# Patient Record
Sex: FEMALE | Race: WHITE | NOT HISPANIC OR LATINO | URBAN - METROPOLITAN AREA
[De-identification: names, ages, dates, MRNs, and addresses within clinical notes are randomized per-mention and may not be internally consistent; named-entity substitution may affect disease eponyms.]

---

## 2019-12-19 ENCOUNTER — TRANSCRIBE ORDERS (OUTPATIENT)
Dept: ADMINISTRATIVE | Facility: HOSPITAL | Age: 49
End: 2019-12-19

## 2019-12-19 ENCOUNTER — TELEPHONE (OUTPATIENT)
Dept: SURGICAL ONCOLOGY | Facility: CLINIC | Age: 49
End: 2019-12-19

## 2019-12-19 ENCOUNTER — APPOINTMENT (OUTPATIENT)
Dept: LAB | Facility: HOSPITAL | Age: 49
End: 2019-12-19
Payer: COMMERCIAL

## 2019-12-19 ENCOUNTER — HOSPITAL ENCOUNTER (OUTPATIENT)
Dept: CT IMAGING | Facility: HOSPITAL | Age: 49
Discharge: HOME | End: 2019-12-19
Payer: COMMERCIAL

## 2019-12-19 DIAGNOSIS — R87.9 ABNORMAL TISSUE IN THE UTERUS: ICD-10-CM

## 2019-12-19 DIAGNOSIS — N93.9 VAGINAL BLEEDING: ICD-10-CM

## 2019-12-19 DIAGNOSIS — E34.9 ELEVATED SERUM HCG: ICD-10-CM

## 2019-12-19 DIAGNOSIS — E34.9 ELEVATED SERUM HCG: Primary | ICD-10-CM

## 2019-12-19 LAB
BUN SERPL-MCNC: 15 MG/DL (ref 5–25)
CREAT SERPL-MCNC: 0.77 MG/DL (ref 0.6–1.3)
GFR SERPL CREATININE-BSD FRML MDRD: 91 ML/MIN/1.73SQ M

## 2019-12-19 PROCEDURE — 36415 COLL VENOUS BLD VENIPUNCTURE: CPT

## 2019-12-19 PROCEDURE — 74177 CT ABD & PELVIS W/CONTRAST: CPT

## 2019-12-19 PROCEDURE — 82565 ASSAY OF CREATININE: CPT

## 2019-12-19 PROCEDURE — 71260 CT THORAX DX C+: CPT

## 2019-12-19 PROCEDURE — 84520 ASSAY OF UREA NITROGEN: CPT

## 2019-12-19 RX ADMIN — IOHEXOL 100 ML: 350 INJECTION, SOLUTION INTRAVENOUS at 17:00

## 2019-12-19 RX ADMIN — IOHEXOL 50 ML: 240 INJECTION, SOLUTION INTRATHECAL; INTRAVASCULAR; INTRAVENOUS; ORAL at 17:00

## 2019-12-19 NOTE — TELEPHONE ENCOUNTER
New Patient Encounter    New Patient Intake Form   Patient Details:  Anali Phillips  1970  [id number]    Background Information:  [address] starts by opening a telephone encounter and gathering the following information   Who is calling to schedule? If not self, relationship to patient? self   Referring Provider Tiburcio Saleem   What is the diagnosis? Elevated hcg levels/tissue mass   Is there any prior history of Cancer? No   If yes, please explain    When was the diagnosis? 12/2019   Is patient aware of diagnosis? Yes   Reason for visit? NP DX   Have you had any testing done? If so: when, where? Yes   Are records in EPIC? No  emailed   Was the patient told to bring a disk? no   Scheduling Information:   Preferred [place]:  Any     Requesting Specific Provider? no   Are there any dates/time the patient cannot be seen? no      Miscellaneous: na   After completing the above information, please route to Financial Counselor and the appropriate Nurse Navigator for review

## 2019-12-19 NOTE — PROGRESS NOTES
Patient to be seen as consult urgently on Friday 12/20/19, for abnormal vaginal bleeding, abnormal uterus on ultrasound imaging (with approximately 6 cm mass in the endometrium), as well as elevated HCG  Plan for STAT CT chest, abdomen and pelvis to rule out metastatic disease and assist with treatment planning

## 2019-12-20 ENCOUNTER — HOSPITAL ENCOUNTER (INPATIENT)
Facility: HOSPITAL | Age: 49
LOS: 3 days | Discharge: HOME | DRG: 754 | End: 2019-12-23
Attending: OBSTETRICS & GYNECOLOGY | Admitting: OBSTETRICS & GYNECOLOGY
Payer: COMMERCIAL

## 2019-12-20 ENCOUNTER — APPOINTMENT (INPATIENT)
Dept: RADIOLOGY | Facility: HOSPITAL | Age: 49
DRG: 754 | End: 2019-12-20
Payer: COMMERCIAL

## 2019-12-20 DIAGNOSIS — O01.9 GTD (GESTATIONAL TROPHOBLASTIC DISEASE): Primary | ICD-10-CM

## 2019-12-20 DIAGNOSIS — R91.8 PULMONARY NODULES/LESIONS, MULTIPLE: ICD-10-CM

## 2019-12-20 DIAGNOSIS — F41.9 ANXIETY: ICD-10-CM

## 2019-12-20 PROBLEM — E66.9 OBESITY: Status: ACTIVE | Noted: 2019-12-20

## 2019-12-20 LAB
ABO GROUP BLD: NORMAL
ALBUMIN SERPL BCP-MCNC: 3.5 G/DL (ref 3.5–5)
ALP SERPL-CCNC: 70 U/L (ref 46–116)
ALT SERPL W P-5'-P-CCNC: 39 U/L (ref 12–78)
ANION GAP SERPL CALCULATED.3IONS-SCNC: 6 MMOL/L (ref 4–13)
APTT PPP: 27 SECONDS (ref 23–37)
AST SERPL W P-5'-P-CCNC: 22 U/L (ref 5–45)
B-HCG SERPL-ACNC: ABNORMAL MIU/ML
BASOPHILS # BLD AUTO: 0.03 THOUSANDS/ΜL (ref 0–0.1)
BASOPHILS NFR BLD AUTO: 0 % (ref 0–1)
BILIRUB SERPL-MCNC: 0.67 MG/DL (ref 0.2–1)
BLD GP AB SCN SERPL QL: NEGATIVE
BUN SERPL-MCNC: 15 MG/DL (ref 5–25)
CALCIUM SERPL-MCNC: 9.4 MG/DL (ref 8.3–10.1)
CHLORIDE SERPL-SCNC: 108 MMOL/L (ref 100–108)
CO2 SERPL-SCNC: 24 MMOL/L (ref 21–32)
CREAT SERPL-MCNC: 0.89 MG/DL (ref 0.6–1.3)
EOSINOPHIL # BLD AUTO: 0.12 THOUSAND/ΜL (ref 0–0.61)
EOSINOPHIL NFR BLD AUTO: 1 % (ref 0–6)
ERYTHROCYTE [DISTWIDTH] IN BLOOD BY AUTOMATED COUNT: 14 % (ref 11.6–15.1)
GFR SERPL CREATININE-BSD FRML MDRD: 76 ML/MIN/1.73SQ M
GLUCOSE SERPL-MCNC: 91 MG/DL (ref 65–140)
HCT VFR BLD AUTO: 39 % (ref 34.8–46.1)
HGB BLD-MCNC: 13 G/DL (ref 11.5–15.4)
IMM GRANULOCYTES # BLD AUTO: 0.03 THOUSAND/UL (ref 0–0.2)
IMM GRANULOCYTES NFR BLD AUTO: 0 % (ref 0–2)
INR PPP: 0.99 (ref 0.84–1.19)
LYMPHOCYTES # BLD AUTO: 1.6 THOUSANDS/ΜL (ref 0.6–4.47)
LYMPHOCYTES NFR BLD AUTO: 18 % (ref 14–44)
MAGNESIUM SERPL-MCNC: 2.1 MG/DL (ref 1.6–2.6)
MCH RBC QN AUTO: 29.1 PG (ref 26.8–34.3)
MCHC RBC AUTO-ENTMCNC: 33.3 G/DL (ref 31.4–37.4)
MCV RBC AUTO: 87 FL (ref 82–98)
MONOCYTES # BLD AUTO: 0.41 THOUSAND/ΜL (ref 0.17–1.22)
MONOCYTES NFR BLD AUTO: 5 % (ref 4–12)
NEUTROPHILS # BLD AUTO: 6.79 THOUSANDS/ΜL (ref 1.85–7.62)
NEUTS SEG NFR BLD AUTO: 76 % (ref 43–75)
NRBC BLD AUTO-RTO: 0 /100 WBCS
PHOSPHATE SERPL-MCNC: 3.8 MG/DL (ref 2.7–4.5)
PLATELET # BLD AUTO: 263 THOUSANDS/UL (ref 149–390)
PMV BLD AUTO: 9.6 FL (ref 8.9–12.7)
POTASSIUM SERPL-SCNC: 4.1 MMOL/L (ref 3.5–5.3)
PROT SERPL-MCNC: 6.9 G/DL (ref 6.4–8.2)
PROTHROMBIN TIME: 12.7 SECONDS (ref 11.6–14.5)
RBC # BLD AUTO: 4.47 MILLION/UL (ref 3.81–5.12)
RH BLD: POSITIVE
SODIUM SERPL-SCNC: 138 MMOL/L (ref 136–145)
SPECIMEN EXPIRATION DATE: NORMAL
WBC # BLD AUTO: 8.98 THOUSAND/UL (ref 4.31–10.16)

## 2019-12-20 PROCEDURE — 86900 BLOOD TYPING SEROLOGIC ABO: CPT | Performed by: STUDENT IN AN ORGANIZED HEALTH CARE EDUCATION/TRAINING PROGRAM

## 2019-12-20 PROCEDURE — 86901 BLOOD TYPING SEROLOGIC RH(D): CPT | Performed by: STUDENT IN AN ORGANIZED HEALTH CARE EDUCATION/TRAINING PROGRAM

## 2019-12-20 PROCEDURE — 85610 PROTHROMBIN TIME: CPT | Performed by: STUDENT IN AN ORGANIZED HEALTH CARE EDUCATION/TRAINING PROGRAM

## 2019-12-20 PROCEDURE — 80053 COMPREHEN METABOLIC PANEL: CPT | Performed by: STUDENT IN AN ORGANIZED HEALTH CARE EDUCATION/TRAINING PROGRAM

## 2019-12-20 PROCEDURE — 86850 RBC ANTIBODY SCREEN: CPT | Performed by: STUDENT IN AN ORGANIZED HEALTH CARE EDUCATION/TRAINING PROGRAM

## 2019-12-20 PROCEDURE — 85025 COMPLETE CBC W/AUTO DIFF WBC: CPT | Performed by: STUDENT IN AN ORGANIZED HEALTH CARE EDUCATION/TRAINING PROGRAM

## 2019-12-20 PROCEDURE — 85730 THROMBOPLASTIN TIME PARTIAL: CPT | Performed by: STUDENT IN AN ORGANIZED HEALTH CARE EDUCATION/TRAINING PROGRAM

## 2019-12-20 PROCEDURE — A9585 GADOBUTROL INJECTION: HCPCS | Performed by: OBSTETRICS & GYNECOLOGY

## 2019-12-20 PROCEDURE — 70553 MRI BRAIN STEM W/O & W/DYE: CPT

## 2019-12-20 PROCEDURE — 84702 CHORIONIC GONADOTROPIN TEST: CPT | Performed by: STUDENT IN AN ORGANIZED HEALTH CARE EDUCATION/TRAINING PROGRAM

## 2019-12-20 PROCEDURE — 84100 ASSAY OF PHOSPHORUS: CPT | Performed by: STUDENT IN AN ORGANIZED HEALTH CARE EDUCATION/TRAINING PROGRAM

## 2019-12-20 PROCEDURE — 99221 1ST HOSP IP/OBS SF/LOW 40: CPT | Performed by: OBSTETRICS & GYNECOLOGY

## 2019-12-20 PROCEDURE — 83735 ASSAY OF MAGNESIUM: CPT | Performed by: STUDENT IN AN ORGANIZED HEALTH CARE EDUCATION/TRAINING PROGRAM

## 2019-12-20 RX ORDER — ONDANSETRON 2 MG/ML
4 INJECTION INTRAMUSCULAR; INTRAVENOUS EVERY 6 HOURS PRN
Status: DISCONTINUED | OUTPATIENT
Start: 2019-12-20 | End: 2019-12-23 | Stop reason: HOSPADM

## 2019-12-20 RX ORDER — SENNOSIDES 8.6 MG
1 TABLET ORAL
Status: DISCONTINUED | OUTPATIENT
Start: 2019-12-20 | End: 2019-12-23 | Stop reason: HOSPADM

## 2019-12-20 RX ORDER — DOCUSATE SODIUM 100 MG/1
100 CAPSULE, LIQUID FILLED ORAL 2 TIMES DAILY
Status: DISCONTINUED | OUTPATIENT
Start: 2019-12-21 | End: 2019-12-23 | Stop reason: HOSPADM

## 2019-12-20 RX ORDER — IBUPROFEN 600 MG/1
600 TABLET ORAL EVERY 6 HOURS PRN
Status: DISCONTINUED | OUTPATIENT
Start: 2019-12-20 | End: 2019-12-20

## 2019-12-20 RX ORDER — ACETAMINOPHEN 325 MG/1
650 TABLET ORAL EVERY 6 HOURS PRN
Status: DISCONTINUED | OUTPATIENT
Start: 2019-12-20 | End: 2019-12-20

## 2019-12-20 RX ORDER — POLYETHYLENE GLYCOL 3350 17 G/17G
17 POWDER, FOR SOLUTION ORAL DAILY
Status: DISCONTINUED | OUTPATIENT
Start: 2019-12-21 | End: 2019-12-23 | Stop reason: HOSPADM

## 2019-12-20 RX ORDER — DEXTROSE AND SODIUM CHLORIDE 5; .45 G/100ML; G/100ML
125 INJECTION, SOLUTION INTRAVENOUS CONTINUOUS
Status: DISCONTINUED | OUTPATIENT
Start: 2019-12-20 | End: 2019-12-20

## 2019-12-20 RX ADMIN — GADOBUTROL 14 ML: 604.72 INJECTION INTRAVENOUS at 21:06

## 2019-12-20 NOTE — QUICK NOTE
At this point, Madisyn Kendall does not require inpatient port placement, especially if this placement will delay discharge  At this time, PICC line may be placed to start emergent chemotherapy if necessary  Butch et  al  (CVIR 2019) showed in a multivariate retrospective analysis of nearly 6000 ports that inpatient status was the sole factor responsible for increased infection when accounting for many other variables, with close to a 5x increased risk of infection  Aliya Parkinson  (JVIR 2013) in a cohort of approximately 2000 port placements, found that there was nearly a 12x increased risk of port infections placed in the inpatient setting compared to the outpatient setting for all indications  Tomy Costa  al  (JVIR 2014) in a cohort of approximately 4000 port placements, also showed an increased prevalence of port infections within the first thirty days of placement specifically within the inpatient population  For these reasons, we will make every attempt to expedite an outpatient port placement  I will email IR scheduling who will reach out to patient for outpatient port placement time and place  Thank you for allowing me to participate in the care of Madisyn Kendall  Please don't hesitate to call, text, email, or TigerText with any questions

## 2019-12-20 NOTE — PLAN OF CARE
Problem: PAIN - ADULT  Goal: Verbalizes/displays adequate comfort level or baseline comfort level  Description  Interventions:  - Encourage patient to monitor pain and request assistance  - Assess pain using appropriate pain scale  - Administer analgesics based on type and severity of pain and evaluate response  - Implement non-pharmacological measures as appropriate and evaluate response  - Consider cultural and social influences on pain and pain management  - Notify physician/advanced practitioner if interventions unsuccessful or patient reports new pain  Outcome: Progressing     Problem: DISCHARGE PLANNING  Goal: Discharge to home or other facility with appropriate resources  Description  INTERVENTIONS:  - Identify barriers to discharge w/patient and caregiver  - Arrange for needed discharge resources and transportation as appropriate  - Identify discharge learning needs (meds, wound care, etc )  - Arrange for interpretive services to assist at discharge as needed  - Refer to Case Management Department for coordinating discharge planning if the patient needs post-hospital services based on physician/advanced practitioner order or complex needs related to functional status, cognitive ability, or social support system  Outcome: Progressing     Problem: Knowledge Deficit  Goal: Patient/family/caregiver demonstrates understanding of disease process, treatment plan, medications, and discharge instructions  Description  Complete learning assessment and assess knowledge base    Interventions:  - Provide teaching at level of understanding  - Provide teaching via preferred learning methods  Outcome: Progressing

## 2019-12-20 NOTE — H&P
History & Physical - GYN Oncology  Melita Devine 52 y o  female MRN: [id number]  Unit/Bed#: [facility] 910-01 Encounter: [id number]      Chief complaint:  "I have a mass in my abdomen"    HPI:  Melita Devine is a 52 y o  [id number] who presents following abdominal mass with pulmonary nodules and elevated bHCG > 506137 consistent with GTD  Felice Rivera reports she has had very regular menses since her ablation 10 years ago and had an episode of heavy vaginal bleeding which prompted her to take a urine pregnancy test that was negative and present for evaluation at her OBGYN office  She was subsequently sent for a transvaginal ultrasound which noted an intrauterine mass and was sent as a direct admission given her concern for advanced GTD  She reports she continues to have vaginal bleeding that is heavier on some days needing to change her pad 3 times  She reports breast tenderness and abdominal cramping she rates a 5/10 that is constant  She denies any light headedness, dizziness, visual changes, parasthesias, weakness, chest pain, shortness of breath or hemoptosis  She has no difficulty voiding or having BM  Felice Rivera has an obstetrical history that is significant for 1  section in  with 2 SAB that were managed surgically by [facility]  and a more recent history in 2016 of a spontaneous  that passed  She was noted to have a bHCG of 584 in 2016 at her ED visit and her uterus showed a complex endometrial lining measuring 7mm with no IUP identified  Per patient she had a documented IUP that size was not equalling to dates prior  She did not follow up per recommendations to trend her bHCG to 0  Felice Rivera denies any significant medical history  She reports a surgical history significant for "partial thyroidectomy" when she was 16, a  section endometrial ablation and 2 D&C surgeries  She reports remote tobacco use "many years ago" socially, but denies any recent smoking   She denies any illegal drug use and reports social alcohol use  Active Problems:  Patient Active Problem List   Diagnosis    GTD (gestational trophoblastic disease)    Obesity     PMH:  Past Medical History:   Diagnosis Date    Obesity      PSH:  Past Surgical History:   Procedure Laterality Date     SECTION  2003    DILATION AND CURETTAGE OF UTERUS      X2    ENDOMETRIAL ABLATION  2009    THYROIDECTOMY, PARTIAL       Social Hx:  Social and rare alcohol use  No current tobacco use  No illicit drug use    OB Hx:  OB History    Para Term  AB Living   4 1 1 0 3 1   SAB TAB Ectopic Multiple Live Births   0 0 0 0 1      # Outcome Date GA Lbr French/2nd Weight Sex Delivery Anes PTL Lv   4 AB            3 AB            2 AB            1 Term                Meds:  Current Facility-Administered Medications on File Prior to Encounter   Medication Dose Route Frequency Provider Last Rate Last Dose    [COMPLETED] iohexol (OMNIPAQUE) 240 MG/ML solution 50 mL  50 mL Oral Once in imaging Trinidad Wynne PA-C   50 mL at 19 1700    [COMPLETED] iohexol (OMNIPAQUE) 350 MG/ML injection (SINGLE-DOSE) 100 mL  100 mL Intravenous Once in imaging Trinidad Wynne PA-C   100 mL at 19 1700     No current outpatient medications on file prior to encounter  Allergies: Allergies   Allergen Reactions    Penicillins Fever       Physical Exam:  /83   Pulse 82   Temp 98 8 °F (37 1 °C)   Resp 18   SpO2 100%     Physical Exam   Constitutional: She is oriented to person, place, and time  She appears well-developed and well-nourished  No distress  Obese  female   HENT:   Head: Normocephalic and atraumatic  Neck: Normal range of motion  Neck supple  Cardiovascular: Normal rate and regular rhythm  Exam reveals no gallop and no friction rub  No murmur heard  Pulmonary/Chest: Effort normal  No respiratory distress  She has no wheezes  She has no rales  Abdominal: Soft  She exhibits mass  There is tenderness   There is no rebound and no guarding  Obese, diffuse mild lower abdominal tenderness   Lymphadenopathy:     She has no cervical adenopathy  Neurological: She is alert and oriented to person, place, and time  Skin: Skin is warm and dry  No rash noted  She is not diaphoretic  No erythema  No pallor  Psychiatric: She has a normal mood and affect  Her behavior is normal  Judgment and thought content normal    Vitals reviewed  Labs:  Recent Results (from the past 6 hour(s))   CBC and differential    Collection Time: 12/20/19  3:27 PM   Result Value Ref Range    WBC 8 98 4 31 - 10 16 Thousand/uL    RBC 4 47 3 81 - 5 12 Million/uL    Hemoglobin 13 0 11 5 - 15 4 g/dL    Hematocrit 39 0 34 8 - 46 1 %    MCV 87 82 - 98 fL    MCH 29 1 26 8 - 34 3 pg    MCHC 33 3 31 4 - 37 4 g/dL    RDW 14 0 11 6 - 15 1 %    MPV 9 6 8 9 - 12 7 fL    Platelets 888 669 - 858 Thousands/uL    nRBC 0 /100 WBCs    Neutrophils Relative 76 (H) 43 - 75 %    Immat GRANS % 0 0 - 2 %    Lymphocytes Relative 18 14 - 44 %    Monocytes Relative 5 4 - 12 %    Eosinophils Relative 1 0 - 6 %    Basophils Relative 0 0 - 1 %    Neutrophils Absolute 6 79 1 85 - 7 62 Thousands/µL    Immature Grans Absolute 0 03 0 00 - 0 20 Thousand/uL    Lymphocytes Absolute 1 60 0 60 - 4 47 Thousands/µL    Monocytes Absolute 0 41 0 17 - 1 22 Thousand/µL    Eosinophils Absolute 0 12 0 00 - 0 61 Thousand/µL    Basophils Absolute 0 03 0 00 - 0 10 Thousands/µL       Assessment/ Plan:   52 y o  [id number] with at least stage III 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  Patient has an 11 1 x 7 6 x 12 1 cm necrotic lesion in uterus  According to the [facility] Prognostic Scoring System of GTD she qualifies as high risk GTD with a score of >7 (currently calculating 14 without imaging of head to assess for brain metastases)  Patient is to be admitted for further staging and to begin treatment  Plan:   1   Gestational trophoblastic disease   - CT C/A/P 12/19/19: 11 1 x 7 5 x 12 1 cm necrotic intrauterine mass, multiple scattered pulmonary nodules bilaterally concerning for metastatic disease, no liver lesions noted  - bHCG 12/17/19 > 400,000   - Elevated TSH at 7 4, T4 1 0   - Will obtain, CBC, CMP to assess liver and kidney function, magnesium, Phos, type and screen and coags   - Will obtain MRI w/ and w/o contrast of brain to r/o brain metastases   - PICC line placement to be completed in AM   - Plan for EMA/CO chemotherapy treatment given the high risk nature of patient's GTD and therefore elevated risk of occurrence- Plan for Etoposide, Methotrexate and Dactinomycin with Leucovorin while inpatient for inpatient  Ultimately she will receive this treatment every 2 weeks until her bHCG returns to normal level with an additional 2 weeks following  Risks, benefits and alternatives discussed with patient this evening by Dr Mena Horton regarding treatment  2  FEN: Regular diet    3   DVT Ppx: Lovenox 40mg SubQ daily, SCDs    Discussed with Dr Sailaja Cedeno MD  12/20/19

## 2019-12-21 PROCEDURE — C1751 CATH, INF, PER/CENT/MIDLINE: HCPCS

## 2019-12-21 PROCEDURE — 36569 INSJ PICC 5 YR+ W/O IMAGING: CPT

## 2019-12-21 PROCEDURE — 99232 SBSQ HOSP IP/OBS MODERATE 35: CPT | Performed by: OBSTETRICS & GYNECOLOGY

## 2019-12-21 PROCEDURE — 02HV33Z INSERTION OF INFUSION DEVICE INTO SUPERIOR VENA CAVA, PERCUTANEOUS APPROACH: ICD-10-PCS | Performed by: OBSTETRICS & GYNECOLOGY

## 2019-12-21 PROCEDURE — 3E04305 INTRODUCTION OF OTHER ANTINEOPLASTIC INTO CENTRAL VEIN, PERCUTANEOUS APPROACH: ICD-10-PCS | Performed by: OBSTETRICS & GYNECOLOGY

## 2019-12-21 RX ORDER — ACETAMINOPHEN 325 MG/1
650 TABLET ORAL EVERY 6 HOURS PRN
Status: DISCONTINUED | OUTPATIENT
Start: 2019-12-21 | End: 2019-12-23 | Stop reason: HOSPADM

## 2019-12-21 RX ORDER — SODIUM CHLORIDE 9 MG/ML
125 INJECTION, SOLUTION INTRAVENOUS CONTINUOUS
Status: DISCONTINUED | OUTPATIENT
Start: 2019-12-21 | End: 2019-12-22

## 2019-12-21 RX ORDER — IBUPROFEN 600 MG/1
600 TABLET ORAL EVERY 6 HOURS PRN
Status: DISCONTINUED | OUTPATIENT
Start: 2019-12-21 | End: 2019-12-23 | Stop reason: HOSPADM

## 2019-12-21 RX ORDER — SODIUM CHLORIDE 9 MG/ML
20 INJECTION, SOLUTION INTRAVENOUS ONCE AS NEEDED
Status: DISCONTINUED | OUTPATIENT
Start: 2019-12-21 | End: 2019-12-23 | Stop reason: HOSPADM

## 2019-12-21 RX ORDER — OXYCODONE HYDROCHLORIDE 5 MG/1
5 TABLET ORAL EVERY 4 HOURS PRN
Status: DISCONTINUED | OUTPATIENT
Start: 2019-12-21 | End: 2019-12-23 | Stop reason: HOSPADM

## 2019-12-21 RX ORDER — PALONOSETRON 0.05 MG/ML
0.25 INJECTION, SOLUTION INTRAVENOUS ONCE
Status: COMPLETED | OUTPATIENT
Start: 2019-12-21 | End: 2019-12-21

## 2019-12-21 RX ORDER — METHOTREXATE 25 MG/ML
100 INJECTION, SOLUTION INTRA-ARTERIAL; INTRAMUSCULAR; INTRAVENOUS ONCE
Status: COMPLETED | OUTPATIENT
Start: 2019-12-21 | End: 2019-12-21

## 2019-12-21 RX ORDER — BISACODYL 10 MG
10 SUPPOSITORY, RECTAL RECTAL DAILY PRN
Status: DISCONTINUED | OUTPATIENT
Start: 2019-12-21 | End: 2019-12-23 | Stop reason: HOSPADM

## 2019-12-21 RX ADMIN — SENNOSIDES 8.6 MG: 8.6 TABLET, FILM COATED ORAL at 21:54

## 2019-12-21 RX ADMIN — POLYETHYLENE GLYCOL 3350 17 G: 17 POWDER, FOR SOLUTION ORAL at 09:43

## 2019-12-21 RX ADMIN — ETOPOSIDE 243 MG: 20 INJECTION, SOLUTION, CONCENTRATE INTRAVENOUS at 14:54

## 2019-12-21 RX ADMIN — ENOXAPARIN SODIUM 40 MG: 40 INJECTION SUBCUTANEOUS at 09:43

## 2019-12-21 RX ADMIN — SODIUM CHLORIDE 125 ML/HR: 0.9 INJECTION, SOLUTION INTRAVENOUS at 11:51

## 2019-12-21 RX ADMIN — IBUPROFEN 600 MG: 600 TABLET ORAL at 09:42

## 2019-12-21 RX ADMIN — DOCUSATE SODIUM 100 MG: 100 CAPSULE, LIQUID FILLED ORAL at 17:15

## 2019-12-21 RX ADMIN — DACTINOMYCIN 500 MCG: 0.5 INJECTION, POWDER, LYOPHILIZED, FOR SOLUTION INTRAVENOUS at 16:00

## 2019-12-21 RX ADMIN — METHOTREXATE 485 MG: 25 INJECTION INTRA-ARTERIAL; INTRAMUSCULAR; INTRATHECAL; INTRAVENOUS at 16:42

## 2019-12-21 RX ADMIN — IBUPROFEN 600 MG: 600 TABLET ORAL at 21:54

## 2019-12-21 RX ADMIN — DOCUSATE SODIUM 100 MG: 100 CAPSULE, LIQUID FILLED ORAL at 09:42

## 2019-12-21 RX ADMIN — METHOTREXATE 242.5 MG: 25 INJECTION INTRA-ARTERIAL; INTRAMUSCULAR; INTRATHECAL; INTRAVENOUS at 16:41

## 2019-12-21 RX ADMIN — SODIUM CHLORIDE 125 ML/HR: 0.9 INJECTION, SOLUTION INTRAVENOUS at 21:02

## 2019-12-21 RX ADMIN — DEXAMETHASONE SODIUM PHOSPHATE 10 MG: 10 INJECTION, SOLUTION INTRAMUSCULAR; INTRAVENOUS at 14:20

## 2019-12-21 RX ADMIN — PALONOSETRON HYDROCHLORIDE 0.25 MG: 0.25 INJECTION, SOLUTION INTRAVENOUS at 14:21

## 2019-12-21 NOTE — PROGRESS NOTES
Gyn Progress Note   Juanita Armando 52 y o  female MRN: [id number]  Unit/Bed#: [facility] 910-01 Encounter: [id number]    Assessment/Plan:  70-year-old female with 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 with 12 cm uterine mass and scattered pulmonary metastatic disease bilaterally  No brain metastases noted on MRI overnight  1  Gestational trophoblastic neoplasia              - CT C/A/P 12/19/19: 11 1 x 7 5 x 12 1 cm necrotic intrauterine mass, multiple scattered pulmonary nodules bilaterally concerning for metastatic disease, no liver lesions noted  - bHCG 12/17/19 > 400,000, bHCG [facility] yesterday > 200,000              - Elevated TSH at 7 4, T4 1 0              - CBC, CMP, Mag, phos and coags within normal limits              - MRI brain 12/21/2019: No evidence of metastatic disease to brain              - PICC line placement to be completed this morning, IR does not recommend port-a- cath placement in hospital as it increases risk of infection               - Plan for EMA/CO chemotherapy treatment given the high risk nature of patient's GTD and therefore elevated risk of occurrence-      Etoposide to be dosed at 100 mg/m^2 IV given on day 1 and 2 of tx     Methotrexate is given at 100 mg/m^2 IV followed by 200 mg/m^2 IV over 12 hours on day 1     Leucovorin 15 mg every 12 hours for 4 doses to be ordered starting 24 hours after methotrexate starts     Actinomycin D 0 5 mg IV on day 1 and 2      2  Elevated blood pressures: Continue to monitor, if persistently elevated consideration to start PO anti-hypertensive  Pain: Motrin/ Tylenol/ Roxicodone 5 mg PO PRN  FEN: Regular diet  DVT Ppx: Lovenox 40mg SubQ daily, SCDs    Disposition:  Continue inpatient management at this time with plans for discharge following treatment    Subjective:   Patient seen at bedside resting comfortably  She reports she slept well overnight denies any complaints this morning    She reports her abdominal discomfort is minimal and her bleeding has tapered down  She denies any chest pain, shortness of breath, nausea, vomiting or calf pain  She reports that it has been awhile since she has had a bowel movement and would like something to help her go to the bathroom  We discussed the findings of her MRI and she expressed relief  We reviewed today's plans of getting a PICC line and starting treatment to which she agrees  /79   Pulse 83   Temp 98 9 °F (37 2 °C)   Resp 21   Ht 5' 6" (1 676 m)   Wt (!) 144 kg (318 lb 3 2 oz)   SpO2 97%   BMI 51 36 kg/m²     Lab Results   Component Value Date    WBC 8 98 12/20/2019    HGB 13 0 12/20/2019    HCT 39 0 12/20/2019    MCV 87 12/20/2019     12/20/2019       Lab Results   Component Value Date    CALCIUM 9 4 12/20/2019    K 4 1 12/20/2019    CO2 24 12/20/2019     12/20/2019    BUN 15 12/20/2019    CREATININE 0 89 12/20/2019       No results found for: POCGLU    I/O last 3 completed shifts: In: 120 [P O :120]  Out: -   No intake/output data recorded  Physical Exam  Physical Exam   Constitutional: She is oriented to person, place, and time  She appears well-developed and well-nourished  No distress  Obese  female   HENT:   Head: Normocephalic and atraumatic  Neck: Normal range of motion  Neck supple  Cardiovascular: Normal rate, regular rhythm and normal heart sounds  Exam reveals no gallop and no friction rub  No murmur heard  Pulmonary/Chest: Effort normal and breath sounds normal  No respiratory distress  She has no wheezes  She has no rales  Abdominal: Soft  She exhibits mass  There is no tenderness  There is no rebound and no guarding  Obese, large palpable lower abdominal mass   Neurological: She is alert and oriented to person, place, and time  Skin: Skin is warm and dry  She is not diaphoretic  Psychiatric: She has a normal mood and affect   Her behavior is normal  Judgment and thought content normal    Vitals reviewed        Kenrick Cole MD   Gyn PGY-3  12/21/2019 3:54 AM

## 2019-12-21 NOTE — UTILIZATION REVIEW
Initial Clinical Review    Admission: Date/Time/Statement: Inpatient Admission Orders (From admission, onward)     Ordered        12/20/19 1428  Inpatient Admission  Once                   Orders Placed This Encounter   Procedures    Inpatient Admission     Standing Status:   Standing     Number of Occurrences:   1     Order Specific Question:   Admitting Physician     Answer:   Shaan Jacinto [[id number]]     Order Specific Question:   Level of Care     Answer:   Med Surg [[id number]]     Order Specific Question:   Estimated length of stay     Answer:   More than 2 Midnights     Order Specific Question:   Certification     Answer:   I certify that inpatient services are medically necessary for this patient for a duration of greater than two midnights  See H&P and MD Progress Notes for additional information about the patient's course of treatment  No chief complaint on file  Assessment/Plan: 51 yo female direct admit  from home w/ abd mass with pulmonary nodules and elevated bHCG > 574002 consistent with GTD, gestational trophoblastic disease )   Cont w/ heavy vaginal bleeding   Admitted IP status monitor labs , MRI brain to eval for mets   PICC for preparation for chemo   PE : abd mass and tenderness     12/20 IR consult   PICC line placed      Temperature Pulse Respirations Blood Pressure SpO2   12/20/19 1441 12/20/19 1441 12/20/19 1441 12/20/19 1441 12/20/19 1441   98 8 °F (37 1 °C) 82 18 154/83 100 %      Temp src Heart Rate Source Patient Position - Orthostatic VS BP Location FiO2 (%)   -- -- -- -- --             Pain Score       12/20/19 1423       5        Wt Readings from Last 1 Encounters:   12/20/19 (!) 144 kg (318 lb 3 2 oz)     Additional Vital Signs:   12/21/19 08:27:45  99 6 °F (37 6 °C)  97  20  146/70  95  99 %   12/20/19 22:11:05  98 9 °F (37 2 °C)  83  21  147/79  102  97 %       Pertinent Labs/Diagnostic Test Results:   12/19 CT chest Large heterogeneous mass within the uterus, as described above  Findings are highly suspicious for neoplasm including endometrial carcinoma or possibly a uterine leiomyosarcoma    Multiple scattered pulmonary nodules throughout the lungs bilaterally suspicious for metastatic disease    Skin thickening along the left greater than right breast   Correlate with mammography    12/20 MRI brain - No evidence of metastatic disease  No acute intracranial process    Results from last 7 days   Lab Units 12/20/19  1527   WBC Thousand/uL 8 98   HEMOGLOBIN g/dL 13 0   HEMATOCRIT % 39 0   PLATELETS Thousands/uL 263   NEUTROS ABS Thousands/µL 6 79     Results from last 7 days   Lab Units 12/20/19  1527 12/19/19  1533   SODIUM mmol/L 138  --    POTASSIUM mmol/L 4 1  --    CHLORIDE mmol/L 108  --    CO2 mmol/L 24  --    ANION GAP mmol/L 6  --    BUN mg/dL 15 15   CREATININE mg/dL 0 89 0 77   EGFR ml/min/1 73sq m 76 91   CALCIUM mg/dL 9 4  --    MAGNESIUM mg/dL 2 1  --    PHOSPHORUS mg/dL 3 8  --      Results from last 7 days   Lab Units 12/20/19  1527   AST U/L 22   ALT U/L 39   ALK PHOS U/L 70   TOTAL PROTEIN g/dL 6 9   ALBUMIN g/dL 3 5   TOTAL BILIRUBIN mg/dL 0 67     Results from last 7 days   Lab Units 12/20/19  1527   GLUCOSE RANDOM mg/dL 91     Results from last 7 days   Lab Units 12/20/19  1527   PROTIME seconds 12 7   INR  0 99   PTT seconds 27     ED Treatment:   Medication Administration - No Administrations Displayed (No Start Event Found)     None        Past Medical History:   Diagnosis Date    Obesity      Present on Admission:   GTD (gestational trophoblastic disease)      Admitting Diagnosis: GTD (gestational trophoblastic disease) [O01 9]  Age/Sex: 52 y o  female  Admission Orders:  Scheduled Medications:    Medications:  DACTINomycin (COSMEGEN) IVPB 500 mcg Intravenous Once   dexamethasone (DECADRON) IVPB (ONC use only) 10 mg Intravenous Once   docusate sodium 100 mg Oral BID   enoxaparin 40 mg Subcutaneous Q24H [address]   etoposide (TOPOSAR) chemo infusion 100 mg/m2 (Treatment Plan Recorded) Intravenous Once   methotrexate (PF) continuous infusion 200 mg/m2 (Treatment Plan Recorded) Intravenous Once   methotrexate 100 mg/m2 (Treatment Plan Recorded) Intravenous Once   palonosetron 0 25 mg Intravenous Once   polyethylene glycol 17 g Oral Daily     Continuous IV Infusions:    sodium chloride 125 mL/hr Intravenous Continuous     PRN Meds:    acetaminophen 650 mg Oral Q6H PRN   bisacodyl 10 mg Rectal Daily PRN   ibuprofen 600 mg Oral Q6H PRN   ondansetron 4 mg Intravenous Q6H PRN   oxyCODONE 5 mg Oral Q4H PRN   senna 1 tablet Oral HS PRN   sodium chloride 20 mL/hr Intravenous Once PRN     PICC   Reg diet   IP CONSULT TO PICC TEAM    Network Utilization Review Department  [email or web address]  ATTENTION: Please call with any questions or concerns to [phone] and carefully listen to the prompts so that you are directed to the right person  All voicemails are confidential   Sully Neri all requests for admission clinical reviews, approved or denied determinations and any other requests to dedicated fax number below belonging to the campus where the patient is receiving treatment   List of dedicated fax numbers for the Facilities:  [address] [place] [address] DENIALS (Administrative/Medical Necessity) [phone]   [address] (Maternity/NICU/Pediatrics) [phone]   Sondra Simental [phone]   Pratibha Cotton [phone]   Laura Levine [phone]   Siddharth Cerna [phone]   [address] [phone]   [facility]  [phone]   [address] [facility] [address]  [address] [facility] [address] [facility] [phone]

## 2019-12-21 NOTE — PROCEDURES
Insert PICC line  Date/Time: 12/21/2019 8:57 AM  Performed by: Deuce Jones RN  Authorized by: Mathis Blizzard, MD     Patient location:  Bedside  Consent:     Consent obtained:  Written (obtained by physician)  Universal protocol:     Procedure explained and questions answered to patient or proxy's satisfaction: yes      Relevant documents present and verified: yes      Test results available and properly labeled: yes      Radiology Images displayed and confirmed  If images not available, report reviewed: yes      Required blood products, implants, devices, and special equipment available: yes      Site/side marked: yes      Immediately prior to procedure, a time out was called: yes      Patient identity confirmed:  Verbally with patient and arm band  Pre-procedure details:     Hand hygiene: Hand hygiene performed prior to insertion      Sterile barrier technique: All elements of maximal sterile technique followed      Skin preparation:  ChloraPrep    Skin preparation agent: Skin preparation agent completely dried prior to procedure    Indications:     PICC line indications: chemotherapy    Anesthesia (see MAR for exact dosages):      Anesthesia method:  Local infiltration    Local anesthetic:  Lidocaine 1% w/o epi (3ml)  Procedure details:     Location:  Basilic    Vessel type: vein      Laterality:  Right    Approach: percutaneous technique used      Patient position:  Flat    Catheter size:  5 Fr    Landmarks identified: yes      Ultrasound guidance: yes      Sterile ultrasound techniques: Sterile gel and sterile probe covers were used      Number of attempts:  1    Successful placement: yes      Vessel of catheter tip end:  Sherlock 3CG confirmed (SVC)    Total catheter length (cm):  49    Catheter out on skin (cm):  1    Max flow rate:  999ml/hr    Arm circumference:  39  Post-procedure details:     Post-procedure:  Dressing applied and securement device placed    Assessment:  Blood return through all ports and free fluid flow    Patient tolerance of procedure:   Tolerated well, no immediate complications    Observer: Yes      Observer name:  DARWIN Richards, inf Tech

## 2019-12-22 LAB
ANION GAP SERPL CALCULATED.3IONS-SCNC: 5 MMOL/L (ref 4–13)
BUN SERPL-MCNC: 12 MG/DL (ref 5–25)
CALCIUM SERPL-MCNC: 8.6 MG/DL (ref 8.3–10.1)
CHLORIDE SERPL-SCNC: 113 MMOL/L (ref 100–108)
CO2 SERPL-SCNC: 22 MMOL/L (ref 21–32)
CREAT SERPL-MCNC: 0.82 MG/DL (ref 0.6–1.3)
ERYTHROCYTE [DISTWIDTH] IN BLOOD BY AUTOMATED COUNT: 13.6 % (ref 11.6–15.1)
GFR SERPL CREATININE-BSD FRML MDRD: 84 ML/MIN/1.73SQ M
GLUCOSE SERPL-MCNC: 100 MG/DL (ref 65–140)
HCT VFR BLD AUTO: 35.6 % (ref 34.8–46.1)
HGB BLD-MCNC: 11.7 G/DL (ref 11.5–15.4)
MCH RBC QN AUTO: 28.8 PG (ref 26.8–34.3)
MCHC RBC AUTO-ENTMCNC: 32.9 G/DL (ref 31.4–37.4)
MCV RBC AUTO: 88 FL (ref 82–98)
PLATELET # BLD AUTO: 232 THOUSANDS/UL (ref 149–390)
PMV BLD AUTO: 10.1 FL (ref 8.9–12.7)
POTASSIUM SERPL-SCNC: 4.1 MMOL/L (ref 3.5–5.3)
RBC # BLD AUTO: 4.06 MILLION/UL (ref 3.81–5.12)
SODIUM SERPL-SCNC: 140 MMOL/L (ref 136–145)
WBC # BLD AUTO: 10.37 THOUSAND/UL (ref 4.31–10.16)

## 2019-12-22 PROCEDURE — NC001 PR NO CHARGE: Performed by: OBSTETRICS & GYNECOLOGY

## 2019-12-22 PROCEDURE — 99232 SBSQ HOSP IP/OBS MODERATE 35: CPT | Performed by: OBSTETRICS & GYNECOLOGY

## 2019-12-22 PROCEDURE — 80048 BASIC METABOLIC PNL TOTAL CA: CPT | Performed by: STUDENT IN AN ORGANIZED HEALTH CARE EDUCATION/TRAINING PROGRAM

## 2019-12-22 PROCEDURE — 85027 COMPLETE CBC AUTOMATED: CPT | Performed by: STUDENT IN AN ORGANIZED HEALTH CARE EDUCATION/TRAINING PROGRAM

## 2019-12-22 RX ORDER — SODIUM CHLORIDE 450 MG/100ML
125 INJECTION, SOLUTION INTRAVENOUS CONTINUOUS
Status: DISCONTINUED | OUTPATIENT
Start: 2019-12-22 | End: 2019-12-23 | Stop reason: HOSPADM

## 2019-12-22 RX ORDER — LORAZEPAM 0.5 MG/1
0.5 TABLET ORAL EVERY 8 HOURS PRN
Status: DISCONTINUED | OUTPATIENT
Start: 2019-12-22 | End: 2019-12-23 | Stop reason: HOSPADM

## 2019-12-22 RX ORDER — PALONOSETRON 0.05 MG/ML
0.25 INJECTION, SOLUTION INTRAVENOUS ONCE
Status: COMPLETED | OUTPATIENT
Start: 2019-12-22 | End: 2019-12-22

## 2019-12-22 RX ORDER — SIMETHICONE 80 MG
80 TABLET,CHEWABLE ORAL EVERY 6 HOURS PRN
Status: DISCONTINUED | OUTPATIENT
Start: 2019-12-22 | End: 2019-12-23 | Stop reason: HOSPADM

## 2019-12-22 RX ORDER — LEUCOVORIN CALCIUM 5 MG/1
15 TABLET ORAL EVERY 12 HOURS
Status: DISCONTINUED | OUTPATIENT
Start: 2019-12-22 | End: 2019-12-23 | Stop reason: HOSPADM

## 2019-12-22 RX ORDER — LEUCOVORIN CALCIUM 5 MG/1
15 TABLET ORAL EVERY 12 HOURS
Status: DISCONTINUED | OUTPATIENT
Start: 2019-12-22 | End: 2019-12-22

## 2019-12-22 RX ORDER — SODIUM CHLORIDE 9 MG/ML
20 INJECTION, SOLUTION INTRAVENOUS ONCE AS NEEDED
Status: DISCONTINUED | OUTPATIENT
Start: 2019-12-22 | End: 2019-12-23 | Stop reason: HOSPADM

## 2019-12-22 RX ADMIN — OXYCODONE HYDROCHLORIDE 5 MG: 5 TABLET ORAL at 23:08

## 2019-12-22 RX ADMIN — LORAZEPAM 0.5 MG: 0.5 TABLET ORAL at 22:25

## 2019-12-22 RX ADMIN — LEUCOVORIN CALCIUM 15 MG: 5 TABLET ORAL at 16:27

## 2019-12-22 RX ADMIN — PALONOSETRON HYDROCHLORIDE 0.25 MG: 0.25 INJECTION, SOLUTION INTRAVENOUS at 10:52

## 2019-12-22 RX ADMIN — SODIUM CHLORIDE 125 ML/HR: 0.9 INJECTION, SOLUTION INTRAVENOUS at 04:41

## 2019-12-22 RX ADMIN — ETOPOSIDE 243 MG: 20 INJECTION, SOLUTION, CONCENTRATE INTRAVENOUS at 11:39

## 2019-12-22 RX ADMIN — DEXAMETHASONE SODIUM PHOSPHATE 10 MG: 10 INJECTION, SOLUTION INTRAMUSCULAR; INTRAVENOUS at 10:52

## 2019-12-22 RX ADMIN — DACTINOMYCIN 500 MCG: 0.5 INJECTION, POWDER, LYOPHILIZED, FOR SOLUTION INTRAVENOUS at 12:50

## 2019-12-22 RX ADMIN — MAGNESIUM HYDROXIDE 30 ML: 400 SUSPENSION ORAL at 23:20

## 2019-12-22 RX ADMIN — OXYCODONE HYDROCHLORIDE 5 MG: 5 TABLET ORAL at 04:41

## 2019-12-22 RX ADMIN — ACETAMINOPHEN 650 MG: 325 TABLET ORAL at 07:35

## 2019-12-22 RX ADMIN — SODIUM CHLORIDE 125 ML/HR: 0.45 INJECTION, SOLUTION INTRAVENOUS at 18:36

## 2019-12-22 RX ADMIN — POLYETHYLENE GLYCOL 3350 17 G: 17 POWDER, FOR SOLUTION ORAL at 10:16

## 2019-12-22 RX ADMIN — DOCUSATE SODIUM 100 MG: 100 CAPSULE, LIQUID FILLED ORAL at 10:17

## 2019-12-22 RX ADMIN — DOCUSATE SODIUM 100 MG: 100 CAPSULE, LIQUID FILLED ORAL at 16:30

## 2019-12-22 RX ADMIN — ENOXAPARIN SODIUM 40 MG: 40 INJECTION SUBCUTANEOUS at 10:16

## 2019-12-22 RX ADMIN — SODIUM CHLORIDE 125 ML/HR: 0.45 INJECTION, SOLUTION INTRAVENOUS at 10:16

## 2019-12-22 RX ADMIN — SIMETHICONE CHEW TAB 80 MG 80 MG: 80 TABLET ORAL at 23:20

## 2019-12-22 RX ADMIN — LORAZEPAM 0.5 MG: 0.5 TABLET ORAL at 11:52

## 2019-12-22 NOTE — DISCHARGE SUMMARY
Discharge Summary - Gynecologic Oncology  David Hills 52 y o  female MRN: [id number]  Unit/Bed#: [facility] Encounter: [id number]    Admission Date: 12/20/2019   Discharge Date: 12/23/2019    Admitting Physician: Dr Oscar Gillespie  Discharge Attending: Dr Oscar Gillespie    Admitting Diagnosis:   GTD (gestational trophoblastic disease) [O01 9]    Discharge Diagnosis:   GTD (gestational trophoblastic disease) [O01 9]    Hospital Course: David Hills is a 51 yo [id number] who was admitted to the hospital on 12/20/2019 for treatment of gestational trophoblastic disease  Patient had an episode of abnormal uterine bleeding with an antecedent miscarriage approximately 4 years ago and on imaging was found to have a 12 cm uterine mass, pulmonary metastatic disease and a beta HCG greater than 400,000 consistent with gestational trophoblastic disease  An MRI of had confirmed no metastatic lesions to the brain  Gestational trophoblastic disease, stage III with risk factor calculated at 14 put her at high risk category and patient was started on EMA/CO Chemotherapy  She began EMA on 12/21-12/22/19 with leucovorin given 24 hours following methotrexate  She overall tolerated her chemotherapy well during her initial cycle  Patient was started on Lexapro 10mg PO daily for anxiety during her hospital stay  Ms Lucille Persaud was discharged on Hospital day #4 with plans for outpatient chemotherapy to complete cycle #1 of EMA/CO on 12/31/2019  She will be sent home w/ Rx for Leucovorin with instruction on how to complete course at this time  We will plan for readmission in 2 weeks for repeat lab work including bHCG, CBC, CMP and Mag levels and to begin her second cycle of EMA/CO  Patient received Depo provera prior to discharge as she desired to begin contraception       Lab Results:   Lab Results   Component Value Date    WBC 10 37 (H) 12/22/2019    HGB 11 7 12/22/2019    HCT 35 6 12/22/2019    MCV 88 12/22/2019     12/22/2019     Lab Results   Component Value Date    CALCIUM 8 6 12/22/2019    K 4 1 12/22/2019    CO2 22 12/22/2019     (H) 12/22/2019    BUN 12 12/22/2019    CREATININE 0 82 12/22/2019     No results found for: POCGLU  Lab Results   Component Value Date    PTT 27 12/20/2019     Lab Results   Component Value Date    INR 0 99 12/20/2019    PROTIME 12 7 12/20/2019     Imaging:  CT C/A/P 12/19/19: Large heterogenous mass in uterus measuring 11 1x 7 6 x 12 1 cm with multiple scattered pulmonary nodules through lungs bilaterally  MRI Brain 12/20/19: No evidence of metastatic disease to brain  Condition at Discharge: good     Discharge Medications: See after visit summary for reconciled discharge medications provided to patient and family  Discharge instructions/Information to patient and family: See after visit summary for information provided to patient and family  Provisions for Follow-Up Care: See after visit summary for information related to follow-up care and any pertinent home health orders  Disposition: Home    Planned Readmission: Yes, patient will need inpatient chemotherapy scheduled     Code Status: Full- code    Discharge Statement   I spent 30 minutes discharging the patient  This time was spent on the day of discharge  I had direct contact with the patient on the day of discharge  Additional documentation is required if more than 30 minutes were spent on discharge       Ronnie Blair MD  12/23/19

## 2019-12-22 NOTE — PROGRESS NOTES
Gyn Progress Note   Connor Davis 52 y o  female MRN: [id number]  Unit/Bed#: [facility] 910-01 Encounter: [id number]    Assessment/Plan:  15-year-old female with 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 with 12 cm uterine mass and scattered pulmonary metastatic disease bilaterally  No brain metastases noted on MRI overnight  Hospital Day #3  1  Gestational trophoblastic neoplasia              - CT C/A/P 12/19/19: 11 1 x 7 5 x 12 1 cm necrotic intrauterine mass, multiple scattered pulmonary nodules bilaterally concerning for metastatic disease, no liver lesions noted  - bHCG 12/17/19 > 400,000, bHCG [facility] yesterday > 200,000              - Elevated TSH at 7 4, T4 1 0              - CBC, CMP, Mag, phos and coags within normal limits              - MRI brain 12/21/2019: No evidence of metastatic disease to brain              - S/p PICC line placement, for port placement in an outpatient setting              - Day #2 of EMA/CO therapy     Etoposide to be dosed at 100 mg/m^2 IV given on day 1 and 2 of tx     Methotrexate is given at 100 mg/m^2 IV followed by 200 mg/m^2 IV over 12 hours on day 1     Leucovorin 15 mg every 12 hours for 4 doses to be started today 24 hours after methotrexate start time     Actinomycin D 0 5 mg IV on day 1 and 2      2  Elevated blood pressures:  Improved, continue to monitor     Pain: Motrin/ Tylenol/ Roxicodone 5 mg PO PRN  FEN: Regular diet  DVT Ppx: Lovenox 40mg SubQ daily, SCDs    Disposition:  Continue inpatient management at this time with plans for discharge following treatment    Subjective:   Patient seen at bedside resting comfortably  She states she did not sleep all overnight because she was up every hour voiding secondary to IV fluids  She additionally reports some feelings of anxiety overnight and is agreeable to try an anti antianxiolytic  She denies any chest pain or shortness of breath, nausea, vomiting    She had a small bowel movement yesterday  /72   Pulse 90   Temp 98 4 °F (36 9 °C)   Resp 18   Ht 5' 6" (1 676 m)   Wt (!) 144 kg (317 lb 14 5 oz)   SpO2 97%   BMI 51 31 kg/m²     Lab Results   Component Value Date    WBC 10 37 (H) 12/22/2019    HGB 11 7 12/22/2019    HCT 35 6 12/22/2019    MCV 88 12/22/2019     12/22/2019       Lab Results   Component Value Date    CALCIUM 8 6 12/22/2019    K 4 1 12/22/2019    CO2 22 12/22/2019     (H) 12/22/2019    BUN 12 12/22/2019    CREATININE 0 82 12/22/2019       No results found for: POCGLU    I/O last 3 completed shifts: In: 3476 8 [P O :1440; I V :1326 8; IV RHTBIZDMX:998]  Out: 6707 [Urine:2950]  I/O this shift:  In: 300 [P O :300]  Out: 500 [Urine:500]      Physical Exam  Physical Exam   Constitutional: She is oriented to person, place, and time  She appears well-developed and well-nourished  No distress  Obese  female  HENT:   Head: Normocephalic and atraumatic  Neck: Normal range of motion  Neck supple  Cardiovascular: Normal rate, regular rhythm and normal heart sounds  Exam reveals no gallop and no friction rub  No murmur heard  Pulmonary/Chest: Effort normal and breath sounds normal  No respiratory distress  She has no wheezes  She has no rales  Abdominal: Soft  She exhibits mass  There is no tenderness  There is no rebound and no guarding  Mild tenderness to palpation   Genitourinary: Vagina normal    Musculoskeletal:   PICC line in place in her right upper extremity, no erythema or warmth noted   Neurological: She is alert and oriented to person, place, and time  Skin: Skin is warm and dry  She is not diaphoretic  Psychiatric: She has a normal mood and affect  Her behavior is normal  Judgment and thought content normal    Vitals reviewed        Kenrick Cole MD   Gyn PGY-3  12/22/2019 8:47 AM

## 2019-12-22 NOTE — SOCIAL WORK
Met with pt and discussed role of CM  Pt lives with her 17 yo son in a 2nd floor apt with flight of stairs at entrance  Pt is independent in ADLs  No DME or prior C  Preference for pharmacy is [facility] in [place], [place]  Pt has hx depression--no inpt psych admissions  No D&A tx hx  PCP- Kalyani Sher MD ([phone])  No POA  Main contact: DeboraherAlcarlos Ayers ([phone])  CM reviewed d/c planning process including the following: identifying help at home, patient preference for d/c planning needs, Discharge Lounge, [facility] Meds to Bed program, availability of treatment team to discuss questions or concerns patient and/or family may have regarding understanding medications and recognizing signs and symptoms once discharged  CM also encouraged patient to follow up with all recommended appointments after discharge  Patient advised of importance for patient and family to participate in managing patients medical well being

## 2019-12-23 VITALS
SYSTOLIC BLOOD PRESSURE: 150 MMHG | OXYGEN SATURATION: 95 % | DIASTOLIC BLOOD PRESSURE: 86 MMHG | BODY MASS INDEX: 47.09 KG/M2 | TEMPERATURE: 98.4 F | HEIGHT: 66 IN | RESPIRATION RATE: 20 BRPM | HEART RATE: 89 BPM | WEIGHT: 293 LBS

## 2019-12-23 DIAGNOSIS — O01.9 GTD (GESTATIONAL TROPHOBLASTIC DISEASE): Primary | ICD-10-CM

## 2019-12-23 DIAGNOSIS — C58 MOLAR PREGNANCY WITH CHORIOCARCINOMA (HCC): ICD-10-CM

## 2019-12-23 DIAGNOSIS — O01.9 GTD (GESTATIONAL TROPHOBLASTIC DISEASE): ICD-10-CM

## 2019-12-23 DIAGNOSIS — O02.89 MOLAR PREGNANCY WITH CHORIOCARCINOMA (HCC): ICD-10-CM

## 2019-12-23 PROBLEM — F41.9 ANXIETY: Status: ACTIVE | Noted: 2019-12-23

## 2019-12-23 LAB
ANION GAP SERPL CALCULATED.3IONS-SCNC: 5 MMOL/L (ref 4–13)
BUN SERPL-MCNC: 16 MG/DL (ref 5–25)
CALCIUM SERPL-MCNC: 8.5 MG/DL (ref 8.3–10.1)
CHLORIDE SERPL-SCNC: 115 MMOL/L (ref 100–108)
CO2 SERPL-SCNC: 21 MMOL/L (ref 21–32)
CREAT SERPL-MCNC: 1.03 MG/DL (ref 0.6–1.3)
ERYTHROCYTE [DISTWIDTH] IN BLOOD BY AUTOMATED COUNT: 14.2 % (ref 11.6–15.1)
GFR SERPL CREATININE-BSD FRML MDRD: 64 ML/MIN/1.73SQ M
GLUCOSE SERPL-MCNC: 84 MG/DL (ref 65–140)
HCT VFR BLD AUTO: 34.7 % (ref 34.8–46.1)
HGB BLD-MCNC: 11.5 G/DL (ref 11.5–15.4)
MCH RBC QN AUTO: 29.3 PG (ref 26.8–34.3)
MCHC RBC AUTO-ENTMCNC: 33.1 G/DL (ref 31.4–37.4)
MCV RBC AUTO: 89 FL (ref 82–98)
PLATELET # BLD AUTO: 217 THOUSANDS/UL (ref 149–390)
PMV BLD AUTO: 9.8 FL (ref 8.9–12.7)
POTASSIUM SERPL-SCNC: 3.9 MMOL/L (ref 3.5–5.3)
RBC # BLD AUTO: 3.92 MILLION/UL (ref 3.81–5.12)
SODIUM SERPL-SCNC: 141 MMOL/L (ref 136–145)
WBC # BLD AUTO: 10.34 THOUSAND/UL (ref 4.31–10.16)

## 2019-12-23 PROCEDURE — 99238 HOSP IP/OBS DSCHRG MGMT 30/<: CPT | Performed by: OBSTETRICS & GYNECOLOGY

## 2019-12-23 PROCEDURE — 85027 COMPLETE CBC AUTOMATED: CPT | Performed by: STUDENT IN AN ORGANIZED HEALTH CARE EDUCATION/TRAINING PROGRAM

## 2019-12-23 PROCEDURE — 80048 BASIC METABOLIC PNL TOTAL CA: CPT | Performed by: STUDENT IN AN ORGANIZED HEALTH CARE EDUCATION/TRAINING PROGRAM

## 2019-12-23 RX ORDER — LORAZEPAM 1 MG/1
1 TABLET ORAL EVERY 8 HOURS PRN
Qty: 30 TABLET | Refills: 0 | Status: SHIPPED | OUTPATIENT
Start: 2019-12-23 | End: 2020-04-02 | Stop reason: SDUPTHER

## 2019-12-23 RX ORDER — OXYCODONE HYDROCHLORIDE 5 MG/1
5 TABLET ORAL EVERY 6 HOURS PRN
Qty: 30 TABLET | Refills: 0 | Status: SHIPPED | OUTPATIENT
Start: 2019-12-23 | End: 2020-01-02

## 2019-12-23 RX ORDER — SODIUM CHLORIDE 9 MG/ML
5 INJECTION INTRAVENOUS DAILY
Qty: 30 SYRINGE | Refills: 0 | Status: SHIPPED | OUTPATIENT
Start: 2019-12-23 | End: 2019-12-23 | Stop reason: HOSPADM

## 2019-12-23 RX ORDER — MEDROXYPROGESTERONE ACETATE 150 MG/ML
150 INJECTION, SUSPENSION INTRAMUSCULAR ONCE
Status: COMPLETED | OUTPATIENT
Start: 2019-12-23 | End: 2019-12-23

## 2019-12-23 RX ORDER — ACETAMINOPHEN 325 MG/1
650 TABLET ORAL EVERY 6 HOURS PRN
Qty: 30 TABLET | Refills: 0
Start: 2019-12-23 | End: 2020-05-06

## 2019-12-23 RX ORDER — MAGNESIUM CARB/ALUMINUM HYDROX 105-160MG
296 TABLET,CHEWABLE ORAL ONCE
Status: COMPLETED | OUTPATIENT
Start: 2019-12-23 | End: 2019-12-23

## 2019-12-23 RX ORDER — DOCUSATE SODIUM 100 MG/1
100 CAPSULE, LIQUID FILLED ORAL 2 TIMES DAILY
Qty: 30 CAPSULE | Refills: 2 | Status: SHIPPED | OUTPATIENT
Start: 2019-12-23 | End: 2020-04-02 | Stop reason: SDUPTHER

## 2019-12-23 RX ORDER — SIMETHICONE 80 MG
80 TABLET,CHEWABLE ORAL EVERY 6 HOURS PRN
Qty: 30 TABLET | Refills: 0 | Status: SHIPPED | OUTPATIENT
Start: 2019-12-23 | End: 2020-05-06

## 2019-12-23 RX ORDER — LEUCOVORIN CALCIUM 10 MG/1
15 TABLET ORAL EVERY 12 HOURS
Qty: 3 TABLET | Refills: 0 | Status: SHIPPED | OUTPATIENT
Start: 2019-12-23 | End: 2020-01-08 | Stop reason: HOSPADM

## 2019-12-23 RX ORDER — IBUPROFEN 600 MG/1
600 TABLET ORAL EVERY 6 HOURS PRN
Qty: 30 TABLET | Refills: 3 | Status: SHIPPED | OUTPATIENT
Start: 2019-12-23 | End: 2020-05-06

## 2019-12-23 RX ORDER — ESCITALOPRAM OXALATE 10 MG/1
10 TABLET ORAL DAILY
Qty: 30 TABLET | Refills: 3 | Status: ON HOLD | OUTPATIENT
Start: 2019-12-23 | End: 2020-02-18

## 2019-12-23 RX ORDER — LEUCOVORIN CALCIUM 15 MG/1
15 TABLET ORAL EVERY 12 HOURS
Qty: 16 TABLET | Refills: 4 | Status: SHIPPED | OUTPATIENT
Start: 2019-12-23 | End: 2020-05-06

## 2019-12-23 RX ORDER — POLYETHYLENE GLYCOL 3350 17 G/17G
17 POWDER, FOR SOLUTION ORAL DAILY
Qty: 14 EACH | Refills: 0 | Status: SHIPPED | OUTPATIENT
Start: 2019-12-23 | End: 2020-05-06

## 2019-12-23 RX ORDER — ONDANSETRON 4 MG/1
4 TABLET, FILM COATED ORAL EVERY 8 HOURS PRN
Qty: 20 TABLET | Refills: 0 | Status: SHIPPED | OUTPATIENT
Start: 2019-12-23 | End: 2020-05-06

## 2019-12-23 RX ORDER — ESCITALOPRAM OXALATE 10 MG/1
10 TABLET ORAL DAILY
Status: DISCONTINUED | OUTPATIENT
Start: 2019-12-23 | End: 2019-12-23 | Stop reason: HOSPADM

## 2019-12-23 RX ADMIN — MAGNESIUM CITRATE 296 ML: 1.75 LIQUID ORAL at 09:21

## 2019-12-23 RX ADMIN — POLYETHYLENE GLYCOL 3350 17 G: 17 POWDER, FOR SOLUTION ORAL at 09:08

## 2019-12-23 RX ADMIN — IBUPROFEN 600 MG: 600 TABLET ORAL at 03:12

## 2019-12-23 RX ADMIN — DOCUSATE SODIUM 100 MG: 100 CAPSULE, LIQUID FILLED ORAL at 09:07

## 2019-12-23 RX ADMIN — MEDROXYPROGESTERONE ACETATE 150 MG: 150 INJECTION, SUSPENSION INTRAMUSCULAR at 09:21

## 2019-12-23 RX ADMIN — SODIUM CHLORIDE 125 ML/HR: 0.45 INJECTION, SOLUTION INTRAVENOUS at 03:07

## 2019-12-23 RX ADMIN — ENOXAPARIN SODIUM 40 MG: 40 INJECTION SUBCUTANEOUS at 09:07

## 2019-12-23 RX ADMIN — IBUPROFEN 600 MG: 600 TABLET ORAL at 09:21

## 2019-12-23 RX ADMIN — ESCITALOPRAM OXALATE 10 MG: 10 TABLET ORAL at 09:07

## 2019-12-23 RX ADMIN — LEUCOVORIN CALCIUM 15 MG: 5 TABLET ORAL at 06:00

## 2019-12-23 NOTE — PLAN OF CARE
Problem: PAIN - ADULT  Goal: Verbalizes/displays adequate comfort level or baseline comfort level  Description  Interventions:  - Encourage patient to monitor pain and request assistance  - Assess pain using appropriate pain scale  - Administer analgesics based on type and severity of pain and evaluate response  - Implement non-pharmacological measures as appropriate and evaluate response  - Consider cultural and social influences on pain and pain management  - Notify physician/advanced practitioner if interventions unsuccessful or patient reports new pain  Outcome: Progressing     Problem: DISCHARGE PLANNING  Goal: Discharge to home or other facility with appropriate resources  Description  INTERVENTIONS:  - Identify barriers to discharge w/patient and caregiver  - Arrange for needed discharge resources and transportation as appropriate  - Identify discharge learning needs (meds, wound care, etc )  - Arrange for interpretive services to assist at discharge as needed  - Refer to Case Management Department for coordinating discharge planning if the patient needs post-hospital services based on physician/advanced practitioner order or complex needs related to functional status, cognitive ability, or social support system  Outcome: Progressing     Problem: Knowledge Deficit  Goal: Patient/family/caregiver demonstrates understanding of disease process, treatment plan, medications, and discharge instructions  Description  Complete learning assessment and assess knowledge base    Interventions:  - Provide teaching at level of understanding  - Provide teaching via preferred learning methods  Outcome: Progressing     Problem: HEMATOLOGIC - ADULT  Goal: Maintains hematologic stability  Description  INTERVENTIONS  - Assess for signs and symptoms of bleeding or hemorrhage  - Monitor labs  - Administer supportive blood products/factors as ordered and appropriate  Outcome: Progressing

## 2019-12-23 NOTE — DISCHARGE INSTRUCTIONS
Please complete your course of Leucovorin- you will need 2 more doses for this cycle, One at 6 PM tonight and one more tomorrow morning at 6AM   Please follow up at your scheduled chemotherapy appointment on 12/31/2019  You will receive a call from the GYN- Oncology office to discuss the rest of your treatment plan and chemo port placement  It has been a pleasure being part of your care  Intravenous Chemotherapy   WHAT YOU NEED TO KNOW:   IV chemo is medicine used to shrink a tumor or kill cancer cells  IV chemo is injected into your blood through an IV  Chemo can help cure cancer, prevent cancer from spreading, and relieve symptoms caused by cancer  You may be given 1 or more types of chemo  You may get chemo at home, in your healthcare provider's office, in a clinic, or in a hospital    DISCHARGE INSTRUCTIONS:   Call 911 for any of the following:   · You have chest pain, shortness of breath, or trouble breathing  · Your throat feels swollen and you have trouble swallowing or breathing  · You cough up blood  Seek care immediately if:   · You feel confused or have a severe headache that does not go away  · You have arm or leg weakness, trouble walking, or trouble seeing  · You have pain where your IV was or at your catheter site  · Your arm or leg feels warm, painful, or looks bigger than usual      · You have blood in your urine or bowel movement  · You vomit blood  · You urinate a lot less than usual or stop urinating  · You feel weak, dizzy, or faint  · Your heart is beating faster than usual   Contact your oncologist if:   · You have a fever of 100 5° F or higher or chills  · You have bleeding from your gums  · You have white spots or sores in your mouth  · You have bruises on your body that are not caused by an injury or fall  · You feel depressed  · You have a cough that lasts more than a few days       · You have diarrhea, nausea, or vomiting that lasts more than 2 days  · You have frequent, painful urination  · You have questions or concerns about your condition or care  Medicines  may be given to help manage side effects of chemotherapy  This may include medicines to decrease nausea and vomiting, or to manage pain  Take your medicines as directed  Call your healthcare provider if you think your medicine is not helping or if you have side effects  Tell him if you are allergic to any medicine  Keep a list of the medicines, vitamins, and herbs you take  Include the amounts, and when and why you take them  Bring the list or the pill bottles to follow-up visits  Carry your medicine list with you in case of an emergency  Self-care:   · Rest as needed  You may feel tired for a few days after getting chemo  Return to activities slowly, and do more as you feel stronger  · Eat healthy foods  Healthy foods include fruits, vegetables, whole-grain breads, low-fat dairy products, beans, lean meats, and fish  Several small meals a day may be easier to eat than a few large meals  · Drink plenty of fluids  This will help prevent dehydration from vomiting or diarrhea  Ask how much liquid to drink each day and which liquids are best for you  · Prevent infection  Stay away from people who are sick  Wash your hands frequently and ask visitors to wash their hands  Ask family and friends not to visit if they are sick  Do not spend time in crowded places such as movie theaters, malls, or elevators  Ask your healthcare provider if you need vaccines  · Manage hair loss  Use mild shampoos if your hair begins to thin or fall out  Use a soft bristled brush to comb your hair  If you lose your hair, wash your scalp with moisturizing shampoos or conditioners  Apply lotion and massage your scalp after a shower  Use sunscreen, a hat, a scarf, or a wig to protect your scalp from the sun  Ask your healthcare provider where you can purchase a wig or hair piece  · Work with your healthcare provider to manage side effects  Always tell your healthcare provider if you have side effects  Take medicines as directed  Ask your healthcare provider for more information on how to manage certain side effects  For more information and support: It may be difficult for you and your family to go through cancer and cancer treatments  Join a support group or talk with others who have gone through treatment  · [address]  [place] , [place] [address]  Phone: [phone]  Web Address: http://[employer]/  [employer]  · [address] [place] [address], [address] [place] [address]  Phone: 2- [phone] - [phone]  Web Address: http://[employer]/  gov  Follow up with your oncologist as directed: You will need to see your oncologist for ongoing tests and treatment  Write down your questions so you remember to ask them during your visits  © 2017 [address] [place] [address] Information is for End User's use only and may not be sold, redistributed or otherwise used for commercial purposes  All illustrations and images included in CareNotes® are the copyrighted property of A D A M , Inc  or Javier Florentino  The above information is an  only  It is not intended as medical advice for individual conditions or treatments  Talk to your doctor, nurse or pharmacist before following any medical regimen to see if it is safe and effective for you

## 2019-12-23 NOTE — QUICK NOTE
New plan:  Discussed with Trish Braun from Gyn/Onc Office who relayed that port placement scheduled for 12/30/2019 with subsequent chemotherapy scheduled on 12/31/2019  PICC may be pulled prior to discharge  She will not need VNA services or flashes      Deangelo Arzola MD  12/23/19

## 2019-12-23 NOTE — SOCIAL WORK
A post acute care recommendation was made by your care team for [facility]  Discussed [place] [facility] with patient  List of agenices given to patient via hard copy  Patient aware the list is custom filtered for them by zip code and that [facility] post acute providers are designated  PT would like referral to [facility], same sent via [facility]  Agency has accepted    Agreeable to [address] for PICC flushes and [facility] for PICC line supplies     Referral in [facility]    [facility], they can provide PICC line supplies and flushes  They will deliver to her room today by 2PM   Notified [facility] not fill flushes    PT aware

## 2019-12-23 NOTE — PROGRESS NOTES
Gyn Progress Note   Peggy Meraz 52 y o  female MRN: [id number]  Unit/Bed#: [facility] 910-01 Encounter: [id number]    Assessment/Plan:  49-year-old female with 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 with 12 cm uterine mass and scattered pulmonary metastatic disease bilaterally  No brain metastases noted on MRI overnight  Hospital Day #4  1  Gestational trophoblastic neoplasia              - CT C/A/P 12/19/19: 11 1 x 7 5 x 12 1 cm necrotic intrauterine mass, multiple scattered pulmonary nodules bilaterally concerning for metastatic disease, no liver lesions noted  - [facility] 12/17/19 > 400,000, [facility] yesterday > 200,000              - MRI brain 12/21/2019: No evidence of metastatic disease to brain              - S/p PICC line placement, for port placement in an outpatient setting              - Day #3 of EMA/CO therapy     S/p Etoposide to be dosed at 100 mg/m^2 IV given on day 1 and 2 of tx     S/p Methotrexate is given at 100 mg/m^2 IV followed by 200 mg/m^2 IV over 12 hours on day 1     On Leucovorin 15 mg every 12 hours for 4 doses to be started today 24 hours after methotrexate start time     S/p Actinomycin D 0 5 mg IV on day 1 and 2      Plan for cyclophosphamide and Vincristine outpatient on Day #8     Planned readmission for next cycle in 2 weeks for EMA      2  Elevated blood pressures:  Elevated overnight- patient reports she was in pain overnight secondary to severe gas pain    - We discussed that she should follow up with her PCP about elevated BP noted in hospital     3  Constipation:   - Colace, Miralax and Sennekot ordered   - Milk of mag and simethicone added overnight   - Dulcolax suppository PRN    4  Anxiety:    - Will start Lexapro 10mg PO daily- explained this is a medication which takes time to work, patient reports understanding    - Ativan PRN    5   Contraception: Patient concerned about pregnancy explained the low likelihood during chemotherapy for pregnancy, desires contraception  Discussed progesterone only options including risks of changes in her bleeding and she would like Depo provera  - Depo Provera prior to discharge    Pain: Motrin/ Tylenol/ Roxicodone 5 mg PO PRN  FEN: Regular diet  DVT Ppx: Lovenox 40mg SubQ daily, SCDs    Disposition:  Anticipate discharge home today    Subjective:   Patient seen at bedside this Am- she is resting comfortably  She reports she feels well this AM  She states she had an episode of abdominal bloating and pain overnight that is improved this morning after "passing a lot of gas"  She has only had a small bowel movement since she has been here  She denies any N/V and is passing flatus  She is tolerating regular diet and has been voiding spontaneously  Reports some vaginal spotting overnight that has resolved this AM  We reviewed her plans for chemotherapy and all questions and concerns addressed  /86   Pulse 89   Temp 98 4 °F (36 9 °C)   Resp 20   Ht 5' 6" (1 676 m)   Wt (!) 144 kg (317 lb 14 5 oz)   SpO2 95%   BMI 51 31 kg/m²     Lab Results   Component Value Date    WBC 10 37 (H) 12/22/2019    HGB 11 7 12/22/2019    HCT 35 6 12/22/2019    MCV 88 12/22/2019     12/22/2019       Lab Results   Component Value Date    CALCIUM 8 6 12/22/2019    K 4 1 12/22/2019    CO2 22 12/22/2019     (H) 12/22/2019    BUN 12 12/22/2019    CREATININE 0 82 12/22/2019       No results found for: POCGLU    I/O last 3 completed shifts: In: 2800 [P O :2540; I V :2358; IV Piggyback:1370]  Out: 3110 [LROZV:5403]  I/O this shift: In: 480 [P O :480]  Out: 1650 [Urine:1650]      Physical Exam  Physical Exam   Constitutional: She is oriented to person, place, and time  She appears well-developed and well-nourished  No distress  HENT:   Head: Normocephalic and atraumatic  Neck: Normal range of motion  Neck supple  Cardiovascular: Normal rate, regular rhythm and normal heart sounds   Exam reveals no gallop and no friction rub  No murmur heard  Pulmonary/Chest: Effort normal and breath sounds normal  No respiratory distress  She has no wheezes  She has no rales  Abdominal: Soft  Bowel sounds are normal  She exhibits distension  There is no tenderness  There is no rebound and no guarding  Obese abdomen   Neurological: She is alert and oriented to person, place, and time  Skin: Skin is warm and dry  She is not diaphoretic  Psychiatric: She has a normal mood and affect  Her behavior is normal    Vitals reviewed        Piedad Mercado MD   Gyn PGY-3  12/23/2019 6:42 AM

## 2019-12-24 ENCOUNTER — TELEPHONE (OUTPATIENT)
Dept: INTERVENTIONAL RADIOLOGY/VASCULAR | Facility: HOSPITAL | Age: 49
End: 2019-12-24

## 2019-12-24 DIAGNOSIS — O01.9 GTD (GESTATIONAL TROPHOBLASTIC DISEASE): Primary | ICD-10-CM

## 2019-12-24 RX ORDER — SODIUM CHLORIDE 9 MG/ML
75 INJECTION, SOLUTION INTRAVENOUS CONTINUOUS
Status: CANCELLED | OUTPATIENT
Start: 2019-12-24

## 2019-12-24 NOTE — UTILIZATION REVIEW
Notification of Discharge  This is a Notification of Discharge from our facility [address]  Please be advised that this patient has been discharge from our facility  Below you will find the admission and discharge date and time including the patients disposition  PRESENTATION DATE: 12/20/2019  2:23 PM  OBS ADMISSION DATE:   IP ADMISSION DATE: 12/20/19 1423   DISCHARGE DATE: 12/23/2019  3:25 PM  DISPOSITION: Home/Self Care Home/Self Care   Admission Orders listed below:  Admission Orders (From admission, onward)     Ordered        12/20/19 1428  Inpatient Admission  Once                   Please contact the UR Department if additional information is required to close this patient's authorization/case  [address] Utilization Review Department  Main: [phone] x carefully listen to the prompts  All voicemails are confidential   [email or web address]  Send all requests for admission clinical reviews, approved or denied determinations and any other requests to dedicated fax number below belonging to the campus where the patient is receiving treatment   List of dedicated fax numbers:  [address] DENIALS (Administrative/Medical Necessity) [phone]   [address] (Maternity/NICU/Pediatrics) [phone] [address] [facility] [phone]   [facility] [phone]   [facility] [phone]   [address] [phone]   [facility]  [phone]   [address] [facility] [address]  [address] [place] [address] [facility] [phone]

## 2019-12-25 ENCOUNTER — APPOINTMENT (EMERGENCY)
Dept: RADIOLOGY | Facility: HOSPITAL | Age: 49
End: 2019-12-25
Payer: COMMERCIAL

## 2019-12-25 ENCOUNTER — APPOINTMENT (EMERGENCY)
Dept: CT IMAGING | Facility: HOSPITAL | Age: 49
End: 2019-12-25
Payer: COMMERCIAL

## 2019-12-25 ENCOUNTER — HOSPITAL ENCOUNTER (EMERGENCY)
Facility: HOSPITAL | Age: 49
End: 2019-12-25
Attending: EMERGENCY MEDICINE | Admitting: EMERGENCY MEDICINE
Payer: COMMERCIAL

## 2019-12-25 ENCOUNTER — HOSPITAL ENCOUNTER (INPATIENT)
Facility: HOSPITAL | Age: 49
LOS: 3 days | Discharge: HOME | DRG: 754 | End: 2019-12-28
Attending: OBSTETRICS & GYNECOLOGY | Admitting: OBSTETRICS & GYNECOLOGY
Payer: COMMERCIAL

## 2019-12-25 VITALS
HEART RATE: 99 BPM | DIASTOLIC BLOOD PRESSURE: 85 MMHG | RESPIRATION RATE: 16 BRPM | TEMPERATURE: 100 F | OXYGEN SATURATION: 98 % | WEIGHT: 293 LBS | HEIGHT: 66 IN | BODY MASS INDEX: 47.09 KG/M2 | SYSTOLIC BLOOD PRESSURE: 177 MMHG

## 2019-12-25 DIAGNOSIS — R04.89 PULMONARY HEMORRHAGE: ICD-10-CM

## 2019-12-25 DIAGNOSIS — E66.01 CLASS 3 SEVERE OBESITY DUE TO EXCESS CALORIES WITHOUT SERIOUS COMORBIDITY WITH BODY MASS INDEX (BMI) OF 50.0 TO 59.9 IN ADULT (HCC): ICD-10-CM

## 2019-12-25 DIAGNOSIS — O01.9 GESTATIONAL TROPHOBLASTIC DISEASE: ICD-10-CM

## 2019-12-25 DIAGNOSIS — I10 HYPERTENSION, UNSPECIFIED TYPE: ICD-10-CM

## 2019-12-25 DIAGNOSIS — O01.9 GTD (GESTATIONAL TROPHOBLASTIC DISEASE): ICD-10-CM

## 2019-12-25 DIAGNOSIS — C78.00 PULMONARY METASTASES (HCC): Primary | ICD-10-CM

## 2019-12-25 DIAGNOSIS — J18.9 PNEUMONIA: ICD-10-CM

## 2019-12-25 DIAGNOSIS — F41.9 ANXIETY: ICD-10-CM

## 2019-12-25 DIAGNOSIS — J18.9 COMMUNITY ACQUIRED PNEUMONIA: Primary | ICD-10-CM

## 2019-12-25 LAB
ALBUMIN SERPL BCP-MCNC: 3.3 G/DL (ref 3.5–5)
ALP SERPL-CCNC: 69 U/L (ref 46–116)
ALT SERPL W P-5'-P-CCNC: 59 U/L (ref 12–78)
AMORPH URATE CRY URNS QL MICRO: ABNORMAL /HPF
ANION GAP SERPL CALCULATED.3IONS-SCNC: 10 MMOL/L (ref 4–13)
APTT PPP: 29 SECONDS (ref 23–37)
AST SERPL W P-5'-P-CCNC: 42 U/L (ref 5–45)
BACTERIA UR QL AUTO: ABNORMAL /HPF
BASOPHILS # BLD AUTO: 0.03 THOUSANDS/ΜL (ref 0–0.1)
BASOPHILS NFR BLD AUTO: 0 % (ref 0–1)
BILIRUB SERPL-MCNC: 0.8 MG/DL (ref 0.2–1)
BILIRUB UR QL STRIP: NEGATIVE
BUN SERPL-MCNC: 17 MG/DL (ref 5–25)
CALCIUM SERPL-MCNC: 8.8 MG/DL (ref 8.3–10.1)
CHLORIDE SERPL-SCNC: 105 MMOL/L (ref 100–108)
CLARITY UR: ABNORMAL
CO2 SERPL-SCNC: 23 MMOL/L (ref 21–32)
COLOR UR: YELLOW
CREAT SERPL-MCNC: 0.99 MG/DL (ref 0.6–1.3)
EOSINOPHIL # BLD AUTO: 0.12 THOUSAND/ΜL (ref 0–0.61)
EOSINOPHIL NFR BLD AUTO: 1 % (ref 0–6)
ERYTHROCYTE [DISTWIDTH] IN BLOOD BY AUTOMATED COUNT: 13.6 % (ref 11.6–15.1)
FLUAV RNA NPH QL NAA+PROBE: NORMAL
FLUBV RNA NPH QL NAA+PROBE: NORMAL
GFR SERPL CREATININE-BSD FRML MDRD: 67 ML/MIN/1.73SQ M
GLUCOSE SERPL-MCNC: 124 MG/DL (ref 65–140)
GLUCOSE UR STRIP-MCNC: NEGATIVE MG/DL
HCT VFR BLD AUTO: 38 % (ref 34.8–46.1)
HGB BLD-MCNC: 12.5 G/DL (ref 11.5–15.4)
HGB UR QL STRIP.AUTO: ABNORMAL
IMM GRANULOCYTES # BLD AUTO: 0.08 THOUSAND/UL (ref 0–0.2)
IMM GRANULOCYTES NFR BLD AUTO: 1 % (ref 0–2)
INR PPP: 0.97 (ref 0.84–1.19)
KETONES UR STRIP-MCNC: NEGATIVE MG/DL
LACTATE SERPL-SCNC: 1.3 MMOL/L (ref 0.5–2)
LEUKOCYTE ESTERASE UR QL STRIP: NEGATIVE
LYMPHOCYTES # BLD AUTO: 0.53 THOUSANDS/ΜL (ref 0.6–4.47)
LYMPHOCYTES NFR BLD AUTO: 4 % (ref 14–44)
MCH RBC QN AUTO: 28.9 PG (ref 26.8–34.3)
MCHC RBC AUTO-ENTMCNC: 32.9 G/DL (ref 31.4–37.4)
MCV RBC AUTO: 88 FL (ref 82–98)
MONOCYTES # BLD AUTO: 0.02 THOUSAND/ΜL (ref 0.17–1.22)
MONOCYTES NFR BLD AUTO: 0 % (ref 4–12)
NEUTROPHILS # BLD AUTO: 12.5 THOUSANDS/ΜL (ref 1.85–7.62)
NEUTS SEG NFR BLD AUTO: 94 % (ref 43–75)
NITRITE UR QL STRIP: NEGATIVE
NON-SQ EPI CELLS URNS QL MICRO: ABNORMAL /HPF
NRBC BLD AUTO-RTO: 0 /100 WBCS
PH UR STRIP.AUTO: 5.5 [PH]
PLATELET # BLD AUTO: 250 THOUSANDS/UL (ref 149–390)
PMV BLD AUTO: 9.9 FL (ref 8.9–12.7)
POTASSIUM SERPL-SCNC: 3.9 MMOL/L (ref 3.5–5.3)
PROCALCITONIN SERPL-MCNC: 0.09 NG/ML
PROT SERPL-MCNC: 6.6 G/DL (ref 6.4–8.2)
PROT UR STRIP-MCNC: ABNORMAL MG/DL
PROTHROMBIN TIME: 12.6 SECONDS (ref 11.6–14.5)
RBC # BLD AUTO: 4.33 MILLION/UL (ref 3.81–5.12)
RBC #/AREA URNS AUTO: ABNORMAL /HPF
RSV RNA NPH QL NAA+PROBE: NORMAL
SODIUM SERPL-SCNC: 138 MMOL/L (ref 136–145)
SP GR UR STRIP.AUTO: 1.02 (ref 1–1.03)
TROPONIN I SERPL-MCNC: 0.03 NG/ML
UROBILINOGEN UR QL STRIP.AUTO: 0.2 E.U./DL
WBC # BLD AUTO: 13.28 THOUSAND/UL (ref 4.31–10.16)
WBC #/AREA URNS AUTO: ABNORMAL /HPF

## 2019-12-25 PROCEDURE — 85025 COMPLETE CBC W/AUTO DIFF WBC: CPT

## 2019-12-25 PROCEDURE — G0379 DIRECT REFER HOSPITAL OBSERV: HCPCS

## 2019-12-25 PROCEDURE — 81001 URINALYSIS AUTO W/SCOPE: CPT

## 2019-12-25 PROCEDURE — 80053 COMPREHEN METABOLIC PANEL: CPT

## 2019-12-25 PROCEDURE — 84484 ASSAY OF TROPONIN QUANT: CPT

## 2019-12-25 PROCEDURE — 87631 RESP VIRUS 3-5 TARGETS: CPT | Performed by: INTERNAL MEDICINE

## 2019-12-25 PROCEDURE — 83605 ASSAY OF LACTIC ACID: CPT

## 2019-12-25 PROCEDURE — 85610 PROTHROMBIN TIME: CPT

## 2019-12-25 PROCEDURE — 99285 EMERGENCY DEPT VISIT HI MDM: CPT

## 2019-12-25 PROCEDURE — 36415 COLL VENOUS BLD VENIPUNCTURE: CPT

## 2019-12-25 PROCEDURE — 93005 ELECTROCARDIOGRAM TRACING: CPT

## 2019-12-25 PROCEDURE — 94640 AIRWAY INHALATION TREATMENT: CPT

## 2019-12-25 PROCEDURE — 99220 PR INITIAL OBSERVATION CARE/DAY 70 MINUTES: CPT | Performed by: OBSTETRICS & GYNECOLOGY

## 2019-12-25 PROCEDURE — 99223 1ST HOSP IP/OBS HIGH 75: CPT | Performed by: INTERNAL MEDICINE

## 2019-12-25 PROCEDURE — 87040 BLOOD CULTURE FOR BACTERIA: CPT

## 2019-12-25 PROCEDURE — 96367 TX/PROPH/DG ADDL SEQ IV INF: CPT

## 2019-12-25 PROCEDURE — 99285 EMERGENCY DEPT VISIT HI MDM: CPT | Performed by: EMERGENCY MEDICINE

## 2019-12-25 PROCEDURE — 85730 THROMBOPLASTIN TIME PARTIAL: CPT

## 2019-12-25 PROCEDURE — 96365 THER/PROPH/DIAG IV INF INIT: CPT

## 2019-12-25 PROCEDURE — 71275 CT ANGIOGRAPHY CHEST: CPT

## 2019-12-25 PROCEDURE — 71046 X-RAY EXAM CHEST 2 VIEWS: CPT

## 2019-12-25 PROCEDURE — 84145 PROCALCITONIN (PCT): CPT

## 2019-12-25 RX ORDER — LORAZEPAM 1 MG/1
1 TABLET ORAL EVERY 8 HOURS PRN
Status: DISCONTINUED | OUTPATIENT
Start: 2019-12-25 | End: 2019-12-28 | Stop reason: HOSPADM

## 2019-12-25 RX ORDER — IBUPROFEN 600 MG/1
600 TABLET ORAL EVERY 6 HOURS PRN
Status: DISCONTINUED | OUTPATIENT
Start: 2019-12-25 | End: 2019-12-25

## 2019-12-25 RX ORDER — CEFTRIAXONE 1 G/50ML
1000 INJECTION, SOLUTION INTRAVENOUS ONCE
Status: COMPLETED | OUTPATIENT
Start: 2019-12-25 | End: 2019-12-25

## 2019-12-25 RX ORDER — IBUPROFEN 600 MG/1
600 TABLET ORAL EVERY 6 HOURS PRN
Status: DISCONTINUED | OUTPATIENT
Start: 2019-12-25 | End: 2019-12-28 | Stop reason: HOSPADM

## 2019-12-25 RX ORDER — ALBUTEROL SULFATE 90 UG/1
2 AEROSOL, METERED RESPIRATORY (INHALATION) EVERY 4 HOURS PRN
Status: DISCONTINUED | OUTPATIENT
Start: 2019-12-25 | End: 2019-12-28 | Stop reason: HOSPADM

## 2019-12-25 RX ORDER — SENNOSIDES 8.6 MG
1 TABLET ORAL
Status: DISCONTINUED | OUTPATIENT
Start: 2019-12-25 | End: 2019-12-28 | Stop reason: HOSPADM

## 2019-12-25 RX ORDER — SIMETHICONE 80 MG
80 TABLET,CHEWABLE ORAL ONCE
Status: COMPLETED | OUTPATIENT
Start: 2019-12-25 | End: 2019-12-25

## 2019-12-25 RX ORDER — ACETAMINOPHEN 325 MG/1
650 TABLET ORAL ONCE
Status: COMPLETED | OUTPATIENT
Start: 2019-12-25 | End: 2019-12-25

## 2019-12-25 RX ORDER — ESCITALOPRAM OXALATE 10 MG/1
10 TABLET ORAL DAILY
Status: DISCONTINUED | OUTPATIENT
Start: 2019-12-26 | End: 2019-12-28 | Stop reason: HOSPADM

## 2019-12-25 RX ORDER — IPRATROPIUM BROMIDE AND ALBUTEROL SULFATE 2.5; .5 MG/3ML; MG/3ML
3 SOLUTION RESPIRATORY (INHALATION)
Status: DISCONTINUED | OUTPATIENT
Start: 2019-12-25 | End: 2019-12-25

## 2019-12-25 RX ORDER — FUROSEMIDE 10 MG/ML
20 INJECTION INTRAMUSCULAR; INTRAVENOUS ONCE
Status: COMPLETED | OUTPATIENT
Start: 2019-12-25 | End: 2019-12-25

## 2019-12-25 RX ORDER — BENZONATATE 100 MG/1
100 CAPSULE ORAL 3 TIMES DAILY PRN
Status: DISCONTINUED | OUTPATIENT
Start: 2019-12-25 | End: 2019-12-28 | Stop reason: HOSPADM

## 2019-12-25 RX ORDER — SIMETHICONE 80 MG
80 TABLET,CHEWABLE ORAL EVERY 6 HOURS PRN
Status: DISCONTINUED | OUTPATIENT
Start: 2019-12-25 | End: 2019-12-28 | Stop reason: HOSPADM

## 2019-12-25 RX ORDER — AZITHROMYCIN 250 MG/1
500 TABLET, FILM COATED ORAL EVERY 24 HOURS
Status: DISCONTINUED | OUTPATIENT
Start: 2019-12-26 | End: 2019-12-28 | Stop reason: HOSPADM

## 2019-12-25 RX ORDER — DOCUSATE SODIUM 100 MG/1
100 CAPSULE, LIQUID FILLED ORAL 2 TIMES DAILY
Status: DISCONTINUED | OUTPATIENT
Start: 2019-12-25 | End: 2019-12-28 | Stop reason: HOSPADM

## 2019-12-25 RX ORDER — ACETAMINOPHEN 325 MG/1
650 TABLET ORAL EVERY 6 HOURS PRN
Status: DISCONTINUED | OUTPATIENT
Start: 2019-12-25 | End: 2019-12-28 | Stop reason: HOSPADM

## 2019-12-25 RX ORDER — POLYETHYLENE GLYCOL 3350 17 G/17G
17 POWDER, FOR SOLUTION ORAL DAILY
Status: DISCONTINUED | OUTPATIENT
Start: 2019-12-26 | End: 2019-12-28 | Stop reason: HOSPADM

## 2019-12-25 RX ADMIN — CEFTRIAXONE 1000 MG: 1 INJECTION, SOLUTION INTRAVENOUS at 15:00

## 2019-12-25 RX ADMIN — ACETAMINOPHEN 650 MG: 325 TABLET ORAL at 18:52

## 2019-12-25 RX ADMIN — DOCUSATE SODIUM 100 MG: 100 CAPSULE, LIQUID FILLED ORAL at 18:52

## 2019-12-25 RX ADMIN — AZITHROMYCIN MONOHYDRATE 500 MG: 500 INJECTION, POWDER, LYOPHILIZED, FOR SOLUTION INTRAVENOUS at 15:38

## 2019-12-25 RX ADMIN — IOHEXOL 100 ML: 350 INJECTION, SOLUTION INTRAVENOUS at 12:14

## 2019-12-25 RX ADMIN — IPRATROPIUM BROMIDE AND ALBUTEROL SULFATE 3 ML: 2.5; .5 SOLUTION RESPIRATORY (INHALATION) at 10:38

## 2019-12-25 RX ADMIN — IBUPROFEN 600 MG: 600 TABLET ORAL at 20:46

## 2019-12-25 RX ADMIN — ACETAMINOPHEN 650 MG: 325 TABLET, FILM COATED ORAL at 12:55

## 2019-12-25 RX ADMIN — SIMETHICONE CHEW TAB 80 MG 80 MG: 80 TABLET ORAL at 15:37

## 2019-12-25 RX ADMIN — FUROSEMIDE 20 MG: 10 INJECTION, SOLUTION INTRAMUSCULAR; INTRAVENOUS at 18:52

## 2019-12-25 NOTE — ED PROVIDER NOTES
History  Chief Complaint   Patient presents with    Shortness of Breath     Pt presents to ER stating she just got DC'd from [facility] recently for chemo, started with congestion and wheezing yesterday, progressively got worse this morning  patient having difficulty talking during triage  This 51-year-old female with history of obesity and gestational trophoblastic disease stage III complains of shortness of breath and wheezing since yesterday  She notes rhinorrhea and cough productive of clear sputum since yesterday  Does not feel she has had fever  No chills, rash GI distress, hemoptysis or syncope  She states she has had some swelling in the legs and face and was told that was due to steroid that was given to her prior to chemotherapy  She was discharged from [facility] on 12/20 of this year after the diagnosis of GTD  She was begun on 1st cycle of leucovorin and methotrexate  She also the Depo-Provera injection and was started on Lexapro for anxiety  She is taking leucovorin at home and is scheduled to return for inpatient chemotherapy in 6 days  Prior to Admission Medications   Prescriptions Last Dose Informant Patient Reported? Taking?    LORazepam (ATIVAN) 1 mg tablet   No No   Sig: Take 1 tablet (1 mg total) by mouth every 8 (eight) hours as needed for anxiety (and nausea) for up to 10 days   acetaminophen (TYLENOL) 325 mg tablet   No No   Sig: Take 2 tablets (650 mg total) by mouth every 6 (six) hours as needed for mild pain   docusate sodium (COLACE) 100 mg capsule   No No   Sig: Take 1 capsule (100 mg total) by mouth 2 (two) times a day   escitalopram (LEXAPRO) 10 mg tablet   No No   Sig: Take 1 tablet (10 mg total) by mouth daily   ibuprofen (MOTRIN) 600 mg tablet   No No   Sig: Take 1 tablet (600 mg total) by mouth every 6 (six) hours as needed for mild pain   leucovorin (WELLCOVORIN) 10 MG tablet   No No   Sig: Take 1 5 tablets (15 mg total) by mouth every 12 (twelve) hours for 2 doses   leucovorin (WELLCOVORIN) 15 MG tablet   No No   Sig: Take 1 tablet (15 mg total) by mouth every 12 (twelve) hours for 3 doses   ondansetron (ZOFRAN) 4 mg tablet   No No   Sig: Take 1 tablet (4 mg total) by mouth every 8 (eight) hours as needed for nausea or vomiting   oxyCODONE (ROXICODONE) 5 mg immediate release tablet   No No   Sig: Take 1 tablet (5 mg total) by mouth every 6 (six) hours as needed for severe pain for up to 10 daysMax Daily Amount: 20 mg   polyethylene glycol (MIRALAX) 17 g packet   No No   Sig: Take 17 g by mouth daily   simethicone (MYLICON) 80 mg chewable tablet   No No   Sig: Chew 1 tablet (80 mg total) every 6 (six) hours as needed for flatulence      Facility-Administered Medications: None       Past Medical History:   Diagnosis Date    Cancer ([facility] [address] )     GTD (gestational trophoblastic disease)     History of chemotherapy     Obesity     Shortness of breath     Wheezing        Past Surgical History:   Procedure Laterality Date     SECTION  2003    DILATION AND CURETTAGE OF UTERUS      X2    ENDOMETRIAL ABLATION  2009    THYROIDECTOMY, PARTIAL         History reviewed  No pertinent family history  I have reviewed and agree with the history as documented  Social History     Tobacco Use    Smoking status: Former Smoker     Types: Cigarettes     Last attempt to quit:      Years since quittin 0    Smokeless tobacco: Former User   Substance Use Topics    Alcohol use: Not Currently    Drug use: Never        Review of Systems   Constitutional: Positive for fatigue  HENT: Positive for rhinorrhea  Negative for sinus pain, sore throat and trouble swallowing  Eyes: Negative  Respiratory: Positive for cough, shortness of breath and wheezing  Cardiovascular: Positive for leg swelling  Negative for chest pain and palpitations  Gastrointestinal: Negative  Endocrine: Negative  Genitourinary: Negative  Musculoskeletal: Negative      Skin: Negative  Allergic/Immunologic: Negative  Neurological: Negative  Hematological: Negative  Psychiatric/Behavioral: Negative for self-injury  The patient is nervous/anxious  All other systems reviewed and are negative  Physical Exam  Physical Exam   Constitutional: She is oriented to person, place, and time  She appears well-developed and well-nourished  Non-toxic appearance  She appears ill  She appears distressed  HENT:   Head: Normocephalic and atraumatic  Mouth/Throat: Oropharynx is clear and moist  No oropharyngeal exudate  Eyes: Pupils are equal, round, and reactive to light  Conjunctivae and EOM are normal    Neck: Normal range of motion  Neck supple  No JVD present  No thyromegaly present  Cardiovascular: Normal rate, regular rhythm and intact distal pulses  No murmur heard  Pulmonary/Chest: Effort normal  No accessory muscle usage or stridor  No respiratory distress  She has decreased breath sounds in the right lower field and the left lower field  She has no wheezes  She has no rhonchi  She has no rales  Chest wall is not dull to percussion  She exhibits no bony tenderness  Diminished air entry bilateral bases   Abdominal: Soft  Bowel sounds are normal  She exhibits no mass  There is no tenderness  There is no guarding  Musculoskeletal: Normal range of motion  Right lower leg: She exhibits edema  She exhibits no tenderness  Left lower leg: She exhibits edema  She exhibits no tenderness  Neurological: She is alert and oriented to person, place, and time  She has normal reflexes  No cranial nerve deficit  Coordination normal    Skin: Skin is warm and dry  Capillary refill takes less than 2 seconds  No rash noted  She is not diaphoretic  No cyanosis  No pallor  Psychiatric: Her mood appears anxious  She is not agitated  Nursing note and vitals reviewed        Vital Signs  ED Triage Vitals   Temperature Pulse Respirations Blood Pressure SpO2   12/25/19 0916 12/25/19 0915 12/25/19 0916 12/25/19 0921 12/25/19 0915   99 5 °F (37 5 °C) (!) 138 19 (!) 204/97 95 %      Temp Source Heart Rate Source Patient Position - Orthostatic VS BP Location FiO2 (%)   12/25/19 1226 12/25/19 0916 12/25/19 0930 12/25/19 0930 --   Temporal Monitor Lying Right arm       Pain Score       12/25/19 0916       No Pain           Vitals:    12/25/19 1530 12/25/19 1545 12/25/19 1645 12/25/19 1700   BP:       Pulse: (!) 108 105 96 99   Patient Position - Orthostatic VS: Lying  Lying Lying         Visual Acuity  Visual Acuity      Most Recent Value   L Pupil Size (mm)  3   R Pupil Size (mm)  3          ED Medications  Medications   iohexol (OMNIPAQUE) 350 MG/ML injection (MULTI-DOSE) 100 mL (100 mL Intravenous Given 12/25/19 1214)   acetaminophen (TYLENOL) tablet 650 mg (650 mg Oral Given 12/25/19 1255)   cefTRIAXone (ROCEPHIN) IVPB (premix) 1,000 mg (0 mg Intravenous Stopped 12/25/19 1537)   azithromycin (ZITHROMAX) 500 mg in sodium chloride 0 9% 250mL IVPB 500 mg (0 mg Intravenous Stopped 12/25/19 1638)   simethicone (MYLICON) chewable tablet 80 mg (80 mg Oral Given 12/25/19 1537)       Diagnostic Studies  Results Reviewed     Procedure Component Value Units Date/Time    Blood culture #1 [[id number]] Collected:  12/25/19 0930    Lab Status:  Preliminary result Specimen:  Blood from Arm, Left Updated:  12/27/19 1702     Blood Culture No Growth at 48 hrs  Blood culture #2 [[id number]] Collected:  12/25/19 0930    Lab Status:  Preliminary result Specimen:  Blood from Arm, Right Updated:  12/27/19 1702     Blood Culture No Growth at 48 hrs      Procalcitonin [[id number]]  (Normal) Collected:  12/25/19 0930    Lab Status:  Final result Specimen:  Blood from Arm, Right Updated:  12/25/19 1703     Procalcitonin 0 09 ng/ml     Urine Microscopic [[id number]]  (Abnormal) Collected:  12/25/19 1117    Lab Status:  Final result Specimen:  Urine, Clean Catch Updated:  12/25/19 1138     RBC, UA 30-50 /hpf      WBC, UA 1-2 /hpf      Epithelial Cells Innumerable /hpf      Bacteria, UA Moderate /hpf      AMORPH URATES Moderate /hpf     UA w Reflex to Microscopic w Reflex to Culture [[id number]]  (Abnormal) Collected:  12/25/19 1117    Lab Status:  Final result Specimen:  Urine, Clean Catch Updated:  12/25/19 1129     Color, UA Yellow     Clarity, UA Slightly Cloudy     Specific [place], UA 1 020     pH, UA 5 5     Leukocytes, UA Negative     Nitrite, UA Negative     Protein, UA Trace mg/dl      Glucose, UA Negative mg/dl      Ketones, UA Negative mg/dl      Urobilinogen, UA 0 2 E U /dl      Bilirubin, UA Negative     Blood, UA Large    CBC and differential [[id number]]  (Abnormal) Collected:  12/25/19 0930    Lab Status:  Final result Specimen:  Blood from Arm, Right Updated:  12/25/19 1030     WBC 13 28 Thousand/uL      RBC 4 33 Million/uL      Hemoglobin 12 5 g/dL      Hematocrit 38 0 %      MCV 88 fL      MCH 28 9 pg      MCHC 32 9 g/dL      RDW 13 6 %      MPV 9 9 fL      Platelets 281 Thousands/uL      nRBC 0 /100 WBCs      Neutrophils Relative 94 %      Immat GRANS % 1 %      Lymphocytes Relative 4 %      Monocytes Relative 0 %      Eosinophils Relative 1 %      Basophils Relative 0 %      Neutrophils Absolute 12 50 Thousands/µL      Immature Grans Absolute 0 08 Thousand/uL      Lymphocytes Absolute 0 53 Thousands/µL      Monocytes Absolute 0 02 Thousand/µL      Eosinophils Absolute 0 12 Thousand/µL      Basophils Absolute 0 03 Thousands/µL     Narrative: This is an appended report  These results have been appended to a previously verified report  Lactic acid x2 [[id number]]  (Normal) Resulted:  12/25/19 1018    Lab Status:  Final result Specimen:  Blood Updated:  12/25/19 1018     LACTIC ACID 1 3 mmol/L     Narrative:       Result may be elevated if tourniquet was used during collection      Troponin I [[id number]]  (Normal) Collected:  12/25/19 0930    Lab Status:  Final result Specimen:  Blood from Arm, Right Updated:  12/25/19 1001     Troponin I 0 03 ng/mL     Comprehensive metabolic panel [[id number]]  (Abnormal) Collected:  12/25/19 0930    Lab Status:  Final result Specimen:  Blood from Arm, Right Updated:  12/25/19 0958     Sodium 138 mmol/L      Potassium 3 9 mmol/L      Chloride 105 mmol/L      CO2 23 mmol/L      ANION GAP 10 mmol/L      BUN 17 mg/dL      Creatinine 0 99 mg/dL      Glucose 124 mg/dL      Calcium 8 8 mg/dL      AST 42 U/L      ALT 59 U/L      Alkaline Phosphatase 69 U/L      Total Protein 6 6 g/dL      Albumin 3 3 g/dL      Total Bilirubin 0 80 mg/dL      eGFR 67 ml/min/1 73sq m     Narrative:       [place] guidelines for Chronic Kidney Disease (CKD):     Stage 1 with normal or high GFR (GFR > 90 mL/min/1 73 square meters)    Stage 2 Mild CKD (GFR = 60-89 mL/min/1 73 square meters)    Stage 3A Moderate CKD (GFR = 45-59 mL/min/1 73 square meters)    Stage 3B Moderate CKD (GFR = 30-44 mL/min/1 73 square meters)    Stage 4 Severe CKD (GFR = 15-29 mL/min/1 73 square meters)    Stage 5 End Stage CKD (GFR <15 mL/min/1 73 square meters)  Note: GFR calculation is accurate only with a steady state creatinine    Protime-INR [[id number]]  (Normal) Collected:  12/25/19 0930    Lab Status:  Final result Specimen:  Blood from Arm, Left Updated:  12/25/19 0954     Protime 12 6 seconds      INR 0 97    APTT [[id number]]  (Normal) Collected:  12/25/19 0930    Lab Status:  Final result Specimen:  Blood from Arm, Left Updated:  12/25/19 0954     PTT 29 seconds                  CTA ED chest PE study   Final Result by Kit Nieves MD (12/25 1301)      1  No evidence of pulmonary embolus  2   Dilated central pulmonary arteries, consistent with pulmonary arterial hypertension  3   New areas of consolidation in both lungs, particularly in the left upper lobe, most likely pneumonia    Differential diagnosis includes pulmonary hemorrhage and, less likely, drug reaction or atypical pulmonary edema  4   Multiple irregular pulmonary nodules, most likely hemorrhagic metastases, increased in size since 12/19/2019    5   Moderate-sized bilateral pleural effusions  Workstation performed: [id number]         XR chest 2 views   ED Interpretation by Philly Marquez DO (12/25 1341)   Cardiomegaly  Possible pleural effusion bilaterally  Bilateral infiltrates      Final Result by Daniel Hines MD (12/25 0865)      Multilobar alveolar opacities most prominent in the left upper lobe  This may represent pneumonia in the appropriate clinical context  Small bilateral pleural effusions  Findings suggestive of mild central pulmonary vascular congestion  9 mm nodule right upper lobe  See subsequent CTA chest report              Workstation performed: [id number]                    Procedures  Procedures         ED Course  ED Course as of Dec 28 0726   Wed Dec 25, 2019   1351 Texted Georgina Vasques Criteria for PE      Most Recent Value   Wells' Criteria for PE   Clinical signs and symptoms of DVT  0 Filed at: 12/25/2019 1109   PE is primary diagnosis or equally likely  0 Filed at: 12/25/2019 1109   HR >100  0 Filed at: 12/25/2019 1109   Immobilization at least 3 days or Surgery in the previous 4 weeks  0 Filed at: 12/25/2019 1109   Previous, objectively diagnosed PE or DVT  0 Filed at: 12/25/2019 1109   Hemoptysis  1 Filed at: 12/25/2019 1109   Malignancy with treatment within 6 months or palliative  1 Filed at: 12/25/2019 1109   Wells' Criteria Total  2 Filed at: 12/25/2019 1109            MDM  Number of Diagnoses or Management Options  Community acquired pneumonia: new and requires workup  Gestational trophoblastic disease: new and requires workup  Pulmonary hemorrhage: new and requires workup  Diagnosis management comments: Patient with acute dyspnea and hemoptysis status post several days of chemotherapy for gestational trophoblastic disease with pulmonary Mets  CT pulmonary arteries shows no PE but there are multiple nodules with indistinct borders consistent with intra tumor hemorrhage  Pneumonia and drug reaction cannot be ruled out  After discussion with gyn oncologist it was decided patient be transferred to [facility] where she can be on the service of GYN Oncology [facility] Pulmonary and other consultations as needed  Amount and/or Complexity of Data Reviewed  Clinical lab tests: ordered and reviewed  Tests in the radiology section of CPT®: ordered and reviewed  Obtain history from someone other than the patient: yes  Review and summarize past medical records: yes  Discuss the patient with other providers: yes  Independent visualization of images, tracings, or specimens: yes          Disposition  Final diagnoses:   Community acquired pneumonia   Gestational trophoblastic disease   Pulmonary hemorrhage     Time reflects when diagnosis was documented in both MDM as applicable and the Disposition within this note     Time User Action Codes Description Comment    12/25/2019  2:44 PM Callie Marino [J18 [id number]] Community acquired pneumonia     12/25/2019  2:45 PM Callie Marino [O01 [id number]] Gestational trophoblastic disease     12/25/2019  2:45 PM Callie Marino [[id number]] Pulmonary hemorrhage       ED Disposition     ED Disposition Condition Date/Time Comment    Transfer to Another Facility-In Network  Wed Dec 25, 2019  2:44 PM Jose M Dougherty should be transferred out to B          MD Documentation      Most Recent Value   Patient Condition  The patient has been stabilized such that within reasonable medical probability, no material deterioration of the patient condition or the condition of the unborn child(richi) is likely to result from the transfer   Reason for Transfer  Level of Care needed not available at this facility   Benefits of Transfer  Continuity of care, Specialized equipment and/or services available at the receiving facility (Include comment)________________________   Risks of Transfer  Potential for delay in receiving treatment, Potential deterioration of medical condition, Loss of IV, Increased discomfort during transfer   Accepting Physician  [address], [address]   [facility]    (Name & Tel number)  PACS   Transported by (Company and Unit #)  Darien Holt MD  [facility]   Provider Certification  General risk, such as traffic hazards, adverse weather conditions, rough terrain or turbulence, possible failure of equipment (including vehicle or aircraft), or consequences of actions of persons outside the control of the transport personnel, Risk of worsening condition      RN Documentation      [facility] Name, [address]    (Name & Tel number)  PACS   Transported by (Company and Unit #)  [employer]      Follow-up Information    None         Discharge Medication List as of 12/25/2019  5:39 PM      CONTINUE these medications which have NOT CHANGED    Details   acetaminophen (TYLENOL) 325 mg tablet Take 2 tablets (650 mg total) by mouth every 6 (six) hours as needed for mild pain, Starting Mon 12/23/2019, No Print      docusate sodium (COLACE) 100 mg capsule Take 1 capsule (100 mg total) by mouth 2 (two) times a day, Starting Mon 12/23/2019, Normal      escitalopram (LEXAPRO) 10 mg tablet Take 1 tablet (10 mg total) by mouth daily, Starting Mon 12/23/2019, Normal      ibuprofen (MOTRIN) 600 mg tablet Take 1 tablet (600 mg total) by mouth every 6 (six) hours as needed for mild pain, Starting Mon 12/23/2019, Normal      leucovorin (WELLCOVORIN) 15 MG tablet Take 1 tablet (15 mg total) by mouth every 12 (twelve) hours for 3 doses, Starting Mon 12/23/2019, Until Wed 12/25/2019, Normal      LORazepam (ATIVAN) 1 mg tablet Take 1 tablet (1 mg total) by mouth every 8 (eight) hours as needed for anxiety (and nausea) for up to 10 days, Starting Mon 12/23/2019, Until u 1/2/2020, Normal      ondansetron (ZOFRAN) 4 mg tablet Take 1 tablet (4 mg total) by mouth every 8 (eight) hours as needed for nausea or vomiting, Starting Mon 12/23/2019, Normal      oxyCODONE (ROXICODONE) 5 mg immediate release tablet Take 1 tablet (5 mg total) by mouth every 6 (six) hours as needed for severe pain for up to 10 daysMax Daily Amount: 20 mg, Starting Mon 12/23/2019, Until Thu 1/2/2020, Normal      polyethylene glycol (MIRALAX) 17 g packet Take 17 g by mouth daily, Starting Mon 12/23/2019, Normal      simethicone (MYLICON) 80 mg chewable tablet Chew 1 tablet (80 mg total) every 6 (six) hours as needed for flatulence, Starting Mon 12/23/2019, Normal           No discharge procedures on file      ED Provider  Electronically Signed by           Chad Mercado DO  12/28/19 0838

## 2019-12-25 NOTE — ED NOTES
CARLOS  at [address] to Room 909   Report can be called at [address], RN  12/25/19 4569

## 2019-12-25 NOTE — ED NOTES
Patient family removing blood pressure cuff from patient arm to "test their own"  I told family they are not allowed to touch medical equipment and that their vitals can be misread as the patients  I asked family not to touch any other equipment in the room        Kadie Thrasher RN  12/25/19 8405

## 2019-12-25 NOTE — PLAN OF CARE
Problem: PAIN - ADULT  Goal: Verbalizes/displays adequate comfort level or baseline comfort level  Description  Interventions:  - Encourage patient to monitor pain and request assistance  - Assess pain using appropriate pain scale  - Administer analgesics based on type and severity of pain and evaluate response  - Implement non-pharmacological measures as appropriate and evaluate response  - Consider cultural and social influences on pain and pain management  - Notify physician/advanced practitioner if interventions unsuccessful or patient reports new pain  Outcome: Progressing     Problem: INFECTION - ADULT  Goal: Absence or prevention of progression during hospitalization  Description  INTERVENTIONS:  - Assess and monitor for signs and symptoms of infection  - Monitor lab/diagnostic results  - Monitor all insertion sites, i e  indwelling lines, tubes, and drains  - Monitor endotracheal if appropriate and nasal secretions for changes in amount and color  - [place] appropriate cooling/warming therapies per order  - Administer medications as ordered  - Instruct and encourage patient and family to use good hand hygiene technique  - Identify and instruct in appropriate isolation precautions for identified infection/condition  Outcome: Progressing     Problem: RESPIRATORY - ADULT  Goal: Achieves optimal ventilation and oxygenation  Description  INTERVENTIONS:  - Assess for changes in respiratory status  - Assess for changes in mentation and behavior  - Position to facilitate oxygenation and minimize respiratory effort  - Oxygen administered by appropriate delivery if ordered  - Initiate smoking cessation education as indicated  - Encourage broncho-pulmonary hygiene including cough, deep breathe, Incentive Spirometry  - Assess the need for suctioning and aspirate as needed  - Assess and instruct to report SOB or any respiratory difficulty  - Respiratory Therapy support as indicated  Outcome: Progressing

## 2019-12-25 NOTE — ED NOTES
Attempted to call [facility] P9 x2 for nurse to nurse report  Will attempt again        Arcadio Simpson RN  12/25/19 6093

## 2019-12-25 NOTE — EMTALA/ACUTE CARE TRANSFER
[facility] EMERGENCY DEPARTMENT  [address]  [place] [address]  Dept: [phone]      [facility] TRANSFER CONSENT    NAME David Hills                                         1970                              MRN [id number]    I have been informed of my rights regarding examination, treatment, and transfer   by Dr Stephanie Suarez DO    Benefits: Continuity of care, Specialized equipment and/or services available at the receiving facility (Include comment)________________________    Risks: Potential for delay in receiving treatment, Potential deterioration of medical condition, Loss of IV, Increased discomfort during transfer      Consent for Transfer:  I acknowledge that my medical condition has been evaluated and explained to me by the emergency department physician or other qualified medical person and/or my attending physician, who has recommended that I be transferred to the service of  Accepting Physician: Chi Scruggs at [address] Name, [address] : [facility]  The above potential benefits of such transfer, the potential risks associated with such transfer, and the probable risks of not being transferred have been explained to me, and I fully understand them  The doctor has explained that, in my case, the benefits of transfer outweigh the risks  I agree to be transferred  I authorize the performance of emergency medical procedures and treatments upon me in both transit and upon arrival at the receiving facility  Additionally, I authorize the release of any and all medical records to the receiving facility and request they be transported with me, if possible  I understand that the safest mode of transportation during a medical emergency is an ambulance and that the Hospital advocates the use of this mode of transport   Risks of traveling to the receiving facility by car, including absence of medical control, life sustaining equipment, such as oxygen, and medical personnel has been explained to me and I fully understand them  (TERESO CORRECT BOX BELOW)  [X]  I consent to the stated transfer and to be transported by ambulance/helicopter  [  ]  I consent to the stated transfer, but refuse transportation by ambulance and accept full responsibility for my transportation by car  I understand the risks of non-ambulance transfers and I exonerate the Hospital and its staff from any deterioration in my condition that results from this refusal     X___________________________________________    DATE  19  TIME________  Signature of patient or legally responsible individual signing on patient behalf           RELATIONSHIP TO PATIENT_________________________          Provider Certification    NAME [facility]                                         1970                              MRN [id number]    A medical screening exam was performed on the above named patient  Based on the examination:    Condition Necessitating Transfer The primary encounter diagnosis was Community acquired pneumonia  Diagnoses of Gestational trophoblastic disease and Pulmonary hemorrhage were also pertinent to this visit      Patient Condition: The patient has been stabilized such that within reasonable medical probability, no material deterioration of the patient condition or the condition of the unborn child(richi) is likely to result from the transfer    Reason for Transfer: Level of Care needed not available at this facility    Transfer Requirements: Facility B   · Space available and qualified personnel available for treatment as acknowledged by PACS  · Agreed to accept transfer and to provide appropriate medical treatment as acknowledged by       Krystle Solano  · Appropriate medical records of the examination and treatment of the patient are provided at the time of transfer   [address] _______  · Transfer will be performed by qualified personnel from [facility]  and appropriate transfer equipment as required, including the use of necessary and appropriate life support measures  Provider Certification: I have examined the patient and explained the following risks and benefits of being transferred/refusing transfer to the patient/family:  General risk, such as traffic hazards, adverse weather conditions, rough terrain or turbulence, possible failure of equipment (including vehicle or aircraft), or consequences of actions of persons outside the control of the transport personnel, Risk of worsening condition      Based on these reasonable risks and benefits to the patient and/or the unborn child(richi), and based upon the information available at the time of the patients examination, I certify that the medical benefits reasonably to be expected from the provision of appropriate medical treatments at another medical facility outweigh the increasing risks, if any, to the individuals medical condition, and in the case of labor to the unborn child, from effecting the transfer      X____________________________________________ DATE 12/25/19        TIME_______      ORIGINAL - SEND TO MEDICAL RECORDS   COPY - SEND WITH PATIENT DURING TRANSFER

## 2019-12-25 NOTE — H&P
H&P EXAM - Gynecology/Onccology  José Tovar 52 y o  female MRN: [id number]  Unit/Bed#: [facility] 909-01 Encounter: [id number]    Assessment/Plan:  1  Gestational trophoblastic disease with pulmonary mets   - CT  vs  demonstrates "new areas of consolidation in both lungs, particularly in the left upper lobe, most likely pneumonia  Differential diagnosis includes pulmonary hemorrhage and less likely drug reaction or atypical pulmonary edema  Multiple irregular pulmonary nodules, most likely hemorrhagic metastases increased in size, moderate-sized bilateral pleural effusions, dilated central pulmonary arteries consistent with pulmonary arterial hypertension, no evidence of pulmonary embolism"   - S/p initial EMA-CO treatment, negative brain mets on recent MRI  2  Superimposed pneumonia   - S/p 1g IV ceftriaxone and 500mg IV azithromycin at [facility] ED   - Afebrile, lactic acid 1 3, procalcitonin 0 09, coags wnl, leuks 13 28k, blood cultures pending x2, UA w reflex pending, troponins normal, CMP wnl   - ID consult placed, case d/w Dr Edward Gaytan, appreciate recommendations for inpatient antibiotic therapy, suspect will need coverage for both CAP and HAP due to recent inpatient hospitalization, pulmonology consulted Dr Wilfrido Coats aware, appreciate recs   - F/u AM labs  3  Hypertension   - Suspect 2/2 pulmonary arterial hypertension, f/u pulm consult   - 20mg IV Lasix provided, strict I/Os, vitals q4h  4  Anxiety   - Continue home ativan PRN   - Lexapro 10mg PO daily  5  Contraception   - S/p Depoprovera at time of last admission  6  Pain   - Motrin/tylenol/roxicodone 5mg pO PRN  7  FEN    - Regular diet  8  DVT ppx   - SCDs, start Lovenox 40mg q24h      CHIEF COMPLAINT:   Wheezing, shortness of breath, tiredness, new onset cough    Subjective:   José Tovar is a 53 yo  with 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   She was admitted on 19 after a workup for abnormal vaginal bleeding and an endometrial mass with an elevated HCG >400,000 was discovered  She received a PICC line and had her initial EMACO chemotherapy while inpatient, MRI of the brain was negative and CT abd/chest/pelvis demonstrated pulmonary metastases  She has a 1cm uterine mass  She began EMA on 12/21-12/22/19 with leucovorin 24h after MTX  She was also started on Lexprao 10mg PO daily for anxiety  She was discharged on hospital day 4, plan was for outpatient chemotherapy for cycle #1 completion of [facility] 12/31/19 after port placement 12/30/19  Yesterday, on 12/24/19 she noticed a new onset cough and some hemoptysis that she said was just "specks of blood" when she coughed without hemorrhage  She also reported wheezing overnight and dyspnea which prompted her to go to the ED  She also reported chills and possible fever at home  She had a CTA PE study in the ED which ruled out PE but demonstrated pulmonary metastses with consolidation bilateral and worse in the left upper lobe, concerning for superimposed pneumonia  Problem:  Cancer Staging  No matching staging information was found for the patient      Previous therapy:     GTD (gestational trophoblastic disease)    12/20/2019 Initial Diagnosis     GTD (gestational trophoblastic disease)      12/21/2019 -  Chemotherapy     leucovorin (WELLCOVORIN) tablet 15 mg, 15 mg, Oral, Every 12 hours, 1 of 6 cycles  Administration: 15 mg (12/22/2019), 15 mg (12/23/2019)  palonosetron (ALOXI) injection 0 25 mg, 0 25 mg, Intravenous, Once, 1 of 6 cycles  Administration: 0 25 mg (12/21/2019), 0 25 mg (12/22/2019)  methotrexate injection 242 5 mg, 100 mg/m2 = 242 5 mg, Intravenous, Once, 1 of 6 cycles  Administration: 242 5 mg (12/21/2019)  DACTINomycin (COSMEGEN) 500 mcg in sodium chloride 0 9 % 50 mL IVPB, 500 mcg, Intravenous, Once, 1 of 6 cycles  Administration: 500 mcg (12/21/2019), 500 mcg (12/22/2019)  vinCRIStine (ONCOVIN) 2 mg in sodium chloride 0 9 % 50 mL chemo infusion, 2 mg (original dose 1 mg/m2), Intravenous, Once, 1 of 6 cycles  Dose modification: 2 mg (original dose 1 mg/m2, Cycle 1, Reason: Max Dose Reached)  cyclophosphamide (CYTOXAN) 1,458 mg in sodium chloride 0 9 % 250 mL IVPB, 600 mg/m2, Intravenous, Once, 1 of 6 cycles  methotrexate (PF) 485 mg in sodium chloride 0 9 % 1,000 mL continuous infusion, 200 mg/m2 = 485 mg, Intravenous, Once, 1 of 6 cycles  Administration: 485 mg (12/21/2019)  etoposide (TOPOSAR) 243 mg in sodium chloride 0 9 % 600 mL chemo infusion, 100 mg/m2 = 243 mg, Intravenous, Once, 1 of 6 cycles  Administration: 243 mg (12/21/2019), 243 mg (12/22/2019)         Patient ID: Anali Phillips is a 52 y o  female  Review of Systems   Constitutional: Positive for chills, fatigue, fever and unexpected weight change  Respiratory: Positive for cough and wheezing  Negative for choking, chest tightness and shortness of breath  Cardiovascular: Negative for chest pain and palpitations  Gastrointestinal: Positive for abdominal distention  Negative for abdominal pain, constipation, diarrhea, nausea and vomiting  Genitourinary: Negative for dysuria, flank pain, pelvic pain, vaginal bleeding and vaginal pain  Musculoskeletal: Negative for back pain  Skin: Negative for pallor and rash  Neurological: Negative for dizziness, speech difficulty, weakness, light-headedness, numbness and headaches  Psychiatric/Behavioral: Negative for agitation, behavioral problems and confusion  The patient is not nervous/anxious          Current Facility-Administered Medications   Medication Dose Route Frequency Provider Last Rate Last Dose    acetaminophen (TYLENOL) tablet 650 mg  650 mg Oral Q6H PRN Belle Grumet, DO        albuterol (PROVENTIL HFA,VENTOLIN HFA) inhaler 2 puff  2 puff Inhalation Q4H PRN Belle Grumet, DO        docusate sodium (COLACE) capsule 100 mg  100 mg Oral BID Belle Grumet, DO        [START ON 12/26/2019] escitalopram (LEXAPRO) tablet 10 mg  10 mg Oral Daily Sadie Brunner, DO        furosemide (LASIX) injection 20 mg  20 mg Intravenous Once Sadie Brunner, DO        ibuprofen (MOTRIN) tablet 600 mg  600 mg Oral Q6H PRN Sadie Brunner, DO        LORazepam (ATIVAN) tablet 1 mg  1 mg Oral Q8H PRN Sadie Brunner, DO        [START ON 2019] polyethylene glycol (MIRALAX) packet 17 g  17 g Oral Daily North Swenson DO        [facility]) chewable tablet 80 mg  80 mg Oral Q6H PRN Sadie Brunner, DO           Allergies   Allergen Reactions    Penicillins Fever       Past Medical History:   Diagnosis Date    Cancer ([facility] [address] )     GTD (gestational trophoblastic disease)     Obesity        Past Surgical History:   Procedure Laterality Date     SECTION      DILATION AND CURETTAGE OF UTERUS      X2    ENDOMETRIAL ABLATION  2009    THYROIDECTOMY, PARTIAL         OB History        4    Para   1    Term   1            AB   3    Living   1       SAB        TAB        Ectopic        Multiple        Live Births   1                 History reviewed  No pertinent family history  The following portions of the patient's history were reviewed and updated as appropriate: allergies, current medications, past family history, past medical history, past social history, past surgical history and problem list     Objective:    Blood pressure (!) 154/103, pulse 105, temperature 99 7 °F (37 6 °C), resp  rate 20, height 5' 6" (1 676 m), weight (!) 151 kg (332 lb), SpO2 98 %  Body mass index is 53 59 kg/m²  Physical Exam   Constitutional: She is oriented to person, place, and time  She appears well-developed and well-nourished  No distress  HENT:   Head: Normocephalic and atraumatic  Right Ear: External ear normal    Left Ear: External ear normal    Cardiovascular: Normal rate, regular rhythm and normal heart sounds  Exam reveals no gallop and no friction rub     No murmur heard   Pulmonary/Chest: Effort normal and breath sounds normal  No stridor  No respiratory distress  She has no wheezes  She exhibits no tenderness  Abdominal: Soft  She exhibits distension  There is no tenderness  There is no guarding  Neurological: She is alert and oriented to person, place, and time  She displays normal reflexes  Skin: Skin is warm and dry  Capillary refill takes less than 2 seconds  She is not diaphoretic  No pallor  Psychiatric: She has a normal mood and affect  Her behavior is normal  Judgment and thought content normal    Vitals reviewed        No results found for:   Lab Results   Component Value Date    WBC 13 28 (H) 12/25/2019    HGB 12 5 12/25/2019    HCT 38 0 12/25/2019    MCV 88 12/25/2019     12/25/2019     Lab Results   Component Value Date    K 3 9 12/25/2019     12/25/2019    CO2 23 12/25/2019    BUN 17 12/25/2019    CREATININE 0 99 12/25/2019    CALCIUM 8 8 12/25/2019    AST 42 12/25/2019    ALT 59 12/25/2019    ALKPHOS 69 12/25/2019    EGFR 67 12/25/2019       Elbert Davison, DO  PGY-3 GYN/ONC  12/25/2019 6:48 PM

## 2019-12-26 ENCOUNTER — ANESTHESIA EVENT (OUTPATIENT)
Dept: GASTROENTEROLOGY | Facility: HOSPITAL | Age: 49
DRG: 754 | End: 2019-12-26
Payer: COMMERCIAL

## 2019-12-26 ENCOUNTER — APPOINTMENT (OUTPATIENT)
Dept: GASTROENTEROLOGY | Facility: HOSPITAL | Age: 49
DRG: 754 | End: 2019-12-26
Attending: INTERNAL MEDICINE
Payer: COMMERCIAL

## 2019-12-26 ENCOUNTER — ANESTHESIA (OUTPATIENT)
Dept: GASTROENTEROLOGY | Facility: HOSPITAL | Age: 49
DRG: 754 | End: 2019-12-26
Payer: COMMERCIAL

## 2019-12-26 LAB
ANION GAP SERPL CALCULATED.3IONS-SCNC: 7 MMOL/L (ref 4–13)
BASOPHILS # BLD MANUAL: 0 THOUSAND/UL (ref 0–0.1)
BASOPHILS NFR MAR MANUAL: 0 % (ref 0–1)
BUN SERPL-MCNC: 15 MG/DL (ref 5–25)
CALCIUM SERPL-MCNC: 8.6 MG/DL (ref 8.3–10.1)
CHLORIDE SERPL-SCNC: 110 MMOL/L (ref 100–108)
CO2 SERPL-SCNC: 22 MMOL/L (ref 21–32)
CREAT SERPL-MCNC: 0.94 MG/DL (ref 0.6–1.3)
EOSINOPHIL # BLD MANUAL: 0.08 THOUSAND/UL (ref 0–0.4)
EOSINOPHIL NFR BLD MANUAL: 1 % (ref 0–6)
ERYTHROCYTE [DISTWIDTH] IN BLOOD BY AUTOMATED COUNT: 13.6 % (ref 11.6–15.1)
GFR SERPL CREATININE-BSD FRML MDRD: 71 ML/MIN/1.73SQ M
GLUCOSE P FAST SERPL-MCNC: 97 MG/DL (ref 65–99)
GLUCOSE SERPL-MCNC: 97 MG/DL (ref 65–140)
HCT VFR BLD AUTO: 34 % (ref 34.8–46.1)
HGB BLD-MCNC: 11.3 G/DL (ref 11.5–15.4)
L PNEUMO1 AG UR QL IA.RAPID: NEGATIVE
LYMPHOCYTES # BLD AUTO: 0.67 THOUSAND/UL (ref 0.6–4.47)
LYMPHOCYTES # BLD AUTO: 8 % (ref 14–44)
MCH RBC QN AUTO: 28.5 PG (ref 26.8–34.3)
MCHC RBC AUTO-ENTMCNC: 33.2 G/DL (ref 31.4–37.4)
MCV RBC AUTO: 86 FL (ref 82–98)
MONOCYTES # BLD AUTO: 0 THOUSAND/UL (ref 0–1.22)
MONOCYTES NFR BLD: 0 % (ref 4–12)
NEUTROPHILS # BLD MANUAL: 7.6 THOUSAND/UL (ref 1.85–7.62)
NEUTS SEG NFR BLD AUTO: 91 % (ref 43–75)
NRBC BLD AUTO-RTO: 0 /100 WBCS
PLATELET # BLD AUTO: 206 THOUSANDS/UL (ref 149–390)
PLATELET BLD QL SMEAR: ADEQUATE
PMV BLD AUTO: 9.7 FL (ref 8.9–12.7)
POTASSIUM SERPL-SCNC: 3.9 MMOL/L (ref 3.5–5.3)
RBC # BLD AUTO: 3.97 MILLION/UL (ref 3.81–5.12)
RBC MORPH BLD: NORMAL
S PNEUM AG UR QL: NEGATIVE
SODIUM SERPL-SCNC: 139 MMOL/L (ref 136–145)
WBC # BLD AUTO: 8.35 THOUSAND/UL (ref 4.31–10.16)

## 2019-12-26 PROCEDURE — 88312 SPECIAL STAINS GROUP 1: CPT | Performed by: PATHOLOGY

## 2019-12-26 PROCEDURE — 99233 SBSQ HOSP IP/OBS HIGH 50: CPT | Performed by: OBSTETRICS & GYNECOLOGY

## 2019-12-26 PROCEDURE — 88112 CYTOPATH CELL ENHANCE TECH: CPT | Performed by: PATHOLOGY

## 2019-12-26 PROCEDURE — 31624 DX BRONCHOSCOPE/LAVAGE: CPT | Performed by: INTERNAL MEDICINE

## 2019-12-26 PROCEDURE — 88305 TISSUE EXAM BY PATHOLOGIST: CPT | Performed by: PATHOLOGY

## 2019-12-26 PROCEDURE — 87116 MYCOBACTERIA CULTURE: CPT | Performed by: INTERNAL MEDICINE

## 2019-12-26 PROCEDURE — 85007 BL SMEAR W/DIFF WBC COUNT: CPT | Performed by: STUDENT IN AN ORGANIZED HEALTH CARE EDUCATION/TRAINING PROGRAM

## 2019-12-26 PROCEDURE — 87281 PNEUMOCYSTIS CARINII AG IF: CPT | Performed by: INTERNAL MEDICINE

## 2019-12-26 PROCEDURE — 87070 CULTURE OTHR SPECIMN AEROBIC: CPT | Performed by: INTERNAL MEDICINE

## 2019-12-26 PROCEDURE — 99232 SBSQ HOSP IP/OBS MODERATE 35: CPT | Performed by: INTERNAL MEDICINE

## 2019-12-26 PROCEDURE — 87206 SMEAR FLUORESCENT/ACID STAI: CPT | Performed by: INTERNAL MEDICINE

## 2019-12-26 PROCEDURE — 99205 OFFICE O/P NEW HI 60 MIN: CPT | Performed by: INTERNAL MEDICINE

## 2019-12-26 PROCEDURE — 87015 SPECIMEN INFECT AGNT CONCNTJ: CPT | Performed by: INTERNAL MEDICINE

## 2019-12-26 PROCEDURE — 87102 FUNGUS ISOLATION CULTURE: CPT | Performed by: INTERNAL MEDICINE

## 2019-12-26 PROCEDURE — 0B998ZX DRAINAGE OF LINGULA BRONCHUS, VIA NATURAL OR ARTIFICIAL OPENING ENDOSCOPIC, DIAGNOSTIC: ICD-10-PCS | Performed by: INTERNAL MEDICINE

## 2019-12-26 PROCEDURE — 87252 VIRUS INOCULATION TISSUE: CPT | Performed by: INTERNAL MEDICINE

## 2019-12-26 PROCEDURE — 80048 BASIC METABOLIC PNL TOTAL CA: CPT | Performed by: STUDENT IN AN ORGANIZED HEALTH CARE EDUCATION/TRAINING PROGRAM

## 2019-12-26 PROCEDURE — 87449 NOS EACH ORGANISM AG IA: CPT | Performed by: INTERNAL MEDICINE

## 2019-12-26 PROCEDURE — 85027 COMPLETE CBC AUTOMATED: CPT | Performed by: STUDENT IN AN ORGANIZED HEALTH CARE EDUCATION/TRAINING PROGRAM

## 2019-12-26 RX ORDER — PROPOFOL 10 MG/ML
INJECTION, EMULSION INTRAVENOUS CONTINUOUS PRN
Status: DISCONTINUED | OUTPATIENT
Start: 2019-12-26 | End: 2019-12-26 | Stop reason: SURG

## 2019-12-26 RX ORDER — PROPOFOL 10 MG/ML
INJECTION, EMULSION INTRAVENOUS AS NEEDED
Status: DISCONTINUED | OUTPATIENT
Start: 2019-12-26 | End: 2019-12-26 | Stop reason: SURG

## 2019-12-26 RX ORDER — LABETALOL 20 MG/4 ML (5 MG/ML) INTRAVENOUS SYRINGE
10 EVERY 6 HOURS PRN
Status: DISCONTINUED | OUTPATIENT
Start: 2019-12-26 | End: 2019-12-28 | Stop reason: HOSPADM

## 2019-12-26 RX ORDER — REMIFENTANIL HYDROCHLORIDE 1 MG/ML
INJECTION, POWDER, LYOPHILIZED, FOR SOLUTION INTRAVENOUS AS NEEDED
Status: DISCONTINUED | OUTPATIENT
Start: 2019-12-26 | End: 2019-12-26 | Stop reason: SURG

## 2019-12-26 RX ORDER — ALBUTEROL SULFATE 2.5 MG/3ML
2.5 SOLUTION RESPIRATORY (INHALATION) ONCE
Status: COMPLETED | OUTPATIENT
Start: 2019-12-26 | End: 2019-12-26

## 2019-12-26 RX ORDER — SODIUM CHLORIDE 9 MG/ML
INJECTION, SOLUTION INTRAVENOUS CONTINUOUS PRN
Status: DISCONTINUED | OUTPATIENT
Start: 2019-12-26 | End: 2019-12-26 | Stop reason: SURG

## 2019-12-26 RX ADMIN — PHENYLEPHRINE HYDROCHLORIDE 100 MCG: 10 INJECTION INTRAVENOUS at 12:35

## 2019-12-26 RX ADMIN — POLYETHYLENE GLYCOL 3350 17 G: 17 POWDER, FOR SOLUTION ORAL at 13:59

## 2019-12-26 RX ADMIN — DOCUSATE SODIUM 100 MG: 100 CAPSULE, LIQUID FILLED ORAL at 13:58

## 2019-12-26 RX ADMIN — ESCITALOPRAM OXALATE 10 MG: 10 TABLET ORAL at 13:58

## 2019-12-26 RX ADMIN — CEFEPIME HYDROCHLORIDE 2000 MG: 2 INJECTION, POWDER, FOR SOLUTION INTRAVENOUS at 03:36

## 2019-12-26 RX ADMIN — IBUPROFEN 600 MG: 600 TABLET ORAL at 06:43

## 2019-12-26 RX ADMIN — ALBUTEROL SULFATE 2 PUFF: 90 AEROSOL, METERED RESPIRATORY (INHALATION) at 22:18

## 2019-12-26 RX ADMIN — BENZONATATE 100 MG: 100 CAPSULE ORAL at 22:18

## 2019-12-26 RX ADMIN — BENZONATATE 100 MG: 100 CAPSULE ORAL at 03:35

## 2019-12-26 RX ADMIN — ALBUTEROL SULFATE 2.5 MG: 2.5 SOLUTION RESPIRATORY (INHALATION) at 13:14

## 2019-12-26 RX ADMIN — ALBUTEROL SULFATE 2 PUFF: 90 AEROSOL, METERED RESPIRATORY (INHALATION) at 06:44

## 2019-12-26 RX ADMIN — PROPOFOL 200 MG: 10 INJECTION, EMULSION INTRAVENOUS at 12:17

## 2019-12-26 RX ADMIN — SODIUM CHLORIDE: 0.9 INJECTION, SOLUTION INTRAVENOUS at 12:13

## 2019-12-26 RX ADMIN — REMIFENTANIL HYDROCHLORIDE 0.2 MCG/KG/MIN: 1 INJECTION, POWDER, LYOPHILIZED, FOR SOLUTION INTRAVENOUS at 12:17

## 2019-12-26 RX ADMIN — IBUPROFEN 600 MG: 600 TABLET ORAL at 14:59

## 2019-12-26 RX ADMIN — ENOXAPARIN SODIUM 40 MG: 40 INJECTION SUBCUTANEOUS at 22:18

## 2019-12-26 RX ADMIN — REMIFENTANIL HYDROCHLORIDE 50 MCG: 1 INJECTION, POWDER, LYOPHILIZED, FOR SOLUTION INTRAVENOUS at 12:18

## 2019-12-26 RX ADMIN — PROPOFOL 130 MCG/KG/MIN: 10 INJECTION, EMULSION INTRAVENOUS at 12:17

## 2019-12-26 RX ADMIN — CEFEPIME HYDROCHLORIDE 2000 MG: 2 INJECTION, POWDER, FOR SOLUTION INTRAVENOUS at 15:04

## 2019-12-26 RX ADMIN — AZITHROMYCIN 500 MG: 250 TABLET, FILM COATED ORAL at 17:45

## 2019-12-26 RX ADMIN — LABETALOL HYDROCHLORIDE 10 MG: 5 INJECTION INTRAVENOUS at 08:22

## 2019-12-26 RX ADMIN — SENNOSIDES 8.6 MG: 8.6 TABLET, FILM COATED ORAL at 22:18

## 2019-12-26 RX ADMIN — DOCUSATE SODIUM 100 MG: 100 CAPSULE, LIQUID FILLED ORAL at 17:45

## 2019-12-26 RX ADMIN — PHENYLEPHRINE HYDROCHLORIDE 100 MCG: 10 INJECTION INTRAVENOUS at 12:40

## 2019-12-26 RX ADMIN — REMIFENTANIL HYDROCHLORIDE 50 MCG: 1 INJECTION, POWDER, LYOPHILIZED, FOR SOLUTION INTRAVENOUS at 12:23

## 2019-12-26 NOTE — PROGRESS NOTES
Gyn Progress Note   Melita Devine 52 y o  female MRN: [id number]  Unit/Bed#: [facility] 909-01 Encounter: [id number]    Assessment/Plan:  78-year-old female with gestational trophoblastic disease undergoing EMA/CO therapy admitted for possible hospital versus community acquired pneumonia  Hospital day 2      1  Possible hospital vs community acquired pneumonia vs viral etiology vs pneumonitis from recent chemotherapy              - S/p 1g IV ceftriaxone and 500mg IV azithromycin --> Appreciate ID recs transitioned to Cefepime 2g Q12h              - Afebrile, lactic acid 1 3, procalcitonin 0 09, mild leukocytosis 13 28k   - Negative Flu/ RSV   - Blood cultures pending x2, Urine Legionella and S  penumo antigens pending   - F/u Pulmonology consultation              - F/u AM labs    2  Gestational trophoblastic disease with pulmonary metastatic disease              - CT 12/19 vs 12/25 demonstrates "new areas of consolidation in both lungs, particularly in the left upper lobe, most likely pneumonia  Differential diagnosis includes pulmonary hemorrhage and less likely drug reaction or atypical pulmonary edema  Multiple irregular pulmonary nodules, most likely hemorrhagic metastases increased in size, moderate-sized bilateral pleural effusions, dilated central pulmonary arteries consistent with pulmonary arterial hypertension, no evidence of pulmonary embolism"              - S/p initial EMA-CO treatment, negative brain mets on recent MRI    3  Hypertension: Improved overnight - Bps 120/50s, however notable hypertensive urgency on admission              - Consideration for chronic HTN given elevated BP last admission   - Possible pulmonary HTN from mets and current disease process              - 20mg IV Lasix provided, strict I/Os, vitals q4h    4  Anxiety              - Continue home ativan PRN              - Lexapro 10mg PO daily    5  Contraception              - S/p Depoprovera at time of last admission    6   Pain - Motrin/tylenol/roxicodone 5mg pO PRN    7  FEN               - Regular diet    8  DVT ppx              - SCDs, will discuss Lovenox following pulmonary consultation    Subjective:   Patient seen at bedside resting comfortably  She reports continued cough with production bloody sputum  Reports her chest tightness is somewhat improved following nebulizer treatment this morning and denies any shortness of breath  She states she has some chest pain when coughing but denies any current chest pain at rest   She denies any fever, chills, nausea, vomiting, abdominal pain or calf pain and reports she is voiding spontaneously  Patient reports a mild headache that is improved with Motrin  /58   Pulse 77   Temp 99 °F (37 2 °C)   Resp 18   Ht 5' 6" (1 676 m)   Wt (!) 151 kg (332 lb)   SpO2 97%   BMI 53 59 kg/m²     Lab Results   Component Value Date    WBC 13 28 (H) 12/25/2019    HGB 12 5 12/25/2019    HCT 38 0 12/25/2019    MCV 88 12/25/2019     12/25/2019       Lab Results   Component Value Date    CALCIUM 8 8 12/25/2019    K 3 9 12/25/2019    CO2 23 12/25/2019     12/25/2019    BUN 17 12/25/2019    CREATININE 0 99 12/25/2019       No results found for: POCGLU    No intake/output data recorded  I/O this shift: In: 530 [P O :480; IV Piggyback:50]  Out: 1700 [Urine:1700]      Physical Exam  Physical Exam   Constitutional: She is oriented to person, place, and time  She appears well-developed and well-nourished  No distress  HENT:   Head: Normocephalic and atraumatic  Neck: Normal range of motion  Neck supple  Cardiovascular: Normal rate, regular rhythm and normal heart sounds  Exam reveals no gallop and no friction rub  No murmur heard  Pulmonary/Chest: Effort normal  No respiratory distress  She has no wheezes  She has no rales  Congested upper resp    Abdominal: Soft  She exhibits mass  There is no tenderness  There is no rebound and no guarding     Obese abdomen Neurological: She is alert and oriented to person, place, and time  Skin: Skin is warm and dry  She is not diaphoretic  Psychiatric: She has a normal mood and affect  Her behavior is normal    Vitals reviewed        Charles Bright MD   Gyn PGY-3  12/26/2019 6:32 AM

## 2019-12-26 NOTE — CONSULTS
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    Recommendations:    Discuss with the primary service  With a normal procalcitonin and symptoms that resemble a viral URI her pneumonia may be viral in origin  However, with her immunosuppressive state it is safest to treat her as a healthcare associated pneumonia that may be bacterial at this time  1  Will check results of her blood cultures and obtain influenza/RSV PCR, urine for pneumococcal and Legionella antigens  2  Will change her antibiotics to cefepime 2 g q 12 hours IV and azithromycin 500 mg Q 24 hours p o  pending above      HISTORY OF PRESENT ILLNESS:    Reason for Consult:  Pneumonia  HPI: Tricia López is a 52y o  year old female who was transferred from the [address] [place] [address] ER today with fever and probable pneumonia  The patient was last here from 12/20/19-12/23/19 for treatment of gestational  trophoblastic disease with pulmonary metastases  She was treated with  EMA/CO chemotherapy as well as leucovorin following methotrexate     On 12/24 the patient developed nasal congestion with a clear sputum productive cough  She also had some wheezing  She called her physician and was told to take Mucinex  This morning she awoke and was weaker with dyspnea on exertion and a continued increased cough and general congestion  The patient lives with her 59-year-old son who was well but neither of them have taken the influenza vaccine this year    At the [address] [place] [address] ER the patient had low-grade temperature elevation of 100 6 degrees     Currently she has urinalysis that has moderate bacteria and large amount of blood  A CBC shows a WBC count of 13,280 with 94% segs, CMP is essentially WNL with the exception of a mildly reduced albumin, procalcitonin is normal at 0 09  Chest x-ray showed multilobar alveolar opacities that were most prominent in the MONIKA  A CTA revealed no evidence of PE with new areas of consolidation in both lungs particularly in the MONIKA that were most likely compatible with pneumonia  She received a single dose of ceftriaxone 1 g IV and azithromycin 500 mg IV  Review of Systems   Constitutional: Positive for appetite change and fatigue  HENT: Positive for congestion, rhinorrhea and sore throat  Respiratory: Positive for cough (White sputum), chest tightness, shortness of breath and wheezing  Gastrointestinal: Positive for diarrhea ( yesterday), nausea and vomiting ( 1 episode with coughing)  Genitourinary: Positive for hematuria  Neurological: Positive for weakness  A [id number] point system-based review of systems is otherwise negative  PAST MEDICAL HISTORY:  Past Medical History:   Diagnosis Date    Cancer ([address] )     GTD (gestational trophoblastic disease)     Obesity      Past Surgical History:   Procedure Laterality Date     SECTION  2003    DILATION AND CURETTAGE OF UTERUS      X2    ENDOMETRIAL ABLATION  2009    THYROIDECTOMY, PARTIAL         FAMILY HISTORY:  Non-contributory    SOCIAL HISTORY:  Social History   Lives with her son who was 16years old    Social History     Substance and Sexual Activity   Alcohol Use Not Currently     Social History     Substance and Sexual Activity   Drug Use Never     Social History     Tobacco Use   Smoking Status Former Smoker    Types: Cigarettes    Last attempt to quit:     Years since quittin 0   Smokeless Tobacco Former User       ALLERGIES:  Allergies   Allergen Reactions    Penicillins Fever       MEDICATIONS:  All current active medications have been reviewed  PHYSICAL EXAM:  Temp:  [99 1 °F (37 3 °C)-100 6 °F (38 1 °C)] 99 7 °F (37 6 °C)  HR:  [] 105  Resp:  [16-48] 20  BP: (154-204)/() 154/103  SpO2:  [93 %-100 %] 98 %  Temp (24hrs), Av 9 °F (37 7 °C), Min:99 1 °F (37 3 °C), Max:100 6 °F (38 1 °C)  Current: Temperature: 99 7 °F (37 6 °C)  No intake or output data in the 24 hours ending 19    General Appearance:  Appearing obese, nontoxic, and in no distress, appears stated age   Head:  Normocephalic, without obvious abnormality, atraumatic   Eyes:  PERRL, conjunctiva pink and sclera anicteric, both eyes   Nose: Nares normal, mucosa normal, no drainage   Throat: Oropharynx moist without lesions; lips, mucosa, and tongue normal; teeth and gums normal   Neck: Supple, symmetrical, trachea midline, no adenopathy, no tenderness/mass/nodules   Back:   Symmetric, no curvature, ROM normal, no CVA tenderness   Lungs:   Clear to auscultation bilaterally, no audible wheezes, rhonchi and rales, respirations unlabored  Respiratory expansion is decreased   Chest Wall:  No tenderness or deformity   Heart:  Regular rate and rhythm, S1, S2 normal, no murmur, rub or gallop   Abdomen:   Soft, non-tender, protuberant, non-distended, positive bowel sounds, no masses, no organomegaly    No CVA tenderness   Extremities: Extremities normal, atraumatic, no cyanosis, clubbing or edema   Skin: Skin color, texture, turgor normal, no rashes or lesions  No draining wounds noted  Lymph nodes: Cervical, supraclavicular, and axillary nodes normal   Neurologic: Alert and oriented times 3, extremity strength 5/5 and symmetric           Invasive Devices:   Peripheral IV 19 Right Antecubital (Active)   Site Assessment Clean;Dry; Intact 2019  8:00 PM   Dressing Type Transparent 2019  8:00 PM   Line Status Saline locked 2019  8:00 PM   Dressing Status Clean;Dry; Intact 2019  8:00 PM   Dressing Change Due 12/29/19 12/25/2019  6:00 PM       LABS, IMAGING, & OTHER STUDIES:  Lab Results:      I have personally reviewed pertinent labs  Results from last 7 days   Lab Units 12/25/19  0930 12/23/19  0731 12/22/19  0556   WBC Thousand/uL 13 28* 10 34* 10 37*   HEMOGLOBIN g/dL 12 5 11 5 11 7   PLATELETS Thousands/uL 250 217 232     Results from last 7 days   Lab Units 12/25/19  0930 12/23/19  0731 12/22/19  0556 12/20/19  1527   SODIUM mmol/L 138 141 140 138   POTASSIUM mmol/L 3 9 3 9 4 1 4 1   CHLORIDE mmol/L 105 115* 113* 108   CO2 mmol/L 23 21 22 24   BUN mg/dL 17 16 12 15   CREATININE mg/dL 0 99 1 03 0 82 0 89   EGFR ml/min/1 73sq m 67 64 84 76   CALCIUM mg/dL 8 8 8 5 8 6 9 4   AST U/L 42  --   --  22   ALT U/L 59  --   --  39   ALK PHOS U/L 69  --   --  70     Results from last 7 days   Lab Units 12/25/19  0930   BLOOD CULTURE  Received in Microbiology Lab  Culture in Progress  Received in Microbiology Lab  Culture in Progress  Imaging Studies:   I have personally reviewed pertinent imaging study reports and images in PACS  EKG, Pathology, and Other Studies:   I have personally reviewed pertinent reports

## 2019-12-26 NOTE — CONSULTS
Pulmonary Consultation   Gala Greenfield 52 y o  female MRN: [id number]  Unit/Bed#: [facility] 909-01 Encounter: [id number]      Reason for consultation: pulmonary mets; pneumonia    Requesting physician: El Bernstein DO    Impressions/Recommendations:   Pt is a 52yo F w/ pmhx sig for gestational trophoblastic with metastases on active chemotherapy who presents with cough/congestion with abnormal CT scan  Abnormal CT scan:  Concern for pneumonia given abnormal CT but procalcitonin is negative again this is in the setting of active chemotherapy  -  Plan for bronchoscopy today given multiple possible etiologies:  Pneumonia vs viral infection vs less likely hemorrhagic mets  -  Influenza/RSV panel negative  -  Discussed with ID who is in agreement   -  abx per ID  -  May continue albuterol HFA on an as needed basis; no current wheezing    Bilateral pleural effusions  -  Likely related to metastatic disease vs infection  -  Too small for thoracentesis at this time; will continue to monitor     Gestational trophoblastic disease with pulmonary mets  -  On active chemotherapy with EMA/CO; methotrexate  -  Planned for next round of chemotherapy in 1 week    Hypertension  -  Currently well controlled    Super morbid obesity   -  Pt understands need for weight loss  Discussed with ob/gyn and ID  History of Present Illness   HPI:  Gala Greenfield is a 52 y o  female with past medical history significant for super morbid obesity, gestational trial plastic cancer on chemotherapy who presents with cough and congestion  The patient reports she underwent her 1st cycle of chemotherapy on 12/23  Approximately 24 hours later she developed a cough and chest congestion  She was told to take Mucinex D  She denies sick contacts but has helped for with children who recently sick  She has a cough congestion but denies fevers and chills  The patient does report hemoptysis which was mixed with mucus    She has been eating and drinking but denies diarrhea  She does have a sore throat  On the  she developed wheezing and worsening shortness of breath  She denies personal or family history of asthma or previous inhaler use  The patient was given breathing treatments in the emergency department felt the these were helpful  She denies smoking, alcohol or illicit drug use  She denies any occupational exposures  Review of systems:  12 point review of systems was completed and was otherwise negative except as listed in HPI        Historical Information   Past Medical History:   Diagnosis Date    Cancer ([address] )     GTD (gestational trophoblastic disease)     Obesity      Past Surgical History:   Procedure Laterality Date     SECTION  2003    DILATION AND CURETTAGE OF UTERUS      X2    ENDOMETRIAL ABLATION  2009    THYROIDECTOMY, PARTIAL       Family History: lung cancer in grandfather    Social History     Socioeconomic History    Marital status: Unknown     Spouse name: None    Number of children: None    Years of education: None    Highest education level: None   Occupational History    None   Social Needs    Financial resource strain: None    Food insecurity:     Worry: None     Inability: None    Transportation needs:     Medical: None     Non-medical: None   Tobacco Use    Smoking status: Former Smoker     Types: Cigarettes     Last attempt to quit:      Years since quittin 0    Smokeless tobacco: Former User   Substance and Sexual Activity    Alcohol use: Not Currently    Drug use: Never    Sexual activity: Not Currently   Lifestyle    Physical activity:     Days per week: None     Minutes per session: None    Stress: None   Relationships    Social connections:     Talks on phone: None     Gets together: None     Attends [employer] service: None     Active member of club or organization: None     Attends meetings of clubs or organizations: None     Relationship status: None    Intimate partner violence:     Fear of current or ex partner: None     Emotionally abused: None     Physically abused: None     Forced sexual activity: None   Other Topics Concern    None   Social History Narrative    None     Meds/Allergies   Current Facility-Administered Medications   Medication Dose Route Frequency    acetaminophen (TYLENOL) tablet 650 mg  650 mg Oral Q6H PRN    albuterol (PROVENTIL HFA,VENTOLIN HFA) inhaler 2 puff  2 puff Inhalation Q4H PRN    azithromycin (ZITHROMAX) tablet 500 mg  500 mg Oral Q24H    benzonatate (TESSALON PERLES) capsule 100 mg  100 mg Oral TID PRN    cefepime (MAXIPIME) 2,000 mg in dextrose 5 % 50 mL IVPB  2,000 mg Intravenous Q12H    docusate sodium (COLACE) capsule 100 mg  100 mg Oral BID    escitalopram (LEXAPRO) tablet 10 mg  10 mg Oral Daily    ibuprofen (MOTRIN) tablet 600 mg  600 mg Oral Q6H PRN    Labetalol HCl (NORMODYNE) injection 10 mg  10 mg Intravenous Q6H PRN    LORazepam (ATIVAN) tablet 1 mg  1 mg Oral Q8H PRN    polyethylene glycol (MIRALAX) packet 17 g  17 g Oral Daily    senna (SENOKOT) tablet 8 6 mg  1 tablet Oral HS PRN    simethicone (MYLICON) chewable tablet 80 mg  80 mg Oral Q6H PRN     Medications Prior to Admission   Medication    acetaminophen (TYLENOL) 325 mg tablet    docusate sodium (COLACE) 100 mg capsule    escitalopram (LEXAPRO) 10 mg tablet    ibuprofen (MOTRIN) 600 mg tablet    leucovorin (WELLCOVORIN) 10 MG tablet    leucovorin (WELLCOVORIN) 15 MG tablet    LORazepam (ATIVAN) 1 mg tablet    ondansetron (ZOFRAN) 4 mg tablet    oxyCODONE (ROXICODONE) 5 mg immediate release tablet    polyethylene glycol (MIRALAX) 17 g packet    simethicone (MYLICON) 80 mg chewable tablet     Allergies   Allergen Reactions    Penicillins Fever       Vitals: Blood pressure (!) 172/98, pulse 78, temperature 98 5 °F (36 9 °C), temperature source Oral, resp   rate 18, height 5' 6" (1 676 m), weight (!) 151 kg (332 lb), SpO2 97 % , RA, Body mass index is 53 59 kg/m²  Intake/Output Summary (Last 24 hours) at 12/26/2019 0849  Last data filed at 12/26/2019 0601  Gross per 24 hour   Intake 530 ml   Output 1925 ml   Net -1395 ml       Physical Exam  General: Pleasant, Awake alert and oriented x 3, conversant without conversational dyspnea, NAD, normal affect  HEENT:  PERRL, Sclera noninjected, nonicteric OU, Nares patent, no nasal flaring, no nasal drainage, Mucous membranes, moist, no oral lesions, normal dentition  NECK: Trachea midline, no accessory muscle use, no stridor, no cervical or supraclavicular adenopathy, JVP not elevated  CARDIAC: Reg, single s1/S2, no m/r/g  PULM: crackles at bilateral bases; good air movement  CHEST: No gross deformities, equal chest expansion on inspiration bilaterally  ABD: Normoactive bowel sounds, soft slight diffuse tenderness, nondistended, no rebound, no rigidity, no guarding; obese  EXT: No cyanosis, no clubbing, no edema, normal capillary refill  SKIN:  No rashes, no lesions  NEURO: no focal neurologic deficits, AAOx3, moving all extremities appropriately    Labs: I have personally reviewed pertinent lab results  Results from last 7 days   Lab Units 12/26/19 0637 12/25/19 0930 12/23/19  0731  12/20/19  1527   WBC Thousand/uL 8 35 13 28* 10 34*   < > 8 98   HEMOGLOBIN g/dL 11 3* 12 5 11 5   < > 13 0   HEMATOCRIT % 34 0* 38 0 34 7*   < > 39 0   PLATELETS Thousands/uL 206 250 217   < > 263   NEUTROS PCT %  --  94*  --   --  76*   MONOS PCT %  --  0*  --   --  5   MONO PCT % 0*  --   --   --   --     < > = values in this interval not displayed        Results from last 7 days   Lab Units 12/26/19 0637 12/25/19  0930 12/23/19  0731  12/20/19  1527   POTASSIUM mmol/L 3 9 3 9 3 9   < > 4 1   CHLORIDE mmol/L 110* 105 115*   < > 108   CO2 mmol/L 22 23 21   < > 24   BUN mg/dL 15 17 16   < > 15   CREATININE mg/dL 0 94 0 99 1 03   < > 0 89   CALCIUM mg/dL 8 6 8 8 8 5   < > 9 4   ALK PHOS U/L  --  69  --   --  70   ALT U/L  -- 59  --   --  39   AST U/L  --  42  --   --  22    < > = values in this interval not displayed  Results from last 7 days   Lab Units 12/20/19  1527   MAGNESIUM mg/dL 2 1     Results from last 7 days   Lab Units 12/20/19  1527   PHOSPHORUS mg/dL 3 8      Results from last 7 days   Lab Units 12/25/19  0930 12/20/19  1527   INR  0 97 0 99   PTT seconds 29 27     Results from last 7 days   Lab Units 12/25/19  1018   LACTIC ACID mmol/L 1 3     0   Lab Value Date/Time    TROPONINI 0 03 12/25/2019 0930       Imaging and other studies: I have personally reviewed pertinent reports  and I have personally reviewed pertinent films in PACS  CTA 12/25:  Bibasilar effusions; bilateral consolidations  PULMONARY ARTERIAL TREE:  No pulmonary embolus is seen  Central pulmonary arteries dilated with a main pulmonary artery diameter of 4 cm      LUNGS:  Dense consolidation in the left upper lobe with small areas of consolidation in both lower lobes, most likely pneumonia or, possibly, pulmonary hemorrhage  Differential diagnosis includes drug reaction and asymmetric pulmonary edema  Several pulmonary nodules, increased in size since the last CT, now with poorly defined margins, consistent with intratumoral hemorrhage, a relatively common finding in pulmonary metastases from choriocarcinoma  Largest nodules include the following: Left upper  lobe, 1 0 cm (series 3, image 32); 1 1 cm, right upper lobe (image 34); 0 8 cm, right upper lobe (image 40); 1 2 cm, right middle lobe (image 53); 1 5 cm, left lower lobe (image 56)  PLEURA:  Moderate sized bilateral pleural effusions, developing since the last CT  HEART/GREAT VESSELS:  Unremarkable for patient's age  MEDIASTINUM AND DHAVAL: No lymphadenopathy or mass  Small hiatal hernia  Esophagus unremarkable  Trachea and main stem bronchi normal   CHEST WALL AND LOWER NECK:   Unremarkable  Pulmonary function testing:  No pulmonary function testing available for review       EKG, Pathology, and Other Studies:   No echocardiogram available for review  Code Status: Level 1 - Full Code    Urbano Ravi

## 2019-12-26 NOTE — MALNUTRITION/BMI
This medical record reflects one or more clinical indicators suggestive of morbid obesity  BMI Findings:  BMI Classifications: Morbid Obesity 50-59 9     Body mass index is 53 59 kg/m²  See Nutrition note dated 12/26/19 for additional details  Completed nutrition assessment is viewable in the nutrition documentation

## 2019-12-26 NOTE — UTILIZATION REVIEW
Initial Clinical Review    Admission: Date/Time/Statement:  OBSERVATION 12/25/19 @ 1845 - CHANGED TO INPATIENT ON 12/26/19 @ 1225  Orders Placed This Encounter   Procedures     12/26/19 1226  Inpatient Admission Once     Transfer Service: GYN Oncology       Question Answer Comment   Admitting Physician Reggie Prime    Level of Care Med Surg    Estimated length of stay More than 2 Midnights    Certification I certify that inpatient services are medically necessary for this patient for a duration of greater than two midnights  See H&P and MD Progress Notes for additional information about the patient's course of treatment  12/26/19 1225       Assessment/Plan:   Jerod Quintero is a 53 yo female who presents as a transfer from [facility] to [facility] for higher level of care  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  Recently started on Chemo EMACO  12/24 developed new onset cough with hemoptysis "specks of blood" not hemorrhage, wheezing, dyspnea, chills and possible fever  CTA PE study showed no PE, pulmonary mets, bilat consolidation worse on MONIKA, concerning for superimposed pneumonia  She is admitted to OBSERVATION status with Gestational trophoblastic disease with pulmonary mets - post initial chemo  Pneumonia - IV antibiotics, ID consult  HTN - IV lasix  Anxiety - Ativan PRN, Lexapro  Pain control  PMH:  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  12/26 - + cough with bloody sputum, chest tightness improved post treatment, CP with coughing  Pt will have bronch today - multiple possible etiologies- pneumonia, viral infection vs  Less likely hemorhhagic mets  Flue and RSV are negative  + bilat pleural effusions - too small for thoracentesis currently         Wt Readings from Last 1 Encounters:   12/25/19 (!) 151 kg (332 lb)     Additional Vital Signs:   12/25/19 22:56:34    77  18  124/58  80  97 %    12/25/19 22:28:55  99 °F (37 2 °C)  90  20  171/86Abnormal    114  97 %     12/25/19 18:44:29  99 7 °F (37 6 °C)  105  20  154/103Abnormal   120  98 %     12/25/19 18:43:43  99 7 °F (37 6 °C)  88  20  154/103Abnormal   120  99 %     12/25/19 17:53:51  99 1 °F (37 3 °C)  89  20  192/107Abnormal    135  98 %  None (Room air)     Pertinent Labs/Diagnostic Test Results:     12/25 CXR - Multilobar alveolar opacities most prominent in the left upper lobe  This may represent pneumonia in the appropriate clinical context  Small bilateral pleural effusions       Findings suggestive of mild central pulmonary vascular congestion  9 mm nodule right upper lobe  12/25 CTA ED chest PE study - 1  No evidence of pulmonary embolus  2   Dilated central pulmonary arteries, consistent with pulmonary arterial hypertension  3   New areas of consolidation in both lungs, particularly in the left upper lobe, most likely pneumonia  Differential diagnosis includes pulmonary hemorrhage and, less likely, drug reaction or atypical pulmonary edema  4   Multiple irregular pulmonary nodules, most likely hemorrhagic metastases, increased in size since 12/19/2019      5   Moderate-sized bilateral pleural effusions        Results from last 7 days   Lab Units 12/26/19  0637 12/25/19  0930 12/23/19  0731 12/22/19  0556 12/20/19  1527   WBC Thousand/uL 8 35 13 28* 10 34* 10 37* 8 98   HEMOGLOBIN g/dL 11 3* 12 5 11 5 11 7 13 0   HEMATOCRIT % 34 0* 38 0 34 7* 35 6 39 0   PLATELETS Thousands/uL 206 250 217 232 263   NEUTROS ABS Thousands/µL  --  12 50*  --   --  6 79         Results from last 7 days   Lab Units 12/25/19  0930 12/23/19  0731 12/22/19  0556 12/20/19  1527 12/19/19  1533   SODIUM mmol/L 138 141 140 138  --    POTASSIUM mmol/L 3 9 3 9 4 1 4 1  --    CHLORIDE mmol/L 105 115* 113* 108  --    CO2 mmol/L 23 21 22 24  --    ANION GAP mmol/L 10 5 5 6  --    BUN mg/dL 17 16 12 15 15   CREATININE mg/dL 0 99 1 03 0 82 0 89 0 77 EGFR ml/min/1 73sq m 67 64 84 76 91   CALCIUM mg/dL 8 8 8 5 8 6 9 4  --    MAGNESIUM mg/dL  --   --   --  2 1  --    PHOSPHORUS mg/dL  --   --   --  3 8  --      Results from last 7 days   Lab Units 12/25/19  0930 12/20/19  1527   AST U/L 42 22   ALT U/L 59 39   ALK PHOS U/L 69 70   TOTAL PROTEIN g/dL 6 6 6 9   ALBUMIN g/dL 3 3* 3 5   TOTAL BILIRUBIN mg/dL 0 80 0 67     Results from last 7 days   Lab Units 12/25/19  0930 12/23/19  0731 12/22/19  0556 12/20/19  1527   GLUCOSE RANDOM mg/dL 124 84 100 91     Results from last 7 days   Lab Units 12/25/19  0930   TROPONIN I ng/mL 0 03     Results from last 7 days   Lab Units 12/25/19  0930 12/20/19  1527   PROTIME seconds 12 6 12 7   INR  0 97 0 99   PTT seconds 29 27     Results from last 7 days   Lab Units 12/25/19  0930   PROCALCITONIN ng/ml 0 09     Results from last 7 days   Lab Units 12/25/19  1018   LACTIC ACID mmol/L 1 3     Results from last 7 days   Lab Units 12/25/19  1117   CLARITY UA  Slightly Cloudy   COLOR UA  Yellow   SPEC GRAV UA  1 020   PH UA  5 5   GLUCOSE UA mg/dl Negative   KETONES UA mg/dl Negative   BLOOD UA  Large*   PROTEIN UA mg/dl Trace*   NITRITE UA  Negative   BILIRUBIN UA  Negative   UROBILINOGEN UA E U /dl 0 2   LEUKOCYTES UA  Negative   WBC UA /hpf 1-2*   RBC UA /hpf 30-50*   BACTERIA UA /hpf Moderate*   EPITHELIAL CELLS WET PREP /hpf Innumerable*     Results from last 7 days   Lab Units 12/25/19  2228   INFLUENZA A PCR  None Detected   RSV PCR  None Detected     Results from last 7 days   Lab Units 12/25/19  0930   BLOOD CULTURE  Received in Microbiology Lab  Culture in Progress  Received in Microbiology Lab  Culture in Progress       Past Medical History:   Diagnosis Date    Cancer ([facility] [address] )     GTD (gestational trophoblastic disease)     Obesity      Admitting Diagnosis: Pneumonia [J18 9]     Age/Sex: 52 y o  female     Admission Orders:  Scheduled Medications:    Medications:  azithromycin 500 mg Oral Q24H   cefepime 2,000 mg Intravenous Q12H   docusate sodium 100 mg Oral BID   escitalopram 10 mg Oral Daily   polyethylene glycol 17 g Oral Daily     Continuous IV Infusions:     PRN Meds:    acetaminophen 650 mg Oral Q6H PRN x1 12/25   albuterol 2 puff Inhalation Q4H PRN x1 12/26   benzonatate 100 mg Oral TID PRN x1 12/26   ibuprofen 600 mg Oral Q6H PRN x1 12/25, 12/26   LORazepam 1 mg Oral Q8H PRN    senna 1 tablet Oral HS PRN    simethicone 80 mg Oral Q6H PRN      SCDs  Vitals q 4 hr   Legionella and strep - urine  Diet NPO   IP CONSULT TO PULMONOLOGY  IP CONSULT TO INFECTIOUS DISEASES    Network Utilization Review Department  [email or web address]  ATTENTION: Please call with any questions or concerns to [phone] and carefully listen to the prompts so that you are directed to the right person  All voicemails are confidential   Radha Tejada all requests for admission clinical reviews, approved or denied determinations and any other requests to dedicated fax number below belonging to the campus where the patient is receiving treatment   List of dedicated fax numbers for the Facilities:  [address] [place] [address] DENIALS (Administrative/Medical Necessity) [phone]   [address] (Maternity/NICU/Pediatrics) [phone]   [facility] [phone]   Chung Hernandez [phone]   [facility] [phone]   Patrick Ridley [phone]   [id number] [facility] [phone]   [facility]  [phone]   [address] [place] [address], [address]  [address] [facility] [address] [facility] [phone]

## 2019-12-26 NOTE — PROGRESS NOTES
Progress Note - Infectious Disease   [facility] 52 y o  female MRN: [id number]  Unit/Bed#: [facility] 909-01 Encounter: [id number]      Impression:  1  Healthcare associated pneumonia versus hemorrhage R/0 viral versus back  2  Gestational trophoblastic disease with pulmonary metastases on chemotherapy    Recommendations:  Results of bronchoscopy noted and discussed with Pulmonary  T-max is 99 7°  WBC count is within normal limits the  Alert blood cultures are negative so far  Urine Legionella, strep pneumoniae antigen as well as influenza and RSV PCRs are negative  1  Check bronchoscopy cultures  2  Pending above would continue cefepime 2 g q 12 hours IV and azithromycin 500 mg Q 24 hours p o  Antibiotics:  1  Cefepime 2 g q 12 hours IV, day 2 Rx  2  Azithromycin 500 mg Q 24 hours p o , day 2 Rx     Subjective: Although she still has a cough is less than previous  She denies shortness of breath  Denies fevers, chills, or sweats  Denies nausea, vomiting, or diarrhea  Objective:  Vitals:  Temp:  [98 3 °F (36 8 °C)-99 7 °F (37 6 °C)] 99 2 °F (37 3 °C)  HR:  [] 105  Resp:  [16-20] 18  BP: (120-192)/() 139/87  SpO2:  [97 %-100 %] 99 %  Temp (24hrs), Av °F (37 2 °C), Min:98 3 °F (36 8 °C), Max:99 7 °F (37 6 °C)  Current: Temperature: 99 2 °F (37 3 °C)    Physical Exam:     General Appearance:  Alert, obese female who is nontoxic, no acute distress  Throat: Oropharynx moist without lesions  Lips, mucosa, and tongue normal   Neck: Supple, symmetrical, trachea midline, no adenopathy,  no tenderness/mass/nodules   Lungs:   Sparse bibasilar rales   Heart:  Regular rate and rhythm, S1, S2 normal, no murmur, rub or gallop   Abdomen:   Soft, non-tender, protuberant, non-distended, positive bowel sounds  No masses, no organomegaly    No CVA tenderness   Extremities: Extremities normal, atraumatic, no clubbing, cyanosis or edema   Skin: Skin color, texture, turgor normal, no rashes or lesions   No draining wounds noted  , tattoos         Invasive Devices     Peripheral Intravenous Line            Peripheral IV 12/25/19 Right Antecubital 1 day                Labs, Imaging, & Other studies:   All pertinent labs were personally reviewed  Results from last 7 days   Lab Units 12/26/19  0637 12/25/19  0930 12/23/19  0731   WBC Thousand/uL 8 35 13 28* 10 34*   HEMOGLOBIN g/dL 11 3* 12 5 11 5   PLATELETS Thousands/uL 206 250 217     Results from last 7 days   Lab Units 12/26/19  0637 12/25/19  0930 12/23/19  0731  12/20/19  1527   SODIUM mmol/L 139 138 141   < > 138   POTASSIUM mmol/L 3 9 3 9 3 9   < > 4 1   CHLORIDE mmol/L 110* 105 115*   < > 108   CO2 mmol/L 22 23 21   < > 24   BUN mg/dL 15 17 16   < > 15   CREATININE mg/dL 0 94 0 99 1 03   < > 0 89   EGFR ml/min/1 73sq m 71 67 64   < > 76   CALCIUM mg/dL 8 6 8 8 8 5   < > 9 4   AST U/L  --  42  --   --  22   ALT U/L  --  59  --   --  39   ALK PHOS U/L  --  69  --   --  70    < > = values in this interval not displayed  Results from last 7 days   Lab Units 12/26/19  0343 12/25/19  0930   BLOOD CULTURE   --  Received in Microbiology Lab  Culture in Progress  Received in Microbiology Lab  Culture in Progress     LEGIONELLA URINARY ANTIGEN  Negative  --

## 2019-12-26 NOTE — ANESTHESIA PREPROCEDURE EVALUATION
Review of Systems/Medical History          Cardiovascular   Pulmonary  Pneumonia, Shortness of breath, Pleural effusion : bilateral,   Comment: Lung ca with mets  Hemoptysis     GI/Hepatic            Endo/Other    Obesity  super morbid obesity   GYN       Hematology   Musculoskeletal       Neurology   Psychology   Anxiety,              Physical Exam    Airway    Mallampati score: II         Dental       Cardiovascular  Rhythm: regular, Rate: normal,     Pulmonary      Other Findings        Anesthesia Plan  ASA Score- 3 Emergent    Anesthesia Type- general with ASA Monitors  Additional Monitors:   Airway Plan: ETT  Plan Factors-    Induction- intravenous  Postoperative Plan- Plan for postoperative opioid use  Planned trial extubation    Informed Consent- Anesthetic plan and risks discussed with patient  I personally reviewed this patient with the CRNA  Discussed and agreed on the Anesthesia Plan with the CRNA  Marcelo Aguilar

## 2019-12-26 NOTE — ANESTHESIA POSTPROCEDURE EVALUATION
Post-Op Assessment Note    CV Status:  Stable  Pain Score: 0    Pain management: adequate     Mental Status:  Alert and awake   Hydration Status:  Euvolemic   PONV Controlled:  Controlled   Airway Patency:  Patent   Post Op Vitals Reviewed: Yes      Staff: CRNA           BP   128/68   Temp  98 3   Pulse 99   Resp 16   SpO2 98 on simple mask @ 8l/m

## 2019-12-26 NOTE — PLAN OF CARE
Problem: PAIN - ADULT  Goal: Verbalizes/displays adequate comfort level or baseline comfort level  Description  Interventions:  - Encourage patient to monitor pain and request assistance  - Assess pain using appropriate pain scale  - Administer analgesics based on type and severity of pain and evaluate response  - Implement non-pharmacological measures as appropriate and evaluate response  - Consider cultural and social influences on pain and pain management  - Notify physician/advanced practitioner if interventions unsuccessful or patient reports new pain  Outcome: Progressing     Problem: INFECTION - ADULT  Goal: Absence or prevention of progression during hospitalization  Description  INTERVENTIONS:  - Assess and monitor for signs and symptoms of infection  - Monitor lab/diagnostic results  - Monitor all insertion sites, i e  indwelling lines, tubes, and drains  - Monitor endotracheal if appropriate and nasal secretions for changes in amount and color  - [place] appropriate cooling/warming therapies per order  - Administer medications as ordered  - Instruct and encourage patient and family to use good hand hygiene technique  - Identify and instruct in appropriate isolation precautions for identified infection/condition  Outcome: Progressing     Problem: RESPIRATORY - ADULT  Goal: Achieves optimal ventilation and oxygenation  Description  INTERVENTIONS:  - Assess for changes in respiratory status  - Assess for changes in mentation and behavior  - Position to facilitate oxygenation and minimize respiratory effort  - Oxygen administered by appropriate delivery if ordered  - Initiate smoking cessation education as indicated  - Encourage broncho-pulmonary hygiene including cough, deep breathe, Incentive Spirometry  - Assess the need for suctioning and aspirate as needed  - Assess and instruct to report SOB or any respiratory difficulty  - Respiratory Therapy support as indicated  Outcome: Progressing

## 2019-12-26 NOTE — SOCIAL WORK
Readmission - fever, cough, SOB, ? Pneumonia viral vs hospital acquired    Met with pt and explained Cm program/CM Role  Resides with son in an apartment, 1 slight stairs to reach, main floor set up  Son is with father  Independent prior to admission for ADL's/ambulation  She drives  PCP TAMARA Timmons  Has prescription plan, uses [facility]- interested in meds to bed  Denies utilization DME/HHC/IP Rehab  Denies mental health illness, IP or OP psyche care, drug and/or alcohol abuse  No POA or living will  Primary contact is brother Carolyn Fox, [phone]  Family will provide transport home    Advised she will receive call from PCP office within 3 business days of discharge to schedule appt    CM reviewed d/c planning process including the following: identifying help at home, patient preference for d/c planning needs, Discharge Lounge, [facility] Meds to Bed program, availability of treatment team to discuss questions or concerns patient and/or family may have regarding understanding medications and recognizing signs and symptoms once discharged  CM also encouraged patient to follow up with all recommended appointments after discharge  Patient advised of importance for patient and family to participate in managing patients medical well being  Patient/caregiver received discharge checklist  Content reviewed  Patient/caregiver encouraged to participate in discharge plan of care prior to discharge home

## 2019-12-27 PROBLEM — I10 HTN (HYPERTENSION): Status: ACTIVE | Noted: 2019-12-27

## 2019-12-27 LAB
ANION GAP SERPL CALCULATED.3IONS-SCNC: 7 MMOL/L (ref 4–13)
ATRIAL RATE: 95 BPM
BASOPHILS # BLD AUTO: 0.03 THOUSANDS/ΜL (ref 0–0.1)
BASOPHILS NFR BLD AUTO: 1 % (ref 0–1)
BUN SERPL-MCNC: 20 MG/DL (ref 5–25)
CALCIUM SERPL-MCNC: 8.4 MG/DL (ref 8.3–10.1)
CHLORIDE SERPL-SCNC: 112 MMOL/L (ref 100–108)
CO2 SERPL-SCNC: 21 MMOL/L (ref 21–32)
CREAT SERPL-MCNC: 0.92 MG/DL (ref 0.6–1.3)
EOSINOPHIL # BLD AUTO: 0.12 THOUSAND/ΜL (ref 0–0.61)
EOSINOPHIL NFR BLD AUTO: 3 % (ref 0–6)
ERYTHROCYTE [DISTWIDTH] IN BLOOD BY AUTOMATED COUNT: 13.8 % (ref 11.6–15.1)
GFR SERPL CREATININE-BSD FRML MDRD: 73 ML/MIN/1.73SQ M
GLUCOSE SERPL-MCNC: 100 MG/DL (ref 65–140)
HCT VFR BLD AUTO: 31.2 % (ref 34.8–46.1)
HGB BLD-MCNC: 10.3 G/DL (ref 11.5–15.4)
IMM GRANULOCYTES # BLD AUTO: 0.06 THOUSAND/UL (ref 0–0.2)
IMM GRANULOCYTES NFR BLD AUTO: 1 % (ref 0–2)
LYMPHOCYTES # BLD AUTO: 0.72 THOUSANDS/ΜL (ref 0.6–4.47)
LYMPHOCYTES NFR BLD AUTO: 15 % (ref 14–44)
MCH RBC QN AUTO: 28.8 PG (ref 26.8–34.3)
MCHC RBC AUTO-ENTMCNC: 33 G/DL (ref 31.4–37.4)
MCV RBC AUTO: 87 FL (ref 82–98)
MONOCYTES # BLD AUTO: 0.05 THOUSAND/ΜL (ref 0.17–1.22)
MONOCYTES NFR BLD AUTO: 1 % (ref 4–12)
NEUTROPHILS # BLD AUTO: 3.88 THOUSANDS/ΜL (ref 1.85–7.62)
NEUTS SEG NFR BLD AUTO: 79 % (ref 43–75)
NRBC BLD AUTO-RTO: 0 /100 WBCS
P AXIS: 66 DEGREES
P JIROVECII AG SPEC QL IF: NORMAL
PLATELET # BLD AUTO: 191 THOUSANDS/UL (ref 149–390)
PMV BLD AUTO: 10 FL (ref 8.9–12.7)
POTASSIUM SERPL-SCNC: 3.9 MMOL/L (ref 3.5–5.3)
PR INTERVAL: 146 MS
QRS AXIS: 20 DEGREES
QRSD INTERVAL: 78 MS
QT INTERVAL: 370 MS
QTC INTERVAL: 464 MS
RBC # BLD AUTO: 3.58 MILLION/UL (ref 3.81–5.12)
SODIUM SERPL-SCNC: 140 MMOL/L (ref 136–145)
T WAVE AXIS: 58 DEGREES
VENTRICULAR RATE: 95 BPM
WBC # BLD AUTO: 4.86 THOUSAND/UL (ref 4.31–10.16)

## 2019-12-27 PROCEDURE — 80048 BASIC METABOLIC PNL TOTAL CA: CPT | Performed by: STUDENT IN AN ORGANIZED HEALTH CARE EDUCATION/TRAINING PROGRAM

## 2019-12-27 PROCEDURE — 93010 ELECTROCARDIOGRAM REPORT: CPT | Performed by: INTERNAL MEDICINE

## 2019-12-27 PROCEDURE — 85025 COMPLETE CBC W/AUTO DIFF WBC: CPT | Performed by: STUDENT IN AN ORGANIZED HEALTH CARE EDUCATION/TRAINING PROGRAM

## 2019-12-27 PROCEDURE — 99254 IP/OBS CNSLTJ NEW/EST MOD 60: CPT | Performed by: INTERNAL MEDICINE

## 2019-12-27 PROCEDURE — 99232 SBSQ HOSP IP/OBS MODERATE 35: CPT | Performed by: INTERNAL MEDICINE

## 2019-12-27 PROCEDURE — 99233 SBSQ HOSP IP/OBS HIGH 50: CPT | Performed by: OBSTETRICS & GYNECOLOGY

## 2019-12-27 PROCEDURE — 99233 SBSQ HOSP IP/OBS HIGH 50: CPT | Performed by: INTERNAL MEDICINE

## 2019-12-27 RX ORDER — GUAIFENESIN/DEXTROMETHORPHAN 100-10MG/5
10 SYRUP ORAL EVERY 4 HOURS PRN
Status: DISCONTINUED | OUTPATIENT
Start: 2019-12-27 | End: 2019-12-28

## 2019-12-27 RX ORDER — CHLORTHALIDONE 25 MG/1
12.5 TABLET ORAL DAILY
Status: DISCONTINUED | OUTPATIENT
Start: 2019-12-28 | End: 2019-12-28

## 2019-12-27 RX ORDER — CALCIUM CARBONATE 200(500)MG
500 TABLET,CHEWABLE ORAL DAILY PRN
Status: DISCONTINUED | OUTPATIENT
Start: 2019-12-27 | End: 2019-12-28 | Stop reason: HOSPADM

## 2019-12-27 RX ADMIN — POLYETHYLENE GLYCOL 3350 17 G: 17 POWDER, FOR SOLUTION ORAL at 09:50

## 2019-12-27 RX ADMIN — ESCITALOPRAM OXALATE 10 MG: 10 TABLET ORAL at 09:50

## 2019-12-27 RX ADMIN — CEFEPIME HYDROCHLORIDE 2000 MG: 2 INJECTION, POWDER, FOR SOLUTION INTRAVENOUS at 03:18

## 2019-12-27 RX ADMIN — ENOXAPARIN SODIUM 40 MG: 40 INJECTION SUBCUTANEOUS at 20:33

## 2019-12-27 RX ADMIN — SIMETHICONE CHEW TAB 80 MG 80 MG: 80 TABLET ORAL at 04:27

## 2019-12-27 RX ADMIN — LORAZEPAM 1 MG: 1 TABLET ORAL at 04:23

## 2019-12-27 RX ADMIN — DOCUSATE SODIUM 100 MG: 100 CAPSULE, LIQUID FILLED ORAL at 09:50

## 2019-12-27 RX ADMIN — DOCUSATE SODIUM 100 MG: 100 CAPSULE, LIQUID FILLED ORAL at 16:49

## 2019-12-27 RX ADMIN — CEFEPIME HYDROCHLORIDE 2000 MG: 2 INJECTION, POWDER, FOR SOLUTION INTRAVENOUS at 16:48

## 2019-12-27 RX ADMIN — IBUPROFEN 600 MG: 600 TABLET ORAL at 03:16

## 2019-12-27 RX ADMIN — HYDROCODONE BITARTRATE AND HOMATROPINE METHYLBROMIDE ORAL SOLUTION 5 ML: 5; 1.5 LIQUID ORAL at 18:11

## 2019-12-27 RX ADMIN — CALCIUM CARBONATE (ANTACID) CHEW TAB 500 MG 500 MG: 500 CHEW TAB at 22:21

## 2019-12-27 RX ADMIN — LABETALOL HYDROCHLORIDE 10 MG: 5 INJECTION INTRAVENOUS at 03:17

## 2019-12-27 RX ADMIN — SENNOSIDES 8.6 MG: 8.6 TABLET, FILM COATED ORAL at 20:32

## 2019-12-27 RX ADMIN — AZITHROMYCIN 500 MG: 250 TABLET, FILM COATED ORAL at 16:49

## 2019-12-27 RX ADMIN — GUAIFENESIN AND DEXTROMETHORPHAN 10 ML: 100; 10 SYRUP ORAL at 09:50

## 2019-12-27 RX ADMIN — HYDROCODONE BITARTRATE AND HOMATROPINE METHYLBROMIDE ORAL SOLUTION 5 ML: 5; 1.5 LIQUID ORAL at 22:13

## 2019-12-27 NOTE — H&P (VIEW-ONLY)
Progress Note - Infectious Disease   Anais Herman 52 y o  female MRN: [id number]  Unit/Bed#: [facility] 909-01 Encounter: [id number]      Impression:  1  Probable hemorrhagic non infectious pneumonitis   2  Gestational trophoblastic disease with pulmonary metastases on chemotherapy    Recommendations:   T-max is 99 4°  WBC count is within normal limits the    Blood cultures are negative so far  Urine Legionella, strep pneumoniae antigen as well as influenza and RSV PCRs are negative  1  Check final bronchoscopy cultures which are negative so far  2  Pending above would continue cefepime 2 g q 12 hours IV and azithromycin 500 mg Q 24 hours p o  If bronchoscopy cultures are negative tomorrow would consider discontinuing antibiotics  3  Discussed with Pulmonary and Internal Medicine    Antibiotics:  1  Cefepime 2 g q 12 hours IV, day 3 Rx  2  Azithromycin 500 mg Q 24 hours p o , day 3 Rx     Subjective:  Cough with mild hemoptysis is still present but is improving daily Denies fevers, chills, or sweats  Denies nausea, vomiting, or diarrhea  Objective:  Vitals:  Temp:  [98 2 °F (36 8 °C)-99 4 °F (37 4 °C)] 99 3 °F (37 4 °C)  HR:  [79-97] 79  Resp:  [18-20] 18  BP: (136-175)/(78-97) 167/91  SpO2:  [96 %-99 %] 98 %  Temp (24hrs), Av 8 °F (37 1 °C), Min:98 2 °F (36 8 °C), Max:99 4 °F (37 4 °C)  Current: Temperature: 99 3 °F (37 4 °C)    Physical Exam:     General Appearance:  Alert, obese female who is nontoxic, no acute distress  Throat: Oropharynx moist without lesions  Lips, mucosa, and tongue normal   Neck: Supple, symmetrical, trachea midline, no adenopathy,  no tenderness/mass/nodules   Lungs:   Lungs are clear   Heart:  Regular rate and rhythm, S1, S2 normal, no murmur, rub or gallop   Abdomen:   Soft, non-tender, protuberant, non-distended, positive bowel sounds    No masses, no organomegaly    No CVA tenderness   Extremities: Extremities normal, atraumatic, no clubbing, cyanosis or edema   Skin: Skin color, texture, turgor normal, no rashes or lesions  No draining wounds noted  , tattoos         Invasive Devices     Peripheral Intravenous Line            Peripheral IV 12/27/19 Right Arm less than 1 day                Labs, Imaging, & Other studies:   All pertinent labs were personally reviewed  Results from last 7 days   Lab Units 12/27/19  0512 12/26/19  0637 12/25/19  0930   WBC Thousand/uL 4 86 8 35 13 28*   HEMOGLOBIN g/dL 10 3* 11 3* 12 5   PLATELETS Thousands/uL 191 206 250     Results from last 7 days   Lab Units 12/27/19  0513 12/26/19  0637 12/25/19  0930   SODIUM mmol/L 140 139 138   POTASSIUM mmol/L 3 9 3 9 3 9   CHLORIDE mmol/L 112* 110* 105   CO2 mmol/L 21 22 23   BUN mg/dL 20 15 17   CREATININE mg/dL 0 92 0 94 0 99   EGFR ml/min/1 73sq m 73 71 67   CALCIUM mg/dL 8 4 8 6 8 8   AST U/L  --   --  42   ALT U/L  --   --  59   ALK PHOS U/L  --   --  69     Results from last 7 days   Lab Units 12/26/19  1250 12/26/19  0343 12/25/19  0930   BLOOD CULTURE   --   --  No Growth at 24 hrs  No Growth at 24 hrs     GRAM STAIN RESULT  3+ Polys  No bacteria seen  --   --    LEGIONELLA URINARY ANTIGEN   --  Negative  --

## 2019-12-27 NOTE — CONSULTS
Consult- Will Mays 1970, 52 y o  female MRN: [id number]    Unit/Bed#: [facility] 909-01 Encounter: [id number]    Primary Care Provider: Que Wilson MD   Date and time admitted to hospital: 12/25/2019  5:47 PM      Inpatient consult to Internal Medicine  Consult performed by: Zenon Bahena MD  Consult ordered by: Lucio Clements MD          HTN (hypertension)  Assessment & Plan  No history of hypertension in the past but does know systolic pressures in the 130s to 140s at prior physician appointments  Suspect a component of uncontrolled pain due to frequent cough  Please consider addition of Hycodan for cough suppression and pain relief  This time the patient is very averse to initiating antihypertensive, given her lower extremity edema will start chlorthalidone 12 5 mg and encourage leg elevation  Will continue to follow and titrate antihypertensives  Check BNP, if abnormal, the possibility of cardiac toxicity from vincristine should be considered  Continue with labetalol IV p r n  Hypertensive urgencies  * Pneumonia  Assessment & Plan  Currently being evaluated by pulmonology and Infectious Disease  Cultures remain negative  Receiving empiric azithromycin and cefepime  Status post bronchoscopy with blood-tinged secretions, awaiting BAL cytology  Inpatient Medical Consultation - [facility]    Patient Information: Will Mays 52 y o  female MRN: [id number]  Unit/Bed#: [facility] 909-01 Encounter: [id number]  PCP: Que Wilson MD  Date of Admission:  12/25/2019  Date of Consultation: 12/27/19  Requesting Physician: Garrett Dietrich MD    Reason For Consultation:   Hypertension    Assessment/Plan:    · Please see above    VTE Prophylaxis: Sequential compression device (Venodyne)   / sequential compression device     Recommendations for Discharge:  · To be determined    Counseling / Coordination of Care Time: 30 minutes    Greater than 50% of total time spent on patient counseling and coordination of care  Collaboration of Care: Were Recommendations Directly Discussed with Primary Treatment Team? 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 is originally admitted to the Gynecology/Oncology service on 2019 due to pneumonia  We are consulted for hypertension  The patient denies any history of hypertension, she does note occasional readings with systolics of 739-267  She reports occasional headaches, denies any visual changes  She does have cough and sputum with associated chest pain  Reports lower extremity edema since starting her chemotherapy  Review of Systems:    Review of Systems   Eyes: Negative for visual disturbance  Respiratory: Positive for cough and shortness of breath  Cardiovascular: Positive for chest pain  Gastrointestinal: Positive for abdominal pain  Neurological: Positive for headaches  Past Medical and Surgical History:     Past Medical History:   Diagnosis Date    Cancer ([address] )     GTD (gestational trophoblastic disease)     History of chemotherapy     Obesity     Shortness of breath     Wheezing        Past Surgical History:   Procedure Laterality Date     SECTION      DILATION AND CURETTAGE OF UTERUS      X2    ENDOMETRIAL ABLATION  2009    THYROIDECTOMY, PARTIAL         Meds/Allergies:    all medications and allergies reviewed    Allergies:    Allergies   Allergen Reactions    Penicillins Fever       Social History:     Marital Status: Unknown    Substance Use History:   Social History     Substance and Sexual Activity   Alcohol Use Not Currently     Social History     Tobacco Use   Smoking Status Former Smoker    Types: Cigarettes    Last attempt to quit: Jeana Cruz Years since quittin 0   Smokeless Tobacco Former User     Social History     Substance and Sexual Activity   Drug Use Never       Family History:    non-contributory    Physical Exam:     Vitals:   Blood Pressure: 167/91 (12/27/19 1607)  Pulse: 79 (12/27/19 1607)  Temperature: 99 3 °F (37 4 °C) (12/27/19 1607)  Temp Source: Oral (12/27/19 0747)  Respirations: 18 (12/27/19 1607)  Height: 5' 6" (167 6 cm) (12/25/19 1753)  Weight - Scale: (!) 151 kg (332 lb) (12/25/19 1753)  SpO2: 98 % (12/27/19 1607)    Physical Exam   Constitutional: She is oriented to person, place, and time  Morbidly obese middle-aged female, lying in bed, coughing frequently   HENT:   Head: Normocephalic and atraumatic  Mouth/Throat: No oropharyngeal exudate  Eyes: Pupils are equal, round, and reactive to light  EOM are normal  No scleral icterus  Neck: Neck supple  Cardiovascular: Normal rate and regular rhythm  Pulmonary/Chest: Effort normal  She has no wheezes  She has no rales  She exhibits tenderness  Abdominal: Soft  She exhibits no distension  There is no tenderness  Musculoskeletal: She exhibits edema  Neurological: She is alert and oriented to person, place, and time  Skin: Skin is warm and dry  Psychiatric: She has a normal mood and affect  Her behavior is normal              Additional Data:     Lab Results: I have personally reviewed pertinent reports  Results from last 7 days   Lab Units 12/27/19  0512   WBC Thousand/uL 4 86   HEMOGLOBIN g/dL 10 3*   HEMATOCRIT % 31 2*   PLATELETS Thousands/uL 191   NEUTROS PCT % 79*   LYMPHS PCT % 15   MONOS PCT % 1*   EOS PCT % 3     Results from last 7 days   Lab Units 12/27/19  0513  12/25/19  0930   POTASSIUM mmol/L 3 9   < > 3 9   CHLORIDE mmol/L 112*   < > 105   CO2 mmol/L 21   < > 23   BUN mg/dL 20   < > 17   CREATININE mg/dL 0 92   < > 0 99   CALCIUM mg/dL 8 4   < > 8 8   ALK PHOS U/L  --   --  69   ALT U/L  --   --  59   AST U/L  --   --  42    < > = values in this interval not displayed  Results from last 7 days   Lab Units 12/25/19  0930   INR  0 97       Imaging: I have personally reviewed pertinent reports        Xr Chest 2 Views    Result Date: 12/25/2019  Narrative: CHEST INDICATION:   shortness of breath  COMPARISON:  None EXAM PERFORMED/VIEWS:  XR CHEST PA & LATERAL  The frontal view was performed utilizing dual energy radiographic technique  FINDINGS: Prominent partially obscured cardiac silhouette  Moderate left upper lobe and small patchy bilateral lower lobe alveolar opacities  Small bilateral pleural effusions  9 mm nodule right upper lobe  Mild prominence of the central pulmonary vasculature  No pneumothorax  Mild degenerative changes of the mid thoracic spine  Impression: Multilobar alveolar opacities most prominent in the left upper lobe  This may represent pneumonia in the appropriate clinical context  Small bilateral pleural effusions  Findings suggestive of mild central pulmonary vascular congestion  9 mm nodule right upper lobe  See subsequent CTA chest report  Workstation performed: [id number]     Mri Brain W Wo Contrast    Result Date: 12/20/2019  Narrative: MRI BRAIN WITH AND WITHOUT CONTRAST INDICATION: Screening for CNS metastasis  COMPARISON:  12/19/2019  TECHNIQUE: Sagittal T1, axial T2, axial FLAIR, axial T1, axial [place], axial diffusion  Sagittal, axial T1 postcontrast   Axial bravo postcontrast with coronal reconstructions  IV Contrast:  14 mL of gadobutrol injection (MULTI-DOSE)  IMAGE QUALITY:   Diagnostic  FINDINGS: BRAIN PARENCHYMA:  There is no discrete mass, mass effect or midline shift  There is no intracranial hemorrhage  Normal posterior fossa  Diffusion imaging is unremarkable  There are no white matter changes in the cerebral hemispheres  Postcontrast imaging of the brain demonstrates no abnormal enhancement  VENTRICLES:  Normal for the patient's age  SELLA AND PITUITARY GLAND:  Normal  ORBITS:  Normal  PARANASAL SINUSES:  Normal  VASCULATURE:  Evaluation of the major intracranial vasculature demonstrates appropriate flow voids   CALVARIUM AND SKULL BASE:  Normal  EXTRACRANIAL SOFT TISSUES:  Normal      Impression: No evidence of metastatic disease  No acute intracranial process  Workstation performed: [id number]     Ct Chest Abdomen Pelvis W Contrast    Result Date: 12/19/2019  Narrative: CT CHEST, ABDOMEN AND PELVIS WITH IV CONTRAST INDICATION:   E34 9: Endocrine disorder, unspecified R87 9: Unspecified abnormal finding in specimens from female genital organs N93 9: Abnormal uterine and vaginal bleeding, unspecified  Reported prior ultrasound demonstrating a fibroid  COMPARISON:  None  TECHNIQUE: CT examination of the chest, abdomen and pelvis was performed  Axial, sagittal, and coronal 2D reformatted images were created from the source data and submitted for interpretation  Radiation dose length product (DLP) for this visit:  9725 mGy-cm   This examination, like all CT scans performed in the [facility], was performed utilizing techniques to minimize radiation dose exposure, including the use of iterative reconstruction and automated exposure control  IV Contrast:  50 mL of iohexol (OMNIPAQUE) 100 mL of iohexol (OMNIPAQUE) Enteric Contrast: Enteric contrast was administered  FINDINGS: CHEST LUNGS:  There are multiple scattered pulmonary nodules bilaterally  These are described below: - There is a 7 x 7 mm nodule in the left upper lobe series 3, image 36 - 6 mm right upper lobe pulmonary nodule on image 45 - 6 mm right upper lobe nodule on image 56 -7 mm nodule along the fissure in the left lower lobe on image 68 - 9 mm nodule in the left lower lobe on image 80 The central airways are patent  PLEURA:  There is no pleural nodularity  There are no pleural effusions  There is no pneumothorax  HEART/GREAT VESSELS:  Unremarkable for patient's age  MEDIASTINUM AND DHAVAL:  Unremarkable  CHEST WALL AND LOWER NECK:   There is left greater than right skin thickening overlying the breasts without discrete soft tissue mass  There is calcific is within the thyroid  ABDOMEN LIVER/BILIARY TREE:  Unremarkable  GALLBLADDER:  No calcified gallstones  No pericholecystic inflammatory change  SPLEEN:  Unremarkable  PANCREAS:  Unremarkable  ADRENAL GLANDS:  Unremarkable  KIDNEYS/URETERS:  Unremarkable  No hydronephrosis  STOMACH AND BOWEL:  There is colonic diverticulosis without evidence of acute diverticulitis  APPENDIX:  No findings to suggest appendicitis  ABDOMINOPELVIC CAVITY:  No ascites or free intraperitoneal air  No lymphadenopathy  VESSELS:  Unremarkable for patient's age  PELVIS REPRODUCTIVE ORGANS:  There is a large heterogeneously enhancing and necrotic lesion within the uterus measuring approximately 11 1 x 7 6 x 12 1 cm  There are otherwise no suspicious adnexal lesions identified  URINARY BLADDER:  Unremarkable  ABDOMINAL WALL/INGUINAL REGIONS:  Unremarkable  OSSEOUS STRUCTURES:  There are age appropriate degenerative changes  No acute fracture or destructive osseous lesion  No discrete lytic or blastic osseous lesions are seen  Impression: Large heterogeneous mass within the uterus, as described above  Findings are highly suspicious for neoplasm including endometrial carcinoma or possibly a uterine leiomyosarcoma  Multiple scattered pulmonary nodules throughout the lungs bilaterally suspicious for metastatic disease  Skin thickening along the left greater than right breast   Correlate with mammography  I personally discussed this study with Dr Yo Leon on 12/19/2019 at 6:24 PM  Workstation performed: [id number]     Cta Ed Chest Pe Study    Result Date: 12/25/2019  Narrative: CTA - CHEST WITH IV CONTRAST - PULMONARY ANGIOGRAM INDICATION:   PE suspected, high pretest prob  12-year-old female found unresponsive  Recent diagnosis of gestational trophoblastic disease and pulmonary metastases, begun 1st cycle of chemotherapy  COMPARISON: CT of the chest, abdomen and pelvis from December 19, 2019   TECHNIQUE: CTA examination of the chest was performed using angiographic technique according to a protocol specifically tailored to evaluate for pulmonary embolism  Axial, sagittal, and coronal 2D reformatted images were created from the source data and  submitted for interpretation  In addition, coronal 3D MIP postprocessing was performed on the acquisition scanner  Radiation dose length product (DLP) for this visit:  537 37 mGy-cm   This examination, like all CT scans performed in the [facility], was performed utilizing techniques to minimize radiation dose exposure, including the use of iterative  reconstruction and automated exposure control  IV Contrast:  100 mL of iohexol (OMNIPAQUE)  FINDINGS: PULMONARY ARTERIAL TREE:  No pulmonary embolus is seen  Central pulmonary arteries dilated with a main pulmonary artery diameter of 4 cm  LUNGS:  Dense consolidation in the left upper lobe with small areas of consolidation in both lower lobes, most likely pneumonia or, possibly, pulmonary hemorrhage  Differential diagnosis includes drug reaction and asymmetric pulmonary edema  Several pulmonary nodules, increased in size since the last CT, now with poorly defined margins, consistent with intratumoral hemorrhage, a relatively common finding in pulmonary metastases from choriocarcinoma  Largest nodules include the following: Left upper  lobe, 1 0 cm (series 3, image 32); 1 1 cm, right upper lobe (image 34); 0 8 cm, right upper lobe (image 40); 1 2 cm, right middle lobe (image 53); 1 5 cm, left lower lobe (image 56)  PLEURA:  Moderate sized bilateral pleural effusions, developing since the last CT  HEART/GREAT VESSELS:  Unremarkable for patient's age  MEDIASTINUM AND DHAVAL: No lymphadenopathy or mass  Small hiatal hernia  Esophagus unremarkable  Trachea and main stem bronchi normal  CHEST WALL AND LOWER NECK:   Unremarkable  VISUALIZED STRUCTURES IN THE UPPER ABDOMEN:  Unremarkable  OSSEOUS STRUCTURES:  No acute fracture or destructive osseous lesion  Impression: 1    No evidence of pulmonary embolus  2   Dilated central pulmonary arteries, consistent with pulmonary arterial hypertension  3   New areas of consolidation in both lungs, particularly in the left upper lobe, most likely pneumonia  Differential diagnosis includes pulmonary hemorrhage and, less likely, drug reaction or atypical pulmonary edema  4   Multiple irregular pulmonary nodules, most likely hemorrhagic metastases, increased in size since 12/19/2019  5   Moderate-sized bilateral pleural effusions  Workstation performed: [id number]       EKG, Pathology, and Other Studies Reviewed on Admission:   · EKG:  Sinus rhythm    ** Please Note: This note has been constructed using a voice recognition system   **

## 2019-12-27 NOTE — PROGRESS NOTES
Pastoral Care Progress Note    2019  Patient: Blayne Felder : 1970  Admission Date & Time: 2019 1747  MRN: [id number] CSN: [id number]                     Chaplaincy Interventions Utilized:   Empowerment: Encouraged focus on present and Encouraged self-care            Relationship Building: Listened empathically                Chaplaincy Outcomes Achieved:  Distress reduced          Spiritual Coping Strategies Utilized:          [facility] Affiliation unknown   Current Pentecostal Involvement Patient not active with Islam   Spiritual Beliefs/Perceptions   Concept of God Uninvolved   Relationship with God Distant   Stress Factors   Patient Stress Factors Other (Comment)  (Pt worried about son home alone who is 16 y o )   Coping Responses   Patient Coping Sadness

## 2019-12-27 NOTE — ASSESSMENT & PLAN NOTE
No history of hypertension in the past but does know systolic pressures in the 130s to 140s at prior physician appointments  Suspect a component of uncontrolled pain due to frequent cough  Please consider addition of Hycodan for cough suppression and pain relief  This time the patient is very averse to initiating antihypertensive, given her lower extremity edema will start chlorthalidone 12 5 mg and encourage leg elevation  Will continue to follow and titrate antihypertensives  Check BNP, if abnormal, the possibility of cardiac toxicity from vincristine should be considered  Continue with labetalol IV p r n  Hypertensive urgencies

## 2019-12-27 NOTE — PROGRESS NOTES
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    Recommendations:   T-max is 99 4°  WBC count is within normal limits the    Blood cultures are negative so far  Urine Legionella, strep pneumoniae antigen as well as influenza and RSV PCRs are negative  1  Check final bronchoscopy cultures which are negative so far  2  Pending above would continue cefepime 2 g q 12 hours IV and azithromycin 500 mg Q 24 hours p o  If bronchoscopy cultures are negative tomorrow would consider discontinuing antibiotics  3  Discussed with Pulmonary and Internal Medicine    Antibiotics:  1  Cefepime 2 g q 12 hours IV, day 3 Rx  2  Azithromycin 500 mg Q 24 hours p o , day 3 Rx     Subjective:  Cough with mild hemoptysis is still present but is improving daily Denies fevers, chills, or sweats  Denies nausea, vomiting, or diarrhea  Objective:  Vitals:  Temp:  [98 2 °F (36 8 °C)-99 4 °F (37 4 °C)] 99 3 °F (37 4 °C)  HR:  [79-97] 79  Resp:  [18-20] 18  BP: (136-175)/(78-97) 167/91  SpO2:  [96 %-99 %] 98 %  Temp (24hrs), Av 8 °F (37 1 °C), Min:98 2 °F (36 8 °C), Max:99 4 °F (37 4 °C)  Current: Temperature: 99 3 °F (37 4 °C)    Physical Exam:     General Appearance:  Alert, obese female who is nontoxic, no acute distress  Throat: Oropharynx moist without lesions  Lips, mucosa, and tongue normal   Neck: Supple, symmetrical, trachea midline, no adenopathy,  no tenderness/mass/nodules   Lungs:   Lungs are clear   Heart:  Regular rate and rhythm, S1, S2 normal, no murmur, rub or gallop   Abdomen:   Soft, non-tender, protuberant, non-distended, positive bowel sounds    No masses, no organomegaly    No CVA tenderness   Extremities: Extremities normal, atraumatic, no clubbing, cyanosis or edema   Skin: Skin color, texture, turgor normal, no rashes or lesions  No draining wounds noted  , tattoos         Invasive Devices     Peripheral Intravenous Line            Peripheral IV 12/27/19 Right Arm less than 1 day                Labs, Imaging, & Other studies:   All pertinent labs were personally reviewed  Results from last 7 days   Lab Units 12/27/19  0512 12/26/19  0637 12/25/19  0930   WBC Thousand/uL 4 86 8 35 13 28*   HEMOGLOBIN g/dL 10 3* 11 3* 12 5   PLATELETS Thousands/uL 191 206 250     Results from last 7 days   Lab Units 12/27/19  0513 12/26/19  0637 12/25/19  0930   SODIUM mmol/L 140 139 138   POTASSIUM mmol/L 3 9 3 9 3 9   CHLORIDE mmol/L 112* 110* 105   CO2 mmol/L 21 22 23   BUN mg/dL 20 15 17   CREATININE mg/dL 0 92 0 94 0 99   EGFR ml/min/1 73sq m 73 71 67   CALCIUM mg/dL 8 4 8 6 8 8   AST U/L  --   --  42   ALT U/L  --   --  59   ALK PHOS U/L  --   --  69     Results from last 7 days   Lab Units 12/26/19  1250 12/26/19  0343 12/25/19  0930   BLOOD CULTURE   --   --  No Growth at 24 hrs  No Growth at 24 hrs     GRAM STAIN RESULT  3+ Polys  No bacteria seen  --   --    LEGIONELLA URINARY ANTIGEN   --  Negative  --

## 2019-12-27 NOTE — PROGRESS NOTES
Gyn Progress Note   Carolina Lam 52 y o  female MRN: [id number]  Unit/Bed#: [facility] 909-01 Encounter: [id number]    Assessment/Plan:  71-year-old female with gestational trophoblastic disease undergoing EMA/CO therapy admitted for possible hospital vs community acquired pneumonia vs pneumonitis secondary to chemotherapy, with current testing infectious etiology seems less likely  Hospital day 3      1  Possible hospital vs community acquired pneumonia vs viral etiology vs pneumonitis from recent chemotherapy              - S/p 1g IV ceftriaxone and 500mg IV azithromycin   - Currently on Day 2 of Cefepime 2g Q12h IV and Day 3 of Azithromycin 500 mg PO daily              - Remains afebrile; Leukocytosis trended down 13 28 -> 8 25 -> f/u AM labs   - Negative Flu/ RSV/ Legionella and S  pneumo   - Blood cultures negative for 24 hours   - S/p bronchoscopy yesterday- f/u cultures and cytology   - Tessalon perles and Dextromethorphan added for cough     2  Gestational trophoblastic disease with pulmonary metastatic disease              - CT 12/19 vs 12/25 demonstrates "new areas of consolidation in both lungs, particularly in the left upper lobe, most likely pneumonia  Differential diagnosis includes pulmonary hemorrhage and less likely drug reaction or atypical pulmonary edema  Multiple irregular pulmonary nodules, most likely hemorrhagic metastases increased in size, moderate-sized bilateral pleural effusions, dilated central pulmonary arteries consistent with pulmonary arterial hypertension, no evidence of pulmonary embolism"              - S/p initial EMA-CO treatment, negative brain mets on recent MRI   - Plan was for Cyclophosphamide and Vincristine on 12/31/19 following oprt placement on 12/30/19  3  Hypertension: Bps remain elevated, at times labile so PRN was added   Has needed Labetalol 10mg IV 2x for BP > 170/100              - If Bps remain persistently elevated plan to start PO antihypertensive    - Labetalol 10mg IV Q 6 hrs PRN for > 170/100   - Possible pulmonary HTN from mets and current disease process              - S/p 20mg IV Lasix, I/Os, vitals q4h    4  Anxiety              - Continue home ativan PRN              - Lexapro 10mg PO daily    5  Pain              - Motrin/tylenol/roxicodone 5mg pO PRN    6  FEN               - Regular diet    7  DVT ppx              - SCDs, Lovenox    Subjective:   Patient reports continued cough with chest pain with coughing  She denies shortness of breath at rest but reports feeling winded when ambulating  She denies fever, chills, N/V/D, abdominal pain or calf pain  She reports she adrian having vaginal spotting again this AM  She reports feeling bloated, she is passing flatus and had BM yesterday  Discussed plan today to which she agrees  BP (!) 171/93   Pulse 83   Temp 98 2 °F (36 8 °C)   Resp 18   Ht 5' 6" (1 676 m)   Wt (!) 151 kg (332 lb)   SpO2 96%   BMI 53 59 kg/m²     Lab Results   Component Value Date    WBC 4 86 12/27/2019    HGB 10 3 (L) 12/27/2019    HCT 31 2 (L) 12/27/2019    MCV 87 12/27/2019     12/27/2019       Lab Results   Component Value Date    CALCIUM 8 4 12/27/2019    K 3 9 12/27/2019    CO2 21 12/27/2019     (H) 12/27/2019    BUN 20 12/27/2019    CREATININE 0 92 12/27/2019       No results found for: POCGLU    I/O last 3 completed shifts: In: 1120 [P O :1020; IV Piggyback:100]  Out: 3675 [Urine:3675]  No intake/output data recorded  Physical Exam  Physical Exam   Constitutional: She is oriented to person, place, and time  She appears well-developed and well-nourished  No distress  HENT:   Head: Normocephalic and atraumatic  Neck: Normal range of motion  Neck supple  Cardiovascular: Normal rate, regular rhythm and normal heart sounds  Exam reveals no gallop and no friction rub  No murmur heard  Pulmonary/Chest: Effort normal  No respiratory distress  She has no wheezes  She has no rales     Coarse bronchial sounds Abdominal: Soft  There is no tenderness  There is no rebound and no guarding  Obese abdomen   Neurological: She is alert and oriented to person, place, and time  Skin: Skin is warm and dry  She is not diaphoretic  Psychiatric: She has a normal mood and affect  Her behavior is normal    Vitals reviewed        Lucie Jaeger MD   Gyn PGY-3

## 2019-12-27 NOTE — ASSESSMENT & PLAN NOTE
Currently being evaluated by pulmonology and Infectious Disease  Cultures remain negative  Receiving empiric azithromycin and cefepime  Status post bronchoscopy with blood-tinged secretions, awaiting BAL cytology

## 2019-12-28 VITALS
HEART RATE: 84 BPM | DIASTOLIC BLOOD PRESSURE: 93 MMHG | BODY MASS INDEX: 47.09 KG/M2 | TEMPERATURE: 98.5 F | RESPIRATION RATE: 20 BRPM | OXYGEN SATURATION: 98 % | WEIGHT: 293 LBS | SYSTOLIC BLOOD PRESSURE: 161 MMHG | HEIGHT: 66 IN

## 2019-12-28 LAB
ANION GAP SERPL CALCULATED.3IONS-SCNC: 6 MMOL/L (ref 4–13)
B-HCG SERPL-ACNC: ABNORMAL MIU/ML
BACTERIA BRONCH AEROBE CULT: NO GROWTH
BASOPHILS # BLD AUTO: 0.03 THOUSANDS/ΜL (ref 0–0.1)
BASOPHILS NFR BLD AUTO: 1 % (ref 0–1)
BUN SERPL-MCNC: 15 MG/DL (ref 5–25)
CALCIUM SERPL-MCNC: 8.7 MG/DL (ref 8.3–10.1)
CHLORIDE SERPL-SCNC: 112 MMOL/L (ref 100–108)
CO2 SERPL-SCNC: 21 MMOL/L (ref 21–32)
CREAT SERPL-MCNC: 0.77 MG/DL (ref 0.6–1.3)
EOSINOPHIL # BLD AUTO: 0.22 THOUSAND/ΜL (ref 0–0.61)
EOSINOPHIL NFR BLD AUTO: 5 % (ref 0–6)
ERYTHROCYTE [DISTWIDTH] IN BLOOD BY AUTOMATED COUNT: 13.6 % (ref 11.6–15.1)
GFR SERPL CREATININE-BSD FRML MDRD: 91 ML/MIN/1.73SQ M
GLUCOSE SERPL-MCNC: 89 MG/DL (ref 65–140)
GRAM STN SPEC: NORMAL
GRAM STN SPEC: NORMAL
HCT VFR BLD AUTO: 30.3 % (ref 34.8–46.1)
HGB BLD-MCNC: 10.2 G/DL (ref 11.5–15.4)
IMM GRANULOCYTES # BLD AUTO: 0.03 THOUSAND/UL (ref 0–0.2)
IMM GRANULOCYTES NFR BLD AUTO: 1 % (ref 0–2)
LYMPHOCYTES # BLD AUTO: 1 THOUSANDS/ΜL (ref 0.6–4.47)
LYMPHOCYTES NFR BLD AUTO: 21 % (ref 14–44)
MCH RBC QN AUTO: 29.3 PG (ref 26.8–34.3)
MCHC RBC AUTO-ENTMCNC: 33.7 G/DL (ref 31.4–37.4)
MCV RBC AUTO: 87 FL (ref 82–98)
MONOCYTES # BLD AUTO: 0.07 THOUSAND/ΜL (ref 0.17–1.22)
MONOCYTES NFR BLD AUTO: 2 % (ref 4–12)
NEUTROPHILS # BLD AUTO: 3.4 THOUSANDS/ΜL (ref 1.85–7.62)
NEUTS SEG NFR BLD AUTO: 70 % (ref 43–75)
NRBC BLD AUTO-RTO: 0 /100 WBCS
NT-PROBNP SERPL-MCNC: 1522 PG/ML
PLATELET # BLD AUTO: 172 THOUSANDS/UL (ref 149–390)
PMV BLD AUTO: 9.9 FL (ref 8.9–12.7)
POTASSIUM SERPL-SCNC: 3.9 MMOL/L (ref 3.5–5.3)
RBC # BLD AUTO: 3.48 MILLION/UL (ref 3.81–5.12)
SODIUM SERPL-SCNC: 139 MMOL/L (ref 136–145)
WBC # BLD AUTO: 4.75 THOUSAND/UL (ref 4.31–10.16)

## 2019-12-28 PROCEDURE — 80048 BASIC METABOLIC PNL TOTAL CA: CPT | Performed by: STUDENT IN AN ORGANIZED HEALTH CARE EDUCATION/TRAINING PROGRAM

## 2019-12-28 PROCEDURE — 99238 HOSP IP/OBS DSCHRG MGMT 30/<: CPT | Performed by: OBSTETRICS & GYNECOLOGY

## 2019-12-28 PROCEDURE — 99222 1ST HOSP IP/OBS MODERATE 55: CPT | Performed by: INTERNAL MEDICINE

## 2019-12-28 PROCEDURE — NC001 PR NO CHARGE: Performed by: OBSTETRICS & GYNECOLOGY

## 2019-12-28 PROCEDURE — 85025 COMPLETE CBC W/AUTO DIFF WBC: CPT | Performed by: STUDENT IN AN ORGANIZED HEALTH CARE EDUCATION/TRAINING PROGRAM

## 2019-12-28 PROCEDURE — 99232 SBSQ HOSP IP/OBS MODERATE 35: CPT | Performed by: INTERNAL MEDICINE

## 2019-12-28 PROCEDURE — 84702 CHORIONIC GONADOTROPIN TEST: CPT | Performed by: OBSTETRICS & GYNECOLOGY

## 2019-12-28 PROCEDURE — 83880 ASSAY OF NATRIURETIC PEPTIDE: CPT | Performed by: INTERNAL MEDICINE

## 2019-12-28 RX ORDER — LISINOPRIL 10 MG/1
10 TABLET ORAL DAILY
Status: DISCONTINUED | OUTPATIENT
Start: 2019-12-28 | End: 2019-12-28 | Stop reason: HOSPADM

## 2019-12-28 RX ORDER — CHLORTHALIDONE 25 MG/1
25 TABLET ORAL DAILY
Status: DISCONTINUED | OUTPATIENT
Start: 2019-12-29 | End: 2019-12-28

## 2019-12-28 RX ORDER — FUROSEMIDE 20 MG/1
20 TABLET ORAL DAILY
Qty: 30 TABLET | Refills: 1 | Status: ON HOLD | OUTPATIENT
Start: 2019-12-28 | End: 2020-01-22 | Stop reason: SDUPTHER

## 2019-12-28 RX ORDER — LISINOPRIL 10 MG/1
10 TABLET ORAL DAILY
Qty: 30 TABLET | Refills: 1 | Status: SHIPPED | OUTPATIENT
Start: 2019-12-28 | End: 2020-03-09 | Stop reason: SDUPTHER

## 2019-12-28 RX ORDER — GUAIFENESIN/DEXTROMETHORPHAN 100-10MG/5
10 SYRUP ORAL
Status: DISCONTINUED | OUTPATIENT
Start: 2019-12-28 | End: 2019-12-28 | Stop reason: HOSPADM

## 2019-12-28 RX ORDER — FUROSEMIDE 20 MG/1
20 TABLET ORAL DAILY
Status: DISCONTINUED | OUTPATIENT
Start: 2019-12-28 | End: 2019-12-28 | Stop reason: HOSPADM

## 2019-12-28 RX ORDER — ALBUTEROL SULFATE 90 UG/1
2 AEROSOL, METERED RESPIRATORY (INHALATION) EVERY 4 HOURS PRN
Qty: 1 INHALER | Refills: 1 | Status: SHIPPED | OUTPATIENT
Start: 2019-12-28 | End: 2020-05-06

## 2019-12-28 RX ORDER — BENZONATATE 100 MG/1
100 CAPSULE ORAL 3 TIMES DAILY PRN
Qty: 20 CAPSULE | Refills: 0 | Status: SHIPPED | OUTPATIENT
Start: 2019-12-28 | End: 2020-05-06

## 2019-12-28 RX ORDER — GUAIFENESIN/DEXTROMETHORPHAN 100-10MG/5
10 SYRUP ORAL
Qty: 118 ML | Refills: 0 | Status: SHIPPED | OUTPATIENT
Start: 2019-12-28 | End: 2020-05-06

## 2019-12-28 RX ADMIN — CHLORTHALIDONE 12.5 MG: 25 TABLET ORAL at 09:24

## 2019-12-28 RX ADMIN — LISINOPRIL 10 MG: 10 TABLET ORAL at 15:08

## 2019-12-28 RX ADMIN — CEFEPIME HYDROCHLORIDE 2000 MG: 2 INJECTION, POWDER, FOR SOLUTION INTRAVENOUS at 02:51

## 2019-12-28 RX ADMIN — FUROSEMIDE 20 MG: 20 TABLET ORAL at 15:08

## 2019-12-28 RX ADMIN — HYDROCODONE BITARTRATE AND HOMATROPINE METHYLBROMIDE ORAL SOLUTION 5 ML: 5; 1.5 LIQUID ORAL at 13:44

## 2019-12-28 RX ADMIN — ESCITALOPRAM OXALATE 10 MG: 10 TABLET ORAL at 09:24

## 2019-12-28 RX ADMIN — LABETALOL HYDROCHLORIDE 10 MG: 5 INJECTION INTRAVENOUS at 03:01

## 2019-12-28 RX ADMIN — POLYETHYLENE GLYCOL 3350 17 G: 17 POWDER, FOR SOLUTION ORAL at 09:24

## 2019-12-28 RX ADMIN — HYDROCODONE BITARTRATE AND HOMATROPINE METHYLBROMIDE ORAL SOLUTION 5 ML: 5; 1.5 LIQUID ORAL at 03:13

## 2019-12-28 RX ADMIN — DOCUSATE SODIUM 100 MG: 100 CAPSULE, LIQUID FILLED ORAL at 09:24

## 2019-12-28 NOTE — CONSULTS
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  -status post bronchoscopy by Pulmonary  -initial concern for pneumonia but does not appear to be infectious cause    Shortness of breath/LAUREANO  -proBNP elevated to 1522  -will order TTE to evaluate heart function, he denies an outpatient  -changed chlorthalidone to Lasix 20 mg p o  Daily for continued diuresis  -will also start lisinopril 10 mg daily for hypertension    Stage III gestational trophoblastic  -currently on chemo by GYN/Onc    HTN (hypertension)  -no prior history of hypertension, started on chlorthalidone 25 mg daily this admission  -changed to Lasix 20 mg daily and lisinopril 10 mg daily    History of Present Illness   Physician Requesting Consult: Mikayla Roberts MD  Reason for Consult / Principal Problem:  Shortness of breath  HPI: Ramez Steel is a 52y o  year old female who presents with shortness of breath, dyspnea on exertion and hemoptysis  She has a past medical history of 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 recent diagnosis (12/20) started on EMA-CO chemo regiment  After being discharged, patient reports developing cough, shortness of breath and some generalized fatigue which she thought was a URI  Due to the shortness of breath, she ended up coming to the emergency department for evaluation  She was also having blood-tinged sputum and did realize until she presented to the emergency department  There a CTA was done which was negative for PE, but showed the pulmonary nodules with likely hemorrhagic metastasis  She had a bronchoscopy performed by pulmonary and she had been treated for pneumonia by ID  The current thought is her symptoms are from her metastatic disease and not from infection    She also has developed new hypertension for which he was started on chlorthalidone, but her BP remains very labile still  She also received 20 mg IV Lasix x1 several days ago  Currently patient feels significantly improved compared to admission  Denies any chest pain, dyspnea on exertion, nausea, vomiting, fever, chills or other cardiac symptoms  She does acknowledge persistent lower extremity swelling and abdominal swelling, but these have mildly improved compared to admission  Consults    Review of Systems:  Review of Systems   Constitutional: Positive for fatigue  Negative for chills and fever  HENT: Positive for congestion  Eyes: Negative for photophobia and visual disturbance  Respiratory: Positive for cough  Negative for apnea and chest tightness  Cardiovascular: Positive for leg swelling  Negative for chest pain and palpitations  Gastrointestinal: Positive for abdominal distention  Negative for abdominal pain, nausea and vomiting  Genitourinary: Negative for difficulty urinating and dysuria  Musculoskeletal: Negative for arthralgias  Neurological: Negative for dizziness, syncope and headaches  Psychiatric/Behavioral: Negative for confusion           Historical Information   Past Medical History:   Diagnosis Date    Cancer ([facility] [address] )     GTD (gestational trophoblastic disease)     History of chemotherapy     Obesity     Shortness of breath     Wheezing      Past Surgical History:   Procedure Laterality Date     SECTION  2003    DILATION AND CURETTAGE OF UTERUS      X2    ENDOMETRIAL ABLATION  2009    THYROIDECTOMY, PARTIAL       Social History     Substance and Sexual Activity   Alcohol Use Not Currently     Social History     Substance and Sexual Activity   Drug Use Never     Social History     Tobacco Use   Smoking Status Former Smoker    Types: Cigarettes    Last attempt to quit: 1990    Years since quittin 0   Smokeless Tobacco Former User     Family History: non-contributory    Meds/Allergies   all current active meds have been reviewed  Allergies   Allergen Reactions    Penicillins Fever       Objective   Vitals: Blood pressure 161/93, pulse 84, temperature 98 5 °F (36 9 °C), resp  rate 20, height 5' 6" (1 676 m), weight (!) 151 kg (332 lb), SpO2 98 %  , Body mass index is 53 59 kg/m² ,   Orthostatic Blood Pressures      Most Recent Value   Blood Pressure  161/93 filed at 12/28/2019 5461   Patient Position - Orthostatic VS  Lying filed at 12/27/2019 1857            Intake/Output Summary (Last 24 hours) at 12/28/2019 1257  Last data filed at 12/28/2019 0900  Gross per 24 hour   Intake 1380 ml   Output 1701 ml   Net -321 ml       Invasive Devices     Peripheral Intravenous Line            Peripheral IV 12/27/19 Right Arm 1 day                    Physical Exam:  Physical Exam   Constitutional: She is oriented to person, place, and time  She appears well-developed and well-nourished  No distress  HENT:   Mouth/Throat: Oropharynx is clear and moist    Eyes: Pupils are equal, round, and reactive to light  Conjunctivae and EOM are normal    Neck: Normal range of motion  Neck supple  Cardiovascular: Normal rate, regular rhythm, normal heart sounds and intact distal pulses  Exam reveals no gallop and no friction rub  No murmur heard  Pulmonary/Chest: Effort normal and breath sounds normal  No respiratory distress  She has no wheezes  Abdominal: Soft  Bowel sounds are normal  She exhibits distension  Musculoskeletal: Normal range of motion  She exhibits edema  Neurological: She is alert and oriented to person, place, and time  Skin: Skin is warm and dry  She is not diaphoretic  Psychiatric: She has a normal mood and affect   Her behavior is normal          Lab Results:     Lab Results   Component Value Date    TROPONINI 0 03 12/25/2019       Lab Results   Component Value Date    CALCIUM 8 7 12/28/2019    K 3 9 12/28/2019    CO2 21 12/28/2019     (H) 12/28/2019    BUN 15 12/28/2019    CREATININE 0 77 12/28/2019       Lab Results   Component Value Date    WBC 4 75 12/28/2019    HGB 10 2 (L) 12/28/2019    HCT 30 3 (L) 12/28/2019    MCV 87 12/28/2019     12/28/2019       No results found for: CHOL  No results found for: HDL  No results found for: LDLCALC  No results found for: TRIG    Lab Results   Component Value Date    ALT 59 12/25/2019    AST 42 12/25/2019       Results from last 7 days   Lab Units 12/25/19  0930   INR  0 97         Imaging: I have personally reviewed pertinent reports        EKG: NSR, no acute ST changes

## 2019-12-28 NOTE — PROGRESS NOTES
Progress Note - OB/GYN   Blayne Felder 52 y o  female MRN: [id number]  Unit/Bed#: [facility] 909-01 Encounter: [id number]    Assessment:  Blayne Felder is a 50yo with gestational trophoblastic disease, on [facility] therapy, now with infectious pneumonia vs  Pneumonitis, Hospital Day #4    Plan:  Infectious pneumonia vs  Pneumonitis:  - Appreciate pulmonary and ID recommendations  - Plan for repeat CXR in outpatient setting with pulm follow up  - Bronchial culture, gram stain negative, AFB culture negative, pneumocystitis smear normal  Legionella, virus, fungal cultures in process  - Continues to report symptom improvement    GTD w/ pulmonary metastases  - s/p EMACO  - Plan for port placement 12/30  - Plan for cyclophosphamide and vincristine 12/31    HTN:  - Labile BPs, 130-170s/70-100s  - Appreciate both SLIM and Cardiology recommendations  - Recent BNP 1522  - s/p Labetalol 10mg IV x2 for BP control  - Chlorthalidone 25mg initiated yesterday    Anxiety:  - Cont home ativan PRN  - Cont Lexapro    FEN:  - Regular diet  - Heplocked    DVT Ppx:  - SCDs bilaterally  - Lovenox    Subjective/Objective   Chief Complaint: I'm doing better    Subjective: Pt reports overall doing better  She strongly desires discharge to home and would like to see her son as soon as possible  +Cough that causes left side pain  Reports the abdominal pain from before has subsided "because this new pain is so bad"  Otherwise has been eating and drinking  Denies fevers, chills  Ambulates in room  She does report that her areolas "feel slightly more firm" but is unable to identify exactly when this occurred  She feels it may have started around the initiation of chemotherapy but she is unsure  Objective:   Vitals: Blood pressure 161/93, pulse 84, temperature 98 5 °F (36 9 °C), resp  rate 20, height 5' 6" (1 676 m), weight (!) 151 kg (332 lb), SpO2 98 %  ,Body mass index is 53 59 kg/m²        Intake/Output Summary (Last 24 hours) at 12/28/2019 1230  Last data filed at 12/28/2019 0900  Gross per 24 hour   Intake 1380 ml   Output 1701 ml   Net -321 ml       Invasive Devices     Peripheral Intravenous Line            Peripheral IV 12/27/19 Right Arm 1 day                Physical Exam:   Gen: AaOx3, NAD, pleasant, cooperative  Card: RRR, no murmurs, rubs, or gallops  Pulm: CTAB with +cough with deep inspiration  Abd: Obese but otherwise soft, nontender, nondistended  Extremities: No edema, nontender, moves all extremities without difficulty      Lab, Imaging and other studies: I have personally reviewed pertinent reports              Robert Zendejas DO  OB/GYN, PGY4  12/28/2019, 1:33 PM

## 2019-12-28 NOTE — PLAN OF CARE
Problem: PAIN - ADULT  Goal: Verbalizes/displays adequate comfort level or baseline comfort level  Description  Interventions:  - Encourage patient to monitor pain and request assistance  - Assess pain using appropriate pain scale  - Administer analgesics based on type and severity of pain and evaluate response  - Implement non-pharmacological measures as appropriate and evaluate response  - Consider cultural and social influences on pain and pain management  - Notify physician/advanced practitioner if interventions unsuccessful or patient reports new pain  Outcome: Progressing     Problem: INFECTION - ADULT  Goal: Absence or prevention of progression during hospitalization  Description  INTERVENTIONS:  - Assess and monitor for signs and symptoms of infection  - Monitor lab/diagnostic results  - Monitor all insertion sites, i e  indwelling lines, tubes, and drains  - Monitor endotracheal if appropriate and nasal secretions for changes in amount and color  - [place] appropriate cooling/warming therapies per order  - Administer medications as ordered  - Instruct and encourage patient and family to use good hand hygiene technique  - Identify and instruct in appropriate isolation precautions for identified infection/condition  Outcome: Progressing     Problem: RESPIRATORY - ADULT  Goal: Achieves optimal ventilation and oxygenation  Description  INTERVENTIONS:  - Assess for changes in respiratory status  - Assess for changes in mentation and behavior  - Position to facilitate oxygenation and minimize respiratory effort  - Oxygen administered by appropriate delivery if ordered  - Initiate smoking cessation education as indicated  - Encourage broncho-pulmonary hygiene including cough, deep breathe, Incentive Spirometry  - Assess the need for suctioning and aspirate as needed  - Assess and instruct to report SOB or any respiratory difficulty  - Respiratory Therapy support as indicated  Outcome: Progressing

## 2019-12-28 NOTE — QUICK NOTE
[facility] consult update:     - cardiology has taken over and transitioned management of hypertension from Chlorthalidone to Lasix w/ addition of Lisinopril  - SLIM will sign off at this time - please re-consult if new/acute medical issues arise during this hospital course

## 2019-12-28 NOTE — DISCHARGE SUMMARY
Discharge Summary - Gynecology  Anais Herman 52 y o  female MRN: [id number]  Unit/Bed#: [facility] 909-01 Encounter: [id number]    Admission Date: 12/25/2019   Discharge Date: 12/28/2019    Attending Physician:   Dr Genell Epley Physician(s):   Jesus Ren   Pulmonology  Cardiology  Infectious disease    Admitting Diagnosis:   Gestational trophoblastic disease  Superimposed pneumonia    Discharge Diagnosis:   Same    Procedures Performed:   None    HPI:  Anais Herman is a 57GY [id number] with gestational trophoblastic disease, recently admitted on 12/20 and discharged on 12/23 for treatment of her GTD  She received EMA/CO chemotherapy at that time with subsequent leucovorin rescue  She tolerated chemotherapy well and was discharged on 12/23  She re-presented to care on 12/25 with shortness of breath and was admitted with concern for GTD with superimposed pneumonia  She received antibiotics that were augmented by infectious disease  She was evaluated by pulmonology for her pneumonia and for pulmonary metastatic disease  Cardiology was available to adjust her HTN regimen  Following symptomatic improvement, she was discharged to home with plan for outpatient antibiotics  She was scheduled for port placement and for further chemotherapy  She reported a desire to go home and felt safe doing so  She was discharged with plan for close outpatient follow up  She was instructed to call with any questions or concerns      Lab Results:   Lab Results   Component Value Date    WBC 3 4 (L) 01/13/2020    HGB 11 6 (L) 01/13/2020    HCT 35 0 01/13/2020    MCV 86 6 01/13/2020     01/13/2020     Lab Results   Component Value Date    CALCIUM 9 3 01/13/2020    K 4 2 01/13/2020    CO2 22 01/13/2020     01/13/2020    BUN 17 01/13/2020    CREATININE 0 82 01/13/2020     No results found for: POCGLU  Lab Results   Component Value Date    PTT 29 12/25/2019     Lab Results   Component Value Date    INR 0 97 12/25/2019    INR 0 99 12/20/2019    PROTIME 12 6 12/25/2019    PROTIME 12 7 80/73/0096       Complications:   None    Condition at Discharge:   stable     Discharge Medications:   See after visit summary for reconciled discharge medications provided to patient and family  Discharge instructions: See after visit summary for complete information  Do not place anything (no partner, tampons or douche) in your vagina for 6 weeks  You may walk for exercise for the first 6 weeks then gradually return to your usual activities     Please do not drive until tolerating pain and off of narcotics     You may take baths or shower per your preference     Please look at your incision in the mirror daily and call for redness or tenderness or increased warmth   Call us for increasing redness or steady drainage from the incision     Please call us for temperature > 100 4*F or 38* C, worsening pain or a foul discharge  Provisions for Follow-Up Care:   See after visit summary for information related to follow-up care and any pertinent home health orders        Disposition: Home  Planned Readmission: Yes pt has gestational trophoblastic disease and will need further chemotherapy        Matthew Oakes DO  OB/GYN, PGY4  1/16/2020, 3:47 PM

## 2019-12-28 NOTE — PROGRESS NOTES
Progress Note - Pulmonary   Laney Valderrama 52 y o  female MRN: [id number]  Unit/Bed#: [facility] 909-01 Encounter: [id number]      1  Abnormal CT chest - likely 2/2 bleeding from metastatic disease, less likely healthcare associated pneumonia   - CT chest with areas of consolidation, most notable in the left upper lobe and lingula  Also noted are multiple irregular pulmonary nodules, likely hemorrhagic metastases  - bronchoscopy done 12/26 with left lingular lavage, cultures have been negative, cytology pending  - multiple aliquots taken from lavage, all similar in color, bloody but inconsistent with diffuse alveolar hemorrhage, but may be 2/2 mild bleeding from pulmonary metastases  - strep pneumo, Legionella, influenza, RSV all negative, along with initial procalcitonin  - given abnormal CT chest in the setting of active chemotherapy, pt treated with  Antibiotics (cefepime, azithro) for possible healthcare associated pneumonia  However, cultures have been negative, can likely dc today per ID, will await further recs   - blood cultures without growth to date thus far     2  Bilateral pleural effusions  - likely related to metastatic disease versus less likely infectious etiology  - continue to monitor, patient is currently saturating comfortably on room air and pleural effusions appear too small for drainage     3  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  - on active chemotherapy with EMA/CO, methotrexate  - planned for next round of chemotherapy in 1 week  - further management per Gyne onc     4  Super morbid obesity  - proper diet/exercise, weight loss counseling     5  Hypertension  - management per primary team, started on chlorthalidone       Subjective:   Patient seen examined at bedside  Resting comfortably in chair  Still has mild productive cough, occasionally productive  Denies fever, chills, nausea, vomiting, sob      Review of Systems   Constitutional: Negative for chills, fever and unexpected weight change  HENT: Negative for congestion, rhinorrhea, sneezing and sore throat  Respiratory: Positive for cough  Negative for shortness of breath and wheezing  Cardiovascular: Negative for chest pain, palpitations and leg swelling  Gastrointestinal: Negative for abdominal pain, constipation, diarrhea, nausea and vomiting  Genitourinary: Negative for dysuria  Musculoskeletal: Negative for arthralgias  Neurological: Negative for dizziness and numbness  Objective:     Vitals:    12/28/19 0247 12/28/19 0252 12/28/19 0254 12/28/19 0639   BP: (!) 173/102 (!) 173/102 170/80 161/93   Pulse: 88 95  84   Resp: 18   20   Temp: 98 5 °F (36 9 °C)   98 5 °F (36 9 °C)   TempSrc:       SpO2: 97% 97%  97%   Weight:       Height:               Intake/Output Summary (Last 24 hours) at 12/28/2019 1771  Last data filed at 12/28/2019 0554  Gross per 24 hour   Intake 1260 ml   Output 2801 ml   Net -1541 ml         Physical Exam   Constitutional: She is oriented to person, place, and time  She appears well-developed and well-nourished  No distress  obese   HENT:   Head: Normocephalic and atraumatic  Mouth/Throat: Oropharynx is clear and moist  No oropharyngeal exudate  Eyes: EOM are normal  No scleral icterus  Cardiovascular: Normal rate, regular rhythm and normal heart sounds  No murmur heard  Pulmonary/Chest: Effort normal  No respiratory distress  She has no wheezes  Dec breath sounds    Abdominal: Soft  Bowel sounds are normal  She exhibits no distension  There is no tenderness  Musculoskeletal: She exhibits no edema  Neurological: She is alert and oriented to person, place, and time  Skin: She is not diaphoretic  Labs: I have personally reviewed pertinent lab results    Results from last 7 days   Lab Units 12/28/19  0554 12/27/19  0512 12/26/19  0637 12/25/19  0930   WBC Thousand/uL 4 75 4 86 8 35 13 28*   HEMOGLOBIN g/dL 10 2* 10 3* 11 3* 12 5   HEMATOCRIT % 30 3* 31 2* 34 0* 38 0   PLATELETS Thousands/uL 172 191 206 250   NEUTROS PCT % 70 79*  --  94*   MONOS PCT % 2* 1*  --  0*   MONO PCT %  --   --  0*  --       Results from last 7 days   Lab Units 12/28/19  0554 12/27/19  0513 12/26/19  0637 12/25/19  0930   POTASSIUM mmol/L 3 9 3 9 3 9 3 9   CHLORIDE mmol/L 112* 112* 110* 105   CO2 mmol/L 21 21 22 23   BUN mg/dL 15 20 15 17   CREATININE mg/dL 0 77 0 92 0 94 0 99   CALCIUM mg/dL 8 7 8 4 8 6 8 8   ALK PHOS U/L  --   --   --  69   ALT U/L  --   --   --  59   AST U/L  --   --   --  42              Results from last 7 days   Lab Units 12/25/19  0930   INR  0 97   PTT seconds 29     Results from last 7 days   Lab Units 12/25/19  1018   LACTIC ACID mmol/L 1 3     0   Lab Value Date/Time    TROPONINI 0 03 12/25/2019 0930       Microbiology:  Blood cultures no growth to date  Flu/RSV negative  Strep pneumo/Legionella negative  Bronchial cultures pending     Imaging and other studies: I have personally reviewed pertinent reports     and I have personally reviewed pertinent films in PACS  CTA chest 12/25  Negative PE  Dilated central pulmonary arteries  Bilateral consolidation, most notably in left upper lobe  Multiple irregular pulmonary nodules consistent with hemorrhagic metastases  Moderate bilateral pleural effusions      Diamond Liz MD  Pulmonary & Critical Care Fellow, [facility]

## 2019-12-30 ENCOUNTER — TELEPHONE (OUTPATIENT)
Dept: SURGERY | Facility: HOSPITAL | Age: 49
End: 2019-12-30

## 2019-12-30 ENCOUNTER — HOSPITAL ENCOUNTER (OUTPATIENT)
Dept: INTERVENTIONAL RADIOLOGY/VASCULAR | Facility: HOSPITAL | Age: 49
Discharge: HOME | End: 2019-12-30
Attending: OBSTETRICS & GYNECOLOGY
Payer: COMMERCIAL

## 2019-12-30 VITALS
TEMPERATURE: 98.5 F | HEIGHT: 66 IN | BODY MASS INDEX: 47.09 KG/M2 | RESPIRATION RATE: 18 BRPM | SYSTOLIC BLOOD PRESSURE: 188 MMHG | DIASTOLIC BLOOD PRESSURE: 79 MMHG | HEART RATE: 75 BPM | OXYGEN SATURATION: 95 % | WEIGHT: 293 LBS

## 2019-12-30 DIAGNOSIS — O01.9 GTD (GESTATIONAL TROPHOBLASTIC DISEASE): ICD-10-CM

## 2019-12-30 LAB
BACTERIA BLD CULT: NORMAL
BACTERIA BLD CULT: NORMAL

## 2019-12-30 PROCEDURE — 76937 US GUIDE VASCULAR ACCESS: CPT | Performed by: RADIOLOGY

## 2019-12-30 PROCEDURE — 36561 INSERT TUNNELED CV CATH: CPT | Performed by: RADIOLOGY

## 2019-12-30 PROCEDURE — 99152 MOD SED SAME PHYS/QHP 5/>YRS: CPT | Performed by: RADIOLOGY

## 2019-12-30 PROCEDURE — 76937 US GUIDE VASCULAR ACCESS: CPT

## 2019-12-30 PROCEDURE — C1788 PORT, INDWELLING, IMP: HCPCS

## 2019-12-30 PROCEDURE — 36561 INSERT TUNNELED CV CATH: CPT

## 2019-12-30 PROCEDURE — C1894 INTRO/SHEATH, NON-LASER: HCPCS

## 2019-12-30 PROCEDURE — 99153 MOD SED SAME PHYS/QHP EA: CPT

## 2019-12-30 PROCEDURE — 99152 MOD SED SAME PHYS/QHP 5/>YRS: CPT

## 2019-12-30 PROCEDURE — 77001 FLUOROGUIDE FOR VEIN DEVICE: CPT | Performed by: RADIOLOGY

## 2019-12-30 RX ORDER — MIDAZOLAM HYDROCHLORIDE 2 MG/2ML
INJECTION, SOLUTION INTRAMUSCULAR; INTRAVENOUS CODE/TRAUMA/SEDATION MEDICATION
Status: COMPLETED | OUTPATIENT
Start: 2019-12-30 | End: 2019-12-30

## 2019-12-30 RX ORDER — CEFTRIAXONE 1 G/1
1000 INJECTION, POWDER, FOR SOLUTION INTRAMUSCULAR; INTRAVENOUS ONCE
Status: DISCONTINUED | OUTPATIENT
Start: 2019-12-30 | End: 2019-12-30

## 2019-12-30 RX ORDER — PALONOSETRON 0.05 MG/ML
0.25 INJECTION, SOLUTION INTRAVENOUS ONCE
Status: CANCELLED | OUTPATIENT
Start: 2019-12-31

## 2019-12-30 RX ORDER — CEFTRIAXONE 1 G/1
2000 INJECTION, POWDER, FOR SOLUTION INTRAMUSCULAR; INTRAVENOUS EVERY 24 HOURS
Status: DISCONTINUED | OUTPATIENT
Start: 2019-12-30 | End: 2019-12-30 | Stop reason: CLARIF

## 2019-12-30 RX ORDER — OXYCODONE HYDROCHLORIDE 5 MG/1
5 TABLET ORAL EVERY 4 HOURS PRN
Status: DISCONTINUED | OUTPATIENT
Start: 2019-12-30 | End: 2019-12-31 | Stop reason: HOSPADM

## 2019-12-30 RX ORDER — FENTANYL CITRATE 50 UG/ML
INJECTION, SOLUTION INTRAMUSCULAR; INTRAVENOUS CODE/TRAUMA/SEDATION MEDICATION
Status: COMPLETED | OUTPATIENT
Start: 2019-12-30 | End: 2019-12-30

## 2019-12-30 RX ORDER — DIPHENHYDRAMINE HYDROCHLORIDE 50 MG/ML
INJECTION INTRAMUSCULAR; INTRAVENOUS CODE/TRAUMA/SEDATION MEDICATION
Status: COMPLETED | OUTPATIENT
Start: 2019-12-30 | End: 2019-12-30

## 2019-12-30 RX ORDER — SODIUM CHLORIDE 9 MG/ML
20 INJECTION, SOLUTION INTRAVENOUS ONCE
Status: CANCELLED | OUTPATIENT
Start: 2019-12-31

## 2019-12-30 RX ORDER — SODIUM CHLORIDE 9 MG/ML
75 INJECTION, SOLUTION INTRAVENOUS CONTINUOUS
Status: DISCONTINUED | OUTPATIENT
Start: 2019-12-30 | End: 2019-12-31 | Stop reason: HOSPADM

## 2019-12-30 RX ORDER — CEFTRIAXONE 2 G/50ML
2000 INJECTION, SOLUTION INTRAVENOUS EVERY 24 HOURS
Status: DISCONTINUED | OUTPATIENT
Start: 2019-12-30 | End: 2019-12-31 | Stop reason: HOSPADM

## 2019-12-30 RX ORDER — ACETAMINOPHEN 325 MG/1
650 TABLET ORAL EVERY 4 HOURS PRN
Status: DISCONTINUED | OUTPATIENT
Start: 2019-12-30 | End: 2019-12-31 | Stop reason: HOSPADM

## 2019-12-30 RX ORDER — CEFTRIAXONE 1 G/50ML
1000 INJECTION, SOLUTION INTRAVENOUS ONCE
Status: DISCONTINUED | OUTPATIENT
Start: 2019-12-30 | End: 2019-12-30

## 2019-12-30 RX ADMIN — FENTANYL CITRATE 100 MCG: 50 INJECTION, SOLUTION INTRAMUSCULAR; INTRAVENOUS at 10:28

## 2019-12-30 RX ADMIN — MIDAZOLAM 1 MG: 1 INJECTION INTRAMUSCULAR; INTRAVENOUS at 10:03

## 2019-12-30 RX ADMIN — MIDAZOLAM 1 MG: 1 INJECTION INTRAMUSCULAR; INTRAVENOUS at 10:24

## 2019-12-30 RX ADMIN — CEFTRIAXONE 2000 MG: 2 INJECTION, SOLUTION INTRAVENOUS at 09:32

## 2019-12-30 RX ADMIN — FENTANYL CITRATE 50 MCG: 50 INJECTION, SOLUTION INTRAMUSCULAR; INTRAVENOUS at 10:04

## 2019-12-30 RX ADMIN — FENTANYL CITRATE 50 MCG: 50 INJECTION, SOLUTION INTRAMUSCULAR; INTRAVENOUS at 10:18

## 2019-12-30 RX ADMIN — MIDAZOLAM 1 MG: 1 INJECTION INTRAMUSCULAR; INTRAVENOUS at 10:08

## 2019-12-30 RX ADMIN — DIPHENHYDRAMINE HYDROCHLORIDE 50 MG: 50 INJECTION INTRAMUSCULAR; INTRAVENOUS at 10:22

## 2019-12-30 RX ADMIN — MIDAZOLAM 1 MG: 1 INJECTION INTRAMUSCULAR; INTRAVENOUS at 10:18

## 2019-12-30 RX ADMIN — SODIUM CHLORIDE 75 ML/HR: 0.9 INJECTION, SOLUTION INTRAVENOUS at 09:06

## 2019-12-30 NOTE — BRIEF OP NOTE (RAD/CATH)
IR PORT PLACEMENT Procedure Note    PATIENT NAME: Connor Davis  : 1970  MRN: [id number]    Pre-op Diagnosis:   1  GTD (gestational trophoblastic disease)      Post-op Diagnosis:   1  GTD (gestational trophoblastic disease)        Surgeon:   Cally Veras MD  Assistants:     No qualified resident was available, Resident is only observing    Estimated Blood Loss: less than 50 mL     Findings:     Right internal jugular approach 8 Turkish profuse port placed  Excellent bidirectional flow  Catheter is ready for use       Specimens: none    Complications:  none    Anesthesia: Conscious sedation and Hannah Cheatham MD     Date: 2019  Time: 10:54 AM

## 2019-12-30 NOTE — INTERVAL H&P NOTE
Update:     Patient is feeling well and breathing is much improved  She would like to continue with the port placement  Risks and benefits discussed  H&P reviewed  After examining the patient, I find no changed to the H&P since it had been written  There were no vitals taken for this visit  Patient re-evaluated   Accept as history and physical     Julian Holley MD/December 30, 2019/8:49 AM

## 2019-12-30 NOTE — DISCHARGE INSTRUCTIONS
Implanted Venous Access Port     WHAT YOU NEED TO KNOW:   An implanted venous access port is a device used to give treatments and take blood  It may also be called a central venous access device (CVAD)  The port is a small container that is placed under your skin, usually in your upper chest  The port is attached to a catheter that enters a large vein  DISCHARGE INSTRUCTIONS:   Resume your normal diet  Small sips of flat soda will help with mild nausea  Prevent an infection:   · Wash your hands often  Use soap and water  Clean your hands before and after you care for your port  Remind everyone who cares for your port to wash their hands  · Check your skin for infection every day  Look for redness, swelling, or fluid oozing from the port site  Care for your port:   1  You may shower beginning 48 hours after procedure  2  Change dressing if it becomes wet  3  Remove dressing after 24 hours  Leave glue in place  4  It is normal for some bruising to occur  5  Use Tylenol for pain  6  Limit use of arm on the side that your port was placed  Lift nothing heavier than 5 pounds for 1 week, and then gradually increase activity as tolerated  7  DO NOT apply ointment, lotion or cream to port site until incision is healed  Allow glue to fall off  DO NOT attempt to peel glue from skin even it it begins to flake  8, After the port incision is healed you may swim, bathe  Notify the Interventional Radiologist if you have any of the followin  Fever above 101 F    2  Increased redness or swelling after 1st day  3  Increased pain after 1st day  4  Any sign of infection (drainage from port site, skin separation, hot to touch)  5  Persistent nausea or vomiting  Contact Interventional Radiology at [phone] [facility] PATIENTS: Contact Interventional Radiology at [phone]) ([address]: Contact Interventional Radiology at [phone])

## 2019-12-31 ENCOUNTER — HOSPITAL ENCOUNTER (OUTPATIENT)
Dept: INFUSION CENTER | Facility: HOSPITAL | Age: 49
Discharge: HOME | End: 2019-12-31
Attending: OBSTETRICS & GYNECOLOGY
Payer: COMMERCIAL

## 2019-12-31 VITALS
TEMPERATURE: 99.3 F | WEIGHT: 293 LBS | SYSTOLIC BLOOD PRESSURE: 151 MMHG | BODY MASS INDEX: 48.82 KG/M2 | HEART RATE: 96 BPM | HEIGHT: 65 IN | DIASTOLIC BLOOD PRESSURE: 83 MMHG | OXYGEN SATURATION: 97 % | RESPIRATION RATE: 20 BRPM

## 2019-12-31 DIAGNOSIS — O01.9 GTD (GESTATIONAL TROPHOBLASTIC DISEASE): Primary | ICD-10-CM

## 2019-12-31 PROCEDURE — 96367 TX/PROPH/DG ADDL SEQ IV INF: CPT

## 2019-12-31 PROCEDURE — 96413 CHEMO IV INFUSION 1 HR: CPT

## 2019-12-31 PROCEDURE — 96417 CHEMO IV INFUS EACH ADDL SEQ: CPT

## 2019-12-31 PROCEDURE — 96361 HYDRATE IV INFUSION ADD-ON: CPT

## 2019-12-31 PROCEDURE — 96375 TX/PRO/DX INJ NEW DRUG ADDON: CPT

## 2019-12-31 RX ORDER — PALONOSETRON 0.05 MG/ML
0.25 INJECTION, SOLUTION INTRAVENOUS ONCE
Status: COMPLETED | OUTPATIENT
Start: 2019-12-31 | End: 2019-12-31

## 2019-12-31 RX ORDER — SODIUM CHLORIDE 9 MG/ML
20 INJECTION, SOLUTION INTRAVENOUS ONCE
Status: COMPLETED | OUTPATIENT
Start: 2019-12-31 | End: 2019-12-31

## 2019-12-31 RX ADMIN — VINCRISTINE SULFATE 2 MG: 1 INJECTION, SOLUTION INTRAVENOUS at 10:28

## 2019-12-31 RX ADMIN — CYCLOPHOSPHAMIDE 1500 MG: 1 INJECTION, POWDER, FOR SOLUTION INTRAVENOUS; ORAL at 11:13

## 2019-12-31 RX ADMIN — PALONOSETRON HYDROCHLORIDE 0.25 MG: 0.25 INJECTION, SOLUTION INTRAVENOUS at 09:55

## 2019-12-31 RX ADMIN — DEXAMETHASONE SODIUM PHOSPHATE 20 MG: 10 INJECTION, SOLUTION INTRAMUSCULAR; INTRAVENOUS at 09:16

## 2019-12-31 RX ADMIN — SODIUM CHLORIDE 20 ML/HR: 0.9 INJECTION, SOLUTION INTRAVENOUS at 09:16

## 2019-12-31 RX ADMIN — SODIUM CHLORIDE 1000 ML: 0.9 INJECTION, SOLUTION INTRAVENOUS at 11:48

## 2019-12-31 RX ADMIN — SODIUM CHLORIDE 1000 ML: 0.9 INJECTION, SOLUTION INTRAVENOUS at 09:12

## 2019-12-31 NOTE — PROGRESS NOTES
Patient in infusion center today for chemotherapy infusion  VSS  Labs within range  Pt offers no complaints at this time

## 2019-12-31 NOTE — PLAN OF CARE
Problem: Potential for Falls  Goal: Patient will remain free of falls  Description  INTERVENTIONS:  - Assess patient frequently for physical needs  -  Identify cognitive and physical deficits and behaviors that affect risk of falls    -  [place] fall precautions as indicated by assessment   - Educate patient/family on patient safety including physical limitations  - Instruct patient to call for assistance with activity based on assessment  - Modify environment to reduce risk of injury  - Consider OT/PT consult to assist with strengthening/mobility  Outcome: Progressing

## 2019-12-31 NOTE — PROGRESS NOTES
Pt tolerated treatment today with no adverse reactions  AVS reviewed with pt  Left unit ambulatory with a steady gait

## 2019-12-31 NOTE — SOCIAL WORK
MSW met with pt int [facility] on this day  The pt was referred by Dr Matthias Saunders due to socio-economic and emotional needs  Pt was open and receptive to chairside counseling  She openly shared her cancer journey, which began only a few weeks ago and how it has abruptly changed her life  The pt is receiving a chemo which requires an in-pt admission, every other week for three days  On the off week, she receives an infusion at the [facility]  The pt is a single mom with a 16year old son  She was tearful throughout this session as she spoke about her sons father, her own childhood and the recent loss of her mother  The pt shared that she works for 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 and she will not qualify for short term disability  She reports that her employer has generously agreed to pay her salary while she is out, for the time she needs  The pt reports her struggle of accepting this level of kindness as she has always had to work for what she has received in life and feels she will be "indebted" if she accepts this income  A significant amount of time was spent processing this with the pt  She was able to see the need to accept her income as she would otherwise have no income  MSW reviewed with the pt the cancer funds and how this might be of help to her  At this time, the pt feels she does not have a need but was grateful to hear this was something available to her  As the conversation progressed, she shared that her biggest concern and fear was that of losing her hair  She stated that she felt "shallow" even admitting that and this MSW had to assure her that her feelings were quite valid  MSW offered information on wigs, head coverings and provided a local salon that would be helpful to obtain a wig  MSW also explained how the cancer funds could be utilized for the wig   There was also discussion encouraging the pt to get her hair cut to a shorter length, so losing the hair may be less traumatic  The pt was open and receptive to all of these ideas  The pt shared that the beginning of January will be the one year anniversary of her mothers passing  This appeared to bring up many issues from her childhood and these issues were addressed and processed with the pt  MSW spoke about counseling and the pt does not feel she is "at a place to receive counseling at this time "    MSW acknowledged the pt's concerns and validated her feelings  She is open to ongoing chairside counseling with this MSW  Pt asked if this MSW could visit her when she is receiving her in pt chemo  MSW will plan to visit next week  Significant emotional support provided

## 2020-01-03 DIAGNOSIS — O01.9 GTD (GESTATIONAL TROPHOBLASTIC DISEASE): Primary | ICD-10-CM

## 2020-01-03 DIAGNOSIS — O01.9 GESTATIONAL TROPHOBLASTIC NEOPLASM: Primary | ICD-10-CM

## 2020-01-03 NOTE — UTILIZATION REVIEW
Notification of Discharge  This is a Notification of Discharge from our facility [address]  Please be advised that this patient has been discharge from our facility  Below you will find the admission and discharge date and time including the patients disposition  PRESENTATION DATE: 12/25/2019  5:47 PM  OBS ADMISSION DATE:   IP ADMISSION DATE: 12/25/19 1747   DISCHARGE DATE: 12/28/2019  3:51 PM  DISPOSITION: Home/Self Care Home/Self Care   Admission Orders listed below:  Admission Orders (From admission, onward)     Ordered        12/26/19 1225  Inpatient Admission  Once         12/25/19 1845  Place in Observation  Once                   Please contact the UR Department if additional information is required to close this patient's authorization/case  [address] Utilization Review Department  Main: [phone] x carefully listen to the prompts  All voicemails are confidential   [email or web address]  Send all requests for admission clinical reviews, approved or denied determinations and any other requests to dedicated fax number below belonging to the campus where the patient is receiving treatment   List of dedicated fax numbers:  [address] DENIALS (Administrative/Medical Necessity) [phone]   [address] (Maternity/NICU/Pediatrics) [phone]   [place] [phone]   Babs Comes [phone]   Daniel Lu [phone]   [address] [phone]   [facility]  [phone]   [address] [facility] [address]  [address] [facility] [address] [facility] [phone]

## 2020-01-06 RX ORDER — METHOTREXATE 25 MG/ML
100 INJECTION, SOLUTION INTRA-ARTERIAL; INTRAMUSCULAR; INTRAVENOUS ONCE
Status: CANCELLED | OUTPATIENT
Start: 2020-01-07

## 2020-01-06 RX ORDER — SODIUM CHLORIDE 9 MG/ML
125 INJECTION, SOLUTION INTRAVENOUS CONTINUOUS
Status: CANCELLED | OUTPATIENT
Start: 2020-01-07 | End: 2020-01-09

## 2020-01-06 RX ORDER — PALONOSETRON 0.05 MG/ML
0.25 INJECTION, SOLUTION INTRAVENOUS ONCE
Status: CANCELLED | OUTPATIENT
Start: 2020-01-08

## 2020-01-06 RX ORDER — SODIUM CHLORIDE 9 MG/ML
20 INJECTION, SOLUTION INTRAVENOUS ONCE AS NEEDED
Status: CANCELLED | OUTPATIENT
Start: 2020-01-07

## 2020-01-06 RX ORDER — PALONOSETRON 0.05 MG/ML
0.25 INJECTION, SOLUTION INTRAVENOUS ONCE
Status: CANCELLED | OUTPATIENT
Start: 2020-01-07

## 2020-01-06 RX ORDER — SODIUM CHLORIDE 9 MG/ML
20 INJECTION, SOLUTION INTRAVENOUS ONCE AS NEEDED
Status: CANCELLED | OUTPATIENT
Start: 2020-01-08

## 2020-01-06 RX ORDER — LEUCOVORIN CALCIUM 5 MG/1
15 TABLET ORAL EVERY 12 HOURS
Status: CANCELLED | OUTPATIENT
Start: 2020-01-08 | End: 2020-01-09

## 2020-01-07 ENCOUNTER — HOSPITAL ENCOUNTER (INPATIENT)
Facility: HOSPITAL | Age: 50
LOS: 1 days | Discharge: HOME | DRG: 847 | End: 2020-01-08
Attending: OBSTETRICS & GYNECOLOGY | Admitting: OBSTETRICS & GYNECOLOGY
Payer: COMMERCIAL

## 2020-01-07 DIAGNOSIS — O01.9 GTD (GESTATIONAL TROPHOBLASTIC DISEASE): Primary | ICD-10-CM

## 2020-01-07 PROBLEM — D70.1 CHEMOTHERAPY INDUCED NEUTROPENIA (HCC): Status: ACTIVE | Noted: 2020-01-07

## 2020-01-07 PROBLEM — T45.1X5A CHEMOTHERAPY INDUCED NEUTROPENIA (HCC): Status: ACTIVE | Noted: 2020-01-07

## 2020-01-07 LAB
ALBUMIN SERPL BCP-MCNC: 3.3 G/DL (ref 3.5–5)
ALP SERPL-CCNC: 57 U/L (ref 46–116)
ALT SERPL W P-5'-P-CCNC: 22 U/L (ref 12–78)
ANION GAP SERPL CALCULATED.3IONS-SCNC: 6 MMOL/L (ref 4–13)
AST SERPL W P-5'-P-CCNC: 14 U/L (ref 5–45)
B-HCG SERPL-ACNC: ABNORMAL MIU/ML
BASOPHILS # BLD AUTO: 0.05 THOUSANDS/ΜL (ref 0–0.1)
BASOPHILS NFR BLD AUTO: 2 % (ref 0–1)
BILIRUB SERPL-MCNC: 0.72 MG/DL (ref 0.2–1)
BUN SERPL-MCNC: 11 MG/DL (ref 5–25)
CALCIUM SERPL-MCNC: 9 MG/DL (ref 8.3–10.1)
CHLORIDE SERPL-SCNC: 110 MMOL/L (ref 100–108)
CO2 SERPL-SCNC: 24 MMOL/L (ref 21–32)
CREAT SERPL-MCNC: 0.72 MG/DL (ref 0.6–1.3)
EOSINOPHIL # BLD AUTO: 0.15 THOUSAND/ΜL (ref 0–0.61)
EOSINOPHIL NFR BLD AUTO: 5 % (ref 0–6)
ERYTHROCYTE [DISTWIDTH] IN BLOOD BY AUTOMATED COUNT: 14.5 % (ref 11.6–15.1)
GFR SERPL CREATININE-BSD FRML MDRD: 99 ML/MIN/1.73SQ M
GLUCOSE SERPL-MCNC: 86 MG/DL (ref 65–140)
HCG-TM SERPL-SCNC: >1000 MLU/ML
HCT VFR BLD AUTO: 34.8 % (ref 34.8–46.1)
HGB BLD-MCNC: 11.4 G/DL (ref 11.5–15.4)
IMM GRANULOCYTES # BLD AUTO: 0.05 THOUSAND/UL (ref 0–0.2)
IMM GRANULOCYTES NFR BLD AUTO: 2 % (ref 0–2)
LYMPHOCYTES # BLD AUTO: 1.26 THOUSANDS/ΜL (ref 0.6–4.47)
LYMPHOCYTES NFR BLD AUTO: 42 % (ref 14–44)
MAGNESIUM SERPL-MCNC: 2.3 MG/DL (ref 1.6–2.6)
MCH RBC QN AUTO: 28.7 PG (ref 26.8–34.3)
MCHC RBC AUTO-ENTMCNC: 32.8 G/DL (ref 31.4–37.4)
MCV RBC AUTO: 88 FL (ref 82–98)
MONOCYTES # BLD AUTO: 0.42 THOUSAND/ΜL (ref 0.17–1.22)
MONOCYTES NFR BLD AUTO: 14 % (ref 4–12)
NEUTROPHILS # BLD AUTO: 1.04 THOUSANDS/ΜL (ref 1.85–7.62)
NEUTS SEG NFR BLD AUTO: 35 % (ref 43–75)
NRBC BLD AUTO-RTO: 0 /100 WBCS
PHOSPHATE SERPL-MCNC: 3.3 MG/DL (ref 2.7–4.5)
PLATELET # BLD AUTO: 495 THOUSANDS/UL (ref 149–390)
PMV BLD AUTO: 9 FL (ref 8.9–12.7)
POTASSIUM SERPL-SCNC: 3.8 MMOL/L (ref 3.5–5.3)
PROT SERPL-MCNC: 6.6 G/DL (ref 6.4–8.2)
RBC # BLD AUTO: 3.97 MILLION/UL (ref 3.81–5.12)
SODIUM SERPL-SCNC: 140 MMOL/L (ref 136–145)
WBC # BLD AUTO: 2.97 THOUSAND/UL (ref 4.31–10.16)

## 2020-01-07 PROCEDURE — 80053 COMPREHEN METABOLIC PANEL: CPT | Performed by: OBSTETRICS & GYNECOLOGY

## 2020-01-07 PROCEDURE — 84100 ASSAY OF PHOSPHORUS: CPT | Performed by: OBSTETRICS & GYNECOLOGY

## 2020-01-07 PROCEDURE — 83735 ASSAY OF MAGNESIUM: CPT | Performed by: OBSTETRICS & GYNECOLOGY

## 2020-01-07 PROCEDURE — 84702 CHORIONIC GONADOTROPIN TEST: CPT | Performed by: OBSTETRICS & GYNECOLOGY

## 2020-01-07 PROCEDURE — 85025 COMPLETE CBC W/AUTO DIFF WBC: CPT | Performed by: OBSTETRICS & GYNECOLOGY

## 2020-01-07 PROCEDURE — 99221 1ST HOSP IP/OBS SF/LOW 40: CPT | Performed by: OBSTETRICS & GYNECOLOGY

## 2020-01-07 RX ORDER — ALBUTEROL SULFATE 90 UG/1
2 AEROSOL, METERED RESPIRATORY (INHALATION) EVERY 4 HOURS PRN
Status: DISCONTINUED | OUTPATIENT
Start: 2020-01-07 | End: 2020-01-08 | Stop reason: HOSPADM

## 2020-01-07 RX ORDER — OXYCODONE HYDROCHLORIDE 5 MG/1
5 TABLET ORAL EVERY 4 HOURS PRN
Status: DISCONTINUED | OUTPATIENT
Start: 2020-01-07 | End: 2020-01-08 | Stop reason: HOSPADM

## 2020-01-07 RX ORDER — IBUPROFEN 600 MG/1
600 TABLET ORAL EVERY 6 HOURS PRN
Status: DISCONTINUED | OUTPATIENT
Start: 2020-01-07 | End: 2020-01-08 | Stop reason: HOSPADM

## 2020-01-07 RX ORDER — SODIUM CHLORIDE 9 MG/ML
20 INJECTION, SOLUTION INTRAVENOUS ONCE AS NEEDED
Status: DISCONTINUED | OUTPATIENT
Start: 2020-01-07 | End: 2020-01-08 | Stop reason: HOSPADM

## 2020-01-07 RX ORDER — SENNA PLUS 8.6 MG/1
1 TABLET ORAL
COMMUNITY
End: 2020-05-06

## 2020-01-07 RX ORDER — SODIUM CHLORIDE 9 MG/ML
125 INJECTION, SOLUTION INTRAVENOUS CONTINUOUS
Status: DISCONTINUED | OUTPATIENT
Start: 2020-01-07 | End: 2020-01-08 | Stop reason: HOSPADM

## 2020-01-07 RX ORDER — FUROSEMIDE 20 MG/1
20 TABLET ORAL DAILY
Status: DISCONTINUED | OUTPATIENT
Start: 2020-01-07 | End: 2020-01-08 | Stop reason: HOSPADM

## 2020-01-07 RX ORDER — DOCUSATE SODIUM 100 MG/1
100 CAPSULE, LIQUID FILLED ORAL 2 TIMES DAILY
Status: DISCONTINUED | OUTPATIENT
Start: 2020-01-07 | End: 2020-01-08 | Stop reason: HOSPADM

## 2020-01-07 RX ORDER — SODIUM CHLORIDE 9 MG/ML
20 INJECTION, SOLUTION INTRAVENOUS ONCE
Status: CANCELLED | OUTPATIENT
Start: 2020-01-14

## 2020-01-07 RX ORDER — ACETAMINOPHEN 325 MG/1
650 TABLET ORAL EVERY 6 HOURS PRN
Status: DISCONTINUED | OUTPATIENT
Start: 2020-01-07 | End: 2020-01-08 | Stop reason: HOSPADM

## 2020-01-07 RX ORDER — SIMETHICONE 80 MG
80 TABLET,CHEWABLE ORAL EVERY 6 HOURS PRN
Status: DISCONTINUED | OUTPATIENT
Start: 2020-01-07 | End: 2020-01-08 | Stop reason: HOSPADM

## 2020-01-07 RX ORDER — LISINOPRIL 10 MG/1
10 TABLET ORAL DAILY
Status: DISCONTINUED | OUTPATIENT
Start: 2020-01-07 | End: 2020-01-08 | Stop reason: HOSPADM

## 2020-01-07 RX ORDER — ONDANSETRON 4 MG/1
4 TABLET, ORALLY DISINTEGRATING ORAL EVERY 6 HOURS PRN
Status: DISCONTINUED | OUTPATIENT
Start: 2020-01-07 | End: 2020-01-08 | Stop reason: HOSPADM

## 2020-01-07 RX ORDER — METHOTREXATE 25 MG/ML
100 INJECTION, SOLUTION INTRA-ARTERIAL; INTRAMUSCULAR; INTRAVENOUS ONCE
Status: COMPLETED | OUTPATIENT
Start: 2020-01-07 | End: 2020-01-07

## 2020-01-07 RX ORDER — LABETALOL 20 MG/4 ML (5 MG/ML) INTRAVENOUS SYRINGE
10 EVERY 4 HOURS PRN
Status: DISCONTINUED | OUTPATIENT
Start: 2020-01-07 | End: 2020-01-08 | Stop reason: HOSPADM

## 2020-01-07 RX ORDER — PALONOSETRON 0.05 MG/ML
0.25 INJECTION, SOLUTION INTRAVENOUS ONCE
Status: CANCELLED | OUTPATIENT
Start: 2020-01-14

## 2020-01-07 RX ORDER — SENNOSIDES 8.6 MG
1 TABLET ORAL
Status: DISCONTINUED | OUTPATIENT
Start: 2020-01-07 | End: 2020-01-08 | Stop reason: HOSPADM

## 2020-01-07 RX ORDER — PALONOSETRON 0.05 MG/ML
0.25 INJECTION, SOLUTION INTRAVENOUS ONCE
Status: COMPLETED | OUTPATIENT
Start: 2020-01-07 | End: 2020-01-07

## 2020-01-07 RX ORDER — SODIUM CHLORIDE 450 MG/100ML
125 INJECTION, SOLUTION INTRAVENOUS CONTINUOUS
Status: DISCONTINUED | OUTPATIENT
Start: 2020-01-07 | End: 2020-01-07

## 2020-01-07 RX ORDER — ESCITALOPRAM OXALATE 10 MG/1
10 TABLET ORAL DAILY
Status: DISCONTINUED | OUTPATIENT
Start: 2020-01-07 | End: 2020-01-08 | Stop reason: HOSPADM

## 2020-01-07 RX ORDER — LORAZEPAM 1 MG/1
1 TABLET ORAL EVERY 8 HOURS PRN
Status: DISCONTINUED | OUTPATIENT
Start: 2020-01-07 | End: 2020-01-08 | Stop reason: HOSPADM

## 2020-01-07 RX ORDER — POLYETHYLENE GLYCOL 3350 17 G/17G
17 POWDER, FOR SOLUTION ORAL DAILY PRN
Status: DISCONTINUED | OUTPATIENT
Start: 2020-01-07 | End: 2020-01-07

## 2020-01-07 RX ADMIN — PALONOSETRON HYDROCHLORIDE 0.25 MG: 0.25 INJECTION, SOLUTION INTRAVENOUS at 13:04

## 2020-01-07 RX ADMIN — ETOPOSIDE 243 MG: 20 INJECTION, SOLUTION, CONCENTRATE INTRAVENOUS at 13:25

## 2020-01-07 RX ADMIN — DOCUSATE SODIUM 100 MG: 100 CAPSULE, LIQUID FILLED ORAL at 18:17

## 2020-01-07 RX ADMIN — ENOXAPARIN SODIUM 40 MG: 40 INJECTION SUBCUTANEOUS at 11:34

## 2020-01-07 RX ADMIN — SODIUM CHLORIDE 125 ML/HR: 0.9 INJECTION, SOLUTION INTRAVENOUS at 10:12

## 2020-01-07 RX ADMIN — METHOTREXATE 485 MG: 25 INJECTION INTRA-ARTERIAL; INTRAMUSCULAR; INTRATHECAL; INTRAVENOUS at 15:27

## 2020-01-07 RX ADMIN — DEXAMETHASONE SODIUM PHOSPHATE 10 MG: 10 INJECTION, SOLUTION INTRAMUSCULAR; INTRAVENOUS at 13:03

## 2020-01-07 RX ADMIN — IBUPROFEN 600 MG: 600 TABLET, FILM COATED ORAL at 21:40

## 2020-01-07 RX ADMIN — SENNOSIDES 8.6 MG: 8.6 TABLET, FILM COATED ORAL at 21:36

## 2020-01-07 RX ADMIN — DACTINOMYCIN 500 MCG: 0.5 INJECTION, POWDER, LYOPHILIZED, FOR SOLUTION INTRAVENOUS at 14:36

## 2020-01-07 RX ADMIN — METHOTREXATE 242.5 MG: 25 INJECTION INTRA-ARTERIAL; INTRAMUSCULAR; INTRATHECAL; INTRAVENOUS at 15:10

## 2020-01-07 NOTE — PLAN OF CARE
Problem: HEMATOLOGIC - ADULT  Goal: Maintains hematologic stability  Description  INTERVENTIONS  - Assess for signs and symptoms of bleeding or hemorrhage  - Monitor labs  - Administer supportive blood products/factors as ordered and appropriate  Outcome: Progressing     Problem: PAIN - ADULT  Goal: Verbalizes/displays adequate comfort level or baseline comfort level  Description  Interventions:  - Encourage patient to monitor pain and request assistance  - Assess pain using appropriate pain scale  - Administer analgesics based on type and severity of pain and evaluate response  - Implement non-pharmacological measures as appropriate and evaluate response  - Consider cultural and social influences on pain and pain management  - Notify physician/advanced practitioner if interventions unsuccessful or patient reports new pain  Outcome: Progressing     Problem: INFECTION - ADULT  Goal: Absence or prevention of progression during hospitalization  Description  INTERVENTIONS:  - Assess and monitor for signs and symptoms of infection  - Monitor lab/diagnostic results  - Monitor all insertion sites, i e  indwelling lines, tubes, and drains  - Monitor endotracheal if appropriate and nasal secretions for changes in amount and color  - [place] appropriate cooling/warming therapies per order  - Administer medications as ordered  - Instruct and encourage patient and family to use good hand hygiene technique  - Identify and instruct in appropriate isolation precautions for identified infection/condition  Outcome: Progressing  Goal: Absence of fever/infection during neutropenic period  Description  INTERVENTIONS:  - Monitor WBC    Outcome: Progressing     Problem: SAFETY ADULT  Goal: Patient will remain free of falls  Description  INTERVENTIONS:  - Assess patient frequently for physical needs  -  Identify cognitive and physical deficits and behaviors that affect risk of falls    -  [place] fall precautions as indicated by assessment   - Educate patient/family on patient safety including physical limitations  - Instruct patient to call for assistance with activity based on assessment  - Modify environment to reduce risk of injury  - Consider OT/PT consult to assist with strengthening/mobility  Outcome: Progressing  Goal: Maintain or return to baseline ADL function  Description  INTERVENTIONS:  -  Assess patient's ability to carry out ADLs; assess patient's baseline for ADL function and identify physical deficits which impact ability to perform ADLs (bathing, care of mouth/teeth, toileting, grooming, dressing, etc )  - Assess/evaluate cause of self-care deficits   - Assess range of motion  - Assess patient's mobility; develop plan if impaired  - Assess patient's need for assistive devices and provide as appropriate  - Encourage maximum independence but intervene and supervise when necessary  - Involve family in performance of ADLs  - Assess for home care needs following discharge   - Consider OT consult to assist with ADL evaluation and planning for discharge  - Provide patient education as appropriate  Outcome: Progressing  Goal: Maintain or return mobility status to optimal level  Description  INTERVENTIONS:  - Assess patient's baseline mobility status (ambulation, transfers, stairs, etc )    - Identify cognitive and physical deficits and behaviors that affect mobility  - Identify mobility aids required to assist with transfers and/or ambulation (gait belt, sit-to-stand, lift, walker, cane, etc )  - [place] fall precautions as indicated by assessment  - Record patient progress and toleration of activity level on Mobility SBAR; progress patient to next Phase/Stage  - Instruct patient to call for assistance with activity based on assessment  - Consider rehabilitation consult to assist with strengthening/weightbearing, etc   Outcome: Progressing     Problem: DISCHARGE PLANNING  Goal: Discharge to home or other facility with appropriate resources  Description  INTERVENTIONS:  - Identify barriers to discharge w/patient and caregiver  - Arrange for needed discharge resources and transportation as appropriate  - Identify discharge learning needs (meds, wound care, etc )  - Arrange for interpretive services to assist at discharge as needed  - Refer to Case Management Department for coordinating discharge planning if the patient needs post-hospital services based on physician/advanced practitioner order or complex needs related to functional status, cognitive ability, or social support system  Outcome: Progressing     Problem: Knowledge Deficit  Goal: Patient/family/caregiver demonstrates understanding of disease process, treatment plan, medications, and discharge instructions  Description  Complete learning assessment and assess knowledge base    Interventions:  - Provide teaching at level of understanding  - Provide teaching via preferred learning methods  Outcome: Progressing

## 2020-01-07 NOTE — H&P
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) on 12/20/2019  MRI brain was negative for metastasis on 12/20/2019  She started chemotherapy inpatient on 12/21/2019  She was recently readmitted due to concern for pneumonia but was determined more likely to be hemorrhagic disease from the chemotherapy and lung nodules vs  viral illness rather than bacterial pneumonia  She had her PICC line replaced by a port on 12/30/2019          GTD (gestational trophoblastic disease)    12/20/2019 Initial Diagnosis     GTD (gestational trophoblastic disease)      12/21/2019 -  Chemotherapy     leucovorin (WELLCOVORIN) tablet 15 mg, 15 mg, Oral, Every 12 hours, 2 of 6 cycles  Administration: 15 mg (12/22/2019), 15 mg (12/23/2019)  palonosetron (ALOXI) injection 0 25 mg, 0 25 mg, Intravenous, Once, 2 of 6 cycles  Administration: 0 25 mg (12/21/2019), 0 25 mg (12/22/2019), 0 25 mg (12/31/2019)  methotrexate injection 242 5 mg, 100 mg/m2 = 242 5 mg, Intravenous, Once, 2 of 6 cycles  Administration: 242 5 mg (12/21/2019)  DACTINomycin (COSMEGEN) 500 mcg in sodium chloride 0 9 % 50 mL IVPB, 500 mcg, Intravenous, Once, 2 of 6 cycles  Administration: 500 mcg (12/21/2019), 500 mcg (12/22/2019)  vinCRIStine (ONCOVIN) 2 mg in sodium chloride 0 9 % 50 mL chemo infusion, 2 mg (original dose 1 mg/m2), Intravenous, Once, 2 of 6 cycles  Dose modification: 2 mg (original dose 1 mg/m2, Cycle 1, Reason: Max Dose Reached)  Administration: 2 mg (12/31/2019)  cyclophosphamide (CYTOXAN) 1,500 mg in sodium chloride 0 9 % 250 mL IVPB, 1,458 mg, Intravenous, Once, 2 of 6 cycles  Administration: 1,500 mg (12/31/2019)  methotrexate (PF) 485 mg in sodium chloride 0 9 % 1,000 mL continuous infusion, 200 mg/m2 = 485 mg, Intravenous, Once, 2 of 6 cycles  Administration: 485 mg (2019)  etoposide (TOPOSAR) 243 mg in sodium chloride 0 9 % 600 mL chemo infusion, 100 mg/m2 = 243 mg, Intravenous, Once, 2 of 6 cycles  Administration: 243 mg (2019), 243 mg (2019)       Today, she feels "so much better"  She recently got a new haircut because her hair was "falling out"  She has been tolerating chemotherapy well mentally and physically  She states that she is "in a better head-space now"  Her son is going to see a counselor today  Review of Systems   Constitutional: Positive for appetite change (Decreased initially, but currently improving), fatigue (She "listens to her body") and unexpected weight change (Lost 8lbs since her outpatient chemotherapy)  HENT: Positive for sore throat ("slight scratchy", but improved)  Respiratory: Negative for cough and shortness of breath  Cardiovascular: Negative for chest pain, palpitations (Anxiety attack her first day back at work, none today) and leg swelling (Improved with the lasix)  Gastrointestinal: Positive for abdominal pain (2/10, improved from 4/10 previously; cramping similar to menstural cramps, one episode last night of "contraction" type pain in her back)  Negative for constipation (Bowel regimen working well), diarrhea, nausea and vomiting  Genitourinary: Negative for dysuria  Musculoskeletal: Positive for back pain  Neurological: Negative for dizziness, light-headedness and headaches  Psychiatric/Behavioral: The patient is nervous/anxious (Taking medications as prescribed, helping)        Historical Information   Past Medical History:   Diagnosis Date    Cancer ([address] )     GTD (gestational trophoblastic disease)     History of chemotherapy     Obesity     Shortness of breath     Wheezing      Past Surgical History:   Procedure Laterality Date     SECTION      DILATION AND CURETTAGE OF UTERUS      X2    ENDOMETRIAL ABLATION      IR PORT PLACEMENT  2019    THYROIDECTOMY, PARTIAL       OB History    Para Term  AB Living   4 1 1   3 1   SAB TAB Ectopic Multiple Live Births           1      # Outcome Date GA Lbr French/2nd Weight Sex Delivery Anes PTL Lv   4 AB            3 AB            2 AB            1 Term                No family history on file  Social History   Social History     Substance and Sexual Activity   Alcohol Use Not Currently     Social History     Substance and Sexual Activity   Drug Use Never     Social History     Tobacco Use   Smoking Status Former Smoker    Types: Cigarettes    Last attempt to quit:     Years since quittin 0   Smokeless Tobacco Former User       Meds/Allergies   Medications Prior to Admission   Medication    acetaminophen (TYLENOL) 325 mg tablet    albuterol (PROVENTIL HFA,VENTOLIN HFA) 90 mcg/act inhaler    benzonatate (TESSALON PERLES) 100 mg capsule    dextromethorphan-guaiFENesin (ROBITUSSIN DM)  mg/5 mL syrup    docusate sodium (COLACE) 100 mg capsule    escitalopram (LEXAPRO) 10 mg tablet    furosemide (LASIX) 20 mg tablet    ibuprofen (MOTRIN) 600 mg tablet    lisinopril (ZESTRIL) 10 mg tablet    senna (SENOKOT) 8 6 MG tablet    simethicone (MYLICON) 80 mg chewable tablet    leucovorin (WELLCOVORIN) 10 MG tablet    leucovorin (WELLCOVORIN) 15 MG tablet    LORazepam (ATIVAN) 1 mg tablet    ondansetron (ZOFRAN) 4 mg tablet    polyethylene glycol (MIRALAX) 17 g packet     Allergies   Allergen Reactions    Penicillins Fever       Objective   /79   Pulse 71   Temp 98 7 °F (37 1 °C)   Resp 18   SpO2 99%     No intake or output data in the 24 hours ending 20 1033    Physical Exam   Constitutional: She is oriented to person, place, and time  She appears well-developed and well-nourished  No distress  Sporting new haircut   HENT:   Head: Normocephalic and atraumatic     Cardiovascular: Normal rate, regular rhythm and normal heart sounds  Exam reveals no gallop and no friction rub  No murmur heard  Pulmonary/Chest: Effort normal and breath sounds normal  No stridor  No respiratory distress  She has no wheezes  Right Port   Abdominal: Soft  She exhibits no distension  There is no tenderness  Neurological: She is alert and oriented to person, place, and time  Skin: Skin is warm and dry  She is not diaphoretic  Psychiatric: She has a normal mood and affect  Her behavior is normal      Lab Results:   No visits with results within 1 Day(s) from this visit  Latest known visit with results is:    Ancillary Orders on 01/03/2020   Component Date Value    White Blood Cell Count 01/03/2020 3 1*    Red Blood Cell Count 01/03/2020 3 71*    Hemoglobin 01/03/2020 10 9*    HCT 01/03/2020 31 8*    MCV 01/03/2020 85 7     MCH 01/03/2020 29 4     MCHC 01/03/2020 34 3     RDW 01/03/2020 13 8     Platelet Count 03/97/8738 267     SL AMB MPV 01/03/2020 9 2     Neutrophils (Absolute) 01/03/2020 1,795     Lymphocytes (Absolute) 01/03/2020 946     Monocytes (Absolute) 01/03/2020 220     Eosinophils (Absolute) 01/03/2020 130     Basophils ABS 01/03/2020 9     Neutrophils 01/03/2020 57 9     Lymphocytes 01/03/2020 30 5     Monocytes 01/03/2020 7 1     Eosinophils 01/03/2020 4 2     Basophils PCT 01/03/2020 0 3     Glucose, Random 01/03/2020 89     BUN 01/03/2020 15     Creatinine 01/03/2020 0 84     eGFR Non African American 01/03/2020 82     eGFR  01/03/2020 95     SL AMB BUN/CREATININE RA* 96/56/8676 NOT APPLICABLE     Sodium 90/32/5016 140     Potassium 01/03/2020 3 8     Chloride 01/03/2020 106     CO2 01/03/2020 27     SL AMB CALCIUM 01/03/2020 8 8     Protein, Total 01/03/2020 5 6*    Albumin 01/03/2020 3 6     Globulin 01/03/2020 2 0     Albumin/Globulin Ratio 01/03/2020 1 8     TOTAL BILIRUBIN 01/03/2020 0 8     Alkaline Phosphatase 01/03/2020 56     AST 01/03/2020 14     ALT 01/03/2020 20     Magnesium, Serum 01/03/2020 2 0     HCG, Quantitative 01/03/2020 62,514       Imaging: I have personally reviewed pertinent reports  EKG, Pathology, and Other Studies: I have personally reviewed pertinent reports        Assessment/Plan     A/P: 52year old [id number] with GHTN and metastatic disease to the lungs bilaterally admitted for further chemotherapy    1) GHTN high risk disease:   - Plan for Etoposide 100mg/m2 IV day 1 (1/7/2020) and 2 (1/8/2020)  - Methotrexate 100mg/m2 IV followed by 200mg/m2 over 12 hours on day 1  - Leucovorin 15mg q12 hours for 4 doses starting 24 hours after Methotrexate starts  - Actinomycin S 0 5mg IV day 1 and day 2  - Will continue therapy with cyclophosphamide 600mg/m2 IV & Vincristine 1mg/m2 on 1/14/2020 outpatient   - Repeat HCG tumor marker for accurate measurement of chemotherapy response; Previous measurements >400K and >200K   - Follow up admission labs     2) Hypertension  - Labetalol 10mg IV PRN for >170/100   - Continue home Lisinopril 10mg daily  - Continue home Lasix 20mg daily  - Plan for Echo while inpatient per her cardiologist    3) Anxiety:   - Continue home Lexapro 10mg daily  - Ativan ordered PRN    4) Bowel regimen: Continue home colace, Zofran, Sennakot, mylicon      5) Pain management: Tylenol/Motrin PRN     6) FEN: Regular; NS @ 125cc/hr     7) DVT PPx: SCDs, Lovenox 40mg QD       Code Status: Level 1 - Full Code    Augie Guerra MD  1/7/2020  10:33 AM

## 2020-01-07 NOTE — SOCIAL WORK
Pt scheduled re-admit for chemo  Met with pt and discussed role of CM  Pt lives with her son in an apt with flight of stairs at entrance  Pt is independent in ADLs  No DME or prior C  Preference for pharmacy is [facility] in [place], [place]  No MH/D&A tx hx  PCP- Hi Rios MD ([phone])  No POA  Five wishes document provided to pt per her request  Main contact: Stuart Hernadez ([phone])  CM reviewed d/c planning process including the following: identifying help at home, patient preference for d/c planning needs, Discharge Lounge, [facility] Meds to Bed program, availability of treatment team to discuss questions or concerns patient and/or family may have regarding understanding medications and recognizing signs and symptoms once discharged  CM also encouraged patient to follow up with all recommended appointments after discharge  Patient advised of importance for patient and family to participate in managing patients medical well being  Patient/caregiver received discharge checklist  Content reviewed  Patient/caregiver encouraged to participate in discharge plan of care prior to discharge home

## 2020-01-08 VITALS
SYSTOLIC BLOOD PRESSURE: 152 MMHG | BODY MASS INDEX: 48.82 KG/M2 | RESPIRATION RATE: 16 BRPM | TEMPERATURE: 98.3 F | OXYGEN SATURATION: 98 % | DIASTOLIC BLOOD PRESSURE: 89 MMHG | HEIGHT: 65 IN | HEART RATE: 78 BPM | WEIGHT: 293 LBS

## 2020-01-08 LAB
ANION GAP SERPL CALCULATED.3IONS-SCNC: 5 MMOL/L (ref 4–13)
BUN SERPL-MCNC: 13 MG/DL (ref 5–25)
CALCIUM SERPL-MCNC: 8.7 MG/DL (ref 8.3–10.1)
CHLORIDE SERPL-SCNC: 115 MMOL/L (ref 100–108)
CO2 SERPL-SCNC: 22 MMOL/L (ref 21–32)
CREAT SERPL-MCNC: 0.78 MG/DL (ref 0.6–1.3)
ERYTHROCYTE [DISTWIDTH] IN BLOOD BY AUTOMATED COUNT: 14.3 % (ref 11.6–15.1)
GFR SERPL CREATININE-BSD FRML MDRD: 90 ML/MIN/1.73SQ M
GLUCOSE SERPL-MCNC: 103 MG/DL (ref 65–140)
HCT VFR BLD AUTO: 31.6 % (ref 34.8–46.1)
HGB BLD-MCNC: 10.3 G/DL (ref 11.5–15.4)
MCH RBC QN AUTO: 28.9 PG (ref 26.8–34.3)
MCHC RBC AUTO-ENTMCNC: 32.6 G/DL (ref 31.4–37.4)
MCV RBC AUTO: 89 FL (ref 82–98)
PLATELET # BLD AUTO: 482 THOUSANDS/UL (ref 149–390)
PMV BLD AUTO: 9.4 FL (ref 8.9–12.7)
POTASSIUM SERPL-SCNC: 3.9 MMOL/L (ref 3.5–5.3)
RBC # BLD AUTO: 3.56 MILLION/UL (ref 3.81–5.12)
SODIUM SERPL-SCNC: 142 MMOL/L (ref 136–145)
WBC # BLD AUTO: 2.9 THOUSAND/UL (ref 4.31–10.16)

## 2020-01-08 PROCEDURE — NC001 PR NO CHARGE: Performed by: OBSTETRICS & GYNECOLOGY

## 2020-01-08 PROCEDURE — 85027 COMPLETE CBC AUTOMATED: CPT | Performed by: STUDENT IN AN ORGANIZED HEALTH CARE EDUCATION/TRAINING PROGRAM

## 2020-01-08 PROCEDURE — 80048 BASIC METABOLIC PNL TOTAL CA: CPT | Performed by: STUDENT IN AN ORGANIZED HEALTH CARE EDUCATION/TRAINING PROGRAM

## 2020-01-08 PROCEDURE — 99238 HOSP IP/OBS DSCHRG MGMT 30/<: CPT | Performed by: OBSTETRICS & GYNECOLOGY

## 2020-01-08 RX ORDER — DIPHENHYDRAMINE HCL 25 MG
50 TABLET ORAL EVERY 6 HOURS PRN
Status: DISCONTINUED | OUTPATIENT
Start: 2020-01-08 | End: 2020-01-08 | Stop reason: HOSPADM

## 2020-01-08 RX ORDER — LEUCOVORIN CALCIUM 5 MG/1
15 TABLET ORAL EVERY 12 HOURS
Status: DISCONTINUED | OUTPATIENT
Start: 2020-01-08 | End: 2020-01-08 | Stop reason: HOSPADM

## 2020-01-08 RX ORDER — SODIUM CHLORIDE 9 MG/ML
20 INJECTION, SOLUTION INTRAVENOUS ONCE AS NEEDED
Status: DISCONTINUED | OUTPATIENT
Start: 2020-01-08 | End: 2020-01-08 | Stop reason: HOSPADM

## 2020-01-08 RX ORDER — DIPHENHYDRAMINE HCL 25 MG
50 TABLET ORAL
Status: DISCONTINUED | OUTPATIENT
Start: 2020-01-08 | End: 2020-01-08

## 2020-01-08 RX ORDER — PALONOSETRON 0.05 MG/ML
0.25 INJECTION, SOLUTION INTRAVENOUS ONCE
Status: COMPLETED | OUTPATIENT
Start: 2020-01-08 | End: 2020-01-08

## 2020-01-08 RX ADMIN — LISINOPRIL 10 MG: 10 TABLET ORAL at 09:12

## 2020-01-08 RX ADMIN — ETOPOSIDE 243 MG: 20 INJECTION, SOLUTION, CONCENTRATE INTRAVENOUS at 13:03

## 2020-01-08 RX ADMIN — LEUCOVORIN CALCIUM 15 MG: 5 TABLET ORAL at 15:19

## 2020-01-08 RX ADMIN — FUROSEMIDE 20 MG: 20 TABLET ORAL at 09:12

## 2020-01-08 RX ADMIN — ESCITALOPRAM OXALATE 10 MG: 10 TABLET ORAL at 09:12

## 2020-01-08 RX ADMIN — ENOXAPARIN SODIUM 40 MG: 40 INJECTION SUBCUTANEOUS at 09:12

## 2020-01-08 RX ADMIN — DEXAMETHASONE SODIUM PHOSPHATE 10 MG: 10 INJECTION, SOLUTION INTRAMUSCULAR; INTRAVENOUS at 12:32

## 2020-01-08 RX ADMIN — DIPHENHYDRAMINE HCL 50 MG: 25 TABLET, COATED ORAL at 02:25

## 2020-01-08 RX ADMIN — DACTINOMYCIN 500 MCG: 0.5 INJECTION, POWDER, LYOPHILIZED, FOR SOLUTION INTRAVENOUS at 14:10

## 2020-01-08 RX ADMIN — DOCUSATE SODIUM 100 MG: 100 CAPSULE, LIQUID FILLED ORAL at 09:12

## 2020-01-08 RX ADMIN — SODIUM CHLORIDE 125 ML/HR: 0.9 INJECTION, SOLUTION INTRAVENOUS at 02:27

## 2020-01-08 RX ADMIN — PALONOSETRON HYDROCHLORIDE 0.25 MG: 0.25 INJECTION, SOLUTION INTRAVENOUS at 12:32

## 2020-01-08 RX ADMIN — SODIUM CHLORIDE 125 ML/HR: 0.9 INJECTION, SOLUTION INTRAVENOUS at 11:35

## 2020-01-08 NOTE — UTILIZATION REVIEW
Initial Clinical Review    Admission: Date/Time/Statement: Inpatient Admission Orders (From admission, onward)     Ordered        01/07/20 0942  Inpatient Admission  Once                   Orders Placed This Encounter   Procedures    Inpatient Admission     Standing Status:   Standing     Number of Occurrences:   1     Order Specific Question:   Admitting Physician     Answer:   Brien Barros [[id number]]     Order Specific Question:   Level of Care     Answer:   Med Surg [[id number]]     Order Specific Question:   Estimated length of stay     Answer:   More than 2 Midnights     Order Specific Question:   Certification     Answer:   I certify that inpatient services are medically necessary for this patient for a duration of greater than two midnights  See H&P and MD Progress Notes for additional information about the patient's course of treatment  Assessment/Plan: 52year old female directly admitted to inpatient unit for chemo infusion  H/O GHTN and metastaic disease to lungs bilaterally  Plan for etoposide, methotrexate, leucovorin, actinomycin  REcheck labs  Right side port a cath in chest wall  Has had decrease appetite recently        Temperature Pulse Respirations Blood Pressure SpO2   98 7 °F (37 1 °C) 71 18 161/79 99 %      Temp src Heart Rate Source Patient Position - Orthostatic VS BP Location FiO2 (%)   -- -- -- -- --      Pain Score       No Pain        Wt Readings from Last 1 Encounters:   01/07/20 (!) 140 kg (308 lb 13 8 oz)     Additional Vital Signs:   Date/Time  Temp  Pulse  Resp  BP  MAP (mmHg)  SpO2  O2 Device   01/07/20 2351  --  82  --  --  --  95 %  --   01/07/20 22:18:40  98 9 °F (37 2 °C)  97  22  155/93  114  95 %  --   01/07/20 15:33:09  97 3 °F (36 3 °C)Abnormal   78  20  150/98  115  96 %  --   01/07/20 11:04:22  98 6 °F (37 °C)  81  18  129/67  88  100 %  --   01/07/20 10:07:45  98 7 °F (37 1 °C)                   Pertinent Labs/Diagnostic Test Results:   Results from last 7 days   Lab Units 01/08/20  0648 01/07/20  1022 01/03/20  0720   WBC Thousand/uL 2 90* 2 97*  --    WHITE BLOOD CELL COUNT  Thousand/uL  --   --  3 1*   HEMOGLOBIN g/dL 10 3* 11 4*  --    HEMOGLOBIN  g/dL  --   --  10 9*   HEMATOCRIT % 31 6* 34 8  --    HEMATOCRIT  %  --   --  31 8*   PLATELETS Thousands/uL 482* 495*  --    PLATELETS  Thousand/uL  --   --  267   NEUTROS ABS Thousands/µL  --  1 04*  --    NEUTROS ABS   cells/uL  --   --  1,795         Results from last 7 days   Lab Units 01/08/20  0648 01/07/20  1022 01/03/20  0720   SODIUM mmol/L 142 140 140   POTASSIUM mmol/L 3 9 3 8 3 8   CHLORIDE mmol/L 115* 110* 106   CO2 mmol/L 22 24 27   ANION GAP mmol/L 5 6  --    BUN mg/dL 13 11 15   CREATININE mg/dL 0 78 0 72 0 84   EGFR ml/min/1 73sq m 90 99  --    SL AMB CALCIUM mg/dL  --   --  8 8   CALCIUM mg/dL 8 7 9 0  --    MAGNESIUM mg/dL  --  2 3 2 0   PHOSPHORUS mg/dL  --  3 3  --      Results from last 7 days   Lab Units 01/07/20  1022 01/03/20  0720   AST U/L 14 14   ALT U/L 22 20   ALK PHOS U/L 57 56   TOTAL PROTEIN g/dL 6 6 5 6*   ALBUMIN g/dL 3 3* 3 6   TOTAL BILIRUBIN mg/dL 0 72 0 8         Results from last 7 days   Lab Units 01/08/20  0648 01/07/20  1022 01/03/20  0720   GLUCOSE RANDOM mg/dL 103 86 89       Past Medical History:   Diagnosis Date    Cancer ([facility] [address] )     GTD (gestational trophoblastic disease)     History of chemotherapy     Obesity     Shortness of breath     Wheezing      Admitting Diagnosis: GTD (gestational trophoblastic disease) [O01 9]  Age/Sex: 52 y o  female  Admission Orders:    Scheduled Medications:    Medications:  docusate sodium 100 mg Oral BID   enoxaparin 40 mg Subcutaneous Daily   escitalopram 10 mg Oral Daily   furosemide 20 mg Oral Daily   lisinopril 10 mg Oral Daily   senna 1 tablet Oral HS     Continuous IV Infusions:    sodium chloride 125 mL/hr Intravenous Continuous     PRN Meds:    acetaminophen 650 mg Oral Q6H PRN   albuterol 2 puff Inhalation Q4H PRN   diphenhydrAMINE 50 mg Oral Q6H PRN   ibuprofen 600 mg Oral Q6H PRN   Labetalol HCl 10 mg Intravenous Q4H PRN   LORazepam 1 mg Oral Q8H PRN   ondansetron 4 mg Oral Q6H PRN   oxyCODONE 5 mg Oral Q4H PRN   simethicone 80 mg Oral Q6H PRN   sodium chloride 20 mL/hr Intravenous Once PRN     Network Utilization Review Department  [email or web address]  ATTENTION: Please call with any questions or concerns to [phone] and carefully listen to the prompts so that you are directed to the right person  All voicemails are confidential   Michela Zepeda all requests for admission clinical reviews, approved or denied determinations and any other requests to dedicated fax number below belonging to the campus where the patient is receiving treatment   List of dedicated fax numbers for the Facilities:  [address] DENIALS (Administrative/Medical Necessity) [phone]   [address] (Maternity/NICU/Pediatrics) [phone]   Iliana Clancy [phone]   Sharmaine Caruso [phone]   [facility] [phone]   Dariel Gutnerissa [phone]   [address] [place] [address] [address] [place] [address] [phone]   [facility]  [phone]   [address] [facility] [address] [facility]  [address] [facility] 1000 [address] [facility] [phone]

## 2020-01-08 NOTE — DISCHARGE INSTR - AVS FIRST PAGE
Sensitive Scalp due to chemotherapy    Alra Mild Conditioning Shampoo:  Cleanses hair and scalp gently and thoroughly through amphoteric surfactants   Helps relieve itching and soothes dry, irritated scalp   Em oil stimulates scalp circulation and hair growth   100% free of added fragrances, colorants and parabens   Bio-degradable and ph-balanced    Can be purchased online or in stores

## 2020-01-08 NOTE — PROGRESS NOTES
.   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 admitted for chemotherapy treatment, cycle 2 or EMA/CO therapy  Patient tolerating chemotherapy relatively well  Hospital Day #2  1) Gestational trophoblastic neoplasia: Responding well to EMA/CO therapy  - BHCG >400,000 --> 62,514 --> 17, 673   - Since admission patient has received    Etoposide (Day 1), to receive Day #2 today    Methotrexate (Day 1)- plan to start Leucovorin 15mg PO Q 12hr for 4 doses 24 hours following MTX start time  (Patient has Rx with refills at home)    Actinomycin (Day 1), to receive Day #2 today   - Plan for Cyclophosphamide and Vincristine at infusion center on 1/14/2020    2) Leukopenia: Secondary to chemotherapy   - ANC: 1 0 yesterday   - Will schedule patient for Granix at least 24 hours after chemotherapy administartion    3) Hypertension   - BP overnight 150/90s   - Continue Lasix and Lisinopril   - Echocardiogram to be scheduled as outpatient    4) Anxiety: Improved on home regimen   - Continue Lexapro   - Ativan PRN    Diet: Regular diet   DVT PPx: SCDs, OOB/ ambulation, Lovenox 40mg subQ daily     Dispo: If patient continues to tolerate well consideration for discharge home later this evening    Subjective:   Patient has no complaints  No new events over night  She has some cramping and vaginal bleeding that is "like a menstrual period"  She also reports some fatigue and scalp sensitivity with recent loss of hair  She is otheriwse tolerating PO, voiding and having regular BM  She denies fevers, chills, chest pain, SOB, N/V or calf pain  Desires discharge home today  /93   Pulse 82   Temp 98 9 °F (37 2 °C)   Resp 22   Ht 5' 5" (1 651 m)   Wt (!) 140 kg (308 lb 13 8 oz)   SpO2 95%   Breastfeeding?  No   BMI 51 40 kg/m²     Lab Results   Component Value Date    WBC 2 97 (L) 01/07/2020    HGB 11 4 (L) 01/07/2020    HCT 34 8 01/07/2020    MCV 88 01/07/2020     (H) 01/07/2020       Lab Results   Component Value Date    CALCIUM 9 0 01/07/2020    K 3 8 01/07/2020    CO2 24 01/07/2020     (H) 01/07/2020    BUN 11 01/07/2020    CREATININE 0 72 01/07/2020       No results found for: POCGLU    I/O last 3 completed shifts: In: 2099 1 [P O :720; I V :669 1; IV Piggyback:710]  Out: 1100 [Urine:1100]  I/O this shift:  In: 1360 1 [I V :1360 1]  Out: 750 [Urine:750]      Physical Exam  Physical Exam   Constitutional: She is oriented to person, place, and time  She appears well-developed and well-nourished  No distress  HENT:   Head: Normocephalic and atraumatic  Neck: Normal range of motion  Neck supple  Cardiovascular: Normal rate, regular rhythm and normal heart sounds  Exam reveals no gallop and no friction rub  No murmur heard  Pulmonary/Chest: Effort normal and breath sounds normal  No respiratory distress  She has no wheezes  She has no rales  Abdominal: Soft  There is no tenderness  There is no rebound and no guarding  Obese abdomen   Genitourinary: Vagina normal    Neurological: She is alert and oriented to person, place, and time  Skin: Skin is warm and dry  She is not diaphoretic  Psychiatric: She has a normal mood and affect  Her behavior is normal    Vitals reviewed        Mikayla Gunter MD   Gyn PGY-3  1/8/2020 6:23 AM

## 2020-01-08 NOTE — DISCHARGE INSTRUCTIONS
Please continue Leucovorin 15mg by mouth every 12 hours for a total of 4 doses with your chemotherapy  Please follow up with augustina Gordon     Self Care Measures With Cancer   AMBULATORY CARE:   Self-care measures  help you cope with cancer and its treatment  The measures are used in addition to your healthcare provider's treatment and care  Common ways cancer and its treatment can affect you include the following:   · Fatigue  means you feel tired most of the time  This may make it more difficult for you to do your normal daily activities  · Pain  may affect your daily activities and make it difficult to sleep  You may need to take medicine to help decrease pain  · Nausea and vomiting  may be caused from the treatment you receive  · Weight loss  may happen from the cancer or its treatment  Treatment may change how food tastes and smells  You may not want to eat because of nausea or vomiting  · Diarrhea, constipation, or urination problems  may be caused by cancer treatment  · Mood changes  may change how you feel about yourself and others  You may become worried, nervous, sad, or angry  · Loss of sleep  may be caused by emotions, symptoms, or treatments  Call 911 for any of the following:   · You have new or different chest pain  · You have new trouble breathing  · You feel like hurting yourself  Seek care immediately if:   · You feel like you have broken a bone  · You have stomach pain that does not go away  · You have severe nausea with vomiting  · You have any bleeding  Contact your healthcare provider or oncologist if:   · You have pain that does not go away after you take medicine  · You feel too tired to do your normal daily activities  · You have no appetite  · You cannot pass gas  · Your legs or ankles are swollen  · You have new or worsening symptoms      · You feel more sad or worried than usual     · You have questions or concerns about your condition or care  Lifestyle changes to help manage your symptoms:  Lifestyle changes may help protect you from getting sick and decrease your symptoms  · Prevent infection  Your cancer or treatment may decrease your immune system and increase your risk of infections  Do the following to protect yourself:     [facility] your hands often  Use soap and water  Wash your hands after you use the bathroom, change a child's diapers, or sneeze  Wash your hands before you prepare or eat food  Use germ-killing gel to clean your hands when there is no soap and water available  ¨ Avoid others who are sick  Try to avoid people who have a cold, the flu, or a rash  ¨ Make sure your food is safe  Be careful when you touch raw meat, fish, chicken, and eggs  [employer] all food until it reaches the right temperature  Choose food carefully at restaurants  Do not eat from a salad bar  Do not eat sushi or other raw food  Do not drink water from a well  · Do not smoke  Smoking increases your risk for new or returning cancer  Smoking can also delay healing after treatment  Ask your healthcare provider for information if you currently smoke and need help quitting  · Limit or do not drink alcohol as directed  Women should limit alcohol to 1 drink each day  Men should limit alcohol to 2 drinks each day  A drink of alcohol is 12 ounces of beer, 5 ounces of wine, or 1½ ounces of liquor  · Exercise as directed  Ask your healthcare provider about the best exercise plan for you  Exercise may help improve your mood and decrease your fatigue and anxiety  · Get more rest   Rest is important for your recovery  Slowly start to do more as you feel stronger  Physical and occupational therapy to help manage your symptoms:  Therapy may help you adapt to changes caused by your cancer, symptoms, and treatment  A physical therapist teaches you exercises to help improve movement and strength and to decrease pain   An occupational therapist teaches you skills to help with your daily activities  Follow up with your healthcare provider as directed:  Write down your questions so you remember to ask them during your visits  © 2017 [address] [employer] Information is for End User's use only and may not be sold, redistributed or otherwise used for commercial purposes  All illustrations and images included in CareNotes® are the copyrighted property of A D A M , Inc  or Javier Florentino  The above information is an  only  It is not intended as medical advice for individual conditions or treatments  Talk to your doctor, nurse or pharmacist before following any medical regimen to see if it is safe and effective for you  Neutropenia   WHAT YOU NEED TO KNOW:   Neutropenia is a condition that causes you to have a low number of neutrophils in your blood  Neutrophils are a type of white blood cell produced in the bone marrow  They help your body fight infection and bacteria  Neutropenia can develop if there is damage to your bone marrow or if your body uses or destroys neutrophils faster than they can be produced  DISCHARGE INSTRUCTIONS:   Medicines: The following medicines may  be ordered for you:  · Antibiotics  help treat or prevent an infection caused by bacteria  · Antifungal medicine  helps kill fungus that can cause illness  · Take your medicine as directed  Contact your healthcare provider if you think your medicine is not helping or if you have side effects  Tell him or her if you are allergic to any medicine  Keep a list of the medicines, vitamins, and herbs you take  Include the amounts, and when and why you take them  Bring the list or the pill bottles to follow-up visits  Carry your medicine list with you in case of an emergency  Follow up with your healthcare provider as directed:  Write down your questions so you remember to ask them during your visits     Self-care:  Ask your healthcare provider about these and other precautions you may need to prevent an infection:  · Wash your hands before you prepare or eat food, and after you use the bathroom  · Bathe daily  If you shave, use an electric razor to prevent nicks in your skin  · Use a soft-bristled toothbrush, and brush your teeth gently 2 times each day  Floss your teeth daily  · Avoid crowds and anyone who may be sick  · Avoid contact with animal saliva, urine, or feces  Have someone clean your cat's litter box, fish tank, or  after your dog  · Wash raw fruits and vegetables thoroughly  Cook meats and eggs thoroughly  · Use stool softeners to help with constipation  Do not use suppositories or enemas  Constipation, suppositories, and enemas can cause a tear in your rectum  This allows germs to get in and can increase your risk for infection  · Ask your healthcare provider if you should get the flu vaccine every fall  Contact your healthcare provider if:   · You have fever or chills  · You have a new cough  · You have a sore throat or a new mouth sore  · You have redness or swelling any place on your body  · You have pain in your abdomen or rectum  · You have burning or pain when you urinate  · You have diarrhea  · You are more tired or forgetful than usual     · You have questions or concerns about your condition or care  Seek care immediately if:   · You have a fever of 100 4°F (38°C) for more than 1 hour  · You have a fever of 101°F (38 3°C) or higher once  © 2017 [address] [employer]  Information is for End User's use only and may not be sold, redistributed or otherwise used for commercial purposes  All illustrations and images included in CareNotes® are the copyrighted property of A D A M , Inc  or Javier Florentino  The above information is an  only  It is not intended as medical advice for individual conditions or treatments   Talk to your doctor, nurse or pharmacist before following any medical regimen to see if it is safe and effective for you

## 2020-01-08 NOTE — DISCHARGE SUMMARY
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 with 12 cm uterine mass and lung metastases  She was initially diagnosed on her first admission on 12/20/2019 and was started on EMA/CO therapy  She started her first cycle of EMA/CO therapy on 12/21/19 and was noted to have excellent response with BHCG > 400 --> 62,514 --> 17, 673 (1/7/2020)  She was admitted on 1/7/2020 for her the beginning of her second cycle during this hospital stay  She received Etoposide (1/7/2020-1/8/2020), Methotrexate (1/7/2020) and Dactionomycin (1/7/2020-1/8/2020)  She was started on Leucovorin add back therapy 15mg PO every 12 hours for 4 doses 24 hours after Methotrexate start time  (Pateint has refills at home)  The rest of her cycle is scheduled at [address] [place] [address] on 1/14/2020  During her hospital stay she was also noted to have Leukopenia with an ANC of 1 0- she was set up for Granix infusion on 1/15, 1/16 and 1/17/2020  She was discharged on Hospital day # 2 in stable condition with follow up instructions and all questions and concerns addressed       Lab Results:   Lab Results   Component Value Date    WBC 2 90 (L) 01/08/2020    HGB 10 3 (L) 01/08/2020    HCT 31 6 (L) 01/08/2020    MCV 89 01/08/2020     (H) 01/08/2020     Lab Results   Component Value Date    CALCIUM 8 7 01/08/2020    K 3 9 01/08/2020    CO2 22 01/08/2020     (H) 01/08/2020    BUN 13 01/08/2020    CREATININE 0 78 01/08/2020     No results found for: POCGLU  Lab Results   Component Value Date    PTT 29 12/25/2019     Lab Results   Component Value Date    INR 0 97 12/25/2019    INR 0 99 12/20/2019    PROTIME 12 6 12/25/2019    PROTIME 12 7 12/20/2019     Condition at Discharge: fair     Discharge Medications: See after visit summary for reconciled discharge medications provided to patient and family  Discharge instructions/Information to patient and family: See after visit summary for information provided to patient and family  Provisions for Follow-Up Care: See after visit summary for information related to follow-up care and any pertinent home health orders  Disposition: Home    Planned Readmission: Yes for chemotherapy    Code Status: Full     Lisa Dorman MD  OB/GYN  1/8/2020  3:14 PM

## 2020-01-09 NOTE — UTILIZATION REVIEW
Notification of Discharge  This is a Notification of Discharge from our facility [address]  Please be advised that this patient has been discharge from our facility  Below you will find the admission and discharge date and time including the patients disposition  PRESENTATION DATE: 1/7/2020  9:27 AM  OBS ADMISSION DATE:   IP ADMISSION DATE: 1/7/20 0927   DISCHARGE DATE: 1/8/2020  4:47 PM  DISPOSITION: Home/Self Care Home/Self Care   Admission Orders listed below:  Admission Orders (From admission, onward)     Ordered        01/07/20 0942  Inpatient Admission  Once                   Please contact the UR Department if additional information is required to close this patient's authorization/case  [address] Utilization Review Department  Main: [phone] x carefully listen to the prompts  All voicemails are confidential   [email or web address]  Send all requests for admission clinical reviews, approved or denied determinations and any other requests to dedicated fax number below belonging to the campus where the patient is receiving treatment   List of dedicated fax numbers:  [address] DENIALS (Administrative/Medical Necessity) [phone]   [address] (Maternity/NICU/Pediatrics) [phone]   Nayla Patillas [phone]   Jai Kirk [phone]   Omero Matthew [phone]   [place] [address] [phone]   [facility]  [phone]   220[address] [facility]  [address] [place] [address] [address] [facility] [phone]

## 2020-01-12 ENCOUNTER — DOCUMENTATION (OUTPATIENT)
Dept: GYNECOLOGIC ONCOLOGY | Facility: CLINIC | Age: 50
End: 2020-01-12

## 2020-01-12 NOTE — PROGRESS NOTES
Her case was presented at the multidisciplinary Gynecologic Tumor Conference on 1/3/20  and the consensus recommendation is: EMA-CO chemotherapy, consider hysterectomy to decrease the number of cycles

## 2020-01-13 PROBLEM — Z45.2 ENCOUNTER FOR CENTRAL LINE CARE: Status: ACTIVE | Noted: 2020-01-13

## 2020-01-14 ENCOUNTER — TELEPHONE (OUTPATIENT)
Dept: HEMATOLOGY ONCOLOGY | Facility: CLINIC | Age: 50
End: 2020-01-14

## 2020-01-14 ENCOUNTER — HOSPITAL ENCOUNTER (OUTPATIENT)
Dept: INFUSION CENTER | Facility: HOSPITAL | Age: 50
Discharge: HOME | End: 2020-01-14
Attending: OBSTETRICS & GYNECOLOGY
Payer: COMMERCIAL

## 2020-01-14 VITALS
RESPIRATION RATE: 18 BRPM | TEMPERATURE: 98 F | HEIGHT: 65 IN | HEART RATE: 114 BPM | BODY MASS INDEX: 48.82 KG/M2 | OXYGEN SATURATION: 100 % | SYSTOLIC BLOOD PRESSURE: 126 MMHG | WEIGHT: 293 LBS | DIASTOLIC BLOOD PRESSURE: 56 MMHG

## 2020-01-14 DIAGNOSIS — O01.9 GTD (GESTATIONAL TROPHOBLASTIC DISEASE): ICD-10-CM

## 2020-01-14 DIAGNOSIS — O01.9 GTD (GESTATIONAL TROPHOBLASTIC DISEASE): Primary | ICD-10-CM

## 2020-01-14 LAB
ALBUMIN SERPL-MCNC: 4.1 G/DL (ref 3.6–5.1)
ALBUMIN/GLOB SERPL: 2 (CALC) (ref 1–2.5)
ALP SERPL-CCNC: 59 U/L (ref 33–115)
ALT SERPL-CCNC: 13 U/L (ref 6–29)
AST SERPL-CCNC: 12 U/L (ref 10–35)
B-HCG SERPL-ACNC: 7932 MIU/ML
BASOPHILS # BLD AUTO: 68 CELLS/UL (ref 0–200)
BASOPHILS NFR BLD AUTO: 2 %
BILIRUB SERPL-MCNC: 0.7 MG/DL (ref 0.2–1.2)
BUN SERPL-MCNC: 17 MG/DL (ref 7–25)
BUN/CREAT SERPL: ABNORMAL (CALC) (ref 6–22)
CALCIUM SERPL-MCNC: 9.3 MG/DL (ref 8.6–10.2)
CHLORIDE SERPL-SCNC: 106 MMOL/L (ref 98–110)
CO2 SERPL-SCNC: 22 MMOL/L (ref 20–32)
CREAT SERPL-MCNC: 0.82 MG/DL (ref 0.5–1.1)
EOSINOPHIL # BLD AUTO: 88 CELLS/UL (ref 15–500)
EOSINOPHIL NFR BLD AUTO: 2.6 %
ERYTHROCYTE [DISTWIDTH] IN BLOOD BY AUTOMATED COUNT: 14.3 % (ref 11–15)
GLOBULIN SER CALC-MCNC: 2.1 G/DL (CALC) (ref 1.9–3.7)
GLUCOSE SERPL-MCNC: 108 MG/DL (ref 65–99)
HCT VFR BLD AUTO: 35 % (ref 35–45)
HGB BLD-MCNC: 11.6 G/DL (ref 11.7–15.5)
LYMPHOCYTES # BLD AUTO: 983 CELLS/UL (ref 850–3900)
LYMPHOCYTES NFR BLD AUTO: 28.9 %
MAGNESIUM SERPL-MCNC: 2.2 MG/DL (ref 1.5–2.5)
MCH RBC QN AUTO: 28.7 PG (ref 27–33)
MCHC RBC AUTO-ENTMCNC: 33.1 G/DL (ref 32–36)
MCV RBC AUTO: 86.6 FL (ref 80–100)
MONOCYTES # BLD AUTO: 88 CELLS/UL (ref 200–950)
MONOCYTES NFR BLD AUTO: 2.6 %
NEUTROPHILS # BLD AUTO: 2173 CELLS/UL (ref 1500–7800)
NEUTROPHILS NFR BLD AUTO: 63.9 %
PLATELET # BLD AUTO: 334 THOUSAND/UL (ref 140–400)
PMV BLD REES-ECKER: 9.9 FL (ref 7.5–12.5)
POTASSIUM SERPL-SCNC: 4.2 MMOL/L (ref 3.5–5.3)
PROT SERPL-MCNC: 6.2 G/DL (ref 6.1–8.1)
RBC # BLD AUTO: 4.04 MILLION/UL (ref 3.8–5.1)
SL AMB EGFR AFRICAN AMERICAN: 97 ML/MIN/1.73M2
SL AMB EGFR NON AFRICAN AMERICAN: 84 ML/MIN/1.73M2
SODIUM SERPL-SCNC: 138 MMOL/L (ref 135–146)
WBC # BLD AUTO: 3.4 THOUSAND/UL (ref 3.8–10.8)

## 2020-01-14 PROCEDURE — 96375 TX/PRO/DX INJ NEW DRUG ADDON: CPT

## 2020-01-14 PROCEDURE — 96361 HYDRATE IV INFUSION ADD-ON: CPT

## 2020-01-14 PROCEDURE — 96417 CHEMO IV INFUS EACH ADDL SEQ: CPT

## 2020-01-14 PROCEDURE — 96367 TX/PROPH/DG ADDL SEQ IV INF: CPT

## 2020-01-14 PROCEDURE — 96413 CHEMO IV INFUSION 1 HR: CPT

## 2020-01-14 RX ORDER — SODIUM CHLORIDE 9 MG/ML
20 INJECTION, SOLUTION INTRAVENOUS ONCE
Status: COMPLETED | OUTPATIENT
Start: 2020-01-14 | End: 2020-01-14

## 2020-01-14 RX ORDER — PALONOSETRON 0.05 MG/ML
0.25 INJECTION, SOLUTION INTRAVENOUS ONCE
Status: COMPLETED | OUTPATIENT
Start: 2020-01-14 | End: 2020-01-14

## 2020-01-14 RX ADMIN — SODIUM CHLORIDE 1000 ML: 0.9 INJECTION, SOLUTION INTRAVENOUS at 12:13

## 2020-01-14 RX ADMIN — SODIUM CHLORIDE 20 ML/HR: 0.9 INJECTION, SOLUTION INTRAVENOUS at 09:30

## 2020-01-14 RX ADMIN — CYCLOPHOSPHAMIDE 1500 MG: 1 INJECTION, POWDER, FOR SOLUTION INTRAVENOUS; ORAL at 11:29

## 2020-01-14 RX ADMIN — PALONOSETRON HYDROCHLORIDE 0.25 MG: 0.25 INJECTION, SOLUTION INTRAVENOUS at 09:33

## 2020-01-14 RX ADMIN — VINCRISTINE SULFATE 2 MG: 1 INJECTION, SOLUTION INTRAVENOUS at 10:46

## 2020-01-14 RX ADMIN — DEXAMETHASONE SODIUM PHOSPHATE 20 MG: 10 INJECTION, SOLUTION INTRAMUSCULAR; INTRAVENOUS at 09:36

## 2020-01-14 RX ADMIN — SODIUM CHLORIDE 1000 ML: 0.9 INJECTION, SOLUTION INTRAVENOUS at 09:30

## 2020-01-14 NOTE — PROGRESS NOTES
Pt tolerated treatment today with no adverse reactions  Apt confirmed for tomorrow  Pt did not want the AVS  Left unit ambulatory with a steady gait

## 2020-01-15 ENCOUNTER — TELEPHONE (OUTPATIENT)
Dept: INFUSION CENTER | Facility: HOSPITAL | Age: 50
End: 2020-01-15

## 2020-01-15 ENCOUNTER — HOSPITAL ENCOUNTER (OUTPATIENT)
Dept: INFUSION CENTER | Facility: HOSPITAL | Age: 50
Discharge: HOME | End: 2020-01-15
Attending: OBSTETRICS & GYNECOLOGY
Payer: COMMERCIAL

## 2020-01-15 VITALS — TEMPERATURE: 99.2 F

## 2020-01-15 DIAGNOSIS — O01.9 GTD (GESTATIONAL TROPHOBLASTIC DISEASE): ICD-10-CM

## 2020-01-15 DIAGNOSIS — T45.1X5A CHEMOTHERAPY INDUCED NEUTROPENIA (HCC): Primary | ICD-10-CM

## 2020-01-15 DIAGNOSIS — D70.1 CHEMOTHERAPY INDUCED NEUTROPENIA (HCC): Primary | ICD-10-CM

## 2020-01-15 PROCEDURE — 96372 THER/PROPH/DIAG INJ SC/IM: CPT

## 2020-01-15 RX ADMIN — FILGRASTIM-SNDZ 480 MCG: 480 INJECTION, SOLUTION INTRAVENOUS; SUBCUTANEOUS at 15:19

## 2020-01-15 NOTE — TELEPHONE ENCOUNTER
MSW received a call from the pt on this day  Pt shared that she has been able to return to work and while she is feeling exhausted, she is pleased to be feeling productive  MSW inquired if it is too much and pt reports that she is not overdoing herself  Pt did go to [facility] and ordered her wig, which she stated the hair loss has been the most challenging part of the journey for her  MSW did inform pt that the bill was sent to this office and the cancer funds were used to pay the bill  The to has additional bills that she is struggling with form the times that she was out of work  MSW encouraged her to send them to this MSW and the cancer funds will be utilized for this as well  Pt expressed much gratitude for the assistance and support she is receiving

## 2020-01-15 NOTE — PROGRESS NOTES
Pt arrived amb for generic Granix injection   (Zarxio)  Temp 99 2  States she feels like she has a low grade fever  Instructions reviewed  Zarxio given SQ korin THOMAS applied  Disch amb to home, steady gait

## 2020-01-16 ENCOUNTER — HOSPITAL ENCOUNTER (OUTPATIENT)
Dept: INFUSION CENTER | Facility: HOSPITAL | Age: 50
Discharge: HOME | End: 2020-01-16
Attending: OBSTETRICS & GYNECOLOGY
Payer: COMMERCIAL

## 2020-01-16 VITALS — TEMPERATURE: 97.7 F

## 2020-01-16 DIAGNOSIS — O01.9 GTD (GESTATIONAL TROPHOBLASTIC DISEASE): Primary | ICD-10-CM

## 2020-01-16 PROCEDURE — 96372 THER/PROPH/DIAG INJ SC/IM: CPT

## 2020-01-16 RX ADMIN — FILGRASTIM-SNDZ 480 MCG: 480 INJECTION, SOLUTION INTRAVENOUS; SUBCUTANEOUS at 14:17

## 2020-01-16 NOTE — PROGRESS NOTES
Pt tolerated Filgrastim 480mcg injection sq in the right upper arm  Left unit ambulatory with a steady gait

## 2020-01-17 ENCOUNTER — HOSPITAL ENCOUNTER (OUTPATIENT)
Dept: INFUSION CENTER | Facility: HOSPITAL | Age: 50
Discharge: HOME | End: 2020-01-17
Attending: OBSTETRICS & GYNECOLOGY
Payer: COMMERCIAL

## 2020-01-17 VITALS — TEMPERATURE: 98.6 F

## 2020-01-17 DIAGNOSIS — O01.9 GTD (GESTATIONAL TROPHOBLASTIC DISEASE): Primary | ICD-10-CM

## 2020-01-17 PROCEDURE — 96372 THER/PROPH/DIAG INJ SC/IM: CPT

## 2020-01-17 RX ADMIN — FILGRASTIM-SNDZ 480 MCG: 480 INJECTION, SOLUTION INTRAVENOUS; SUBCUTANEOUS at 14:07

## 2020-01-17 NOTE — PROGRESS NOTES
Patient received Zarxio in back of right arm  Mentioned she feels jittery today, but nothing more than that    Left unit ambulatory with steady gait

## 2020-01-20 ENCOUNTER — DOCUMENTATION (OUTPATIENT)
Dept: INFUSION CENTER | Facility: HOSPITAL | Age: 50
End: 2020-01-20

## 2020-01-20 RX ORDER — PALONOSETRON 0.05 MG/ML
0.25 INJECTION, SOLUTION INTRAVENOUS ONCE
Status: CANCELLED | OUTPATIENT
Start: 2020-01-22

## 2020-01-20 RX ORDER — SODIUM CHLORIDE 9 MG/ML
20 INJECTION, SOLUTION INTRAVENOUS ONCE AS NEEDED
Status: CANCELLED | OUTPATIENT
Start: 2020-01-22

## 2020-01-20 RX ORDER — METHOTREXATE 25 MG/ML
100 INJECTION, SOLUTION INTRA-ARTERIAL; INTRAMUSCULAR; INTRAVENOUS ONCE
Status: CANCELLED | OUTPATIENT
Start: 2020-01-21

## 2020-01-20 RX ORDER — SODIUM CHLORIDE 9 MG/ML
125 INJECTION, SOLUTION INTRAVENOUS CONTINUOUS
Status: CANCELLED | OUTPATIENT
Start: 2020-01-21

## 2020-01-20 RX ORDER — SODIUM CHLORIDE 9 MG/ML
20 INJECTION, SOLUTION INTRAVENOUS ONCE AS NEEDED
Status: CANCELLED | OUTPATIENT
Start: 2020-01-21

## 2020-01-20 RX ORDER — LEUCOVORIN CALCIUM 5 MG/1
15 TABLET ORAL EVERY 12 HOURS
Status: CANCELLED | OUTPATIENT
Start: 2020-01-22 | End: 2020-01-23

## 2020-01-20 RX ORDER — SODIUM CHLORIDE 9 MG/ML
20 INJECTION, SOLUTION INTRAVENOUS ONCE
Status: CANCELLED | OUTPATIENT
Start: 2020-01-28

## 2020-01-20 RX ORDER — PALONOSETRON 0.05 MG/ML
0.25 INJECTION, SOLUTION INTRAVENOUS ONCE
Status: CANCELLED | OUTPATIENT
Start: 2020-01-28

## 2020-01-20 RX ORDER — PALONOSETRON 0.05 MG/ML
0.25 INJECTION, SOLUTION INTRAVENOUS ONCE
Status: CANCELLED | OUTPATIENT
Start: 2020-01-21

## 2020-01-20 NOTE — SOCIAL WORK
MSW spoke with pt about her financial needs and her bills that she is seeking assisting with and will be utilizing the cancer fund  The pt was able to secure a wig, through the [employer], and did use some cancer funds for this purpose  The pt will be in pt this week for her treatment and MSW will plan to meet to collect her bills  Pt states her son is in a "better emotional place" than he was the last time this MSW met with the pt

## 2020-01-21 ENCOUNTER — HOSPITAL ENCOUNTER (INPATIENT)
Facility: HOSPITAL | Age: 50
LOS: 1 days | Discharge: HOME | DRG: 848 | End: 2020-01-22
Attending: OBSTETRICS & GYNECOLOGY | Admitting: OBSTETRICS & GYNECOLOGY
Payer: COMMERCIAL

## 2020-01-21 DIAGNOSIS — O01.9 GTD (GESTATIONAL TROPHOBLASTIC DISEASE): Primary | ICD-10-CM

## 2020-01-21 DIAGNOSIS — I10 HYPERTENSION, UNSPECIFIED TYPE: ICD-10-CM

## 2020-01-21 LAB
BASOPHILS # BLD MANUAL: 0.16 THOUSAND/UL (ref 0–0.1)
BASOPHILS NFR MAR MANUAL: 2 % (ref 0–1)
EOSINOPHIL # BLD MANUAL: 0.08 THOUSAND/UL (ref 0–0.4)
EOSINOPHIL NFR BLD MANUAL: 1 % (ref 0–6)
ERYTHROCYTE [DISTWIDTH] IN BLOOD BY AUTOMATED COUNT: 15 % (ref 11.6–15.1)
HCT VFR BLD AUTO: 32.5 % (ref 34.8–46.1)
HGB BLD-MCNC: 10.9 G/DL (ref 11.5–15.4)
LYMPHOCYTES # BLD AUTO: 0.56 THOUSAND/UL (ref 0.6–4.47)
LYMPHOCYTES # BLD AUTO: 7 % (ref 14–44)
MCH RBC QN AUTO: 29.3 PG (ref 26.8–34.3)
MCHC RBC AUTO-ENTMCNC: 33.5 G/DL (ref 31.4–37.4)
MCV RBC AUTO: 87 FL (ref 82–98)
METAMYELOCYTES NFR BLD MANUAL: 4 % (ref 0–1)
MONOCYTES # BLD AUTO: 0.88 THOUSAND/UL (ref 0–1.22)
MONOCYTES NFR BLD: 11 % (ref 4–12)
NEUTROPHILS # BLD MANUAL: 5.53 THOUSAND/UL (ref 1.85–7.62)
NEUTS BAND NFR BLD MANUAL: 2 % (ref 0–8)
NEUTS SEG NFR BLD AUTO: 67 % (ref 43–75)
NRBC BLD AUTO-RTO: 1 /100 WBCS
PLATELET # BLD AUTO: 280 THOUSANDS/UL (ref 149–390)
PLATELET BLD QL SMEAR: ADEQUATE
PMV BLD AUTO: 9.4 FL (ref 8.9–12.7)
POLYCHROMASIA BLD QL SMEAR: PRESENT
RBC # BLD AUTO: 3.72 MILLION/UL (ref 3.81–5.12)
RBC MORPH BLD: PRESENT
VARIANT LYMPHS # BLD AUTO: 6 %
WBC # BLD AUTO: 8.02 THOUSAND/UL (ref 4.31–10.16)

## 2020-01-21 PROCEDURE — 99221 1ST HOSP IP/OBS SF/LOW 40: CPT | Performed by: OBSTETRICS & GYNECOLOGY

## 2020-01-21 PROCEDURE — 85027 COMPLETE CBC AUTOMATED: CPT | Performed by: OBSTETRICS & GYNECOLOGY

## 2020-01-21 PROCEDURE — 85007 BL SMEAR W/DIFF WBC COUNT: CPT | Performed by: OBSTETRICS & GYNECOLOGY

## 2020-01-21 RX ORDER — METHOTREXATE 25 MG/ML
100 INJECTION, SOLUTION INTRA-ARTERIAL; INTRAMUSCULAR; INTRAVENOUS ONCE
Status: COMPLETED | OUTPATIENT
Start: 2020-01-21 | End: 2020-01-21

## 2020-01-21 RX ORDER — SODIUM CHLORIDE 9 MG/ML
20 INJECTION, SOLUTION INTRAVENOUS ONCE AS NEEDED
Status: DISCONTINUED | OUTPATIENT
Start: 2020-01-21 | End: 2020-01-22 | Stop reason: HOSPADM

## 2020-01-21 RX ORDER — IBUPROFEN 600 MG/1
600 TABLET ORAL EVERY 6 HOURS PRN
Status: DISCONTINUED | OUTPATIENT
Start: 2020-01-21 | End: 2020-01-21

## 2020-01-21 RX ORDER — ALBUTEROL SULFATE 90 UG/1
2 AEROSOL, METERED RESPIRATORY (INHALATION) EVERY 4 HOURS PRN
Status: DISCONTINUED | OUTPATIENT
Start: 2020-01-21 | End: 2020-01-22 | Stop reason: HOSPADM

## 2020-01-21 RX ORDER — DOCUSATE SODIUM 100 MG/1
100 CAPSULE, LIQUID FILLED ORAL 2 TIMES DAILY
Status: DISCONTINUED | OUTPATIENT
Start: 2020-01-21 | End: 2020-01-22 | Stop reason: HOSPADM

## 2020-01-21 RX ORDER — SENNOSIDES 8.6 MG
1 TABLET ORAL
Status: DISCONTINUED | OUTPATIENT
Start: 2020-01-21 | End: 2020-01-22 | Stop reason: HOSPADM

## 2020-01-21 RX ORDER — LISINOPRIL 10 MG/1
10 TABLET ORAL DAILY
Status: DISCONTINUED | OUTPATIENT
Start: 2020-01-22 | End: 2020-01-22 | Stop reason: HOSPADM

## 2020-01-21 RX ORDER — LORAZEPAM 1 MG/1
1 TABLET ORAL EVERY 8 HOURS PRN
Status: DISCONTINUED | OUTPATIENT
Start: 2020-01-21 | End: 2020-01-22 | Stop reason: HOSPADM

## 2020-01-21 RX ORDER — PALONOSETRON 0.05 MG/ML
0.25 INJECTION, SOLUTION INTRAVENOUS ONCE
Status: COMPLETED | OUTPATIENT
Start: 2020-01-21 | End: 2020-01-21

## 2020-01-21 RX ORDER — FUROSEMIDE 20 MG/1
20 TABLET ORAL DAILY
Status: DISCONTINUED | OUTPATIENT
Start: 2020-01-21 | End: 2020-01-22 | Stop reason: HOSPADM

## 2020-01-21 RX ORDER — SIMETHICONE 80 MG
80 TABLET,CHEWABLE ORAL EVERY 6 HOURS PRN
Status: DISCONTINUED | OUTPATIENT
Start: 2020-01-21 | End: 2020-01-22 | Stop reason: HOSPADM

## 2020-01-21 RX ORDER — ACETAMINOPHEN 325 MG/1
650 TABLET ORAL EVERY 6 HOURS PRN
Status: DISCONTINUED | OUTPATIENT
Start: 2020-01-21 | End: 2020-01-22 | Stop reason: HOSPADM

## 2020-01-21 RX ORDER — ONDANSETRON 4 MG/1
4 TABLET, ORALLY DISINTEGRATING ORAL EVERY 6 HOURS PRN
Status: DISCONTINUED | OUTPATIENT
Start: 2020-01-21 | End: 2020-01-22 | Stop reason: HOSPADM

## 2020-01-21 RX ORDER — IBUPROFEN 600 MG/1
600 TABLET ORAL EVERY 6 HOURS PRN
Status: DISCONTINUED | OUTPATIENT
Start: 2020-01-21 | End: 2020-01-22 | Stop reason: HOSPADM

## 2020-01-21 RX ORDER — SODIUM CHLORIDE 9 MG/ML
125 INJECTION, SOLUTION INTRAVENOUS CONTINUOUS
Status: DISCONTINUED | OUTPATIENT
Start: 2020-01-21 | End: 2020-01-22 | Stop reason: HOSPADM

## 2020-01-21 RX ORDER — ESCITALOPRAM OXALATE 10 MG/1
10 TABLET ORAL DAILY
Status: DISCONTINUED | OUTPATIENT
Start: 2020-01-21 | End: 2020-01-22 | Stop reason: HOSPADM

## 2020-01-21 RX ADMIN — DOCUSATE SODIUM 100 MG: 100 CAPSULE, LIQUID FILLED ORAL at 17:18

## 2020-01-21 RX ADMIN — IBUPROFEN 600 MG: 600 TABLET ORAL at 12:22

## 2020-01-21 RX ADMIN — SENNOSIDES 8.6 MG: 8.6 TABLET, FILM COATED ORAL at 23:04

## 2020-01-21 RX ADMIN — METHOTREXATE 475 MG: 25 INJECTION INTRA-ARTERIAL; INTRAMUSCULAR; INTRATHECAL; INTRAVENOUS at 14:00

## 2020-01-21 RX ADMIN — PALONOSETRON HYDROCHLORIDE 0.25 MG: 0.05 INJECTION, SOLUTION INTRAVENOUS at 11:52

## 2020-01-21 RX ADMIN — SODIUM CHLORIDE 125 ML/HR: 0.9 INJECTION, SOLUTION INTRAVENOUS at 19:05

## 2020-01-21 RX ADMIN — ETOPOSIDE 237 MG: 20 INJECTION, SOLUTION, CONCENTRATE INTRAVENOUS at 12:13

## 2020-01-21 RX ADMIN — METHOTREXATE 237.5 MG: 25 INJECTION INTRA-ARTERIAL; INTRAMUSCULAR; INTRATHECAL; INTRAVENOUS at 13:52

## 2020-01-21 RX ADMIN — DACTINOMYCIN 500 MCG: 0.5 INJECTION, POWDER, LYOPHILIZED, FOR SOLUTION INTRAVENOUS at 13:23

## 2020-01-21 RX ADMIN — SODIUM CHLORIDE 125 ML/HR: 0.9 INJECTION, SOLUTION INTRAVENOUS at 10:00

## 2020-01-21 RX ADMIN — DEXAMETHASONE SODIUM PHOSPHATE 10 MG: 10 INJECTION, SOLUTION INTRAMUSCULAR; INTRAVENOUS at 11:49

## 2020-01-21 RX ADMIN — FUROSEMIDE 20 MG: 20 TABLET ORAL at 17:17

## 2020-01-21 NOTE — H&P
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:   - HCG downtrending: most recently 823 1/21  - Plan for Etoposide 100mg/m2 IV today and tomorrow  - Methotrexate 100mg/m2 IV followed by 200mg/m2 over 12 hours today  - Leucovorin 15mg q12 hours for 4 doses starting 24 hours after Methotrexate starts  - Dactinomycin S 0 5mg IV today and tomorrow  - Cyclophosphamide and Vincristine outpatient treatment     2  Hypertension  - home lisinopril, lasix     3) Anxiety:   - Continue home Lexapro 10mg daily, ativan PRN     4) Bowel regimen: Continue home regimen of colace, senna, simethicone     5) Pain management: Tylenol and motrin as needed      6) FEN: Regular; NS @ 125cc/hr      7) DVT PPx: SCDs      CHIEF COMPLAINT: "I am here for my chemotherapy"      Subjective: Hanane Abraham presents for scheduled EMA/CO chemotherapy  She was initially diagnosed with high risk GTN in December 2019  MRI negative for metastasis  This is her third cycle of chemotherapy  She is without complaints  Problem:  Cancer Staging  No matching staging information was found for the patient      Previous therapy:     GTD (gestational trophoblastic disease)    12/20/2019 Initial Diagnosis     GTD (gestational trophoblastic disease)      12/21/2019 -  Chemotherapy     leucovorin (WELLCOVORIN) tablet 15 mg, 15 mg, Oral, Every 12 hours, 3 of 6 cycles  Administration: 15 mg (12/22/2019), 15 mg (12/23/2019), 15 mg (1/8/2020)  palonosetron (ALOXI) injection 0 25 mg, 0 25 mg, Intravenous, Once, 3 of 6 cycles  Administration: 0 25 mg (12/21/2019), 0 25 mg (12/22/2019), 0 25 mg (12/31/2019), 0 25 mg (1/7/2020), 0 25 mg (1/14/2020), 0 25 mg (1/8/2020)  methotrexate injection 242 5 mg, 100 mg/m2 = 242 5 mg, Intravenous, Once, 3 of 6 cycles  Administration: 242 5 mg (12/21/2019), 242 5 mg (1/7/2020)  DACTINomycin (COSMEGEN) 500 mcg in sodium chloride 0 9 % 50 mL IVPB, 500 mcg, Intravenous, Once, 3 of 6 cycles  Administration: 500 mcg (12/21/2019), 500 mcg (12/22/2019), 500 mcg (1/7/2020), 500 mcg (1/8/2020)  vinCRIStine (ONCOVIN) 2 mg in sodium chloride 0 9 % 50 mL chemo infusion, 2 mg (original dose 1 mg/m2), Intravenous, Once, 3 of 6 cycles  Dose modification: 2 mg (original dose 1 mg/m2, Cycle 1, Reason: Max Dose Reached)  Administration: 2 mg (12/31/2019), 2 mg (1/14/2020)  cyclophosphamide (CYTOXAN) 1,500 mg in sodium chloride 0 9 % 250 mL IVPB, 1,458 mg, Intravenous, Once, 3 of 6 cycles  Administration: 1,500 mg (12/31/2019), 1,500 mg (1/14/2020)  methotrexate (PF) 485 mg in sodium chloride 0 9 % 1,000 mL continuous infusion, 200 mg/m2 = 485 mg, Intravenous, Once, 3 of 6 cycles  Administration: 485 mg (12/21/2019), 485 mg (1/7/2020)  etoposide (TOPOSAR) 243 mg in sodium chloride 0 9 % 600 mL chemo infusion, 100 mg/m2 = 243 mg, Intravenous, Once, 3 of 6 cycles  Administration: 243 mg (12/21/2019), 243 mg (12/22/2019), 243 mg (1/7/2020), 243 mg (1/8/2020)           Patient ID: Jose M Dougherty is a 52 y o  female  HPI    Review of Systems   Constitutional: Negative for chills, diaphoresis and fatigue  Respiratory: Negative for cough, choking, chest tightness and shortness of breath  Cardiovascular: Negative for chest pain, palpitations and leg swelling  Gastrointestinal: Negative for abdominal pain, constipation, diarrhea, nausea and vomiting  Genitourinary: Negative for pelvic pain, vaginal bleeding, vaginal discharge and vaginal pain  Musculoskeletal: Negative for arthralgias, back pain, myalgias and neck pain  Neurological: Negative for dizziness, weakness, light-headedness and headaches  Psychiatric/Behavioral: Negative for self-injury, sleep disturbance and suicidal ideas  The patient is not nervous/anxious          Current Facility-Administered Medications   Medication Dose Route Frequency Provider Last Rate Last Dose    acetaminophen (TYLENOL) tablet 650 mg  650 mg Oral Q6H PRN Clotilda Dionne F Zabo, DO        albuterol (PROVENTIL HFA,VENTOLIN HFA) inhaler 2 puff  2 puff Inhalation Q4H PRN Clotilda Dionne F Zabo, DO        docusate sodium (COLACE) capsule 100 mg  100 mg Oral BID Clotilda Dionne F Zabo, DO        escitalopram (LEXAPRO) tablet 10 mg  10 mg Oral Daily Clotilda Dionne F Zabo, DO        furosemide (LASIX) tablet 20 mg  20 mg Oral Daily Clotilda Dionne F Zabo, DO        ibuprofen (MOTRIN) tablet 600 mg  600 mg Oral Q6H PRN Clotilda Dionne F Zabo, DO        [START ON 2020] lisinopril (ZESTRIL) tablet 10 mg  10 mg Oral Daily Clotilda Dionne F Zabo, DO        LORazepam (ATIVAN) tablet 1 mg  1 mg Oral Q8H PRN Clotilda Dionne F Zabo, DO        methotrexate (PF) 475 mg in sodium chloride 0 9 % 1,000 mL continuous infusion  200 mg/m2 (Treatment Plan Recorded) Intravenous Once Vesta Gavia, DO 88 mL/hr at 20 1400 475 mg at 20 1400    ondansetron (ZOFRAN-ODT) dispersible tablet 4 mg  4 mg Oral Q6H PRN Clotilda Dionne F Zabo, DO        senna (SENOKOT) tablet 8 6 mg  1 tablet Oral HS Clotilda Dionne F Zabo, DO        simethicone (MYLICON) chewable tablet 80 mg  80 mg Oral Q6H PRN Clotilda Dionne F Zabo, DO        sodium chloride 0 9 % infusion  125 mL/hr Intravenous Continuous Clotilda Dionne F Zabo,  mL/hr at 20 1000 125 mL/hr at 20 1000    sodium chloride 0 9 % infusion  20 mL/hr Intravenous Once PRN Clotilda Dionne F Zabo, DO           Allergies   Allergen Reactions    Penicillins Fever       Past Medical History:   Diagnosis Date    Cancer ([address] )     GTD (gestational trophoblastic disease)     History of chemotherapy     Obesity     Shortness of breath     Wheezing        Past Surgical History:   Procedure Laterality Date     SECTION      DILATION AND CURETTAGE OF UTERUS      X2    ENDOMETRIAL ABLATION      IR PORT PLACEMENT  2019    THYROIDECTOMY, PARTIAL         OB History        4    Para   1    Term   1            AB   3    Living   1       SAB        TAB        Ectopic        Multiple Live Births   1                 No family history on file  Objective:    Blood pressure 165/86, pulse 77, temperature 98 2 °F (36 8 °C), resp  rate 18, height 5' 6" (1 676 m), weight (!) 138 kg (303 lb 12 7 oz), SpO2 100 %, not currently breastfeeding  Body mass index is 49 03 kg/m²  Physical Exam   Constitutional: She is oriented to person, place, and time  She appears well-developed and well-nourished  No distress  Cardiovascular: Normal rate, regular rhythm, normal heart sounds and intact distal pulses  Left chest port in place    Pulmonary/Chest: Effort normal and breath sounds normal  No stridor  No respiratory distress  Abdominal: Soft  Bowel sounds are normal  She exhibits no distension  There is no tenderness  Neurological: She is alert and oriented to person, place, and time  Skin: Skin is warm and dry  She is not diaphoretic  Psychiatric: She has a normal mood and affect   Her behavior is normal          No results found for:   Lab Results   Component Value Date    WBC 8 02 01/21/2020    HGB 10 9 (L) 01/21/2020    HCT 32 5 (L) 01/21/2020    MCV 87 01/21/2020     01/21/2020     Lab Results   Component Value Date    K 3 9 01/20/2020     01/20/2020    CO2 24 01/20/2020    BUN 10 01/20/2020    CREATININE 0 89 01/20/2020    GLUF 97 12/26/2019    CALCIUM 9 2 01/20/2020    AST 16 01/20/2020    ALT 19 01/20/2020    ALKPHOS 74 01/20/2020    EGFR 90 01/08/2020

## 2020-01-21 NOTE — PLAN OF CARE
Problem: PAIN - ADULT  Goal: Verbalizes/displays adequate comfort level or baseline comfort level  Description  Interventions:  - Encourage patient to monitor pain and request assistance  - Assess pain using appropriate pain scale  - Administer analgesics based on type and severity of pain and evaluate response  - Implement non-pharmacological measures as appropriate and evaluate response  - Consider cultural and social influences on pain and pain management  - Notify physician/advanced practitioner if interventions unsuccessful or patient reports new pain  Outcome: Progressing     Problem: INFECTION - ADULT  Goal: Absence or prevention of progression during hospitalization  Description  INTERVENTIONS:  - Assess and monitor for signs and symptoms of infection  - Monitor lab/diagnostic results  - Monitor all insertion sites, i e  indwelling lines, tubes, and drains  - Monitor endotracheal if appropriate and nasal secretions for changes in amount and color  - [place] appropriate cooling/warming therapies per order  - Administer medications as ordered  - Instruct and encourage patient and family to use good hand hygiene technique  - Identify and instruct in appropriate isolation precautions for identified infection/condition  Outcome: Progressing  Goal: Absence of fever/infection during neutropenic period  Description  INTERVENTIONS:  - Monitor WBC    Outcome: Progressing     Problem: SAFETY ADULT  Goal: Patient will remain free of falls  Description  INTERVENTIONS:  - Assess patient frequently for physical needs  -  Identify cognitive and physical deficits and behaviors that affect risk of falls    -  [place] fall precautions as indicated by assessment   - Educate patient/family on patient safety including physical limitations  - Instruct patient to call for assistance with activity based on assessment  - Modify environment to reduce risk of injury  - Consider OT/PT consult to assist with strengthening/mobility  Outcome: Progressing  Goal: Maintain or return to baseline ADL function  Description  INTERVENTIONS:  -  Assess patient's ability to carry out ADLs; assess patient's baseline for ADL function and identify physical deficits which impact ability to perform ADLs (bathing, care of mouth/teeth, toileting, grooming, dressing, etc )  - Assess/evaluate cause of self-care deficits   - Assess range of motion  - Assess patient's mobility; develop plan if impaired  - Assess patient's need for assistive devices and provide as appropriate  - Encourage maximum independence but intervene and supervise when necessary  - Involve family in performance of ADLs  - Assess for home care needs following discharge   - Consider OT consult to assist with ADL evaluation and planning for discharge  - Provide patient education as appropriate  Outcome: Progressing  Goal: Maintain or return mobility status to optimal level  Description  INTERVENTIONS:  - Assess patient's baseline mobility status (ambulation, transfers, stairs, etc )    - Identify cognitive and physical deficits and behaviors that affect mobility  - Identify mobility aids required to assist with transfers and/or ambulation (gait belt, sit-to-stand, lift, walker, cane, etc )  - [place] fall precautions as indicated by assessment  - Record patient progress and toleration of activity level on Mobility SBAR; progress patient to next Phase/Stage  - Instruct patient to call for assistance with activity based on assessment  - Consider rehabilitation consult to assist with strengthening/weightbearing, etc   Outcome: Progressing     Problem: DISCHARGE PLANNING  Goal: Discharge to home or other facility with appropriate resources  Description  INTERVENTIONS:  - Identify barriers to discharge w/patient and caregiver  - Arrange for needed discharge resources and transportation as appropriate  - Identify discharge learning needs (meds, wound care, etc )  - Arrange for interpretive services to assist at discharge as needed  - Refer to Case Management Department for coordinating discharge planning if the patient needs post-hospital services based on physician/advanced practitioner order or complex needs related to functional status, cognitive ability, or social support system  Outcome: Progressing     Problem: Knowledge Deficit  Goal: Patient/family/caregiver demonstrates understanding of disease process, treatment plan, medications, and discharge instructions  Description  Complete learning assessment and assess knowledge base    Interventions:  - Provide teaching at level of understanding  - Provide teaching via preferred learning methods  Outcome: Progressing

## 2020-01-21 NOTE — SOCIAL WORK
Pt scheduled re-admit for chemo  Met with pt and discussed role of CM  Pt lives with her son (currently being supervised by pt's brother) in an apt with flight of stairs at entrance  Pt is independent in ADLs  No DME or prior C  Preference for pharmacy is [facility] in [place], [place]  No MH/D&A tx hx  PCP- Hi Rios MD ([phone])  No POA  Main contact: Stuart Hernadez ([phone])  CM reviewed d/c planning process including the following: identifying help at home, patient preference for d/c planning needs, Discharge Lounge, [facility] Meds to Bed program, availability of treatment team to discuss questions or concerns patient and/or family may have regarding understanding medications and recognizing signs and symptoms once discharged  CM also encouraged patient to follow up with all recommended appointments after discharge  Patient advised of importance for patient and family to participate in managing patients medical well being  Patient/caregiver received discharge checklist  Content reviewed  Patient/caregiver encouraged to participate in discharge plan of care prior to discharge home

## 2020-01-22 ENCOUNTER — DOCUMENTATION (OUTPATIENT)
Dept: INFUSION CENTER | Facility: HOSPITAL | Age: 50
End: 2020-01-22

## 2020-01-22 VITALS
RESPIRATION RATE: 18 BRPM | DIASTOLIC BLOOD PRESSURE: 73 MMHG | OXYGEN SATURATION: 98 % | TEMPERATURE: 98.3 F | SYSTOLIC BLOOD PRESSURE: 149 MMHG | WEIGHT: 293 LBS | HEART RATE: 68 BPM | BODY MASS INDEX: 47.09 KG/M2 | HEIGHT: 66 IN

## 2020-01-22 PROCEDURE — NC001 PR NO CHARGE: Performed by: OBSTETRICS & GYNECOLOGY

## 2020-01-22 PROCEDURE — 99238 HOSP IP/OBS DSCHRG MGMT 30/<: CPT | Performed by: OBSTETRICS & GYNECOLOGY

## 2020-01-22 RX ORDER — LEUCOVORIN CALCIUM 5 MG/1
15 TABLET ORAL EVERY 12 HOURS
Status: DISCONTINUED | OUTPATIENT
Start: 2020-01-22 | End: 2020-01-22 | Stop reason: HOSPADM

## 2020-01-22 RX ORDER — FUROSEMIDE 20 MG/1
20 TABLET ORAL DAILY
Qty: 30 TABLET | Refills: 1 | Status: SHIPPED | OUTPATIENT
Start: 2020-01-22 | End: 2020-05-06

## 2020-01-22 RX ORDER — PALONOSETRON 0.05 MG/ML
0.25 INJECTION, SOLUTION INTRAVENOUS ONCE
Status: COMPLETED | OUTPATIENT
Start: 2020-01-22 | End: 2020-01-22

## 2020-01-22 RX ORDER — SODIUM CHLORIDE 9 MG/ML
20 INJECTION, SOLUTION INTRAVENOUS ONCE AS NEEDED
Status: DISCONTINUED | OUTPATIENT
Start: 2020-01-22 | End: 2020-01-22 | Stop reason: HOSPADM

## 2020-01-22 RX ADMIN — LEUCOVORIN CALCIUM 15 MG: 5 TABLET ORAL at 13:55

## 2020-01-22 RX ADMIN — FUROSEMIDE 20 MG: 20 TABLET ORAL at 08:22

## 2020-01-22 RX ADMIN — PALONOSETRON HYDROCHLORIDE 0.25 MG: 0.05 INJECTION, SOLUTION INTRAVENOUS at 11:39

## 2020-01-22 RX ADMIN — LORAZEPAM 1 MG: 1 TABLET ORAL at 02:07

## 2020-01-22 RX ADMIN — SODIUM CHLORIDE 125 ML/HR: 0.9 INJECTION, SOLUTION INTRAVENOUS at 02:05

## 2020-01-22 RX ADMIN — DACTINOMYCIN 500 MCG: 0.5 INJECTION, POWDER, LYOPHILIZED, FOR SOLUTION INTRAVENOUS at 13:17

## 2020-01-22 RX ADMIN — DOCUSATE SODIUM 100 MG: 100 CAPSULE, LIQUID FILLED ORAL at 08:22

## 2020-01-22 RX ADMIN — ESCITALOPRAM OXALATE 10 MG: 10 TABLET ORAL at 08:22

## 2020-01-22 RX ADMIN — LISINOPRIL 10 MG: 10 TABLET ORAL at 08:22

## 2020-01-22 RX ADMIN — ETOPOSIDE 237 MG: 20 INJECTION, SOLUTION, CONCENTRATE INTRAVENOUS at 12:06

## 2020-01-22 RX ADMIN — SODIUM CHLORIDE 125 ML/HR: 0.9 INJECTION, SOLUTION INTRAVENOUS at 11:33

## 2020-01-22 RX ADMIN — DEXAMETHASONE SODIUM PHOSPHATE 10 MG: 10 INJECTION, SOLUTION INTRAMUSCULAR; INTRAVENOUS at 11:35

## 2020-01-22 NOTE — UTILIZATION REVIEW
Initial Clinical Review    Admission: Date/Time/Statement: Inpatient Admission Orders (From admission, onward)     Ordered        01/21/20 1057  Inpatient Admission  Once                   Orders Placed This Encounter   Procedures    Inpatient Admission     Standing Status:   Standing     Number of Occurrences:   1     Order Specific Question:   Admitting Physician     Answer:   Martín Whitman [[id number]]     Order Specific Question:   Level of Care     Answer:   Med Surg [[id number]]     Order Specific Question:   Estimated length of stay     Answer:   More than 2 Midnights     Order Specific Question:   Certification     Answer:   I certify that inpatient services are medically necessary for this patient for a duration of greater than two midnights  See H&P and MD Progress Notes for additional information about the patient's course of treatment  Assessment/Plan:   Ms Arik Schmitt is a 52year old female who presents for an elective admission for 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  This is her third cycle of EMA (Etoposide, methotrexate and Actinomycin D) chemo  She has tolerated previous chemo well with chemo-induced neutropenia requiring colony stimulating factors after her last CO treatment  Her hematologic and metabolic parameters are adequate for treatment  1/22 Tolerating chemo today          Pain Score       01/21/20 0900       No Pain        Wt Readings from Last 1 Encounters:   01/21/20 (!) 138 kg (303 lb 12 7 oz)     Additional Vital Signs:   01/22/20 07:22:05  98 3 °F (36 8 °C)  68  18  149/73  98  98 %  --   01/21/20 22:23:28  98 6 °F (37 °C)  85  20  147/74  98  96 %  --   01/21/20 15:01:39  98 7 °F (37 1 °C)  79  20  149/72  98  95 %  --   01/21/20 0900  --  --  --  --  --  --  None (Room air)   01/21/20 08:28:08  98 2 °F (36 8 °C)  77  18  165/86  112  100 %  --     Pertinent Labs/Diagnostic Test Results:   Results from last 7 days   Lab Units 01/21/20  0922 01/20/20  1316 WBC Thousand/uL 8 02  --    WHITE BLOOD CELL COUNT  Thousand/uL  --  7 7   HEMOGLOBIN g/dL 10 9*  --    HEMOGLOBIN  g/dL  --  11 4*   HEMATOCRIT % 32 5*  --    HEMATOCRIT  %  --  34 7*   PLATELETS Thousands/uL 280  --    PLATELETS  Thousand/uL  --  309   NEUTROS ABS   cells/uL  --  3,080   BANDS PCT % 2  --      Results from last 7 days   Lab Units 01/20/20  1316   SODIUM mmol/L 140   POTASSIUM mmol/L 3 9   CHLORIDE mmol/L 107   CO2 mmol/L 24   BUN mg/dL 10   CREATININE mg/dL 0 89   SL AMB CALCIUM mg/dL 9 2   MAGNESIUM mg/dL 2 1     Results from last 7 days   Lab Units 01/20/20  1316   AST U/L 16   ALT U/L 19   ALK PHOS U/L 74   TOTAL PROTEIN g/dL 6 4   ALBUMIN g/dL 4 3   TOTAL BILIRUBIN mg/dL 0 6     Results from last 7 days   Lab Units 01/20/20  1316   GLUCOSE RANDOM mg/dL 95     Past Medical History:   Diagnosis Date    [address] [address]     GTD (gestational trophoblastic disease)     History of chemotherapy     Obesity     Shortness of breath     Wheezing      Present on Admission:   GTD (gestational trophoblastic disease)    Admitting Diagnosis: GTD (gestational trophoblastic disease) [O01 9]     Age/Sex: 52 y o  female     Admission Orders:  Scheduled Medications:    Medications:  DACTINomycin (COSMEGEN) IVPB 500 mcg Intravenous Once   dexamethasone (DECADRON) IVPB (ONC use only) 10 mg Intravenous Once   docusate sodium 100 mg Oral BID   escitalopram 10 mg Oral Daily   etoposide (TOPOSAR) chemo infusion 100 mg/m2 (Treatment Plan Recorded) Intravenous Once   furosemide 20 mg Oral Daily   leucovorin 15 mg Oral Q12H   lisinopril 10 mg Oral Daily   palonosetron 0 25 mg Intravenous Once   senna 1 tablet Oral HS     Continuous IV Infusions:    sodium chloride 125 mL/hr Intravenous Continuous     PRN Meds:    acetaminophen 650 mg Oral Q6H PRN    albuterol 2 puff Inhalation Q4H PRN    ibuprofen 600 mg Oral Q6H PRN x1 1/21   LORazepam 1 mg Oral Q8H PRN x1 1/22   ondansetron 4 mg Oral Q6H PRN    simethicone 80 mg Oral Q6H PRN    sodium chloride 20 mL/hr Intravenous Once PRN    sodium chloride 20 mL/hr Intravenous Once PRN      SCDs  Monitor for hypersensitivity reaction   OOB as renee   Regular diet       Network Utilization Review Department  [email or web address]  ATTENTION: Please call with any questions or concerns to [phone] and carefully listen to the prompts so that you are directed to the right person  All voicemails are confidential   Radha Tejada all requests for admission clinical reviews, approved or denied determinations and any other requests to dedicated fax number below belonging to the campus where the patient is receiving treatment   List of dedicated fax numbers for the Facilities:  [address] DENIALS (Administrative/Medical Necessity) [phone]   [address] (Maternity/NICU/Pediatrics) [phone]   Lourdes Ivey [phone]   Chung Hernandez [phone]   [facility] [phone]   Patrick Ridley [phone]   [address] [facility] [address] [facility] [phone]   [facility]  [phone]   [address] [facility] [address] [facility]  24[address] [facility] [address] [facility] [phone]

## 2020-01-22 NOTE — DISCHARGE INSTRUCTIONS
Chemo Induced Nausea and Vomiting   WHAT YOU NEED TO KNOW:   Nausea and vomiting are common side effects of chemotherapy (chemo)  They may be worse with certain kinds of chemo  Your risk for nausea and vomiting depends on the type of chemo and the dose (amount) of chemo you get  Nausea and vomiting can lead to dehydration, low blood pressure, fatigue, trouble focusing, loss of appetite, and weight loss  DISCHARGE INSTRUCTIONS:   Contact your healthcare provider if:   · The medicines you take for nausea and vomiting are not working  · You are vomiting and cannot keep any food or liquids down  · You cannot take your medicines  · You are losing weight  · You have questions or concerns about your condition or care  Different types of chemo-induced nausea and vomiting:   · Acute nausea and vomiting  happens within a few minutes to a few hours after you get chemo  It is usually worst during the first 4 to 6 hours after treatment and usually goes away within 24 hours  · Delayed nausea and vomiting  usually does not start until 24 hours or more after you get chemo  It can last for several days  · Anticipatory nausea and vomiting  is a learned response to chemo  It occurs because of past nausea and vomiting after chemo  Your brain expects that nausea and vomiting will happen again, even before the treatment is given  It can occur while you are preparing for the next treatment, during treatment, or after  Treatment for chemo-induced nausea and vomiting:   · Take medicines for nausea and vomiting exactly as directed,  even if you do not have symptoms  You may need to continue to take these medicines as long as chemo is likely to cause nausea and vomiting  For example, you may need to take these medicines for several days after your last dose of chemo  · You may need to try different medicines or take more than one kind  There may be some medicines that work better for you than others   You may also need more than one kind of medicine to prevent or control your nausea and vomiting  · Alternative treatments, such as guided imagery or progressive muscle relaxation, may also help  Ask for more information about these and other alternative treatments  Manage chemo-induced nausea and vomiting:   · Avoid eating foods that can make nausea and vomiting worse  These include high-fat, fried, high-fiber, salty, sweet, and spicy foods  · Avoid strong food odors  You may need to ask others to cook for you so that you can avoid the odor of food as it is cooking  · Eat small, frequent meals throughout the day instead of large meals  You may have less nausea and vomiting with small meals  · Eat bland foods  [place] foods may be easier for you to tolerate  Examples include clear broths, baked chicken, potatoes, rice, crackers, pretzels, and dry toast  Other bland foods include applesauce, bananas, sherbet, and yogurt  · Find the best time for you to eat or drink  on the days you have chemo treatment  You may find it helpful to have a light meal or snack before chemo  Wait at least 1 hour after chemo to eat or drink  Follow up with your healthcare provider as directed:  Write down your questions so you remember to ask them during your visits  © 2017 [address] [employer] Information is for End User's use only and may not be sold, redistributed or otherwise used for commercial purposes  All illustrations and images included in CareNotes® are the copyrighted property of A D A M [employer]  or Javier Florentino  The above information is an  only  It is not intended as medical advice for individual conditions or treatments  Talk to your doctor, nurse or pharmacist before following any medical regimen to see if it is safe and effective for you

## 2020-01-22 NOTE — PLAN OF CARE
Problem: PAIN - ADULT  Goal: Verbalizes/displays adequate comfort level or baseline comfort level  Description  Interventions:  - Encourage patient to monitor pain and request assistance  - Assess pain using appropriate pain scale  - Administer analgesics based on type and severity of pain and evaluate response  - Implement non-pharmacological measures as appropriate and evaluate response  - Consider cultural and social influences on pain and pain management  - Notify physician/advanced practitioner if interventions unsuccessful or patient reports new pain  Outcome: Progressing     Problem: INFECTION - ADULT  Goal: Absence or prevention of progression during hospitalization  Description  INTERVENTIONS:  - Assess and monitor for signs and symptoms of infection  - Monitor lab/diagnostic results  - Monitor all insertion sites, i e  indwelling lines, tubes, and drains  - Monitor endotracheal if appropriate and nasal secretions for changes in amount and color  - [place] appropriate cooling/warming therapies per order  - Administer medications as ordered  - Instruct and encourage patient and family to use good hand hygiene technique  - Identify and instruct in appropriate isolation precautions for identified infection/condition  Outcome: Progressing  Goal: Absence of fever/infection during neutropenic period  Description  INTERVENTIONS:  - Monitor WBC    Outcome: Progressing     Problem: SAFETY ADULT  Goal: Patient will remain free of falls  Description  INTERVENTIONS:  - Assess patient frequently for physical needs  -  Identify cognitive and physical deficits and behaviors that affect risk of falls    -  [place] fall precautions as indicated by assessment   - Educate patient/family on patient safety including physical limitations  - Instruct patient to call for assistance with activity based on assessment  - Modify environment to reduce risk of injury  - Consider OT/PT consult to assist with strengthening/mobility  Outcome: Progressing  Goal: Maintain or return to baseline ADL function  Description  INTERVENTIONS:  -  Assess patient's ability to carry out ADLs; assess patient's baseline for ADL function and identify physical deficits which impact ability to perform ADLs (bathing, care of mouth/teeth, toileting, grooming, dressing, etc )  - Assess/evaluate cause of self-care deficits   - Assess range of motion  - Assess patient's mobility; develop plan if impaired  - Assess patient's need for assistive devices and provide as appropriate  - Encourage maximum independence but intervene and supervise when necessary  - Involve family in performance of ADLs  - Assess for home care needs following discharge   - Consider OT consult to assist with ADL evaluation and planning for discharge  - Provide patient education as appropriate  Outcome: Progressing  Goal: Maintain or return mobility status to optimal level  Description  INTERVENTIONS:  - Assess patient's baseline mobility status (ambulation, transfers, stairs, etc )    - Identify cognitive and physical deficits and behaviors that affect mobility  - Identify mobility aids required to assist with transfers and/or ambulation (gait belt, sit-to-stand, lift, walker, cane, etc )  - [place] fall precautions as indicated by assessment  - Record patient progress and toleration of activity level on Mobility SBAR; progress patient to next Phase/Stage  - Instruct patient to call for assistance with activity based on assessment  - Consider rehabilitation consult to assist with strengthening/weightbearing, etc   Outcome: Progressing     Problem: DISCHARGE PLANNING  Goal: Discharge to home or other facility with appropriate resources  Description  INTERVENTIONS:  - Identify barriers to discharge w/patient and caregiver  - Arrange for needed discharge resources and transportation as appropriate  - Identify discharge learning needs (meds, wound care, etc )  - Arrange for interpretive services to assist at discharge as needed  - Refer to Case Management Department for coordinating discharge planning if the patient needs post-hospital services based on physician/advanced practitioner order or complex needs related to functional status, cognitive ability, or social support system  Outcome: Progressing     Problem: Knowledge Deficit  Goal: Patient/family/caregiver demonstrates understanding of disease process, treatment plan, medications, and discharge instructions  Description  Complete learning assessment and assess knowledge base    Interventions:  - Provide teaching at level of understanding  - Provide teaching via preferred learning methods  Outcome: Progressing

## 2020-01-22 NOTE — DISCHARGE SUMMARY
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  She received her 3rd cycle of etoposide, methotrexate, actinomycin, with plan for outpatient cyclophosphamide and vincristine planned for later this week  Her hospitalization was uncomplicated  She will follow-up in the outpatient setting for going treatment of gestational trophoblastic neoplasia  Lab Results:   Lab Results   Component Value Date    WBC 8 02 01/21/2020    HGB 10 9 (L) 01/21/2020    HCT 32 5 (L) 01/21/2020    MCV 87 01/21/2020     01/21/2020     Lab Results   Component Value Date    CALCIUM 9 2 01/20/2020    K 3 9 01/20/2020    CO2 24 01/20/2020     01/20/2020    BUN 10 01/20/2020    CREATININE 0 89 01/20/2020     No results found for: POCGLU  Lab Results   Component Value Date    PTT 29 12/25/2019     Lab Results   Component Value Date    INR 0 97 12/25/2019    INR 0 99 12/20/2019    PROTIME 12 6 12/25/2019    PROTIME 12 7 37/16/9172       Complications: none apparent    Condition at discharge: stable     Discharge instructions/Information to patient and family:   See After Visit Summary for information provided to patient and family  Provisions for Follow-Up Care:  See After Visit Summary for information related to follow-up care and any pertinent home health orders  Disposition: See After Visit Summary for discharge disposition information  Planned Readmission: No    Discharge Medications: For a complete list of the patient's medications, please refer to her med rec      Garrett Kay DO  OB/GYN, PGY3  1/22/2020, 7:39 AM

## 2020-01-22 NOTE — PROGRESS NOTES
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 undergoing cycle 3 of EMA-CO  Tolerating inpatient chemotherapy    PLAN:    1  GTN high risk disease:  Planned to and inpatient chemotherapy today, for outpatient cyclophosphamide and vincristine    2  Hypertension: home lisinopril, lasix    3  Anxiety: continue home lexapro, ativan PRN    4  Bowel regimen: continue home regimen of colace, senna, simethicone    Dispo: discharge later today after completion of chemotherapy     PPx: SCDs  FEN: regular diet, replete lytes prn,NS  Pain mgmnt: motrin, tylenol  Invasive lines: Port  Code status: Full  ____________________    SUBJECTIVE  History of Present Illness:  Overnight:  Reports difficult sleep, but states that she has a hard time stopping the hospital when she is getting treatment and getting fluids  Otherwise doing well  Pain: no  Tolerating Oral Intake: yes   Voiding: yes  Flatus: yes  Bowel Movement: yes  Ambulating: yes  Vaginal Bleeding: no  Pelvic Pain: no  Chest Pain: no  Shortness of Breath: no  Leg Pain/Discomfort: no    OBJECTIVE  Vitals  Temp:  [98 2 °F (36 8 °C)-98 7 °F (37 1 °C)] 98 6 °F (37 °C)  HR:  [77-85] 85  Resp:  [18-20] 20  BP: (147-165)/(72-86) 147/74       Intake/Output Summary (Last 24 hours) at 1/22/2020 0648  Last data filed at 1/22/2020 3464  Gross per 24 hour   Intake 3664 7 ml   Output 2150 ml   Net 1514 7 ml       Physical Exam:   Physical Exam   Constitutional: She is oriented to person, place, and time  She appears well-developed and well-nourished  No distress  Cardiovascular: Normal rate, regular rhythm, normal heart sounds and intact distal pulses  Pulmonary/Chest: Effort normal and breath sounds normal  No stridor  No respiratory distress  Abdominal: Soft  Bowel sounds are normal  She exhibits no distension  There is no tenderness     Neurological: She is alert and oriented to person, place, and time  Skin: Skin is warm and dry  She is not diaphoretic  Psychiatric: She has a normal mood and affect   Her behavior is normal          Labs:   Recent Results (from the past 72 hour(s))   CBC and differential    Collection Time: 01/20/20  1:16 PM   Result Value Ref Range    White Blood Cell Count 7 7 3 8 - 10 8 Thousand/uL    Red Blood Cell Count 3 98 3 80 - 5 10 Million/uL    Hemoglobin 11 4 (L) 11 7 - 15 5 g/dL    HCT 34 7 (L) 35 0 - 45 0 %    MCV 87 2 80 0 - 100 0 fL    MCH 28 6 27 0 - 33 0 pg    MCHC 32 9 32 0 - 36 0 g/dL    RDW 14 9 11 0 - 15 0 %    Platelet Count 204 912 - 400 Thousand/uL    SL AMB MPV 9 5 7 5 - 12 5 fL    Comment(s)       Toxic granulation present Anisocytosis 1 + Basophilic stippling 1 +   Comprehensive metabolic panel    Collection Time: 01/20/20  1:16 PM   Result Value Ref Range    Glucose, Random 95 65 - 139 mg/dL    BUN 10 7 - 25 mg/dL    Creatinine 0 89 0 50 - 1 10 mg/dL    eGFR Non  76 > OR = 60 mL/min/1 73m2    eGFR  88 > OR = 60 mL/min/1 73m2    SL AMB BUN/CREATININE RATIO NOT APPLICABLE 6 - 22 (calc)    Sodium 140 135 - 146 mmol/L    Potassium 3 9 3 5 - 5 3 mmol/L    Chloride 107 98 - 110 mmol/L    CO2 24 20 - 32 mmol/L    SL AMB CALCIUM 9 2 8 6 - 10 2 mg/dL    Protein, Total 6 4 6 1 - 8 1 g/dL    Albumin 4 3 3 6 - 5 1 g/dL    Globulin 2 1 1 9 - 3 7 g/dL (calc)    Albumin/Globulin Ratio 2 0 1 0 - 2 5 (calc)    TOTAL BILIRUBIN 0 6 0 2 - 1 2 mg/dL    Alkaline Phosphatase 74 33 - 115 U/L    AST 16 10 - 35 U/L    ALT 19 6 - 29 U/L   Magnesium    Collection Time: 01/20/20  1:16 PM   Result Value Ref Range    Magnesium, Serum 2 1 1 5 - 2 5 mg/dL   hCG, quantitative    Collection Time: 01/20/20  1:16 PM   Result Value Ref Range    HCG, Quantitative 823 mIU/mL   Differential    Collection Time: 01/20/20  1:16 PM   Result Value Ref Range    Neutrophils (Absolute) 3,080 1,500 - 7,800 cells/uL    Absolute Bands 924 (H) 0 - 750 cells/uL    SL AMB ABSOLUTE MYELOCYTES 539 (H) 0 cells/uL    SL AMB LYMPHOCYTES 1,771 850 - 3,900 cells/uL    Monocytes (Absolute) 1,078 (H) 200 - 950 cells/uL    Eosinophils (Absolute) 308 15 - 500 cells/uL    Basophils ABS 0 0 - 200 cells/uL    Absolute NRBC's 77 (H) 0 cells/uL    Neutrophils 40 0 %    Bands PCT 12 0 %    SL AMB MYELOCYTES 7 0 (H) %    Lymphocytes 23 0 %    Monocytes 14 0 %    Eosinophils 4 0 %    Basophils PCT 0 %    NRBC's 1 (H) 0 /100 WBC    Note:     CBC and differential    Collection Time: 01/21/20  9:22 AM   Result Value Ref Range    WBC 8 02 4 31 - 10 16 Thousand/uL    RBC 3 72 (L) 3 81 - 5 12 Million/uL    Hemoglobin 10 9 (L) 11 5 - 15 4 g/dL    Hematocrit 32 5 (L) 34 8 - 46 1 %    MCV 87 82 - 98 fL    MCH 29 3 26 8 - 34 3 pg    MCHC 33 5 31 4 - 37 4 g/dL    RDW 15 0 11 6 - 15 1 %    MPV 9 4 8 9 - 12 7 fL    Platelets 403 964 - 234 Thousands/uL    nRBC 1 /100 WBCs   Manual Differential(PHLEBS Do Not Order)    Collection Time: 01/21/20  9:22 AM   Result Value Ref Range    Segmented % 67 43 - 75 %    Bands % 2 0 - 8 %    Lymphocytes % 7 (L) 14 - 44 %    Monocytes % 11 4 - 12 %    Eosinophils, % 1 0 - 6 %    Basophils % 2 (H) 0 - 1 %    Metamyelocytes% 4 (H) 0 - 1 %    Atypical Lymphocytes % 6 (H) <=0 %    Absolute Neutrophils 5 53 1 85 - 7 62 Thousand/uL    Lymphocytes Absolute 0 56 (L) 0 60 - 4 47 Thousand/uL    Monocytes Absolute 0 88 0 00 - 1 22 Thousand/uL    Eosinophils Absolute 0 08 0 00 - 0 40 Thousand/uL    Basophils Absolute 0 16 (H) 0 00 - 0 10 Thousand/uL    Total Counted      RBC Morphology Present     Polychromasia Present     Platelet Estimate Adequate Adequate       Meds:  Scheduled Meds:  Current Facility-Administered Medications:  acetaminophen 650 mg Oral Q6H PRN [place] F Zabo, DO    albuterol 2 puff Inhalation Q4H PRN [place] F Zabo, DO    docusate sodium 100 mg Oral BID [place] F Zabo, DO    escitalopram 10 mg Oral Daily Joaquin Joy DO    furosemide 20 mg Oral Daily Saira Cardenas DO    ibuprofen 600 mg Oral Q6H PRN [place] F Zabo, DO    lisinopril 10 mg Oral Daily [place] F Zabo, DO    LORazepam 1 mg Oral Q8H PRN [place] F Zabo, DO    ondansetron 4 mg Oral Q6H PRN [place] F Zabo, DO    senna 1 tablet Oral HS [place] F Zabo, DO    simethicone 80 mg Oral Q6H PRN [place] F Zabo, DO    sodium chloride 125 mL/hr Intravenous Continuous Saira Cardenas DO Last Rate: 125 mL/hr (01/22/20 0205)   sodium chloride 20 mL/hr Intravenous Once PRN [place] F Zabo, DO      Continuous Infusions:  sodium chloride 125 mL/hr Last Rate: 125 mL/hr (01/22/20 0205)     PRN Meds:   acetaminophen    albuterol    ibuprofen    LORazepam    ondansetron    simethicone    sodium chloride    Imaging Studies: I have personally reviewed pertinent reports  EKG, Pathology, Microbiology, and Other Studies: I have personally reviewed pertinent reports        __________________    Signature / Title: Saira Cardenas DO, Ob/Gyn  Date: 1/22/2020  Time: 6:48 AM

## 2020-01-23 NOTE — SOCIAL WORK
MSW visited with pt while she was receiving her in pt treatment  Pt presented as pleasant, positive and hopeful  She explained that Dr Manisha Christianson had been in and had given her a positive report on her numbers and how her cancer was responding to the chemo  The pt shared how fearful she had been due to the feeling of having no control of her life  She now reports with her return to work and the chemo schedule feeling more routine, she has a sense of "normalcy and some control"  This appears to be very important to the pt as she describes he life as quite organized prior to this diagnosis  She did share some trials she has been having with her 16year old son but nothing that she doesn't feel she can't handle  The pt continues to present as strong and determined, which have been good coping mechanisms for her  Significant emotional support provided and will be ongoing

## 2020-01-23 NOTE — UTILIZATION REVIEW
Notification of Discharge  This is a Notification of Discharge from our facility [address]  Please be advised that this patient has been discharge from our facility  Below you will find the admission and discharge date and time including the patients disposition  PRESENTATION DATE: 1/21/2020  8:23 AM  OBS ADMISSION DATE:   IP ADMISSION DATE: 1/21/20 0823   DISCHARGE DATE: 1/22/2020  2:27 PM  DISPOSITION: Home/Self Care Home/Self Care   Admission Orders listed below:  Admission Orders (From admission, onward)     Ordered        01/21/20 1057  Inpatient Admission  Once                   Please contact the UR Department if additional information is required to close this patient's authorization/case  [address] Utilization Review Department  Main: [phone] x carefully listen to the prompts  All voicemails are confidential   [email or web address]  Send all requests for admission clinical reviews, approved or denied determinations and any other requests to dedicated fax number below belonging to the campus where the patient is receiving treatment   List of dedicated fax numbers:  [address] DENIALS (Administrative/Medical Necessity) [phone]   [address] (Maternity/NICU/Pediatrics) [phone]   Mayank Baker [phone]   Mary Lopez [phone]   Beatrice Hernandez [phone]   [place]ales Bush [address] [phone]   [facility]  [phone]   [address] [facility] [place]  [address] [phone]

## 2020-01-24 DIAGNOSIS — B37.0 ORAL THRUSH: Primary | ICD-10-CM

## 2020-01-27 DIAGNOSIS — B37.0 ORAL THRUSH: Primary | ICD-10-CM

## 2020-01-27 DIAGNOSIS — K12.30 MUCOSITIS: ICD-10-CM

## 2020-01-27 LAB — FUNGUS SPEC CULT: NORMAL

## 2020-01-27 RX ORDER — FLUCONAZOLE 200 MG/1
TABLET ORAL
Qty: 2 TABLET | Refills: 0 | Status: SHIPPED | OUTPATIENT
Start: 2020-01-27 | End: 2020-01-29

## 2020-01-28 ENCOUNTER — NUTRITION (OUTPATIENT)
Dept: NUTRITION | Facility: HOSPITAL | Age: 50
End: 2020-01-28

## 2020-01-28 ENCOUNTER — HOSPITAL ENCOUNTER (OUTPATIENT)
Dept: INFUSION CENTER | Facility: HOSPITAL | Age: 50
Discharge: HOME | End: 2020-01-28
Attending: OBSTETRICS & GYNECOLOGY
Payer: COMMERCIAL

## 2020-01-28 VITALS
HEIGHT: 64 IN | RESPIRATION RATE: 18 BRPM | BODY MASS INDEX: 50.02 KG/M2 | DIASTOLIC BLOOD PRESSURE: 78 MMHG | WEIGHT: 293 LBS | HEART RATE: 109 BPM | SYSTOLIC BLOOD PRESSURE: 121 MMHG | OXYGEN SATURATION: 100 % | TEMPERATURE: 98.5 F

## 2020-01-28 DIAGNOSIS — Z71.3 NUTRITIONAL COUNSELING: Primary | ICD-10-CM

## 2020-01-28 DIAGNOSIS — O01.9 GTD (GESTATIONAL TROPHOBLASTIC DISEASE): Primary | ICD-10-CM

## 2020-01-28 DIAGNOSIS — K12.30 MUCOSITIS: Primary | ICD-10-CM

## 2020-01-28 LAB
ALBUMIN SERPL-MCNC: 4.4 G/DL (ref 3.6–5.1)
ALBUMIN/GLOB SERPL: 2 (CALC) (ref 1–2.5)
ALP SERPL-CCNC: 65 U/L (ref 33–115)
ALT SERPL-CCNC: 13 U/L (ref 6–29)
AST SERPL-CCNC: 12 U/L (ref 10–35)
B-HCG SERPL-ACNC: 249 MIU/ML
BASOPHILS # BLD AUTO: 31 CELLS/UL (ref 0–200)
BASOPHILS NFR BLD AUTO: 1.3 %
BILIRUB SERPL-MCNC: 1.2 MG/DL (ref 0.2–1.2)
BUN SERPL-MCNC: 24 MG/DL (ref 7–25)
BUN/CREAT SERPL: 18 (CALC) (ref 6–22)
CALCIUM SERPL-MCNC: 9.5 MG/DL (ref 8.6–10.2)
CHLORIDE SERPL-SCNC: 104 MMOL/L (ref 98–110)
CO2 SERPL-SCNC: 27 MMOL/L (ref 20–32)
CREAT SERPL-MCNC: 1.32 MG/DL (ref 0.5–1.1)
EOSINOPHIL # BLD AUTO: 72 CELLS/UL (ref 15–500)
EOSINOPHIL NFR BLD AUTO: 3 %
ERYTHROCYTE [DISTWIDTH] IN BLOOD BY AUTOMATED COUNT: 15.1 % (ref 11–15)
GLOBULIN SER CALC-MCNC: 2.2 G/DL (CALC) (ref 1.9–3.7)
GLUCOSE SERPL-MCNC: 135 MG/DL (ref 65–99)
HCT VFR BLD AUTO: 34.2 % (ref 35–45)
HGB BLD-MCNC: 11.3 G/DL (ref 11.7–15.5)
LYMPHOCYTES # BLD AUTO: 518 CELLS/UL (ref 850–3900)
LYMPHOCYTES NFR BLD AUTO: 21.6 %
MAGNESIUM SERPL-MCNC: 2.2 MG/DL (ref 1.5–2.5)
MCH RBC QN AUTO: 28.9 PG (ref 27–33)
MCHC RBC AUTO-ENTMCNC: 33 G/DL (ref 32–36)
MCV RBC AUTO: 87.5 FL (ref 80–100)
MONOCYTES # BLD AUTO: 31 CELLS/UL (ref 200–950)
MONOCYTES NFR BLD AUTO: 1.3 %
NEUTROPHILS # BLD AUTO: 1747 CELLS/UL (ref 1500–7800)
NEUTROPHILS NFR BLD AUTO: 72.8 %
PLATELET # BLD AUTO: 153 THOUSAND/UL (ref 140–400)
PMV BLD REES-ECKER: 9.9 FL (ref 7.5–12.5)
POTASSIUM SERPL-SCNC: 4 MMOL/L (ref 3.5–5.3)
PROT SERPL-MCNC: 6.6 G/DL (ref 6.1–8.1)
RBC # BLD AUTO: 3.91 MILLION/UL (ref 3.8–5.1)
SERVICE CMNT-IMP: ABNORMAL
SL AMB EGFR AFRICAN AMERICAN: 55 ML/MIN/1.73M2
SL AMB EGFR NON AFRICAN AMERICAN: 47 ML/MIN/1.73M2
SODIUM SERPL-SCNC: 140 MMOL/L (ref 135–146)
WBC # BLD AUTO: 2.4 THOUSAND/UL (ref 3.8–10.8)

## 2020-01-28 PROCEDURE — 96367 TX/PROPH/DG ADDL SEQ IV INF: CPT

## 2020-01-28 PROCEDURE — 96417 CHEMO IV INFUS EACH ADDL SEQ: CPT

## 2020-01-28 PROCEDURE — 96361 HYDRATE IV INFUSION ADD-ON: CPT

## 2020-01-28 PROCEDURE — 96375 TX/PRO/DX INJ NEW DRUG ADDON: CPT

## 2020-01-28 PROCEDURE — 96413 CHEMO IV INFUSION 1 HR: CPT

## 2020-01-28 RX ORDER — METHYLPREDNISOLONE 4 MG/1
TABLET ORAL
Qty: 21 TABLET | Refills: 0 | Status: ON HOLD | OUTPATIENT
Start: 2020-01-28 | End: 2020-02-04

## 2020-01-28 RX ORDER — PALONOSETRON 0.05 MG/ML
0.25 INJECTION, SOLUTION INTRAVENOUS ONCE
Status: COMPLETED | OUTPATIENT
Start: 2020-01-28 | End: 2020-01-28

## 2020-01-28 RX ORDER — SODIUM CHLORIDE 9 MG/ML
20 INJECTION, SOLUTION INTRAVENOUS ONCE
Status: COMPLETED | OUTPATIENT
Start: 2020-01-28 | End: 2020-01-28

## 2020-01-28 RX ADMIN — VINCRISTINE SULFATE 2 MG: 1 INJECTION, SOLUTION INTRAVENOUS at 10:41

## 2020-01-28 RX ADMIN — SODIUM CHLORIDE 1000 ML: 0.9 INJECTION, SOLUTION INTRAVENOUS at 09:22

## 2020-01-28 RX ADMIN — SODIUM CHLORIDE 20 ML/HR: 0.9 INJECTION, SOLUTION INTRAVENOUS at 09:51

## 2020-01-28 RX ADMIN — PALONOSETRON HYDROCHLORIDE 0.25 MG: 0.25 INJECTION, SOLUTION INTRAVENOUS at 09:53

## 2020-01-28 RX ADMIN — SODIUM CHLORIDE 1000 ML: 0.9 INJECTION, SOLUTION INTRAVENOUS at 12:04

## 2020-01-28 RX ADMIN — CYCLOPHOSPHAMIDE 1500 MG: 500 INJECTION, POWDER, FOR SOLUTION INTRAVENOUS; ORAL at 11:23

## 2020-01-28 RX ADMIN — DEXAMETHASONE SODIUM PHOSPHATE 20 MG: 10 INJECTION, SOLUTION INTRAMUSCULAR; INTRAVENOUS at 09:57

## 2020-01-28 NOTE — PROGRESS NOTES
Pt arrived amb for chemo  C/o terrible mouth sores, and her lips completely peeled off  Has Magiv Mouthwash, but only helps for 1/2 hr, then pain is back  Feels her cheeks and face are on fire  States office is aware  Also c/o folliculitis on her scalp  States she is taking anti fungal meds  Also taking Acidopholus  Only able to drink liquids and eat soft foods, like applesause and yogurt  Drinks protein shakes  Lost 7# since last week per pt  Made comfortable  Port accessed RCW, NSS to kvo, NSS hydration infusing  Trying to nap    W[facility]

## 2020-01-28 NOTE — PROGRESS NOTES
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, uterine  Mass, lung metastases  3rd treatment inpatient tx of etoposide, methotrexate, actinomycin 1/20/2019  Receives Cyclophoshamide and Vincristine outpatient on opposite week        GTD (gestational trophoblastic disease)    12/20/2019 Initial Diagnosis     GTD (gestational trophoblastic disease)      12/21/2019 -  Chemotherapy     leucovorin (WELLCOVORIN) tablet 15 mg, 15 mg, Oral, Every 12 hours, 3 of 6 cycles  Administration: 15 mg (12/22/2019), 15 mg (12/23/2019), 15 mg (1/8/2020), 15 mg (1/22/2020)  palonosetron (ALOXI) injection 0 25 mg, 0 25 mg, Intravenous, Once, 3 of 6 cycles  Administration: 0 25 mg (12/21/2019), 0 25 mg (12/22/2019), 0 25 mg (12/31/2019), 0 25 mg (1/7/2020), 0 25 mg (1/14/2020), 0 25 mg (1/8/2020), 0 25 mg (1/21/2020), 0 25 mg (1/22/2020)  methotrexate injection 242 5 mg, 100 mg/m2 = 242 5 mg, Intravenous, Once, 3 of 6 cycles  Administration: 242 5 mg (12/21/2019), 242 5 mg (1/7/2020), 237 5 mg (1/21/2020)  DACTINomycin (COSMEGEN) 500 mcg in sodium chloride 0 9 % 50 mL IVPB, 500 mcg, Intravenous, Once, 3 of 6 cycles  Administration: 500 mcg (12/21/2019), 500 mcg (12/22/2019), 500 mcg (1/7/2020), 500 mcg (1/8/2020), 500 mcg (1/21/2020), 500 mcg (1/22/2020)  vinCRIStine (ONCOVIN) 2 mg in sodium chloride 0 9 % 50 mL chemo infusion, 2 mg (original dose 1 mg/m2), Intravenous, Once, 3 of 6 cycles  Dose modification: 2 mg (original dose 1 mg/m2, Cycle 1, Reason: Max Dose Reached)  Administration: 2 mg (12/31/2019), 2 mg (1/14/2020)  cyclophosphamide (CYTOXAN) 1,500 mg in sodium chloride 0 9 % 250 mL IVPB, 1,458 mg, Intravenous, Once, 3 of 6 cycles  Administration: 1,500 mg (2019), 1,500 mg (2020)  methotrexate (PF) 485 mg in sodium chloride 0 9 % 1,000 mL continuous infusion, 200 mg/m2 = 485 mg, Intravenous, Once, 3 of 6 cycles  Administration: 485 mg (2019), 485 mg (2020), 475 mg (2020)  etoposide (TOPOSAR) 243 mg in sodium chloride 0 9 % 600 mL chemo infusion, 100 mg/m2 = 243 mg, Intravenous, Once, 3 of 6 cycles  Administration: 243 mg (2019), 243 mg (2019), 243 mg (2020), 243 mg (2020), 237 mg (2020), 237 mg (2020)       Past Medical & Surgical Hx: [id number] thryoidectomy   Past Medical History:   Diagnosis Date    Cancer ([facility] [address] )     GTD (gestational trophoblastic disease)     History of chemotherapy     Obesity     Shortness of breath     Wheezing      Past Surgical History:   Procedure Laterality Date     SECTION      DILATION AND CURETTAGE OF UTERUS      X2    ENDOMETRIAL ABLATION      IR PORT PLACEMENT  2019    THYROIDECTOMY, PARTIAL         Review of Medications:   Vitamins, Supplements and Herbals: no    Current Outpatient Medications:     acetaminophen (TYLENOL) 325 mg tablet, Take 2 tablets (650 mg total) by mouth every 6 (six) hours as needed for mild pain, Disp: 30 tablet, Rfl: 0    al mag oxide-diphenhydramine-lidocaine viscous (MAGIC MOUTHWASH) 1:1:1 suspension, Swish and spit 10 mL every 4 (four) hours as needed for mouth pain or discomfort, Disp: 1 Bottle, Rfl: 0    albuterol (PROVENTIL HFA,VENTOLIN HFA) 90 mcg/act inhaler, Inhale 2 puffs every 4 (four) hours as needed for wheezing, Disp: 1 Inhaler, Rfl: 1    benzonatate (TESSALON PERLES) 100 mg capsule, Take 1 capsule (100 mg total) by mouth 3 (three) times a day as needed for cough, Disp: 20 capsule, Rfl: 0    dextromethorphan-guaiFENesin (ROBITUSSIN DM)  mg/5 mL syrup, Take 10 mL by mouth every 3 (three) hours as needed for cough, Disp: 118 mL, Rfl: 0    docusate sodium (COLACE) 100 mg capsule, Take 1 capsule (100 mg total) by mouth 2 (two) times a day, Disp: 30 capsule, Rfl: 2    escitalopram (LEXAPRO) 10 mg tablet, Take 1 tablet (10 mg total) by mouth daily, Disp: 30 tablet, Rfl: 3    Filgrastim-sndz (ZARXIO) 480 MCG/0 8ML SOSY, Inject 0 8 mL (480 mcg total) as directed once for 1 dose, Disp: 0 8 mL, Rfl: 0    Filgrastim-sndz 480 MCG/0 8ML SOSY, Inject 0 8 mL (480 mcg total) as directed once for 1 dose, Disp: 1 Syringe, Rfl: 0    fluconazole (DIFLUCAN) 200 mg tablet, Take one tab PO now and one tab on day 3 , Disp: 2 tablet, Rfl: 0    furosemide (LASIX) 20 mg tablet, Take 1 tablet (20 mg total) by mouth daily, Disp: 30 tablet, Rfl: 1    ibuprofen (MOTRIN) 600 mg tablet, Take 1 tablet (600 mg total) by mouth every 6 (six) hours as needed for mild pain, Disp: 30 tablet, Rfl: 3    leucovorin (WELLCOVORIN) 15 MG tablet, Take 1 tablet (15 mg total) by mouth every 12 (twelve) hours for 3 doses, Disp: 16 tablet, Rfl: 4    lisinopril (ZESTRIL) 10 mg tablet, Take 1 tablet (10 mg total) by mouth daily, Disp: 30 tablet, Rfl: 1    LORazepam (ATIVAN) 1 mg tablet, Take 1 tablet (1 mg total) by mouth every 8 (eight) hours as needed for anxiety (and nausea) for up to 10 days, Disp: 30 tablet, Rfl: 0    methylPREDNISolone 4 MG tablet therapy pack, Use as directed on package, Disp: 21 tablet, Rfl: 0    nystatin (MYCOSTATIN) 500,000 units/5 mL suspension, Apply 5 mL (500,000 Units total) to the mouth or throat 4 (four) times a day, Disp: 60 mL, Rfl: 0    ondansetron (ZOFRAN) 4 mg tablet, Take 1 tablet (4 mg total) by mouth every 8 (eight) hours as needed for nausea or vomiting, Disp: 20 tablet, Rfl: 0    polyethylene glycol (MIRALAX) 17 g packet, Take 17 g by mouth daily, Disp: 14 each, Rfl: 0    senna (SENOKOT) 8 6 MG tablet, Take 1 tablet by mouth daily at bedtime, Disp: , Rfl:     simethicone (MYLICON) 80 mg chewable tablet, Chew 1 tablet (80 mg total) every 6 (six) hours as needed for flatulence, Disp: 30 tablet, Rfl: 0  No current facility-administered medications for this visit  Facility-Administered Medications Ordered in Other Visits:     sodium chloride 0 9 % bolus 1,000 mL, 1,000 mL, Intravenous, Once, Jesse Ferrara MD, Last Rate: 1,000 mL/hr at 01/28/20 1204, 1,000 mL at 01/28/20 1204    Most Recent Lab Results:   Most Recent Lab Results:   Lab Results   Component Value Date    WBC 2 4 (L) 01/27/2020    ALT 13 01/27/2020    AST 12 01/27/2020    ALB 4 4 01/27/2020    SODIUM 140 01/27/2020    SODIUM 140 01/20/2020    K 4 0 01/27/2020    K 3 9 01/20/2020     01/27/2020    BUN 24 01/27/2020    BUN 10 01/20/2020    CREATININE 1 32 (H) 01/27/2020    CREATININE 0 89 01/20/2020    EGFR 90 01/08/2020    PHOS 3 3 01/07/2020    PHOS 3 8 12/20/2019    GLUF 97 12/26/2019    GLUC 135 (H) 01/27/2020    CALCIUM 9 5 01/27/2020    MG 2 2 01/27/2020       Anthropometric Measurements:   Height: 5'4"  Ht Readings from Last 1 Encounters:   01/28/20 5' 4 41" (1 636 m)     -Weight History:   Usual Weight: 316-320#  Ideal Body Weight: 120#  Wt Readings from Last 20 Encounters:   01/28/20 134 kg (296 lb 4 8 oz)   01/21/20 (!) 138 kg (303 lb 12 7 oz)   01/14/20 (!) 140 kg (308 lb 10 3 oz)   01/07/20 (!) 140 kg (308 lb 13 8 oz)   12/31/19 (!) 144 kg (317 lb 7 4 oz)   12/30/19 (!) 145 kg (319 lb 6 oz)   12/25/19 (!) 151 kg (332 lb)   12/25/19 (!) 144 kg (318 lb)   12/21/19 (!) 144 kg (317 lb 14 5 oz)   12/20/19 (!) 144 kg (318 lb)       Weight Changes: loss of 22# (7%) 5 weeks (significant) Estimated body mass index is 50 22 kg/m² as calculated from the following:    Height as of an earlier encounter on 1/28/20: 5' 4 41" (1 636 m)  Weight as of an earlier encounter on 1/28/20: 134 kg (296 lb 4 8 oz)      Nutrition-Focused Physical Findings: none observed    Food/Nutrition-Related History & Client/Social History:    Current Nutrition Impact Symptoms:  [] Nausea  [] Reduced Appetite  [] Acid Reflux    [] Vomiting  [x] Unintended Wt Loss  [] Malabsorption    [] Diarrhea  [] Unintended Wt Gain  [] Dumping Syndrome    [] Constipation  [] Thick Mucous/Secretions  [] Abdominal Pain    [] Dysgeusia (Altered Taste)  [] Xerostomia (Dry Mouth)  [] Gas    [] Dysosmia (Altered Smell)  [x] Thrush -reports had after last admit to hospital and now cleared up [] Difficulty Chewing    [x] Oral Mucositis (Sore Mouth)  [x] Fatigue- reports not sleeping well due to mouth pain    [] Other:    [] Odynophagia   [] Esophagitis  [] Other:    [x] Dysphagia- reports hard to swallow because mouth is sore and inflammed [] Early Satiety  [] No Problems Eating      Food Allergies: no  Food Intolerances: yes: only tolerating liquids currently    Current Diet: Pureed and Liquids  Current Nutrition Intake: Less than usual   Appetite: Fair , Poor  Nutrition Route: PO  Meal planning/preparation mainly done by: Self  Oral Care: attempting to brush but has pain  Activity level: works 2-3 days per week depending on chemo schedule  Social Hx: lives with 16year old son, doesn't go out a lot currently  Diet Recall:   Breakfast: fruit smoothie  Lunch: mashed potatoes  Dinner: applesauce, yogurt  Snacks: none    Beverages: water (48 oz), green tea (16 oz),  Supplements:    None      Estimated Nutrition Needs: (based on 74 5kg/ 164# adjusted wt)  Energy Needs:5219-4718  kcal/day (35-40 kcal/kg)  Protein Needs:  grams/day (1 2-1 5 g/kg)  Fluid Needs: 4497-2963 mL/day (1 mL/kcal)    Discussion/Summary: Alexsander Wilson reports eating poorly due to mouth sores and pain  Tolerating liquids mostly and has tried some pureeds (mashed potatoes, yogurt)  Buying fruit smoothies at a local smoothie place with protein powder  Reports just overall feeling tired  Trying to avoid sugar to prevent thrush  To get steroid today after treatment which may help the pain  Reviewed use of pureed baby food, using a straw, smaller spoon to eat, use of gravies to moisten foods   Reviewed using protein powder in fruit smoothies or milkshakes  Encouraged attempt with eggs, yogurts and custards as tolerated  Reviewed supplements that she can try Ensure Max, Orgain, Ensure Enlive or [place] [employer] to help with caloric intake while taking mostly liquids  Discussed/Reviewed:   · the importance of weight maintenance during CA tx  · indications & use of oral nutrition supplements  · how to modify foods for anticipated nutrition impact symptoms pt may experience during CA tx  · high protein foods to include at all meals and snacks  · ways to increase overall calorie intake  · encouraged eating every 2-3 hours (5-6 small meals/day)  · adequate hydation & tips to increase overall fluid intake  Individualized nutrition recommendations and interventions listed below  All questions and concerns addressed during todays visit  Dorene Workman has RD contact information  Nutrition Diagnosis:    Inadequate Energy Intake related to physiological causes, disease state and treatment related issues as evidenced by food recall, wt loss and discussion with pt and/or family   Increased Nutrient Needs (kcal & pro) related to increased demand for nutrients and disease state as evidenced by cancer dx and pt undergoing tx for cancer   Patient has clinical indicators (or ASPEN criteria) consistent with moderate protein-calorie malnutrition in the context of Acute Illness or Injury as evidenced by >5% wt loss in 1 month and </=75% energy intake vs  Estimated needs for >/=1 month    Intervention & Recommendations:   Topics addressed: Nutrition education, Choosing high protein meals/snacks, Meal pattern: eating small/frequent meals (every 2-3 hours), Nutrition Symptom Management, Adequate Hydration, Medical Food Supplements and Weight Management    Barriers: None  Readiness to change: action  Comprehension: verbalizes understanding  Expected Compliance: good    Materials Provided: [facility] Eating Hints book  Monitoring & Evaluation: Dietitian to Monitor: Food and Nutrition Intake, Nutrtion Impact Symptoms, Body Weight and Biochemical Data     Goals:  · weight maintenance/stabilization  · adequate nutrition impact symptom management  · pt to meet >/=75% estimated nutrition needs daily    · Progress Towards Goals: Initiated    Nutrition Rx & Recommendations:  · Diet: High Calorie, High Protein (for high calorie foods see pages 52-53, and for high protein foods see pages 49-51 in your Eating Hints book)  · Nutrition Supplements: Ensure Enlive or Enure Max 2-3x daily  May use homemade shakes/smoothies as desired  If using a pre-made shake/bar, choose ones with >300 calories and >10 grams protein (ex  Ensure Enlive, Ensure Plus, Boost Plus, Boost Very High Calorie, etc )  · Small, frequent meals/snacks may be easier to tolerate than 3 large daily meals  Aim for 5-6 small meals per day (every 2-3 hours)  · Include protein at all meals/snacks  · Stay hydrated by sipping fluids of choice/tolerance throughout the day  · Liquid nutrition may be better tolerated than solids at times  · Refer to Eating Hints book for other meal/snack ideas and symptom management  · Avoid spicy, acidic, sharp/hard/crunchy foods & carbonated beverages as needed  · Follow proper oral care; Try baking soda/salt water rinse recipe (mix 3/4 tsp salt + 1 tsp baking soda + 1 qt water; rinse with pain water after using) in Eating Hints book (pg 18)  Brush your teeth before/after meals & before bed  · For sore mouth/throat: choose foods that are easy to chew/swallow; cook foods until they are soft/tender; moisten & soften foods (gravy/sauce/broth/yogurt); cut food into small pieces; drink with a straw (as tolerated); eat with a very small spoon; eat cold or room temperature food; suck on ice chips; Avoid citrus, spicy foods, tomatoes/ketchup, salty foods, raw vegetables, sharp/crunchy/hard foods & alcohol (see pages 23-28 in your Eating Hints book)      Follow Up Plan: 2/25/2020  Recommend Referral to Other Providers: Social Work

## 2020-01-28 NOTE — PROGRESS NOTES
Chemo infused w/o adverse reaction, pt offered no complaints  Post hydration fluids also tolerated well  Port deaccessed per protocol, pt then d/c'd to home with steady gait

## 2020-01-28 NOTE — PLAN OF CARE
Problem: Potential for Falls  Goal: Patient will remain free of falls  Description  INTERVENTIONS:  - Assess patient frequently for physical needs  -  Identify cognitive and physical deficits and behaviors that affect risk of falls  -  [place] fall precautions as indicated by assessment   - Educate patient/family on patient safety including physical limitations  - Instruct patient to call for assistance with activity based on assessment  - Modify environment to reduce risk of injury  - Consider OT/PT consult to assist with strengthening/mobility  Outcome: Progressing     Problem: Knowledge Deficit  Goal: Patient/family/caregiver demonstrates understanding of disease process, treatment plan, medications, and discharge instructions  Description  Complete learning assessment and assess knowledge base    Interventions:  - Provide teaching at level of understanding  - Provide teaching via preferred learning methods  Outcome: Progressing

## 2020-01-31 ENCOUNTER — HOSPITAL ENCOUNTER (OUTPATIENT)
Dept: INFUSION CENTER | Facility: HOSPITAL | Age: 50
Discharge: HOME | End: 2020-01-31
Payer: COMMERCIAL

## 2020-01-31 ENCOUNTER — OFFICE VISIT (OUTPATIENT)
Dept: GYNECOLOGIC ONCOLOGY | Facility: CLINIC | Age: 50
End: 2020-01-31
Payer: COMMERCIAL

## 2020-01-31 VITALS
HEART RATE: 88 BPM | SYSTOLIC BLOOD PRESSURE: 118 MMHG | TEMPERATURE: 98.6 F | HEIGHT: 64 IN | DIASTOLIC BLOOD PRESSURE: 72 MMHG | BODY MASS INDEX: 50.02 KG/M2 | WEIGHT: 293 LBS

## 2020-01-31 VITALS
TEMPERATURE: 97.6 F | DIASTOLIC BLOOD PRESSURE: 57 MMHG | RESPIRATION RATE: 18 BRPM | SYSTOLIC BLOOD PRESSURE: 117 MMHG | HEART RATE: 68 BPM

## 2020-01-31 DIAGNOSIS — O01.9 GTD (GESTATIONAL TROPHOBLASTIC DISEASE): Primary | ICD-10-CM

## 2020-01-31 DIAGNOSIS — K12.30 STOMATITIS AND MUCOSITIS: ICD-10-CM

## 2020-01-31 DIAGNOSIS — D70.1 CHEMOTHERAPY INDUCED NEUTROPENIA (HCC): ICD-10-CM

## 2020-01-31 DIAGNOSIS — T45.1X5A CHEMOTHERAPY INDUCED NEUTROPENIA (HCC): ICD-10-CM

## 2020-01-31 DIAGNOSIS — K12.1 STOMATITIS AND MUCOSITIS: ICD-10-CM

## 2020-01-31 LAB
ABO GROUP BLD: NORMAL
ATRIAL RATE: 69 BPM
BLD GP AB SCN SERPL QL: NEGATIVE
EST. AVERAGE GLUCOSE BLD GHB EST-MCNC: 128 MG/DL
HBA1C MFR BLD: 6.1 % (ref 4.2–6.3)
INR PPP: 1.09 (ref 0.84–1.19)
P AXIS: 42 DEGREES
PR INTERVAL: 140 MS
PROTHROMBIN TIME: 13.7 SECONDS (ref 11.6–14.5)
QRS AXIS: -20 DEGREES
QRSD INTERVAL: 74 MS
QT INTERVAL: 400 MS
QTC INTERVAL: 428 MS
RH BLD: POSITIVE
SPECIMEN EXPIRATION DATE: NORMAL
T WAVE AXIS: 6 DEGREES
VENTRICULAR RATE: 69 BPM

## 2020-01-31 PROCEDURE — 96360 HYDRATION IV INFUSION INIT: CPT

## 2020-01-31 PROCEDURE — 86900 BLOOD TYPING SEROLOGIC ABO: CPT

## 2020-01-31 PROCEDURE — 93010 ELECTROCARDIOGRAM REPORT: CPT | Performed by: INTERNAL MEDICINE

## 2020-01-31 PROCEDURE — 86901 BLOOD TYPING SEROLOGIC RH(D): CPT

## 2020-01-31 PROCEDURE — 83036 HEMOGLOBIN GLYCOSYLATED A1C: CPT

## 2020-01-31 PROCEDURE — 93005 ELECTROCARDIOGRAM TRACING: CPT

## 2020-01-31 PROCEDURE — 85610 PROTHROMBIN TIME: CPT

## 2020-01-31 PROCEDURE — 86850 RBC ANTIBODY SCREEN: CPT

## 2020-01-31 PROCEDURE — 99215 OFFICE O/P EST HI 40 MIN: CPT | Performed by: OBSTETRICS & GYNECOLOGY

## 2020-01-31 RX ORDER — SODIUM CHLORIDE, SODIUM LACTATE, POTASSIUM CHLORIDE, CALCIUM CHLORIDE 600; 310; 30; 20 MG/100ML; MG/100ML; MG/100ML; MG/100ML
125 INJECTION, SOLUTION INTRAVENOUS CONTINUOUS
Status: CANCELLED | OUTPATIENT
Start: 2020-01-31

## 2020-01-31 RX ORDER — CLINDAMYCIN PHOSPHATE 900 MG/50ML
900 INJECTION INTRAVENOUS ONCE
Status: CANCELLED | OUTPATIENT
Start: 2020-01-31 | End: 2020-01-31

## 2020-01-31 RX ORDER — GENTAMICIN SULFATE 80 MG/50ML
1.5 INJECTION, SOLUTION INTRAVENOUS ONCE
Status: CANCELLED | OUTPATIENT
Start: 2020-01-31 | End: 2020-01-31

## 2020-01-31 RX ORDER — ACETAMINOPHEN 325 MG/1
975 TABLET ORAL ONCE
Status: CANCELLED | OUTPATIENT
Start: 2020-01-31 | End: 2020-01-31

## 2020-01-31 RX ORDER — HEPARIN SODIUM 5000 [USP'U]/ML
5000 INJECTION, SOLUTION INTRAVENOUS; SUBCUTANEOUS
Status: CANCELLED | OUTPATIENT
Start: 2020-02-01 | End: 2020-02-02

## 2020-01-31 RX ADMIN — SODIUM CHLORIDE 1000 ML: 0.9 INJECTION, SOLUTION INTRAVENOUS at 14:15

## 2020-01-31 NOTE — ASSESSMENT & PLAN NOTE
49-year-old with 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 undergoing curative intent chemotherapy with EMA-CO  She has had is significant treatment response with beta HCG declining from over 400,000 to a value of 249  Based on imaging, the majority of her disease is intrauterine  She has a grade 2 stomatitis from methotrexate  She is morbidly obese with a BMI of 50 kilograms/meter squared  Her performance status is 0   1  I discussed performing hysterectomy with bilateral salpingo-oophorectomy in order to decrease the number of subsequent chemotherapy cycles to achieve cure  2  I discussed the risks and benefits of possible robotic assisted total laparoscopic hysterectomy, bilateral salpingo-oophorectomy, possible exploratory laparotomy and all other indicated procedures  She understands the risks and benefits of the operation agrees to proceed as outlined  Consent was obtained by me in the office    3  She understands that she will require 2 complete cycles of chemotherapy after her quantitative beta HCG is normal

## 2020-01-31 NOTE — ASSESSMENT & PLAN NOTE
Secondary to methotrexate  Given difficulty tolerating oral liquids and solids, she will go for IV hydration today  I encouraged oral liquids, use of Magic mouthwash

## 2020-01-31 NOTE — H&P (VIEW-ONLY)
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 undergoing curative intent chemotherapy with EMA-CO  She has had is significant treatment response with beta HCG declining from over 400,000 to a value of 249  Based on imaging, the majority of her disease is intrauterine  She has a grade 2 stomatitis from methotrexate  She is morbidly obese with a BMI of 50 kilograms/meter squared  Her performance status is 0   1  I discussed performing hysterectomy with bilateral salpingo-oophorectomy in order to decrease the number of subsequent chemotherapy cycles to achieve cure  2  I discussed the risks and benefits of possible robotic assisted total laparoscopic hysterectomy, bilateral salpingo-oophorectomy, possible exploratory laparotomy and all other indicated procedures  She understands the risks and benefits of the operation agrees to proceed as outlined  Consent was obtained by me in the office  3  She understands that she will require 2 complete cycles of chemotherapy after her quantitative beta HCG is normal            Relevant Orders    Case request operating room: HYSTERECTOMY LAPAROSCOPIC TOTAL ([address] [place] [address]) W/ ROBOTICS (Completed)    HEMOGLOBIN A1C W/ EAG ESTIMATION    Protime-INR    EKG 12 lead    Type and screen    Stomatitis and mucositis     Secondary to methotrexate  Given difficulty tolerating oral liquids and solids, she will go for IV hydration today  I encouraged oral liquids, use of Magic mouthwash                     CHIEF COMPLAINT:  Treatment discussion        Previous therapy:     GTD (gestational trophoblastic disease)    12/20/2019 Initial Diagnosis     GTD (gestational trophoblastic disease)      12/21/2019 -  Chemotherapy     leucovorin (WELLCOVORIN) tablet 15 mg, 15 mg, Oral, Every 12 hours, 3 of 6 cycles  Administration: 15 mg (12/22/2019), 15 mg (12/23/2019), 15 mg (1/8/2020), 15 mg (1/22/2020)  palonosetron (ALOXI) injection 0 25 mg, 0 25 mg, Intravenous, Once, 3 of 6 cycles  Administration: 0 25 mg (12/21/2019), 0 25 mg (12/22/2019), 0 25 mg (12/31/2019), 0 25 mg (1/7/2020), 0 25 mg (1/14/2020), 0 25 mg (1/8/2020), 0 25 mg (1/28/2020), 0 25 mg (1/21/2020), 0 25 mg (1/22/2020)  methotrexate injection 242 5 mg, 100 mg/m2 = 242 5 mg, Intravenous, Once, 3 of 6 cycles  Administration: 242 5 mg (12/21/2019), 242 5 mg (1/7/2020), 237 5 mg (1/21/2020)  DACTINomycin (COSMEGEN) 500 mcg in sodium chloride 0 9 % 50 mL IVPB, 500 mcg, Intravenous, Once, 3 of 6 cycles  Administration: 500 mcg (12/21/2019), 500 mcg (12/22/2019), 500 mcg (1/7/2020), 500 mcg (1/8/2020), 500 mcg (1/21/2020), 500 mcg (1/22/2020)  vinCRIStine (ONCOVIN) 2 mg in sodium chloride 0 9 % 50 mL chemo infusion, 2 mg (original dose 1 mg/m2), Intravenous, Once, 3 of 6 cycles  Dose modification: 2 mg (original dose 1 mg/m2, Cycle 1, Reason: Max Dose Reached)  Administration: 2 mg (12/31/2019), 2 mg (1/14/2020), 2 mg (1/28/2020)  cyclophosphamide (CYTOXAN) 1,500 mg in sodium chloride 0 9 % 250 mL IVPB, 1,458 mg, Intravenous, Once, 3 of 6 cycles  Administration: 1,500 mg (12/31/2019), 1,500 mg (1/14/2020), 1,500 mg (1/28/2020)  methotrexate (PF) 485 mg in sodium chloride 0 9 % 1,000 mL continuous infusion, 200 mg/m2 = 485 mg, Intravenous, Once, 3 of 6 cycles  Administration: 485 mg (12/21/2019), 485 mg (1/7/2020), 475 mg (1/21/2020)  etoposide (TOPOSAR) 243 mg in sodium chloride 0 9 % 600 mL chemo infusion, 100 mg/m2 = 243 mg, Intravenous, Once, 3 of 6 cycles  Administration: 243 mg (12/21/2019), 243 mg (12/22/2019), 243 mg (1/7/2020), 243 mg (1/8/2020), 237 mg (1/21/2020), 237 mg (1/22/2020)           Patient ID: Jose M Dougherty is a 52 y o  female  26-year-old returns for treatment discussion  She had acute onset stomatitis with mucositis, difficulty tolerating liquids and solid foods, mouth pain    She has been taking oxycodone which has not been helping  Ibuprofen is somewhat helpful and she has been using Magic mouthwash  Her most recent serum beta-hCG on 1/27/2020 is 249 she is having vaginal bleeding which has been persistent throughout the course of her treatment  The remainder of her laboratory studies are as below  Review of Systems   Constitutional: Positive for appetite change, fatigue and unexpected weight change  Negative for activity change  HENT: Positive for mouth sores, sore throat and trouble swallowing  Eyes: Negative  Respiratory: Negative  Cardiovascular: Negative  Gastrointestinal: Negative for abdominal distention and abdominal pain  Endocrine: Negative  Genitourinary: Positive for vaginal bleeding  Negative for pelvic pain  Musculoskeletal: Negative  Skin: Negative  Allergic/Immunologic: Negative  Neurological: Negative  Hematological: Negative  Psychiatric/Behavioral: Negative          Current Outpatient Medications   Medication Sig Dispense Refill    acetaminophen (TYLENOL) 325 mg tablet Take 2 tablets (650 mg total) by mouth every 6 (six) hours as needed for mild pain 30 tablet 0    al mag oxide-diphenhydramine-lidocaine viscous (MAGIC MOUTHWASH) 1:1:1 suspension Swish and spit 10 mL every 4 (four) hours as needed for mouth pain or discomfort 1 Bottle 0    albuterol (PROVENTIL HFA,VENTOLIN HFA) 90 mcg/act inhaler Inhale 2 puffs every 4 (four) hours as needed for wheezing 1 Inhaler 1    benzonatate (TESSALON PERLES) 100 mg capsule Take 1 capsule (100 mg total) by mouth 3 (three) times a day as needed for cough 20 capsule 0    dextromethorphan-guaiFENesin (ROBITUSSIN DM)  mg/5 mL syrup Take 10 mL by mouth every 3 (three) hours as needed for cough 118 mL 0    docusate sodium (COLACE) 100 mg capsule Take 1 capsule (100 mg total) by mouth 2 (two) times a day 30 capsule 2    escitalopram (LEXAPRO) 10 mg tablet Take 1 tablet (10 mg total) by mouth daily 30 tablet 3    Filgrastim-sndz (ZARXIO) 480 MCG/0 8ML SOSY Inject 0 8 mL (480 mcg total) as directed once for 1 dose 0 8 mL 0    Filgrastim-sndz 480 MCG/0 8ML SOSY Inject 0 8 mL (480 mcg total) as directed once for 1 dose 1 Syringe 0    furosemide (LASIX) 20 mg tablet Take 1 tablet (20 mg total) by mouth daily 30 tablet 1    ibuprofen (MOTRIN) 600 mg tablet Take 1 tablet (600 mg total) by mouth every 6 (six) hours as needed for mild pain 30 tablet 3    leucovorin (WELLCOVORIN) 15 MG tablet Take 1 tablet (15 mg total) by mouth every 12 (twelve) hours for 3 doses 16 tablet 4    lisinopril (ZESTRIL) 10 mg tablet Take 1 tablet (10 mg total) by mouth daily 30 tablet 1    LORazepam (ATIVAN) 1 mg tablet Take 1 tablet (1 mg total) by mouth every 8 (eight) hours as needed for anxiety (and nausea) for up to 10 days 30 tablet 0    methylPREDNISolone 4 MG tablet therapy pack Use as directed on package 21 tablet 0    nystatin (MYCOSTATIN) 500,000 units/5 mL suspension Apply 5 mL (500,000 Units total) to the mouth or throat 4 (four) times a day 60 mL 0    ondansetron (ZOFRAN) 4 mg tablet Take 1 tablet (4 mg total) by mouth every 8 (eight) hours as needed for nausea or vomiting 20 tablet 0    polyethylene glycol (MIRALAX) 17 g packet Take 17 g by mouth daily 14 each 0    senna (SENOKOT) 8 6 MG tablet Take 1 tablet by mouth daily at bedtime      simethicone (MYLICON) 80 mg chewable tablet Chew 1 tablet (80 mg total) every 6 (six) hours as needed for flatulence 30 tablet 0     No current facility-administered medications for this visit          Allergies   Allergen Reactions    Penicillins Fever       Past Medical History:   Diagnosis Date    Cancer ([facility] [address] )     GTD (gestational trophoblastic disease)     History of chemotherapy     Obesity     Shortness of breath     Wheezing        Past Surgical History:   Procedure Laterality Date     SECTION      DILATION AND CURETTAGE OF UTERUS      X2  ENDOMETRIAL ABLATION      IR PORT PLACEMENT  2019    THYROIDECTOMY, PARTIAL         OB History        4    Para   1    Term   1            AB   3    Living   1       SAB        TAB        Ectopic        Multiple        Live Births   1                 No family history on file  The following portions of the patient's history were reviewed and updated as appropriate: allergies, current medications, past family history, past medical history, past social history, past surgical history and problem list       Objective:    Blood pressure 118/72, pulse 88, temperature 98 6 °F (37 °C), temperature source Tympanic, height 5' 4 41" (1 636 m), weight 135 kg (298 lb), not currently breastfeeding  Body mass index is 50 5 kg/m²  Physical Exam   Constitutional: She is oriented to person, place, and time  She appears well-developed and well-nourished  No distress  HENT:   Head: Normocephalic and atraumatic  Neck: Normal range of motion  Neck supple  No thyromegaly present  Cardiovascular: Normal rate, regular rhythm and normal heart sounds  Pulmonary/Chest: Effort normal and breath sounds normal    Abdominal: Soft  She exhibits no distension and no mass  There is no tenderness  There is no rebound and no guarding  Genitourinary:   Genitourinary Comments: The external female genitalia is normal  The bartholin's, uretheral and skenes glands are normal  The urethral meatus is normal (midline with no lesions)  Anus without fissure or lesion  Speculum exam reveals vagina without lesion or discharge  Cervix is normal appearing without visible lesions  There is dark blood with tissue present in the posterior fornix  No evidence of vaginal masses or lesions  No significant cystocele or rectocele noted  Bimanual exam notes a uterus with mobility  Size and contour cannot be described secondary to morbid obesity  No tenderness  Adnexa without masses or tenderness   Bladder is without fullness, mass or tenderness  Musculoskeletal: She exhibits no edema  Lymphadenopathy:     She has no cervical adenopathy  Neurological: She is alert and oriented to person, place, and time  Skin: Skin is warm and dry  She is not diaphoretic  Transverse skin incision   Psychiatric: She has a normal mood and affect   Her behavior is normal  Judgment and thought content normal          No results found for:   Lab Results   Component Value Date    WBC 2 4 (L) 01/27/2020    HGB 11 3 (L) 01/27/2020    HCT 34 2 (L) 01/27/2020    MCV 87 5 01/27/2020     01/27/2020     Lab Results   Component Value Date    K 4 0 01/27/2020     01/27/2020    CO2 27 01/27/2020    BUN 24 01/27/2020    CREATININE 1 32 (H) 01/27/2020    GLUF 97 12/26/2019    CALCIUM 9 5 01/27/2020    AST 12 01/27/2020    ALT 13 01/27/2020    ALKPHOS 65 01/27/2020    EGFR 90 01/08/2020

## 2020-01-31 NOTE — PLAN OF CARE
Problem: Potential for Falls  Goal: Patient will remain free of falls  Description  INTERVENTIONS:  - Assess patient frequently for physical needs  -  Identify cognitive and physical deficits and behaviors that affect risk of falls    -  [place] fall precautions as indicated by assessment   - Educate patient/family on patient safety including physical limitations  - Instruct patient to call for assistance with activity based on assessment  - Modify environment to reduce risk of injury  - Consider OT/PT consult to assist with strengthening/mobility  Outcome: Progressing

## 2020-01-31 NOTE — PROGRESS NOTES
Patient labs drawn and tolerated hydration without incident  Declined AVS  Ambulated off unit in no signs of distress

## 2020-01-31 NOTE — H&P
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 undergoing curative intent chemotherapy with EMA-CO  She has had is significant treatment response with beta HCG declining from over 400,000 to a value of 249  Based on imaging, the majority of her disease is intrauterine  She has a grade 2 stomatitis from methotrexate  She is morbidly obese with a BMI of 50 kilograms/meter squared  Her performance status is 0   1  I discussed performing hysterectomy with bilateral salpingo-oophorectomy in order to decrease the number of subsequent chemotherapy cycles to achieve cure  2  I discussed the risks and benefits of possible robotic assisted total laparoscopic hysterectomy, bilateral salpingo-oophorectomy, possible exploratory laparotomy and all other indicated procedures  She understands the risks and benefits of the operation agrees to proceed as outlined  Consent was obtained by me in the office  3  She understands that she will require 2 complete cycles of chemotherapy after her quantitative beta HCG is normal            Relevant Orders    Case request operating room: HYSTERECTOMY LAPAROSCOPIC TOTAL ([address] [place] [address]) W/ ROBOTICS (Completed)    HEMOGLOBIN A1C W/ EAG ESTIMATION    Protime-INR    EKG 12 lead    Type and screen    Stomatitis and mucositis     Secondary to methotrexate  Given difficulty tolerating oral liquids and solids, she will go for IV hydration today  I encouraged oral liquids, use of Magic mouthwash                     CHIEF COMPLAINT:  Treatment discussion        Previous therapy:     GTD (gestational trophoblastic disease)    12/20/2019 Initial Diagnosis     GTD (gestational trophoblastic disease)      12/21/2019 -  Chemotherapy     leucovorin (WELLCOVORIN) tablet 15 mg, 15 mg, Oral, Every 12 hours, 3 of 6 cycles  Administration: 15 mg (12/22/2019), 15 mg (12/23/2019), 15 mg (1/8/2020), 15 mg (1/22/2020)  palonosetron (ALOXI) injection 0 25 mg, 0 25 mg, Intravenous, Once, 3 of 6 cycles  Administration: 0 25 mg (12/21/2019), 0 25 mg (12/22/2019), 0 25 mg (12/31/2019), 0 25 mg (1/7/2020), 0 25 mg (1/14/2020), 0 25 mg (1/8/2020), 0 25 mg (1/28/2020), 0 25 mg (1/21/2020), 0 25 mg (1/22/2020)  methotrexate injection 242 5 mg, 100 mg/m2 = 242 5 mg, Intravenous, Once, 3 of 6 cycles  Administration: 242 5 mg (12/21/2019), 242 5 mg (1/7/2020), 237 5 mg (1/21/2020)  DACTINomycin (COSMEGEN) 500 mcg in sodium chloride 0 9 % 50 mL IVPB, 500 mcg, Intravenous, Once, 3 of 6 cycles  Administration: 500 mcg (12/21/2019), 500 mcg (12/22/2019), 500 mcg (1/7/2020), 500 mcg (1/8/2020), 500 mcg (1/21/2020), 500 mcg (1/22/2020)  vinCRIStine (ONCOVIN) 2 mg in sodium chloride 0 9 % 50 mL chemo infusion, 2 mg (original dose 1 mg/m2), Intravenous, Once, 3 of 6 cycles  Dose modification: 2 mg (original dose 1 mg/m2, Cycle 1, Reason: Max Dose Reached)  Administration: 2 mg (12/31/2019), 2 mg (1/14/2020), 2 mg (1/28/2020)  cyclophosphamide (CYTOXAN) 1,500 mg in sodium chloride 0 9 % 250 mL IVPB, 1,458 mg, Intravenous, Once, 3 of 6 cycles  Administration: 1,500 mg (12/31/2019), 1,500 mg (1/14/2020), 1,500 mg (1/28/2020)  methotrexate (PF) 485 mg in sodium chloride 0 9 % 1,000 mL continuous infusion, 200 mg/m2 = 485 mg, Intravenous, Once, 3 of 6 cycles  Administration: 485 mg (12/21/2019), 485 mg (1/7/2020), 475 mg (1/21/2020)  etoposide (TOPOSAR) 243 mg in sodium chloride 0 9 % 600 mL chemo infusion, 100 mg/m2 = 243 mg, Intravenous, Once, 3 of 6 cycles  Administration: 243 mg (12/21/2019), 243 mg (12/22/2019), 243 mg (1/7/2020), 243 mg (1/8/2020), 237 mg (1/21/2020), 237 mg (1/22/2020)           Patient ID: Carolina Lam is a 52 y o  female  51-year-old returns for treatment discussion  She had acute onset stomatitis with mucositis, difficulty tolerating liquids and solid foods, mouth pain    She has been taking oxycodone which has not been helping  Ibuprofen is somewhat helpful and she has been using Magic mouthwash  Her most recent serum beta-hCG on 1/27/2020 is 249 she is having vaginal bleeding which has been persistent throughout the course of her treatment  The remainder of her laboratory studies are as below  Review of Systems   Constitutional: Positive for appetite change, fatigue and unexpected weight change  Negative for activity change  HENT: Positive for mouth sores, sore throat and trouble swallowing  Eyes: Negative  Respiratory: Negative  Cardiovascular: Negative  Gastrointestinal: Negative for abdominal distention and abdominal pain  Endocrine: Negative  Genitourinary: Positive for vaginal bleeding  Negative for pelvic pain  Musculoskeletal: Negative  Skin: Negative  Allergic/Immunologic: Negative  Neurological: Negative  Hematological: Negative  Psychiatric/Behavioral: Negative          Current Outpatient Medications   Medication Sig Dispense Refill    acetaminophen (TYLENOL) 325 mg tablet Take 2 tablets (650 mg total) by mouth every 6 (six) hours as needed for mild pain 30 tablet 0    al mag oxide-diphenhydramine-lidocaine viscous (MAGIC MOUTHWASH) 1:1:1 suspension Swish and spit 10 mL every 4 (four) hours as needed for mouth pain or discomfort 1 Bottle 0    albuterol (PROVENTIL HFA,VENTOLIN HFA) 90 mcg/act inhaler Inhale 2 puffs every 4 (four) hours as needed for wheezing 1 Inhaler 1    benzonatate (TESSALON PERLES) 100 mg capsule Take 1 capsule (100 mg total) by mouth 3 (three) times a day as needed for cough 20 capsule 0    dextromethorphan-guaiFENesin (ROBITUSSIN DM)  mg/5 mL syrup Take 10 mL by mouth every 3 (three) hours as needed for cough 118 mL 0    docusate sodium (COLACE) 100 mg capsule Take 1 capsule (100 mg total) by mouth 2 (two) times a day 30 capsule 2    escitalopram (LEXAPRO) 10 mg tablet Take 1 tablet (10 mg total) by mouth daily 30 tablet 3    Filgrastim-sndz (ZARXIO) 480 MCG/0 8ML SOSY Inject 0 8 mL (480 mcg total) as directed once for 1 dose 0 8 mL 0    Filgrastim-sndz 480 MCG/0 8ML SOSY Inject 0 8 mL (480 mcg total) as directed once for 1 dose 1 Syringe 0    furosemide (LASIX) 20 mg tablet Take 1 tablet (20 mg total) by mouth daily 30 tablet 1    ibuprofen (MOTRIN) 600 mg tablet Take 1 tablet (600 mg total) by mouth every 6 (six) hours as needed for mild pain 30 tablet 3    leucovorin (WELLCOVORIN) 15 MG tablet Take 1 tablet (15 mg total) by mouth every 12 (twelve) hours for 3 doses 16 tablet 4    lisinopril (ZESTRIL) 10 mg tablet Take 1 tablet (10 mg total) by mouth daily 30 tablet 1    LORazepam (ATIVAN) 1 mg tablet Take 1 tablet (1 mg total) by mouth every 8 (eight) hours as needed for anxiety (and nausea) for up to 10 days 30 tablet 0    methylPREDNISolone 4 MG tablet therapy pack Use as directed on package 21 tablet 0    nystatin (MYCOSTATIN) 500,000 units/5 mL suspension Apply 5 mL (500,000 Units total) to the mouth or throat 4 (four) times a day 60 mL 0    ondansetron (ZOFRAN) 4 mg tablet Take 1 tablet (4 mg total) by mouth every 8 (eight) hours as needed for nausea or vomiting 20 tablet 0    polyethylene glycol (MIRALAX) 17 g packet Take 17 g by mouth daily 14 each 0    senna (SENOKOT) 8 6 MG tablet Take 1 tablet by mouth daily at bedtime      simethicone (MYLICON) 80 mg chewable tablet Chew 1 tablet (80 mg total) every 6 (six) hours as needed for flatulence 30 tablet 0     No current facility-administered medications for this visit          Allergies   Allergen Reactions    Penicillins Fever       Past Medical History:   Diagnosis Date    Cancer ([facility] [address] )     GTD (gestational trophoblastic disease)     History of chemotherapy     Obesity     Shortness of breath     Wheezing        Past Surgical History:   Procedure Laterality Date     SECTION      DILATION AND CURETTAGE OF UTERUS      X2  ENDOMETRIAL ABLATION      IR PORT PLACEMENT  2019    THYROIDECTOMY, PARTIAL         OB History        4    Para   1    Term   1            AB   3    Living   1       SAB        TAB        Ectopic        Multiple        Live Births   1                 No family history on file  The following portions of the patient's history were reviewed and updated as appropriate: allergies, current medications, past family history, past medical history, past social history, past surgical history and problem list       Objective:    Blood pressure 118/72, pulse 88, temperature 98 6 °F (37 °C), temperature source Tympanic, height 5' 4 41" (1 636 m), weight 135 kg (298 lb), not currently breastfeeding  Body mass index is 50 5 kg/m²  Physical Exam   Constitutional: She is oriented to person, place, and time  She appears well-developed and well-nourished  No distress  HENT:   Head: Normocephalic and atraumatic  Neck: Normal range of motion  Neck supple  No thyromegaly present  Cardiovascular: Normal rate, regular rhythm and normal heart sounds  Pulmonary/Chest: Effort normal and breath sounds normal    Abdominal: Soft  She exhibits no distension and no mass  There is no tenderness  There is no rebound and no guarding  Genitourinary:   Genitourinary Comments: The external female genitalia is normal  The bartholin's, uretheral and skenes glands are normal  The urethral meatus is normal (midline with no lesions)  Anus without fissure or lesion  Speculum exam reveals vagina without lesion or discharge  Cervix is normal appearing without visible lesions  There is dark blood with tissue present in the posterior fornix  No evidence of vaginal masses or lesions  No significant cystocele or rectocele noted  Bimanual exam notes a uterus with mobility  Size and contour cannot be described secondary to morbid obesity  No tenderness  Adnexa without masses or tenderness   Bladder is without fullness, mass or tenderness  Musculoskeletal: She exhibits no edema  Lymphadenopathy:     She has no cervical adenopathy  Neurological: She is alert and oriented to person, place, and time  Skin: Skin is warm and dry  She is not diaphoretic  Transverse skin incision   Psychiatric: She has a normal mood and affect   Her behavior is normal  Judgment and thought content normal          No results found for:   Lab Results   Component Value Date    WBC 2 4 (L) 01/27/2020    HGB 11 3 (L) 01/27/2020    HCT 34 2 (L) 01/27/2020    MCV 87 5 01/27/2020     01/27/2020     Lab Results   Component Value Date    K 4 0 01/27/2020     01/27/2020    CO2 27 01/27/2020    BUN 24 01/27/2020    CREATININE 1 32 (H) 01/27/2020    GLUF 97 12/26/2019    CALCIUM 9 5 01/27/2020    AST 12 01/27/2020    ALT 13 01/27/2020    ALKPHOS 65 01/27/2020    EGFR 90 01/08/2020

## 2020-01-31 NOTE — PATIENT INSTRUCTIONS
1  Nothing to eat or drink after midnight prior to the operation  You are allowed clear liquids until 3 hours prior to the scheduled start time of surgery  No milk products or solid food for 8 hours prior to surgery  2   Please avoid ibuprofen, aspirin, fish oil for 7 days prior to surgery- you may take your ibuprofen as needed prior to surgery to help with your mouth pain  3  Do not take any of your medications the day of surgery

## 2020-02-03 ENCOUNTER — ANESTHESIA EVENT (OUTPATIENT)
Dept: PERIOP | Facility: HOSPITAL | Age: 50
DRG: 818 | End: 2020-02-03
Payer: COMMERCIAL

## 2020-02-03 NOTE — ANESTHESIA PREPROCEDURE EVALUATION
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; bilateral pleural effusions     GI/Hepatic  Negative GI/hepatic ROS   No GERD ,        Negative  ROS        Endo/Other  Negative endo/other ROS   Obesity  morbid obesity   GYN      Comment: gestational trophoblastic neoplasia      Hematology  Negative hematology ROS      Musculoskeletal  Negative musculoskeletal ROS        Neurology  Negative neurology ROS   No CVA ,    Psychology   Anxiety,          EKG 1/31/2020:  NSR, rate 69, non-specific T-wave abnormality    CT Chest 12/25/19:  1  No evidence of pulmonary embolus  2   Dilated central pulmonary arteries, consistent with pulmonary arterial hypertension  3   New areas of consolidation in both lungs, particularly in the left upper lobe, most likely pneumonia  Differential diagnosis includes pulmonary hemorrhage and, less likely, drug reaction or atypical pulmonary edema  4   Multiple irregular pulmonary nodules, most likely hemorrhagic metastases, increased in size since 12/19/2019   5   Moderate-sized bilateral pleural effusions      Lab Results   Component Value Date    WBC 2 4 (L) 01/27/2020    HGB 11 3 (L) 01/27/2020    HCT 34 2 (L) 01/27/2020    MCV 87 5 01/27/2020     01/27/2020     Lab Results   Component Value Date    SODIUM 140 01/27/2020    K 4 0 01/27/2020     01/27/2020    CO2 27 01/27/2020    BUN 24 01/27/2020    CREATININE 1 32 (H) 01/27/2020    GLUC 135 (H) 01/27/2020    CALCIUM 9 5 01/27/2020     Lab Results   Component Value Date    INR 1 09 01/31/2020    INR 0 97 12/25/2019    INR 0 99 12/20/2019    PROTIME 13 7 01/31/2020    PROTIME 12 6 12/25/2019    PROTIME 12 7 12/20/2019           Physical Exam    Airway    Mallampati score: III  TM Distance: >3 FB  Neck ROM: full     Dental   No notable dental hx     Cardiovascular  Cardiovascular exam normal    Pulmonary  Pulmonary exam normal     Other Findings        Anesthesia Plan  ASA Score- 3     Anesthesia Type- general with ASA Monitors  Additional Monitors: arterial line  Airway Plan: ETT  Comment: Possible bilateral TAP blocks with Exparel for postoperative pain if converted to open procedure  Possible postoperative ICU; possible postoperative mechanical ventilation  Plan Factors-    Induction- intravenous  Postoperative Plan- Plan for postoperative opioid use  Planned trial extubation    Informed Consent- Anesthetic plan and risks discussed with patient  I personally reviewed this patient with the CRNA  Discussed and agreed on the Anesthesia Plan with the CRNA  So Colmenares

## 2020-02-04 ENCOUNTER — ANESTHESIA (OUTPATIENT)
Dept: PERIOP | Facility: HOSPITAL | Age: 50
DRG: 818 | End: 2020-02-04
Payer: COMMERCIAL

## 2020-02-04 ENCOUNTER — HOSPITAL ENCOUNTER (INPATIENT)
Facility: HOSPITAL | Age: 50
LOS: 1 days | Discharge: HOME | DRG: 818 | End: 2020-02-06
Attending: OBSTETRICS & GYNECOLOGY | Admitting: OBSTETRICS & GYNECOLOGY
Payer: COMMERCIAL

## 2020-02-04 DIAGNOSIS — O01.9 GTD (GESTATIONAL TROPHOBLASTIC DISEASE): ICD-10-CM

## 2020-02-04 PROBLEM — E66.01 MORBID OBESITY WITH BMI OF 50.0-59.9, ADULT (HCC): Status: ACTIVE | Noted: 2020-02-04

## 2020-02-04 LAB
ABO GROUP BLD: NORMAL
BLD GP AB SCN SERPL QL: NEGATIVE
EXT PREGNANCY TEST URINE: POSITIVE
EXT. CONTROL: ABNORMAL
GLUCOSE SERPL-MCNC: 134 MG/DL (ref 65–140)
HBV SURFACE AG SER QL: NORMAL
HCV AB SER QL: NORMAL
HIV 1+2 AB+HIV1 P24 AG SERPL QL IA: NORMAL
HIV1 P24 AG SER QL: NORMAL
RH BLD: POSITIVE
SPECIMEN EXPIRATION DATE: NORMAL

## 2020-02-04 PROCEDURE — 86900 BLOOD TYPING SEROLOGIC ABO: CPT | Performed by: OBSTETRICS & GYNECOLOGY

## 2020-02-04 PROCEDURE — 81025 URINE PREGNANCY TEST: CPT | Performed by: OBSTETRICS & GYNECOLOGY

## 2020-02-04 PROCEDURE — 0UT9FZZ RESECTION OF UTERUS, VIA NATURAL OR ARTIFICIAL OPENING WITH PERCUTANEOUS ENDOSCOPIC ASSISTANCE: ICD-10-PCS | Performed by: OBSTETRICS & GYNECOLOGY

## 2020-02-04 PROCEDURE — 87806 HIV AG W/HIV1&2 ANTB W/OPTIC: CPT | Performed by: OBSTETRICS & GYNECOLOGY

## 2020-02-04 PROCEDURE — 88342 IMHCHEM/IMCYTCHM 1ST ANTB: CPT | Performed by: PATHOLOGY

## 2020-02-04 PROCEDURE — 99024 POSTOP FOLLOW-UP VISIT: CPT | Performed by: PHYSICIAN ASSISTANT

## 2020-02-04 PROCEDURE — 87340 HEPATITIS B SURFACE AG IA: CPT | Performed by: OBSTETRICS & GYNECOLOGY

## 2020-02-04 PROCEDURE — 8E0W4CZ ROBOTIC ASSISTED PROCEDURE OF TRUNK REGION, PERCUTANEOUS ENDOSCOPIC APPROACH: ICD-10-PCS | Performed by: OBSTETRICS & GYNECOLOGY

## 2020-02-04 PROCEDURE — 88309 TISSUE EXAM BY PATHOLOGIST: CPT | Performed by: PATHOLOGY

## 2020-02-04 PROCEDURE — 88341 IMHCHEM/IMCYTCHM EA ADD ANTB: CPT | Performed by: PATHOLOGY

## 2020-02-04 PROCEDURE — 0UT7FZZ RESECTION OF BILATERAL FALLOPIAN TUBES, VIA NATURAL OR ARTIFICIAL OPENING WITH PERCUTANEOUS ENDOSCOPIC ASSISTANCE: ICD-10-PCS | Performed by: OBSTETRICS & GYNECOLOGY

## 2020-02-04 PROCEDURE — 86803 HEPATITIS C AB TEST: CPT | Performed by: OBSTETRICS & GYNECOLOGY

## 2020-02-04 PROCEDURE — 0TJB8ZZ INSPECTION OF BLADDER, VIA NATURAL OR ARTIFICIAL OPENING ENDOSCOPIC: ICD-10-PCS | Performed by: OBSTETRICS & GYNECOLOGY

## 2020-02-04 PROCEDURE — 0UT2FZZ RESECTION OF BILATERAL OVARIES, VIA NATURAL OR ARTIFICIAL OPENING WITH PERCUTANEOUS ENDOSCOPIC ASSISTANCE: ICD-10-PCS | Performed by: OBSTETRICS & GYNECOLOGY

## 2020-02-04 PROCEDURE — 58573 TLH W/T/O UTERUS OVER 250 G: CPT | Performed by: OBSTETRICS & GYNECOLOGY

## 2020-02-04 PROCEDURE — 86901 BLOOD TYPING SEROLOGIC RH(D): CPT | Performed by: OBSTETRICS & GYNECOLOGY

## 2020-02-04 PROCEDURE — 82948 REAGENT STRIP/BLOOD GLUCOSE: CPT

## 2020-02-04 PROCEDURE — 86850 RBC ANTIBODY SCREEN: CPT | Performed by: OBSTETRICS & GYNECOLOGY

## 2020-02-04 RX ORDER — IBUPROFEN 600 MG/1
600 TABLET ORAL EVERY 6 HOURS PRN
Status: DISCONTINUED | OUTPATIENT
Start: 2020-02-04 | End: 2020-02-06 | Stop reason: HOSPADM

## 2020-02-04 RX ORDER — PROPOFOL 10 MG/ML
INJECTION, EMULSION INTRAVENOUS AS NEEDED
Status: DISCONTINUED | OUTPATIENT
Start: 2020-02-04 | End: 2020-02-04 | Stop reason: SURG

## 2020-02-04 RX ORDER — CLINDAMYCIN PHOSPHATE 900 MG/50ML
900 INJECTION INTRAVENOUS ONCE
Status: COMPLETED | OUTPATIENT
Start: 2020-02-04 | End: 2020-02-04

## 2020-02-04 RX ORDER — MIDAZOLAM HYDROCHLORIDE 2 MG/2ML
INJECTION, SOLUTION INTRAMUSCULAR; INTRAVENOUS AS NEEDED
Status: DISCONTINUED | OUTPATIENT
Start: 2020-02-04 | End: 2020-02-04 | Stop reason: SURG

## 2020-02-04 RX ORDER — ONDANSETRON 2 MG/ML
4 INJECTION INTRAMUSCULAR; INTRAVENOUS EVERY 6 HOURS PRN
Status: DISCONTINUED | OUTPATIENT
Start: 2020-02-04 | End: 2020-02-06 | Stop reason: HOSPADM

## 2020-02-04 RX ORDER — ALBUTEROL SULFATE 90 UG/1
2 AEROSOL, METERED RESPIRATORY (INHALATION) EVERY 4 HOURS PRN
Status: DISCONTINUED | OUTPATIENT
Start: 2020-02-04 | End: 2020-02-06 | Stop reason: HOSPADM

## 2020-02-04 RX ORDER — MAGNESIUM HYDROXIDE 1200 MG/15ML
LIQUID ORAL AS NEEDED
Status: DISCONTINUED | OUTPATIENT
Start: 2020-02-04 | End: 2020-02-04 | Stop reason: HOSPADM

## 2020-02-04 RX ORDER — DEXAMETHASONE SODIUM PHOSPHATE 10 MG/ML
INJECTION, SOLUTION INTRAMUSCULAR; INTRAVENOUS AS NEEDED
Status: DISCONTINUED | OUTPATIENT
Start: 2020-02-04 | End: 2020-02-04 | Stop reason: SURG

## 2020-02-04 RX ORDER — HYDROMORPHONE HCL/PF 1 MG/ML
0.2 SYRINGE (ML) INJECTION
Status: DISCONTINUED | OUTPATIENT
Start: 2020-02-04 | End: 2020-02-04 | Stop reason: HOSPADM

## 2020-02-04 RX ORDER — FENTANYL CITRATE/PF 50 MCG/ML
25 SYRINGE (ML) INJECTION
Status: DISCONTINUED | OUTPATIENT
Start: 2020-02-04 | End: 2020-02-04 | Stop reason: HOSPADM

## 2020-02-04 RX ORDER — HEPARIN SODIUM 5000 [USP'U]/ML
5000 INJECTION, SOLUTION INTRAVENOUS; SUBCUTANEOUS
Status: DISCONTINUED | OUTPATIENT
Start: 2020-02-05 | End: 2020-02-04

## 2020-02-04 RX ORDER — SODIUM CHLORIDE 9 MG/ML
INJECTION, SOLUTION INTRAVENOUS CONTINUOUS PRN
Status: DISCONTINUED | OUTPATIENT
Start: 2020-02-04 | End: 2020-02-04 | Stop reason: SURG

## 2020-02-04 RX ORDER — OXYCODONE HYDROCHLORIDE 5 MG/1
5 TABLET ORAL EVERY 6 HOURS PRN
Qty: 10 TABLET | Refills: 0 | Status: SHIPPED | OUTPATIENT
Start: 2020-02-04 | End: 2020-02-14

## 2020-02-04 RX ORDER — ESCITALOPRAM OXALATE 10 MG/1
10 TABLET ORAL DAILY
Status: DISCONTINUED | OUTPATIENT
Start: 2020-02-05 | End: 2020-02-06 | Stop reason: HOSPADM

## 2020-02-04 RX ORDER — SODIUM CHLORIDE, SODIUM LACTATE, POTASSIUM CHLORIDE, CALCIUM CHLORIDE 600; 310; 30; 20 MG/100ML; MG/100ML; MG/100ML; MG/100ML
50 INJECTION, SOLUTION INTRAVENOUS CONTINUOUS
Status: DISCONTINUED | OUTPATIENT
Start: 2020-02-04 | End: 2020-02-04

## 2020-02-04 RX ORDER — ROCURONIUM BROMIDE 10 MG/ML
INJECTION, SOLUTION INTRAVENOUS AS NEEDED
Status: DISCONTINUED | OUTPATIENT
Start: 2020-02-04 | End: 2020-02-04 | Stop reason: SURG

## 2020-02-04 RX ORDER — SENNOSIDES 8.6 MG
1 TABLET ORAL
Status: DISCONTINUED | OUTPATIENT
Start: 2020-02-04 | End: 2020-02-06 | Stop reason: HOSPADM

## 2020-02-04 RX ORDER — OXYCODONE HYDROCHLORIDE 5 MG/1
10 TABLET ORAL EVERY 4 HOURS PRN
Status: DISCONTINUED | OUTPATIENT
Start: 2020-02-04 | End: 2020-02-06 | Stop reason: HOSPADM

## 2020-02-04 RX ORDER — ONDANSETRON 2 MG/ML
4 INJECTION INTRAMUSCULAR; INTRAVENOUS ONCE AS NEEDED
Status: DISCONTINUED | OUTPATIENT
Start: 2020-02-04 | End: 2020-02-04 | Stop reason: HOSPADM

## 2020-02-04 RX ORDER — DEXTROSE, SODIUM CHLORIDE, AND POTASSIUM CHLORIDE 5; .9; .15 G/100ML; G/100ML; G/100ML
125 INJECTION INTRAVENOUS CONTINUOUS
Status: DISCONTINUED | OUTPATIENT
Start: 2020-02-04 | End: 2020-02-06

## 2020-02-04 RX ORDER — OXYCODONE HYDROCHLORIDE 5 MG/1
5 CAPSULE ORAL EVERY 4 HOURS PRN
COMMUNITY
End: 2020-05-06

## 2020-02-04 RX ORDER — ACETAMINOPHEN 325 MG/1
975 TABLET ORAL EVERY 8 HOURS SCHEDULED
Status: DISCONTINUED | OUTPATIENT
Start: 2020-02-04 | End: 2020-02-06 | Stop reason: HOSPADM

## 2020-02-04 RX ORDER — LORAZEPAM 1 MG/1
1 TABLET ORAL EVERY 8 HOURS PRN
Status: DISCONTINUED | OUTPATIENT
Start: 2020-02-04 | End: 2020-02-06 | Stop reason: HOSPADM

## 2020-02-04 RX ORDER — GENTAMICIN SULFATE 80 MG/50ML
1.5 INJECTION, SOLUTION INTRAVENOUS ONCE
Status: COMPLETED | OUTPATIENT
Start: 2020-02-04 | End: 2020-02-04

## 2020-02-04 RX ORDER — ACETAMINOPHEN 325 MG/1
975 TABLET ORAL ONCE
Status: COMPLETED | OUTPATIENT
Start: 2020-02-04 | End: 2020-02-04

## 2020-02-04 RX ORDER — FENTANYL CITRATE 50 UG/ML
INJECTION, SOLUTION INTRAMUSCULAR; INTRAVENOUS AS NEEDED
Status: DISCONTINUED | OUTPATIENT
Start: 2020-02-04 | End: 2020-02-04 | Stop reason: SURG

## 2020-02-04 RX ORDER — HYDROMORPHONE HCL/PF 1 MG/ML
0.2 SYRINGE (ML) INJECTION
Status: DISCONTINUED | OUTPATIENT
Start: 2020-02-04 | End: 2020-02-04

## 2020-02-04 RX ORDER — ONDANSETRON 2 MG/ML
INJECTION INTRAMUSCULAR; INTRAVENOUS AS NEEDED
Status: DISCONTINUED | OUTPATIENT
Start: 2020-02-04 | End: 2020-02-04 | Stop reason: SURG

## 2020-02-04 RX ORDER — HYDROMORPHONE HCL 110MG/55ML
PATIENT CONTROLLED ANALGESIA SYRINGE INTRAVENOUS AS NEEDED
Status: DISCONTINUED | OUTPATIENT
Start: 2020-02-04 | End: 2020-02-04 | Stop reason: SURG

## 2020-02-04 RX ORDER — EPHEDRINE SULFATE 50 MG/ML
INJECTION INTRAVENOUS AS NEEDED
Status: DISCONTINUED | OUTPATIENT
Start: 2020-02-04 | End: 2020-02-04 | Stop reason: SURG

## 2020-02-04 RX ORDER — OXYCODONE HYDROCHLORIDE 5 MG/1
5 TABLET ORAL EVERY 4 HOURS PRN
Status: DISCONTINUED | OUTPATIENT
Start: 2020-02-04 | End: 2020-02-06 | Stop reason: HOSPADM

## 2020-02-04 RX ORDER — BUPIVACAINE HYDROCHLORIDE 2.5 MG/ML
INJECTION, SOLUTION EPIDURAL; INFILTRATION; INTRACAUDAL AS NEEDED
Status: DISCONTINUED | OUTPATIENT
Start: 2020-02-04 | End: 2020-02-04 | Stop reason: HOSPADM

## 2020-02-04 RX ORDER — LIDOCAINE HYDROCHLORIDE 10 MG/ML
0.5 INJECTION, SOLUTION EPIDURAL; INFILTRATION; INTRACAUDAL; PERINEURAL ONCE AS NEEDED
Status: DISCONTINUED | OUTPATIENT
Start: 2020-02-04 | End: 2020-02-04 | Stop reason: HOSPADM

## 2020-02-04 RX ORDER — SODIUM CHLORIDE, SODIUM LACTATE, POTASSIUM CHLORIDE, CALCIUM CHLORIDE 600; 310; 30; 20 MG/100ML; MG/100ML; MG/100ML; MG/100ML
INJECTION, SOLUTION INTRAVENOUS CONTINUOUS PRN
Status: DISCONTINUED | OUTPATIENT
Start: 2020-02-04 | End: 2020-02-04

## 2020-02-04 RX ORDER — HEPARIN SODIUM 5000 [USP'U]/ML
5000 INJECTION, SOLUTION INTRAVENOUS; SUBCUTANEOUS
Status: COMPLETED | OUTPATIENT
Start: 2020-02-04 | End: 2020-02-04

## 2020-02-04 RX ORDER — DOCUSATE SODIUM 100 MG/1
100 CAPSULE, LIQUID FILLED ORAL 2 TIMES DAILY
Status: DISCONTINUED | OUTPATIENT
Start: 2020-02-04 | End: 2020-02-06 | Stop reason: HOSPADM

## 2020-02-04 RX ORDER — SODIUM CHLORIDE, SODIUM LACTATE, POTASSIUM CHLORIDE, CALCIUM CHLORIDE 600; 310; 30; 20 MG/100ML; MG/100ML; MG/100ML; MG/100ML
125 INJECTION, SOLUTION INTRAVENOUS CONTINUOUS
Status: DISCONTINUED | OUTPATIENT
Start: 2020-02-04 | End: 2020-02-04

## 2020-02-04 RX ORDER — HYDROMORPHONE HCL/PF 1 MG/ML
0.5 SYRINGE (ML) INJECTION
Status: DISCONTINUED | OUTPATIENT
Start: 2020-02-04 | End: 2020-02-06 | Stop reason: HOSPADM

## 2020-02-04 RX ORDER — LIDOCAINE HYDROCHLORIDE 10 MG/ML
INJECTION, SOLUTION EPIDURAL; INFILTRATION; INTRACAUDAL; PERINEURAL AS NEEDED
Status: DISCONTINUED | OUTPATIENT
Start: 2020-02-04 | End: 2020-02-04 | Stop reason: SURG

## 2020-02-04 RX ADMIN — PHENYLEPHRINE HYDROCHLORIDE 100 MCG: 10 INJECTION INTRAVENOUS at 18:20

## 2020-02-04 RX ADMIN — PHENYLEPHRINE HYDROCHLORIDE 20 MCG/MIN: 10 INJECTION INTRAVENOUS at 17:23

## 2020-02-04 RX ADMIN — ONDANSETRON 4 MG: 2 INJECTION INTRAMUSCULAR; INTRAVENOUS at 20:48

## 2020-02-04 RX ADMIN — SODIUM CHLORIDE, SODIUM LACTATE, POTASSIUM CHLORIDE, AND CALCIUM CHLORIDE: .6; .31; .03; .02 INJECTION, SOLUTION INTRAVENOUS at 16:08

## 2020-02-04 RX ADMIN — MIDAZOLAM 2 MG: 1 INJECTION INTRAMUSCULAR; INTRAVENOUS at 15:56

## 2020-02-04 RX ADMIN — SENNOSIDES 8.6 MG: 8.6 TABLET, FILM COATED ORAL at 22:06

## 2020-02-04 RX ADMIN — FENTANYL CITRATE 25 MCG: 50 INJECTION, SOLUTION INTRAMUSCULAR; INTRAVENOUS at 19:14

## 2020-02-04 RX ADMIN — ROCURONIUM BROMIDE 50 MG: 50 INJECTION, SOLUTION INTRAVENOUS at 16:03

## 2020-02-04 RX ADMIN — LIDOCAINE HYDROCHLORIDE 50 MG: 10 INJECTION, SOLUTION EPIDURAL; INFILTRATION; INTRACAUDAL; PERINEURAL at 16:02

## 2020-02-04 RX ADMIN — ACETAMINOPHEN 975 MG: 325 TABLET ORAL at 22:06

## 2020-02-04 RX ADMIN — PHENYLEPHRINE HYDROCHLORIDE 100 MCG: 10 INJECTION INTRAVENOUS at 18:16

## 2020-02-04 RX ADMIN — DEXTROSE, SODIUM CHLORIDE, AND POTASSIUM CHLORIDE 125 ML/HR: 5; .9; .15 INJECTION INTRAVENOUS at 20:09

## 2020-02-04 RX ADMIN — SUGAMMADEX 400 MG: 100 INJECTION, SOLUTION INTRAVENOUS at 18:28

## 2020-02-04 RX ADMIN — HEPARIN SODIUM 5000 UNITS: 5000 INJECTION INTRAVENOUS; SUBCUTANEOUS at 16:11

## 2020-02-04 RX ADMIN — DOCUSATE SODIUM 100 MG: 100 CAPSULE, LIQUID FILLED ORAL at 22:06

## 2020-02-04 RX ADMIN — DEXMEDETOMIDINE 0.2 MCG/KG/HR: 100 INJECTION, SOLUTION, CONCENTRATE INTRAVENOUS at 16:15

## 2020-02-04 RX ADMIN — SODIUM CHLORIDE: 0.9 INJECTION, SOLUTION INTRAVENOUS at 16:08

## 2020-02-04 RX ADMIN — PROPOFOL 180 MG: 10 INJECTION, EMULSION INTRAVENOUS at 16:02

## 2020-02-04 RX ADMIN — HYDROMORPHONE HYDROCHLORIDE 0.5 MG: 2 INJECTION, SOLUTION INTRAMUSCULAR; INTRAVENOUS; SUBCUTANEOUS at 18:13

## 2020-02-04 RX ADMIN — FENTANYL CITRATE 100 MCG: 50 INJECTION, SOLUTION INTRAMUSCULAR; INTRAVENOUS at 16:02

## 2020-02-04 RX ADMIN — ONDANSETRON 4 MG: 2 INJECTION INTRAMUSCULAR; INTRAVENOUS at 17:09

## 2020-02-04 RX ADMIN — DEXAMETHASONE SODIUM PHOSPHATE 10 MG: 10 INJECTION, SOLUTION INTRAMUSCULAR; INTRAVENOUS at 17:09

## 2020-02-04 RX ADMIN — ROCURONIUM BROMIDE 10 MG: 50 INJECTION, SOLUTION INTRAVENOUS at 18:02

## 2020-02-04 RX ADMIN — HYDROMORPHONE HYDROCHLORIDE 0.5 MG: 2 INJECTION, SOLUTION INTRAMUSCULAR; INTRAVENOUS; SUBCUTANEOUS at 16:36

## 2020-02-04 RX ADMIN — GENTAMICIN SULFATE 150 MG: 80 INJECTION, SOLUTION INTRAVENOUS at 16:25

## 2020-02-04 RX ADMIN — CLINDAMYCIN PHOSPHATE 900 MG: 900 INJECTION, SOLUTION INTRAVENOUS at 16:23

## 2020-02-04 RX ADMIN — SODIUM CHLORIDE, SODIUM LACTATE, POTASSIUM CHLORIDE, AND CALCIUM CHLORIDE 125 ML/HR: .6; .31; .03; .02 INJECTION, SOLUTION INTRAVENOUS at 13:58

## 2020-02-04 RX ADMIN — ROCURONIUM BROMIDE 30 MG: 50 INJECTION, SOLUTION INTRAVENOUS at 16:37

## 2020-02-04 RX ADMIN — ACETAMINOPHEN 975 MG: 325 TABLET ORAL at 13:32

## 2020-02-04 RX ADMIN — EPHEDRINE SULFATE 10 MG: 50 INJECTION, SOLUTION INTRAVENOUS at 18:22

## 2020-02-04 NOTE — OP NOTE
OPERATIVE REPORT  PATIENT NAME: Reba Garcia    :  1970  MRN: [id number]  Pt Location: [facility] OR ROOM 14    SURGERY DATE: 2020    Surgeon(s) and Role:     * Dangelo Garcia MD - Primary     * Bakari Burr PA-C - Derrell Vargas MD - Assisting     * Jaret Casarez DO - Assisting    Preop Diagnosis:  GTD (gestational trophoblastic disease) [O01 9]    Post-Op Diagnosis Codes:     * GTD (gestational trophoblastic disease) [O01 9]    Procedure(s) (LRB):  ROBOTIC ASSISTED TOTAL LAPAROSCOPIC HYSTERECTOMY, BILATERAL SALPINGO-OOPHORECTOMY (N/A)  MINI-LAPAROTOMY (FOR SPECIMEN REMOVAL) (N/A)  CYSTOSCOPY (N/A)    Specimen(s):  ID Type Source Tests Collected by Time Destination   1 : Uterus, Cervix, Bilateral Ovaries and Bilateral Fallopian Tubes Tissue Uterus w/Bilateral Ovaries and Fallopian Tubes TISSUE EXAM Dangelo Garcia MD 2020 [id number]        Estimated Blood Loss:   100 mL    Drains:  [REMOVED] Urethral Catheter Non-latex 16 Fr  (Removed)   Number of days: 0       Anesthesia Type:   General    Operative Indications:  GTD (gestational trophoblastic disease) [O369]  51-year-old with morbid obesity, BMI 51 kilograms/meter squared, 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 who is status post 3 cycles of EMA-CO with excellent response, grade 2 stomatitis, majority of her disease in the uterus presents for hysterectomy and bilateral salpingo-oophorectomy as part of curative intent treatment for her GTD    Operative Findings:  1  Exam under anesthesia revealed tissue in the vagina  There was a small amount of vaginal bleeding present  The cervix grossly normal   The uterus was mobile  2  On laparoscopy, there is no evidence of peritoneal disease  The uterus measures approximately 14 weeks in size  There were no adnexal masses  There was a small amount of hemosiderin laden material present on the peritoneal surfaces in the pelvis     3  Mini-laparotomy was necessary to remove the specimen  4  Cystoscopy with bilateral jets and no evidence of bladder injury      Complications:   None    Procedure and Technique:  After informed consent was obtained, the patient was taken to the operating room where general endotracheal anesthesia was administered without incident  She was then prepped and draped in normal sterile fashion in the low dorsal lithotomy position  Examination under anesthesia was then performed with findings noted as above  A speculum was placed in patient's vagina  The anterior lip of the cervix was grasped with a single-tooth tenaculum  The uterus was sounded to 14 cm  The cervix was dilated with Dulcie Pickerel dilators  A 12 cm JADON manipulator was then placed with the koh cup and secured using a 0 Vicryl suture  A Rudd catheter was inserted  Attention was then turned the abdomen  0 25% Marcaine was used to infiltrate the skin prior to placement of any laparoscopic or robotic trocar  The 1st insertion site was approximately 2 cm superior to the umbilicus in the midline  5 mm skin incision was made using 11 blade scalpel  Through this was passed a 5 mm trocar under direct visualization into the abdominal cavity  The abdomen is then insufflated to 15 mmHg using CO2 gas  Findings on laparoscopy are noted as above  Additional trocars then placed under direct visualization  Two robotic trocars placed in left upper quadrant, 1 in the right upper quadrant  The 5 mm trocar in the midline was exchanged for a 8 mm robotic trocar  A 12 mm assistant port was placed in the right upper quadrant  A total of 5 trocars were utilized  The robot was docked  The bilateral retroperitoneal spaces were opened by cauterizing and transecting the round ligaments bilaterally  The ureters were able to be identified coursing normally within the medial leaf of the broad ligaments bilaterally  The infundibulopelvic ligaments were then skeletonized, cauterized and transected bilaterally    The vesicovaginal space was opened  The bladder was taken down below the level of the external os of the cervix using cautery and sharp dissection  The bilateral uterine vessels were then skeletonized, cauterized and transected  The cardinal ligaments were then cauterized and transected  The bilateral uterosacral ligaments were cauterized and transected  A circumferential colpotomy was then performed using monopolar cautery  The JADON and koh cup were removed  A large anchor bag was placed transvaginally and the uterus, cervix, bilateral tubes and ovaries were placed within the bag  The bag was then left in the abdominal cavity  The vagina was then closed using a running 2-0 stratafix suture with excellent hemostasis noted  The pelvis was irrigated  The pressure in the pelvis was brought down to 5 mmHg without evidence of bleeding identified  The pressure was then increased  The robot was undocked  An 8 cm transverse skin incision was made with cutting current cautery approximately 3 finger breaths above the pubic symphysis in the midline  This was taken down to the underlying layer of fascia using cautery  The fascia was skeletonized  The fascia was opened in a transverse fashion  The muscle was cauterized and transected for approximately 1 cm on either side  The peritoneum was then identified and entered  The gas was removed from abdominal cavity  The anchor bag with the specimen and it was brought through the incision  The uterus, cervix, bilateral tubes and ovaries then removed inside the anchor bag  The fascial incision was then closed using a running 1  Looped PDS  The abdomen is then reinsufflated and the incision examined  Everything was found to be intact  The gas was then removed from the abdominal cavity  The ports were then removed under direct visualization  The subcutaneous space was irrigated    Subcutaneous adipose tissue was closed using 2-0 plain suture in interrupted fashion and the skin was closed using 3-0 Monocryl in a subcuticular fashion followed by exofin  The port sites were closed using 4-0 Monocryl followed by exofin  The Rudd catheter was removed  The suction irrigation device was then used to insufflate the bladder with 200 cc of normal saline  The 5 mm laparoscope  was then used as a cystoscope with findings noted as above  The Rudd catheter was then replaced to drain the bladder and then removed  The vagina was inspected  There is no evidence of bleeding identified  The patient was then awakened and transferred to the recovery room in stable condition  All instrument counts correct x2 for the procedure  No complications  Estimated blood loss is 100 mL      I was present for the entire procedure    Patient Disposition:  PACU     SIGNATURE: Shannan Mejia MD  DATE: February 4, 2020  TIME: 6:48 PM

## 2020-02-04 NOTE — ANESTHESIA PROCEDURE NOTES
Arterial Line Insertion  Performed by: Homer Guerrero CRNA  Authorized by: Homer Guerrero CRNA   Consent: Verbal consent obtained  Written consent obtained  Risks and benefits: risks, benefits and alternatives were discussed  Consent given by: patient  Patient understanding: patient states understanding of the procedure being performed  Patient consent: the patient's understanding of the procedure matches consent given  Procedure consent: procedure consent matches procedure scheduled  Relevant documents: relevant documents present and verified  Test results: test results available and properly labeled  Site marked: the operative site was marked  Radiology Images: Radiology Images displayed and confirmed  If images not available, report reviewed  Patient identity confirmed: hospital-assigned identification number, provided demographic data, verbally with patient and arm band  Time out: Immediately prior to procedure a "time out" was called to verify the correct patient, procedure, equipment, support staff and site/side marked as required  Preparation: Patient was prepped and draped in the usual sterile fashion    Indications: hemodynamic monitoring  Orientation:  Left  Location: radial artery  Sedation:  Patient sedated: no    Procedure Details:  Abiel's test normal: yes  Needle gauge: 20  Seldinger technique: Seldinger technique used  Number of attempts: 1    Post-procedure:  Post-procedure: dressing applied  Waveform: good waveform and waveform confirmed  Post-procedure CNS: unchanged  Patient tolerance: Patient tolerated the procedure well with no immediate complications

## 2020-02-04 NOTE — INTERVAL H&P NOTE
H&P reviewed  After examining the patient I find no changes in the patients condition since the H&P had been written      Vitals:    02/04/20 1353   BP: 150/73   Pulse: 81   Resp: 16   Temp: 99 °F (37 2 °C)   SpO2: 99%

## 2020-02-04 NOTE — DISCHARGE INSTRUCTIONS
[facility]  Alisia Kan, and Isiah Lorenz   [phone]    Hysterectomy Discharge Instructions    WHAT YOU NEED TO KNOW:   A hysterectomy is surgery to remove your uterus  Your ovaries, fallopian tubes, cervix, or part of your vagina may also need to be removed  The organs and tissue that will be removed depends on your medical condition  After a hysterectomy, you will not be able to become pregnant  If your ovaries are removed, you will go through menopause if you have not already  DISCHARGE INSTRUCTIONS:   Contact your doctor at the number above if:   · You have a fever over 101o  · You have nausea or are vomiting that does not improve after a light meal    · Your pain is getting worse, even after you take medicine  · You feel pain or burning when you urinate, or you have trouble urinating  · You have pus or a foul-smelling odor coming from your vagina  · Your wound is red, swollen, or draining pus  · You see new or an increased amount of bright red blood coming from your vagina or your incisions  · You have questions or concerns about your condition or care  Seek care immediately:   · Your arm or leg feels warm, tender, and painful  It may look swollen and red  · You have increasing abdominal or pelvic pain  · You have heavy vaginal bleeding that fills 1 or more sanitary pads in 1 hour  Call 911 for any of the following:   · You feel lightheaded, short of breath, and have chest pain  · You cough up blood  Medicines: You may need any of the following:  · Prescription medicine may be given  You may receive a prescription for pain medication or be advised to use over the counter (OTC) pain medication such as acetaminophen (Tylenol) or ibuprofen (Advil, Motrin)  Ask your healthcare provider how to take this medicine safely  · NSAIDs , such as ibuprofen, help decrease swelling, pain, and fever   NSAIDs can cause stomach bleeding or kidney problems in certain people  If you take blood thinner medicine, always ask your healthcare provider if NSAIDs are safe for you  Always read the medicine label and follow directions  · Stool softeners help treat or prevent constipation  · Take your medicine as directed  Contact your healthcare provider if you think your medicine is not helping or if you have side effects  Tell him or her if you are allergic to any medicine  Keep a list of the medicines, vitamins, and herbs you take  Include the amounts, and when and why you take them  Bring the list or the pill bottles to follow-up visits  Carry your medicine list with you in case of an emergency  Activity:   · Rest as needed  Get up and move around as directed to help prevent blood clots  Start with short walks and slowly increase the distance every day  Limit the number of times you climb stairs to 2 times each day for the first week  Plan most of your daily activities on one level of your home  · Do not lift objects heavier than 10 pounds for 6 weeks  Avoid strenuous activity for 2 weeks  · Do not strain during bowel movements  High-fiber foods and extra liquids can help you prevent constipation  Examples of high-fiber foods are fruit and bran  Prune juice and water are good liquids to drink  · Do not have sex, use tampons, or douche for up to 8 weeks  You may shower as soon as the day after surgery  Tub baths can be taken starting 2 weeks after surgery  Do not go into pools or hot tubs until cleared by your doctor  · Ask when it is safe for you to drive  It is generally safe to drive after 2 weeks and when no longer taking prescription pain medication  · Ask when you may return to work and to other regular activities  Wound care: Care for your abdominal incisions as directed  Carefully wash around the wound with soap and water   If you have Hibiclens or medicated soap that you were instructed to use before surgery, you may use that to wash with for up to 2 days after surgery  If not, any mild non-scented, non-abrasive soap is safe  It is okay to let the soap and water run over your incision  Do not scrub your incision  Dry the area and put on new, clean bandages as directed  Change your bandages when they get wet or dirty  If you have strips of medical tape, let them fall off on their own  It may take 7 to 14 days for them to fall off  Check your incision every day for redness, swelling, or pus  Deep breathing: Take deep breaths and cough 10 times each hour  This will decrease your risk for a lung infection  Take a deep breath and hold it for as long as you can  Let the air out and then cough strongly  Deep breaths help open your airway  You may be given an incentive spirometer to help you take deep breaths  Put the plastic piece in your mouth and take a slow, deep breath, then let the air out and cough  Repeat these steps 10 times every hour  Get support: This surgery may be life-changing for you and your family  You will no longer be able to get pregnant  Sudden changes in the levels of your hormones may occur and cause mood swings and depression  You may feel angry, sad, or frightened, or cry frequently and unexpectedly  These feelings are normal  Talk to your healthcare provider about where you can get support  You can also ask if hormone replacement medicine is right for you  Follow up with your healthcare provider or gynecologist as directed: You may need to return to have stitches removed, and for other tests  Write down your questions so you remember to ask them during your visits  © 2017 [employer] Information is for End User's use only and may not be sold, redistributed or otherwise used for commercial purposes  All illustrations and images included in CareNotes® are the copyrighted property of A D A M [employer] Inc  or [employer]    The above information is an educational aid only  It is not intended as medical advice for individual conditions or treatments  Talk to your doctor, nurse or pharmacist before following any medical regimen to see if it is safe and effective for you

## 2020-02-05 VITALS
WEIGHT: 293 LBS | OXYGEN SATURATION: 96 % | TEMPERATURE: 98 F | HEIGHT: 66 IN | SYSTOLIC BLOOD PRESSURE: 116 MMHG | BODY MASS INDEX: 47.09 KG/M2 | HEART RATE: 75 BPM | DIASTOLIC BLOOD PRESSURE: 48 MMHG | RESPIRATION RATE: 18 BRPM

## 2020-02-05 LAB
ANION GAP SERPL CALCULATED.3IONS-SCNC: 4 MMOL/L (ref 4–13)
BASOPHILS # BLD AUTO: 0 THOUSANDS/ΜL (ref 0–0.1)
BASOPHILS # BLD AUTO: 0.01 THOUSANDS/ΜL (ref 0–0.1)
BASOPHILS NFR BLD AUTO: 0 % (ref 0–1)
BASOPHILS NFR BLD AUTO: 1 % (ref 0–1)
BUN SERPL-MCNC: 13 MG/DL (ref 5–25)
CA-I BLD-SCNC: 1.1 MMOL/L (ref 1.12–1.32)
CALCIUM SERPL-MCNC: 8.1 MG/DL (ref 8.3–10.1)
CHLORIDE SERPL-SCNC: 112 MMOL/L (ref 100–108)
CO2 SERPL-SCNC: 24 MMOL/L (ref 21–32)
CREAT SERPL-MCNC: 0.97 MG/DL (ref 0.6–1.3)
EOSINOPHIL # BLD AUTO: 0.01 THOUSAND/ΜL (ref 0–0.61)
EOSINOPHIL # BLD AUTO: 0.03 THOUSAND/ΜL (ref 0–0.61)
EOSINOPHIL NFR BLD AUTO: 1 % (ref 0–6)
EOSINOPHIL NFR BLD AUTO: 1 % (ref 0–6)
ERYTHROCYTE [DISTWIDTH] IN BLOOD BY AUTOMATED COUNT: 14.6 % (ref 11.6–15.1)
ERYTHROCYTE [DISTWIDTH] IN BLOOD BY AUTOMATED COUNT: 14.6 % (ref 11.6–15.1)
ERYTHROCYTE [DISTWIDTH] IN BLOOD BY AUTOMATED COUNT: 14.7 % (ref 11.6–15.1)
GFR SERPL CREATININE-BSD FRML MDRD: 69 ML/MIN/1.73SQ M
GLUCOSE SERPL-MCNC: 165 MG/DL (ref 65–140)
HCT VFR BLD AUTO: 23.5 % (ref 34.8–46.1)
HCT VFR BLD AUTO: 24.5 % (ref 34.8–46.1)
HCT VFR BLD AUTO: 24.6 % (ref 34.8–46.1)
HGB BLD-MCNC: 7.9 G/DL (ref 11.5–15.4)
HGB BLD-MCNC: 7.9 G/DL (ref 11.5–15.4)
HGB BLD-MCNC: 8.1 G/DL (ref 11.5–15.4)
IMM GRANULOCYTES # BLD AUTO: 0.01 THOUSAND/UL (ref 0–0.2)
IMM GRANULOCYTES # BLD AUTO: 0.02 THOUSAND/UL (ref 0–0.2)
IMM GRANULOCYTES NFR BLD AUTO: 1 % (ref 0–2)
IMM GRANULOCYTES NFR BLD AUTO: 1 % (ref 0–2)
LYMPHOCYTES # BLD AUTO: 0.35 THOUSANDS/ΜL (ref 0.6–4.47)
LYMPHOCYTES # BLD AUTO: 0.52 THOUSANDS/ΜL (ref 0.6–4.47)
LYMPHOCYTES NFR BLD AUTO: 18 % (ref 14–44)
LYMPHOCYTES NFR BLD AUTO: 24 % (ref 14–44)
MCH RBC QN AUTO: 28.3 PG (ref 26.8–34.3)
MCH RBC QN AUTO: 29 PG (ref 26.8–34.3)
MCH RBC QN AUTO: 29.2 PG (ref 26.8–34.3)
MCHC RBC AUTO-ENTMCNC: 32.1 G/DL (ref 31.4–37.4)
MCHC RBC AUTO-ENTMCNC: 33.1 G/DL (ref 31.4–37.4)
MCHC RBC AUTO-ENTMCNC: 33.6 G/DL (ref 31.4–37.4)
MCV RBC AUTO: 87 FL (ref 82–98)
MCV RBC AUTO: 88 FL (ref 82–98)
MCV RBC AUTO: 88 FL (ref 82–98)
MONOCYTES # BLD AUTO: 0.22 THOUSAND/ΜL (ref 0.17–1.22)
MONOCYTES # BLD AUTO: 0.37 THOUSAND/ΜL (ref 0.17–1.22)
MONOCYTES NFR BLD AUTO: 12 % (ref 4–12)
MONOCYTES NFR BLD AUTO: 17 % (ref 4–12)
NEUTROPHILS # BLD AUTO: 1.27 THOUSANDS/ΜL (ref 1.85–7.62)
NEUTROPHILS # BLD AUTO: 1.29 THOUSANDS/ΜL (ref 1.85–7.62)
NEUTS SEG NFR BLD AUTO: 57 % (ref 43–75)
NEUTS SEG NFR BLD AUTO: 67 % (ref 43–75)
NRBC BLD AUTO-RTO: 0 /100 WBCS
NRBC BLD AUTO-RTO: 1 /100 WBCS
PLATELET # BLD AUTO: 386 THOUSANDS/UL (ref 149–390)
PLATELET # BLD AUTO: 401 THOUSANDS/UL (ref 149–390)
PLATELET # BLD AUTO: 405 THOUSANDS/UL (ref 149–390)
PMV BLD AUTO: 8.7 FL (ref 8.9–12.7)
PMV BLD AUTO: 8.8 FL (ref 8.9–12.7)
PMV BLD AUTO: 9 FL (ref 8.9–12.7)
POTASSIUM SERPL-SCNC: 4.3 MMOL/L (ref 3.5–5.3)
RBC # BLD AUTO: 2.71 MILLION/UL (ref 3.81–5.12)
RBC # BLD AUTO: 2.79 MILLION/UL (ref 3.81–5.12)
RBC # BLD AUTO: 2.79 MILLION/UL (ref 3.81–5.12)
SODIUM SERPL-SCNC: 140 MMOL/L (ref 136–145)
WBC # BLD AUTO: 1.82 THOUSAND/UL (ref 4.31–10.16)
WBC # BLD AUTO: 1.9 THOUSAND/UL (ref 4.31–10.16)
WBC # BLD AUTO: 2.2 THOUSAND/UL (ref 4.31–10.16)

## 2020-02-05 PROCEDURE — 85025 COMPLETE CBC W/AUTO DIFF WBC: CPT | Performed by: OBSTETRICS & GYNECOLOGY

## 2020-02-05 PROCEDURE — 85027 COMPLETE CBC AUTOMATED: CPT | Performed by: OBSTETRICS & GYNECOLOGY

## 2020-02-05 PROCEDURE — 99024 POSTOP FOLLOW-UP VISIT: CPT | Performed by: OBSTETRICS & GYNECOLOGY

## 2020-02-05 PROCEDURE — 82330 ASSAY OF CALCIUM: CPT | Performed by: OBSTETRICS & GYNECOLOGY

## 2020-02-05 PROCEDURE — 80048 BASIC METABOLIC PNL TOTAL CA: CPT | Performed by: OBSTETRICS & GYNECOLOGY

## 2020-02-05 RX ORDER — POLYETHYLENE GLYCOL 3350 17 G/17G
17 POWDER, FOR SOLUTION ORAL DAILY PRN
Status: DISCONTINUED | OUTPATIENT
Start: 2020-02-05 | End: 2020-02-06 | Stop reason: HOSPADM

## 2020-02-05 RX ORDER — CLINDAMYCIN PHOSPHATE 900 MG/50ML
900 INJECTION INTRAVENOUS ONCE
Status: COMPLETED | OUTPATIENT
Start: 2020-02-05 | End: 2020-02-05

## 2020-02-05 RX ORDER — CALCIUM GLUCONATE 20 MG/ML
1 INJECTION, SOLUTION INTRAVENOUS ONCE
Status: COMPLETED | OUTPATIENT
Start: 2020-02-05 | End: 2020-02-05

## 2020-02-05 RX ADMIN — POLYETHYLENE GLYCOL 3350 17 G: 17 POWDER, FOR SOLUTION ORAL at 17:26

## 2020-02-05 RX ADMIN — SENNOSIDES 8.6 MG: 8.6 TABLET, FILM COATED ORAL at 21:36

## 2020-02-05 RX ADMIN — DEXTROSE, SODIUM CHLORIDE, AND POTASSIUM CHLORIDE 125 ML/HR: 5; .9; .15 INJECTION INTRAVENOUS at 21:37

## 2020-02-05 RX ADMIN — ENOXAPARIN SODIUM 40 MG: 40 INJECTION SUBCUTANEOUS at 08:34

## 2020-02-05 RX ADMIN — ACETAMINOPHEN 975 MG: 325 TABLET ORAL at 05:42

## 2020-02-05 RX ADMIN — OXYCODONE HYDROCHLORIDE 10 MG: 5 TABLET ORAL at 08:43

## 2020-02-05 RX ADMIN — DEXTROSE, SODIUM CHLORIDE, AND POTASSIUM CHLORIDE 125 ML/HR: 5; .9; .15 INJECTION INTRAVENOUS at 14:03

## 2020-02-05 RX ADMIN — GENTAMICIN SULFATE 130 MG: 40 INJECTION, SOLUTION INTRAMUSCULAR; INTRAVENOUS at 12:00

## 2020-02-05 RX ADMIN — ACETAMINOPHEN 975 MG: 325 TABLET ORAL at 21:36

## 2020-02-05 RX ADMIN — CLINDAMYCIN PHOSPHATE 900 MG: 900 INJECTION, SOLUTION INTRAVENOUS at 11:00

## 2020-02-05 RX ADMIN — OXYCODONE HYDROCHLORIDE 10 MG: 5 TABLET ORAL at 12:50

## 2020-02-05 RX ADMIN — OXYCODONE HYDROCHLORIDE 10 MG: 5 TABLET ORAL at 04:17

## 2020-02-05 RX ADMIN — ESCITALOPRAM OXALATE 10 MG: 10 TABLET ORAL at 08:34

## 2020-02-05 RX ADMIN — CALCIUM GLUCONATE 1 G: 20 INJECTION, SOLUTION INTRAVENOUS at 09:27

## 2020-02-05 RX ADMIN — OXYCODONE HYDROCHLORIDE 10 MG: 5 TABLET ORAL at 17:26

## 2020-02-05 RX ADMIN — DOCUSATE SODIUM 100 MG: 100 CAPSULE, LIQUID FILLED ORAL at 08:34

## 2020-02-05 RX ADMIN — DOCUSATE SODIUM 100 MG: 100 CAPSULE, LIQUID FILLED ORAL at 17:27

## 2020-02-05 NOTE — SOCIAL WORK
CM reviewed role with pt  Pt reported her brother, Gabe Rojas, as her emergency contact   Pt reported that she lives in a 2nd floor apartment home with 20 steps with railings to enter her apartment from the outside  Pt reported that she was IPTA with [employer]  Pt denied inpatient PT/OT, inpatient MH and substance abuse treatment  PT also denied [address] services  Pt reported [facility] [place] as her pharmacy of choice  Pt reported Dr Maryjane Cornejo as her PCP  Pt does not have any DME in the home  Pt is employed and is able to drive herself  CM reviewed d/c planning process including the following: identifying help at home, patient preference for d/c planning needs, Discharge Lounge, [facility] Meds to Bed program, availability of treatment team to discuss questions or concerns patient and/or family may have regarding understanding medications and recognizing signs and symptoms once discharged  CM also encouraged patient to follow up with all recommended appointments after discharge  Patient advised of importance for patient and family to participate in managing patients medical well being     ~ I have read and agree with the above documentation    Celestine Roberts, MSW

## 2020-02-05 NOTE — ANESTHESIA POSTPROCEDURE EVALUATION
Post-Op Assessment Note    CV Status:  Stable  Pain Score: 0    Pain management: adequate     Mental Status:  Alert and awake   Hydration Status:  Euvolemic   PONV Controlled:  Controlled   Airway Patency:  Patent   Post Op Vitals Reviewed: Yes      Staff: CRNA           BP   119/67   Temp 98 9 °F (37 2 °C) (02/04/20 1858)    Pulse 98 (02/04/20 1858)   Resp 18 (02/04/20 1858)    SpO2 96 % (02/04/20 1858) on RA

## 2020-02-05 NOTE — PERIOPERATIVE NURSING NOTE
As per Whit Núñez, p9 charge nurse aware that this patient needs her port de-accessed tonight  p9 to send a port certified nurse to de-access port sometime tonight as per patria jeter  Will continue to monitor

## 2020-02-05 NOTE — PROGRESS NOTES
Plan for continued inpatient observation in the setting of leukopenia secondary to ongoing chemotherapy, recovering well postoperatively    Will recheck CBC, HCG in AM   Can consider Granix if leukopenia, low absolute neutropenia count persist       Colt Joy DO

## 2020-02-05 NOTE — PROGRESS NOTES
GYN-ONC Progress Note  Adelaide Crowder  [id number]  /-01  2/5/2020  6:08 AM    ASSESS:    PLAN:    Dispo:     PPx: SCDs, ***  FEN: *** diet, replete lytes prn, D5 1/2NS + 20KCL  Pain mgmnt: ***  Invasive lines:   Code status:  ____________________    SUBJECTIVE  History of Present Illness:  Overnight: ***  Pain: {yes no:21666::"no"}  Tolerating Oral Intake: {yes no:21665::"yes"}   Voiding: {yes no:21665::"yes"}  Flatus: {yes no:21665::"yes"}  Bowel Movement: {yes no:21666::"no"}  Ambulating: {yes no:21665::"yes"}  Vaginal Bleeding: {yes no:21665::"yes"}  Pelvic Pain: {yes no:21665::"yes"}  Chest Pain: {yes no:21666::"no"}  Shortness of Breath: {yes no:21666::"no"}  Leg Pain/Discomfort: {yes no:21666::"no"}  Other: ***    OBJECTIVE  Vitals  Temp:  [98 2 °F (36 8 °C)-99 °F (37 2 °C)] 98 6 °F (37 °C)  HR:  [78-98] 78  Resp:  [16-20] 18  BP: (119-165)/(67-85) 165/85       Intake/Output Summary (Last 24 hours) at 2/5/2020 1238  Last data filed at 2/4/2020 2135  Gross per 24 hour   Intake 2410 ml   Output 300 ml   Net 2110 ml       Physical Exam:   General: Appears well, no acute distress  CV: RRR, no murmurs, carotid and peripheral pulses palpable  Resp: CTAB, nonlabored breathing  Abd: normal bowel sounds, soft, no tenderness, no distention  Incision C/D/I  : ***  Ext: No edema, nontender  SCDs on  Neuro: no focal findings  Alert and oriented x 3, cooperative, pleasant mood      Labs:   Recent Results (from the past 72 hour(s))   Type and screen    Collection Time: 02/04/20  1:46 PM   Result Value Ref Range    ABO Grouping B     Rh Factor Positive     Antibody Screen Negative     Specimen Expiration Date [id number]    POCT pregnancy, urine    Collection Time: 02/04/20  2:04 PM   Result Value Ref Range    EXT Preg Test, Ur Positive (A) Negative    Control Valid Valid   Fingerstick Glucose (POCT)    Collection Time: 02/04/20  7:04 PM   Result Value Ref Range    POC Glucose 134 65 - 140 mg/dl   Hepatitis B surface antigen    Collection Time: 02/04/20  7:12 PM   Result Value Ref Range    Hepatitis B Surface Ag Non-reactive Non-reactive, NonReactive - Confirmed   Hepatitis C antibody    Collection Time: 02/04/20  7:12 PM   Result Value Ref Range    Hepatitis C Ab Non-reactive Non-reactive   Rapid HIV 1/2 AB-AG Combo    Collection Time: 02/04/20  7:12 PM   Result Value Ref Range    Rapid HIV 1 AND 2 Non-Reactive Non-Reactive    HIV-1 P24 Ag Screen Non-Reactive Non-Reactive       Meds:  Scheduled Meds:  Current Facility-Administered Medications:  acetaminophen 975 mg Oral Q8H [address] [facility]    al mag oxide-diphenhydramine-lidocaine viscous 10 mL Swish [employer] Spit Q4H PRN [facility], DO    albuterol 2 puff Inhalation Q4H PRN [facility], DO    dextrose 5 % and sodium chloride 0 9 % with KCl 20 mEq/L 125 mL/hr Intravenous Continuous [facility], DO Last Rate: 125 mL/hr (02/04/20 2009)   docusate sodium 100 mg Oral BID [facility], DO    enoxaparin 40 mg Subcutaneous Daily [facility], DO    escitalopram 10 mg Oral Daily [facility], DO    HYDROmorphone 0 5 mg Intravenous Q1H PRN [facility] Za, DO    ibuprofen 600 mg Oral Q6H PRN [facility], DO    LORazepam 1 mg Oral Q8H PRN [facility], DO    ondansetron 4 mg Intravenous Q6H PRN [facility], DO    oxyCODONE 10 mg Oral Q4H PRN [facility], DO    oxyCODONE 5 mg Oral Q4H PRN [facility], DO    senna 1 tablet Oral HS [facility], DO      Continuous Infusions:  dextrose 5 % and sodium chloride 0 9 % with KCl 20 mEq/L 125 mL/hr Last Rate: 125 mL/hr (02/04/20 2009)     PRN Meds: al mag oxide-diphenhydramine-lidocaine viscous    albuterol    HYDROmorphone    ibuprofen    LORazepam    ondansetron    oxyCODONE    oxyCODONE    Imaging Studies: {Results Review Statement:82943}    EKG, Pathology, Microbiology, and Other Studies: {Results Review Statement:94355}    __________________    Signature / Title: Nadeem Ogden DO, Ob/Gyn, PGY-3  Date: 2/5/2020  Time: 6:08 AM

## 2020-02-05 NOTE — PROGRESS NOTES
Gyn/ Onc Post-Op Note    Procedure:   Procedure(s):  ROBOTIC ASSISTED TOTAL LAPAROSCOPIC HYSTERECTOMY, BILATERAL SALPINGO-OOPHORECTOMY  MINI-LAPAROTOMY (FOR SPECIMEN REMOVAL)  CYSTOSCOPY    Subjective:   Patient is doing well postop  Pain is well controlled with PACU meds that were already given  She states some nausea post-op but no vomiting  She walked easily from the stretcher to her bed and is sitting up in chair presently  There is some generalized abdominal soreness, but admits to no localized pain  She feels the urge to void but has not done so yet- states she will attempt to void again soon  Objective:   Vitals:  Vitals:    02/04/20 2023   BP: 134/76   Pulse: 85   Resp: 18   Temp: 98 2 °F (36 8 °C)   SpO2: 96%       I/O this shift:  In: 120 [P O :120]  Out: -     Physical Exam:  General: Alert and oriented, sitting up in chair and conversing well in NAD  CV: Heart is regular rate and rhythm, S1 and S2, no murmurs or gallops  Resp: Lungs are clear to auscultation bilaterally without rales or wheezes  Abd: Soft and obese, nontender with few BS audible, there is generalized abdominal incisional soreness to palpation  Ext: Bilateral SCD sleeves on calves bilaterally  No calf tenderness           Vitals:  BP      Temp      Pulse     Resp      SpO2        I/Os:  I/O this shift:  In: 120 [P O :120]  Out: -     Lab Results and Cultures:   CBC with diff:   Lab Results   Component Value Date    WBC 2 4 (L) 01/27/2020    HGB 11 3 (L) 01/27/2020    HCT 34 2 (L) 01/27/2020    MCV 87 5 01/27/2020     01/27/2020    MCH 28 9 01/27/2020    MCHC 33 0 01/27/2020    RDW 15 1 (H) 01/27/2020    MPV 9 4 01/21/2020    NRBC 1 01/21/2020   ,   BMP/CMP:  Lab Results   Component Value Date    K 4 0 01/27/2020     01/27/2020    CO2 27 01/27/2020    BUN 24 01/27/2020    CREATININE 1 32 (H) 01/27/2020    CREATININE 0 78 01/08/2020    CALCIUM 9 5 01/27/2020    AST 12 01/27/2020    ALT 13 01/27/2020    ALKPHOS 65 01/27/2020    EGFR 90 01/08/2020   ,   Lipid Panel: No results found for: CHOL,   Coags:   Lab Results   Component Value Date    PTT 29 12/25/2019    INR 1 09 01/31/2020   ,     Current Medications:  Current Facility-Administered Medications   Medication Dose Route Frequency    acetaminophen (TYLENOL) tablet 975 mg  975 mg Oral Q8H [facility]    al mag oxide-diphenhydramine-lidocaine viscous (MAGIC MOUTHWASH) suspension 10 mL  10 mL Swish & Spit Q4H PRN    albuterol (PROVENTIL HFA,VENTOLIN HFA) inhaler 2 puff  2 puff Inhalation Q4H PRN    dextrose 5 % and sodium chloride 0 9 % with KCl 20 mEq/L infusion (premix)  125 mL/hr Intravenous Continuous    docusate sodium (COLACE) capsule 100 mg  100 mg Oral BID    [START ON 2/5/2020] enoxaparin (LOVENOX) subcutaneous injection 40 mg  40 mg Subcutaneous Daily    [START ON 2/5/2020] escitalopram (LEXAPRO) tablet 10 mg  10 mg Oral Daily    HYDROmorphone (DILAUDID) injection 0 5 mg  0 5 mg Intravenous Q1H PRN    ibuprofen (MOTRIN) tablet 600 mg  600 mg Oral Q6H PRN    LORazepam (ATIVAN) tablet 1 mg  1 mg Oral Q8H PRN    ondansetron (ZOFRAN) injection 4 mg  4 mg Intravenous Q6H PRN    oxyCODONE (ROXICODONE) IR tablet 10 mg  10 mg Oral Q4H PRN    oxyCODONE (ROXICODONE) IR tablet 5 mg  5 mg Oral Q4H PRN    senna (SENOKOT) tablet 8 6 mg  1 tablet Oral HS       Assessment/Plan:   Chan Becker is a 52 y o  female s/p RA TLH/ BSO with mini-lap and Cystoscopy  2/4/2020    Pain: Pain well controlled on pain medications ordered  Diet: Regular but dental soft secondary to mouth sores  Activity: OOB as tolerates- presently in chair    Post-op Nausea- Zofran ordered and to be given  Await AM labs        Ryder Tesfaye PA-C  2/4/2020 2122

## 2020-02-05 NOTE — UTILIZATION REVIEW
Initial Clinical Review    Admission Orders: Date/Time/Statement:  OUTPATIENT ELECTIVE SURGERY OCCURRED 2/4/20, BED REQUEST PLACED FOR POSTOP STAY 2/4 @1905, OUTPATIENT NO CHARGE BED ENTERED 2/5/20 @0919, CONVERTED TO INPATIENT ADMISSION 2/5/20 @1234 DUE TO CONTINUED STAY REQUIRED TO CARE FOR PATIENT WITH HIGH RISK GESTATIONAL TROPHOBLASTIC DISEASE UNDERGOING CHEMOTHERAPY, POD#1 ROBOT ASSISTED TLH, BSO, CYSTO  NEEDS H&H MONITORING DUE TO ANEMIA  Admission Orders (From admission, onward)     Ordered        02/05/20 1234  Inpatient Admission  Once                   Orders Placed This Encounter   Procedures    Inpatient Admission     Standing Status:   Standing     Number of Occurrences:   1     Order Specific Question:   Admitting Physician     Answer:   Liane Jiang [[id number]]     Order Specific Question:   Level of Care     Answer:   Med Surg [[id number]]     Order Specific Question:   Estimated length of stay     Answer:   More than 2 Midnights     Order Specific Question:   Certification     Answer:   I certify that inpatient services are medically necessary for this patient for a duration of greater than two midnights  See H&P and MD Progress Notes for additional information about the patient's course of treatment  Elective outpatient surgical procedure       Case reviewed postop per Coding team, confirmed outpatient procedure  Age/Sex: 52 y o  female w/hx GTD (gestational trophoblastic disease) [O369]  55-year-old with morbid obesity, BMI 46 kilograms/meter squared, 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 who is status post 3 cycles of EMA-CO with excellent response, grade 2 stomatitis, majority of her disease in the uterus presents for hysterectomy and bilateral salpingo-oophorectomy as part of curative intent treatment for her GTD; undergoing chemo     Surgery Date: 2/4/20  Procedure: ROBOTIC ASSISTED TOTAL LAPAROSCOPIC HYSTERECTOMY, BILATERAL SALPINGO-OOPHORECTOMY   MINI-LAPAROTOMY (FOR SPECIMEN REMOVAL)   CYSTOSCOPY   Anesthesia: general  Operative Findings:   1  Exam under anesthesia revealed tissue in the vagina  There was a small amount of vaginal bleeding present  The cervix grossly normal   The uterus was mobile  2  On laparoscopy, there is no evidence of peritoneal disease  The uterus measures approximately 14 weeks in size  There were no adnexal masses  There was a small amount of hemosiderin laden material present on the peritoneal surfaces in the pelvis  3  Mini-laparotomy was necessary to remove the specimen  4  Cystoscopy with bilateral jets and no evidence of bladder injury      POD#1 Progress Note: 2/5: did not sleep well, c/o ongoing dry mouth in setting of stomatitis  No acute events overnight  4/10 pain at mini lap and trocar sites-tylenol in progress  Ate some applesauce  She is leukopenic, likely from chemo  Checking ANC, considering granix  She will need hcg followed until it is undetectable and will need future admissions for ongoing EMA-CO treatment  hgb dropped to 7 9 this am, with repeat at 8 1 a few hours later  Will remain in house overnight  She will need further chemo after recovery, repeat CBC has been sent  Topical therapy is required for stomatitis  ANC count to be monitored         Vital Signs: /72   Pulse 73   Temp 98 4 °F (36 9 °C)   Resp 16   Ht 5' 6" (1 676 m)   Wt 135 kg (298 lb)   SpO2 96%   BMI 48 10 kg/m²    Date/Time  Temp  Pulse  Resp  BP  MAP (mmHg)  SpO2  O2 Device    02/05/20 0700  98 4 °F (36 9 °C)  73  16  120/72    96 %      02/04/20 2342  98 6 °F (37 °C)  78  18  165/85    97 %  None (Room air)    02/04/20 2023  98 2 °F (36 8 °C)  85  18  134/76  98  96 %  None (Room air)    02/04/20 2012        136/74          02/04/20 2000    84  18  136/74    99 %  Nasal cannula    02/04/20 1958        02/04/20 1945    86  18  131/76    99 %  Nasal cannula    02/04/20 1930    82  18  133/81    98 %  Nasal cannula 02/04/20 1929                  02/04/20 1915    86  18  132/78    98 %  Nasal cannula    02/04/20 1900    96  20  119/67    96 %  Nasal cannula          Results from last 7 days   Lab Units 02/05/20  1128 02/05/20  0808 02/05/20  0535   WBC Thousand/uL 2 20* 1 90* 1 82*   HEMOGLOBIN g/dL 7 9* 8 1* 7 9*   HEMATOCRIT % 24 6* 24 5* 23 5*   PLATELETS Thousands/uL 405* 401* 386   NEUTROS ABS Thousands/µL 1 27* 1 29*  --      Results from last 7 days   Lab Units 02/05/20  0710 02/05/20  0535   SODIUM mmol/L  --  140   POTASSIUM mmol/L  --  4 3   CHLORIDE mmol/L  --  112*   CO2 mmol/L  --  24   ANION GAP mmol/L  --  4   BUN mg/dL  --  13   CREATININE mg/dL  --  0 97   EGFR ml/min/1 73sq m  --  69   CALCIUM mg/dL  --  8 1*   CALCIUM, IONIZED mmol/L 1 10*  --      Results from last 7 days   Lab Units 02/04/20  1904   POC GLUCOSE mg/dl 134     Results from last 7 days   Lab Units 02/05/20  0535   GLUCOSE RANDOM mg/dL 165*     Results from last 7 days   Lab Units 01/31/20  1412   HEMOGLOBIN A1C % 6 1   EAG mg/dl 128     Results from last 7 days   Lab Units 01/31/20  1412   PROTIME seconds 13 7   INR  1 09     Results from last 7 days   Lab Units 02/04/20  1912   HEP B S AG  Non-reactive   HEP C AB  Non-reactive       Diet: house  Mobility: up w/assist  DVT Prophylaxis: scd's, ambulatory, SQ lovenox  Medications/Pain Control:     Medications:  acetaminophen 975 mg Oral Q8H [address]   docusate sodium 100 mg Oral BID   enoxaparin 40 mg Subcutaneous Daily   escitalopram 10 mg Oral Daily   senna 1 tablet Oral HS   IV ca gluconate x 1 on 2/5 @0927    Continuous IV Infusions:    dextrose 5 % and sodium chloride 0 9 % with KCl 20 mEq/L 125 mL/hr Intravenous Continuous   lactated ringers infusion   Rate: 125 mL/hr Dose: 125 mL/hr  Freq: Continuous Route: IV  Last Dose: Stopped (02/05/20 0838)  Start: 02/04/20 1300 End: 02/04/20 2022    PRN Meds:  al mag oxide-diphenhydramine-lidocaine viscous 10 mL Swish & Spit Q4H PRN albuterol 2 puff Inhalation Q4H PRN   HYDROmorphone 0 5 mg Intravenous Q1H PRN   ibuprofen 600 mg Oral Q6H PRN   LORazepam 1 mg Oral Q8H PRN   ondansetron 4 mg Intravenous Q6H PRN x 1 on 2/4   oxyCODONE 10 mg Oral Q4H PRN x 2 on 2/5   oxyCODONE 5 mg Oral Q4H PRN         Network Utilization Review Department  [email or web address]  ATTENTION: Please call with any questions or concerns to [phone] and carefully listen to the prompts so that you are directed to the right person  All voicemails are confidential   Prachi Ramirez all requests for admission clinical reviews, approved or denied determinations and any other requests to dedicated fax number below belonging to the campus where the patient is receiving treatment   List of dedicated fax numbers for the Facilities:  [address] DENIALS (Administrative/Medical Necessity) [phone]   [address] (Maternity/NICU/Pediatrics) [phone]   Abelino Bland [phone]   [facility] [phone]   [address] [place] [address] [phone]   [address]  [phone]   [address] [facility] [address] [place] [address] [phone]   [facility]  [phone]   [address] [facility] [address]  [address] [facility] [address] [facility] [phone]

## 2020-02-05 NOTE — PERIOPERATIVE NURSING NOTE
ms5 rn aware that p9 port certified nurse to de-access port tonight once patient is in her room  ms5 rn ok with plan  Will continue to monitor

## 2020-02-05 NOTE — MALNUTRITION/BMI
This medical record reflects one or more clinical indicators suggestive of malnutrition and/or morbid obesity  Malnutrition Findings:              BMI Findings:  BMI Classifications: Morbid Obesity 45-49 9     Body mass index is 48 1 kg/m²  See Nutrition note dated 2/5/20 for additional details  Completed nutrition assessment is viewable in the nutrition documentation

## 2020-02-05 NOTE — PERIOPERATIVE NURSING NOTE
This nurse touched base with p9 charge nurse regarding de-accessing this patient's port  As per p9 charge nurse, "I'm in charge and in an assignment  I will get there when I get there "  ms5 rn, kevin, rn, made aware by this rn  Will continue to monitor

## 2020-02-05 NOTE — PLAN OF CARE
Problem: PAIN - ADULT  Goal: Verbalizes/displays adequate comfort level or baseline comfort level  Description  Interventions:  - Encourage patient to monitor pain and request assistance  - Assess pain using appropriate pain scale  - Administer analgesics based on type and severity of pain and evaluate response  - Implement non-pharmacological measures as appropriate and evaluate response  - Consider cultural and social influences on pain and pain management  - Notify physician/advanced practitioner if interventions unsuccessful or patient reports new pain  Outcome: Progressing     Problem: INFECTION - ADULT  Goal: Absence or prevention of progression during hospitalization  Description  INTERVENTIONS:  - Assess and monitor for signs and symptoms of infection  - Monitor lab/diagnostic results  - Monitor all insertion sites, i e  indwelling lines, tubes, and drains  - Monitor endotracheal if appropriate and nasal secretions for changes in amount and color  - Mobile appropriate cooling/warming therapies per order  - Administer medications as ordered  - Instruct and encourage patient and family to use good hand hygiene technique  - Identify and instruct in appropriate isolation precautions for identified infection/condition  Outcome: Progressing  Goal: Absence of fever/infection during neutropenic period  Description  INTERVENTIONS:  - Monitor WBC    Outcome: Progressing     Problem: DISCHARGE PLANNING  Goal: Discharge to home or other facility with appropriate resources  Description  INTERVENTIONS:  - Identify barriers to discharge w/patient and caregiver  - Arrange for needed discharge resources and transportation as appropriate  - Identify discharge learning needs (meds, wound care, etc )  - Arrange for interpretive services to assist at discharge as needed  - Refer to Case Management Department for coordinating discharge planning if the patient needs post-hospital services based on physician/advanced practitioner order or complex needs related to functional status, cognitive ability, or social support system  Outcome: Progressing     Problem: Knowledge Deficit  Goal: Patient/family/caregiver demonstrates understanding of disease process, treatment plan, medications, and discharge instructions  Description  Complete learning assessment and assess knowledge base    Interventions:  - Provide teaching at level of understanding  - Provide teaching via preferred learning methods  Outcome: Progressing

## 2020-02-05 NOTE — PROGRESS NOTES
GYN-ONC Progress Note  Casandra Lott  [id number]  /-64  2/5/2020  6:08 AM    ASSESS:  68-year-old with high risk gestational trophoblastic disease, undergoing curative intent chemotherapy with EMA-CO with excellent response, now postoperative day 1 status post robotic assisted total laparoscopic hysterectomy, bilateral salpingo oophorectomy, mini laparotomy for specimen extraction, cystoscopy, stable    PLAN:    1  High risk gestational trophoblastic disease:  Now status post surgery, continuing to follow hCG until undetectable, plan for future hospital admission for ongoing EMA-CO treatment  2  Asthma: home albuterol    3  Anxiety and Depression: home ativan and lexapro    4  Stomatitis: secondary to chemotherapy, continued use of magic mouth wash    5  Leukopenia: WBC 8 02-->1 82, likely secondary to ongoing chemotherapy, will check ANC, consider Granix    6   Anemia: Hgb 10 9-->7 9, follow-up noon CBC     Dispo: inpatient    PPx: SCDs, lovenox  FEN: regular diet, replete lytes prn, D5 1/2NS + 20KCL  Pain mgmnt: tylenol atc, motrin prn, oxy 5/10, dilaudid BKT  Invasive lines: Port  Code status: Full  ____________________    SUBJECTIVE  History of Present Illness:  Overnight:  Denies any acute events, states she did not sleep well but she anticipates that when staying in the hospital   Reports ongoing dry mouth, using magic mouth wash  Pain: reports 4/10 pain, focused at mini lap site and trocar incisions, taking tylenol  Tolerating Oral Intake: ate applesauce overnight  Voiding: yes  Flatus: yes  Bowel Movement: no  Ambulating: no  Vaginal Bleeding: no  Pelvic Pain: no  Chest Pain: no  Shortness of Breath: no  Leg Pain/Discomfort: no    OBJECTIVE  Vitals  Temp:  [98 2 °F (36 8 °C)-99 °F (37 2 °C)] 98 6 °F (37 °C)  HR:  [78-98] 78  Resp:  [16-20] 18  BP: (119-165)/(67-85) 165/85       Intake/Output Summary (Last 24 hours) at 2/5/2020 0505  Last data filed at 2/4/2020 2135  Gross per 24 hour   Intake 2410 ml   Output 300 ml   Net 2110 ml       Physical Exam:   Physical Exam   Constitutional: She is oriented to person, place, and time  She appears well-developed and well-nourished  No distress  Cardiovascular: Normal rate, regular rhythm, normal heart sounds and intact distal pulses  Pulmonary/Chest: Effort normal and breath sounds normal  No stridor  No respiratory distress  Abdominal: Soft  Bowel sounds are normal  She exhibits no distension  There is no tenderness  Mini laparotomy, trocar sites clean, dry, and intact    Neurological: She is alert and oriented to person, place, and time  Skin: Skin is warm and dry  She is not diaphoretic  Psychiatric: She has a normal mood and affect   Her behavior is normal          Labs:   Recent Results (from the past 72 hour(s))   Type and screen    Collection Time: 02/04/20  1:46 PM   Result Value Ref Range    ABO Grouping B     Rh Factor Positive     Antibody Screen Negative     Specimen Expiration Date [id number]    POCT pregnancy, urine    Collection Time: 02/04/20  2:04 PM   Result Value Ref Range    EXT Preg Test, Ur Positive (A) Negative    Control Valid Valid   Fingerstick Glucose (POCT)    Collection Time: 02/04/20  7:04 PM   Result Value Ref Range    POC Glucose 134 65 - 140 mg/dl   Hepatitis B surface antigen    Collection Time: 02/04/20  7:12 PM   Result Value Ref Range    Hepatitis B Surface Ag Non-reactive Non-reactive, NonReactive - Confirmed   Hepatitis C antibody    Collection Time: 02/04/20  7:12 PM   Result Value Ref Range    Hepatitis C Ab Non-reactive Non-reactive   Rapid HIV 1/2 AB-AG Combo    Collection Time: 02/04/20  7:12 PM   Result Value Ref Range    Rapid HIV 1 AND 2 Non-Reactive Non-Reactive    HIV-1 P24 Ag Screen Non-Reactive Non-Reactive       Meds:  Scheduled Meds:  Current Facility-Administered Medications:  acetaminophen 975 mg Oral Q8H [facility] & alf Joaquin Joy DO    al mag oxide-diphenhydramine-lidocaine viscous 10 mL Swish & Spit Q4H PRN Valentine Cain, DO    albuterol 2 puff Inhalation Q4H PRN [facility] Zabo, DO    dextrose 5 % and sodium chloride 0 9 % with KCl 20 mEq/L 125 mL/hr Intravenous Continuous Valentine Cain DO Last Rate: 125 mL/hr (02/04/20 2009)   docusate sodium 100 mg Oral BID [facility]  Zabo, DO    enoxaparin 40 mg Subcutaneous Daily [facility] Zabo, DO    escitalopram 10 mg Oral Daily [facility] Zabo, DO    HYDROmorphone 0 5 mg Intravenous Q1H PRN [facility] Zabo, DO    ibuprofen 600 mg Oral Q6H PRN [facility] Zabo, DO    LORazepam 1 mg Oral Q8H PRN [facility] Zabo, DO    ondansetron 4 mg Intravenous Q6H PRN [facility] Zabo, DO    oxyCODONE 10 mg Oral Q4H PRN [facility] Zabo, DO    oxyCODONE 5 mg Oral Q4H PRN [facility]  Zabo, DO    senna 1 tablet Oral HS [facility] Zabo, DO      Continuous Infusions:  dextrose 5 % and sodium chloride 0 9 % with KCl 20 mEq/L 125 mL/hr Last Rate: 125 mL/hr (02/04/20 2009)     PRN Meds: al mag oxide-diphenhydramine-lidocaine viscous    albuterol    HYDROmorphone    ibuprofen    LORazepam    ondansetron    oxyCODONE    oxyCODONE    Imaging Studies: I have personally reviewed pertinent reports  EKG, Pathology, Microbiology, and Other Studies: I have personally reviewed pertinent reports        __________________    Signature / Title: Valentine Cain DO, Ob/Gyn, PGY-3  Date: 2/5/2020  Time: 6:08 AM

## 2020-02-06 PROBLEM — D64.81 ANTINEOPLASTIC CHEMOTHERAPY INDUCED ANEMIA: Status: ACTIVE | Noted: 2020-02-06

## 2020-02-06 PROBLEM — T45.1X5A ANTINEOPLASTIC CHEMOTHERAPY INDUCED ANEMIA: Status: ACTIVE | Noted: 2020-02-06

## 2020-02-06 LAB
ANION GAP SERPL CALCULATED.3IONS-SCNC: 5 MMOL/L (ref 4–13)
B-HCG SERPL-ACNC: 45 MIU/ML
BASOPHILS # BLD AUTO: 0.01 THOUSANDS/ΜL (ref 0–0.1)
BASOPHILS NFR BLD AUTO: 1 % (ref 0–1)
BUN SERPL-MCNC: 10 MG/DL (ref 5–25)
CA-I BLD-SCNC: 1.1 MMOL/L (ref 1.12–1.32)
CALCIUM SERPL-MCNC: 8 MG/DL (ref 8.3–10.1)
CHLORIDE SERPL-SCNC: 115 MMOL/L (ref 100–108)
CO2 SERPL-SCNC: 24 MMOL/L (ref 21–32)
CREAT SERPL-MCNC: 0.89 MG/DL (ref 0.6–1.3)
EOSINOPHIL # BLD AUTO: 0.06 THOUSAND/ΜL (ref 0–0.61)
EOSINOPHIL NFR BLD AUTO: 3 % (ref 0–6)
ERYTHROCYTE [DISTWIDTH] IN BLOOD BY AUTOMATED COUNT: 15.9 % (ref 11.6–15.1)
GFR SERPL CREATININE-BSD FRML MDRD: 76 ML/MIN/1.73SQ M
GLUCOSE SERPL-MCNC: 106 MG/DL (ref 65–140)
HCT VFR BLD AUTO: 22.7 % (ref 34.8–46.1)
HGB BLD-MCNC: 7.4 G/DL (ref 11.5–15.4)
IMM GRANULOCYTES # BLD AUTO: 0.01 THOUSAND/UL (ref 0–0.2)
IMM GRANULOCYTES NFR BLD AUTO: 1 % (ref 0–2)
LYMPHOCYTES # BLD AUTO: 0.86 THOUSANDS/ΜL (ref 0.6–4.47)
LYMPHOCYTES NFR BLD AUTO: 40 % (ref 14–44)
MAGNESIUM SERPL-MCNC: 2.1 MG/DL (ref 1.6–2.6)
MCH RBC QN AUTO: 29.2 PG (ref 26.8–34.3)
MCHC RBC AUTO-ENTMCNC: 32.6 G/DL (ref 31.4–37.4)
MCV RBC AUTO: 90 FL (ref 82–98)
MONOCYTES # BLD AUTO: 0.32 THOUSAND/ΜL (ref 0.17–1.22)
MONOCYTES NFR BLD AUTO: 15 % (ref 4–12)
NEUTROPHILS # BLD AUTO: 0.83 THOUSANDS/ΜL (ref 1.85–7.62)
NEUTS SEG NFR BLD AUTO: 40 % (ref 43–75)
NRBC BLD AUTO-RTO: 0 /100 WBCS
PLATELET # BLD AUTO: 393 THOUSANDS/UL (ref 149–390)
PMV BLD AUTO: 8.9 FL (ref 8.9–12.7)
POTASSIUM SERPL-SCNC: 4 MMOL/L (ref 3.5–5.3)
RBC # BLD AUTO: 2.53 MILLION/UL (ref 3.81–5.12)
SODIUM SERPL-SCNC: 144 MMOL/L (ref 136–145)
WBC # BLD AUTO: 2.09 THOUSAND/UL (ref 4.31–10.16)

## 2020-02-06 PROCEDURE — 82330 ASSAY OF CALCIUM: CPT | Performed by: OBSTETRICS & GYNECOLOGY

## 2020-02-06 PROCEDURE — 84702 CHORIONIC GONADOTROPIN TEST: CPT | Performed by: OBSTETRICS & GYNECOLOGY

## 2020-02-06 PROCEDURE — 99024 POSTOP FOLLOW-UP VISIT: CPT | Performed by: OBSTETRICS & GYNECOLOGY

## 2020-02-06 PROCEDURE — 80048 BASIC METABOLIC PNL TOTAL CA: CPT | Performed by: OBSTETRICS & GYNECOLOGY

## 2020-02-06 PROCEDURE — 85025 COMPLETE CBC W/AUTO DIFF WBC: CPT | Performed by: OBSTETRICS & GYNECOLOGY

## 2020-02-06 PROCEDURE — 83735 ASSAY OF MAGNESIUM: CPT | Performed by: OBSTETRICS & GYNECOLOGY

## 2020-02-06 RX ADMIN — ACETAMINOPHEN 975 MG: 325 TABLET ORAL at 05:56

## 2020-02-06 RX ADMIN — ESCITALOPRAM OXALATE 10 MG: 10 TABLET ORAL at 08:28

## 2020-02-06 RX ADMIN — DEXTROSE, SODIUM CHLORIDE, AND POTASSIUM CHLORIDE 125 ML/HR: 5; .9; .15 INJECTION INTRAVENOUS at 05:28

## 2020-02-06 RX ADMIN — DOCUSATE SODIUM 100 MG: 100 CAPSULE, LIQUID FILLED ORAL at 08:28

## 2020-02-06 RX ADMIN — OXYCODONE HYDROCHLORIDE 5 MG: 5 TABLET ORAL at 02:52

## 2020-02-06 RX ADMIN — ACETAMINOPHEN 975 MG: 325 TABLET ORAL at 05:25

## 2020-02-06 NOTE — PROGRESS NOTES
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, undergoing curative intent chemotherapy with EMA-CO with excellent response, now postoperative day 2 status post robotic assisted total laparoscopic hysterectomy, bilateral salpingo oophorectomy, mini-laparotomy for specimen extraction, cystoscopy, stable    PLAN:    1  High risk GTD: s/p surgery as above, plan for future hospital admission for ongoing EMA-CO treatment    2  Leukopenia: WBC 8 02-->1 82-->2 09, ANC this morning 0 83, hold off Granix, infectious precautions reviewed    3  Anemia: Hgb 10 9-->7 9-->7 4, stable    4  Stomatitis: secondary to chemotherapy, continue magic mouth wash    5  Anxiety and Depression: home ativan and lexapro    Dispo: Inpatient    PPx: SCDs, lovenox while inpatient  FEN: regular diet, replete lytes prn, D5 1/2NS + 20KCL  Pain mgmnt: tylenol atc, motrin prn, oxy 5/10, dilaudid BKT  Invasive lines: Port  Code status: Full  ____________________    SUBJECTIVE  History of Present Illness:  Overnight: no acute events  Pain: rating lower abdominal discomfort at site of mini lap 2/10, taking tylenol and oxycodone  Tolerating Oral Intake: yes   Voiding: yes  Flatus: yes  Bowel Movement: no  Ambulating: yes  Vaginal Bleeding: no  Pelvic Pain: no  Chest Pain: no  Shortness of Breath: no  Leg Pain/Discomfort: no    OBJECTIVE  Vitals  Temp:  [98 °F (36 7 °C)-98 4 °F (36 9 °C)] 98 °F (36 7 °C)  HR:  [73-75] 75  Resp:  [16-18] 18  BP: (116-120)/(48-72) 116/48       Intake/Output Summary (Last 24 hours) at 2/6/2020 0630  Last data filed at 2/6/2020 0555  Gross per 24 hour   Intake 4600 ml   Output 0 ml   Net 4600 ml       Physical Exam:   Physical Exam   Constitutional: She is oriented to person, place, and time  Vital signs are normal  She appears well-nourished  She is active     Cardiovascular: Normal rate, regular rhythm, normal heart sounds and intact distal pulses  Pulmonary/Chest: Effort normal and breath sounds normal  No stridor  No respiratory distress  Abdominal: Soft  Bowel sounds are normal  She exhibits no distension  There is no tenderness  Neurological: She is alert and oriented to person, place, and time  Skin: Skin is warm and dry  Psychiatric: She has a normal mood and affect   Her behavior is normal          Labs:   Recent Results (from the past 72 hour(s))   Type and screen    Collection Time: 02/04/20  1:46 PM   Result Value Ref Range    ABO Grouping B     Rh Factor Positive     Antibody Screen Negative     Specimen Expiration Date 20200207    POCT pregnancy, urine    Collection Time: 02/04/20  2:04 PM   Result Value Ref Range    EXT Preg Test, Ur Positive (A) Negative    Control Valid Valid   Fingerstick Glucose (POCT)    Collection Time: 02/04/20  7:04 PM   Result Value Ref Range    POC Glucose 134 65 - 140 mg/dl   Hepatitis B surface antigen    Collection Time: 02/04/20  7:12 PM   Result Value Ref Range    Hepatitis B Surface Ag Non-reactive Non-reactive, NonReactive - Confirmed   Hepatitis C antibody    Collection Time: 02/04/20  7:12 PM   Result Value Ref Range    Hepatitis C Ab Non-reactive Non-reactive   Rapid HIV 1/2 AB-AG Combo    Collection Time: 02/04/20  7:12 PM   Result Value Ref Range    Rapid HIV 1 AND 2 Non-Reactive Non-Reactive    HIV-1 P24 Ag Screen Non-Reactive Non-Reactive   Basic metabolic panel    Collection Time: 02/05/20  5:35 AM   Result Value Ref Range    Sodium 140 136 - 145 mmol/L    Potassium 4 3 3 5 - 5 3 mmol/L    Chloride 112 (H) 100 - 108 mmol/L    CO2 24 21 - 32 mmol/L    ANION GAP 4 4 - 13 mmol/L    BUN 13 5 - 25 mg/dL    Creatinine 0 97 0 60 - 1 30 mg/dL    Glucose 165 (H) 65 - 140 mg/dL    Calcium 8 1 (L) 8 3 - 10 1 mg/dL    eGFR 69 ml/min/1 73sq m   CBC (with platelets)    Collection Time: 02/05/20  5:35 AM   Result Value Ref Range    WBC 1 82 (LL) 4 31 - 10 16 Thousand/uL    RBC 2 71 (L) 3 81 - 5 12 Million/uL    Hemoglobin 7 9 (L) 11 5 - 15 4 g/dL    Hematocrit 23 5 (L) 34 8 - 46 1 %    MCV 87 82 - 98 fL    MCH 29 2 26 8 - 34 3 pg    MCHC 33 6 31 4 - 37 4 g/dL    RDW 14 6 11 6 - 15 1 %    Platelets 066 436 - 481 Thousands/uL    MPV 9 0 8 9 - 12 7 fL   Calcium, ionized    Collection Time: 02/05/20  7:10 AM   Result Value Ref Range    Calcium, Ionized 1 10 (L) 1 12 - 1 32 mmol/L   CBC and differential    Collection Time: 02/05/20  8:08 AM   Result Value Ref Range    WBC 1 90 (LL) 4 31 - 10 16 Thousand/uL    RBC 2 79 (L) 3 81 - 5 12 Million/uL    Hemoglobin 8 1 (L) 11 5 - 15 4 g/dL    Hematocrit 24 5 (L) 34 8 - 46 1 %    MCV 88 82 - 98 fL    MCH 29 0 26 8 - 34 3 pg    MCHC 33 1 31 4 - 37 4 g/dL    RDW 14 6 11 6 - 15 1 %    MPV 8 7 (L) 8 9 - 12 7 fL    Platelets 594 (H) 176 - 390 Thousands/uL    nRBC 0 /100 WBCs    Neutrophils Relative 67 43 - 75 %    Immat GRANS % 1 0 - 2 %    Lymphocytes Relative 18 14 - 44 %    Monocytes Relative 12 4 - 12 %    Eosinophils Relative 1 0 - 6 %    Basophils Relative 1 0 - 1 %    Neutrophils Absolute 1 29 (L) 1 85 - 7 62 Thousands/µL    Immature Grans Absolute 0 02 0 00 - 0 20 Thousand/uL    Lymphocytes Absolute 0 35 (L) 0 60 - 4 47 Thousands/µL    Monocytes Absolute 0 22 0 17 - 1 22 Thousand/µL    Eosinophils Absolute 0 01 0 00 - 0 61 Thousand/µL    Basophils Absolute 0 01 0 00 - 0 10 Thousands/µL   CBC and differential    Collection Time: 02/05/20 11:28 AM   Result Value Ref Range    WBC 2 20 (L) 4 31 - 10 16 Thousand/uL    RBC 2 79 (L) 3 81 - 5 12 Million/uL    Hemoglobin 7 9 (L) 11 5 - 15 4 g/dL    Hematocrit 24 6 (L) 34 8 - 46 1 %    MCV 88 82 - 98 fL    MCH 28 3 26 8 - 34 3 pg    MCHC 32 1 31 4 - 37 4 g/dL    RDW 14 7 11 6 - 15 1 %    MPV 8 8 (L) 8 9 - 12 7 fL    Platelets 267 (H) 116 - 390 Thousands/uL    nRBC 1 /100 WBCs    Neutrophils Relative 57 43 - 75 %    Immat GRANS % 1 0 - 2 %    Lymphocytes Relative 24 14 - 44 %    Monocytes Relative 17 (H) 4 - 12 %    Eosinophils Relative 1 0 - 6 %    Basophils Relative 0 0 - 1 %    Neutrophils Absolute 1 27 (L) 1 85 - 7 62 Thousands/µL    Immature Grans Absolute 0 01 0 00 - 0 20 Thousand/uL    Lymphocytes Absolute 0 52 (L) 0 60 - 4 47 Thousands/µL    Monocytes Absolute 0 37 0 17 - 1 22 Thousand/µL    Eosinophils Absolute 0 03 0 00 - 0 61 Thousand/µL    Basophils Absolute 0 00 0 00 - 0 10 Thousands/µL   CBC and differential    Collection Time: 02/06/20  5:25 AM   Result Value Ref Range    WBC 2 09 (L) 4 31 - 10 16 Thousand/uL    RBC 2 53 (L) 3 81 - 5 12 Million/uL    Hemoglobin 7 4 (L) 11 5 - 15 4 g/dL    Hematocrit 22 7 (L) 34 8 - 46 1 %    MCV 90 82 - 98 fL    MCH 29 2 26 8 - 34 3 pg    MCHC 32 6 31 4 - 37 4 g/dL    RDW 15 9 (H) 11 6 - 15 1 %    MPV 8 9 8 9 - 12 7 fL    Platelets 820 (H) 002 - 390 Thousands/uL    nRBC 0 /100 WBCs    Neutrophils Relative 40 (L) 43 - 75 %    Immat GRANS % 1 0 - 2 %    Lymphocytes Relative 40 14 - 44 %    Monocytes Relative 15 (H) 4 - 12 %    Eosinophils Relative 3 0 - 6 %    Basophils Relative 1 0 - 1 %    Neutrophils Absolute 0 83 (L) 1 85 - 7 62 Thousands/µL    Immature Grans Absolute 0 01 0 00 - 0 20 Thousand/uL    Lymphocytes Absolute 0 86 0 60 - 4 47 Thousands/µL    Monocytes Absolute 0 32 0 17 - 1 22 Thousand/µL    Eosinophils Absolute 0 06 0 00 - 0 61 Thousand/µL    Basophils Absolute 0 01 0 00 - 0 10 Thousands/µL   Calcium, ionized    Collection Time: 02/06/20  5:25 AM   Result Value Ref Range    Calcium, Ionized 1 10 (L) 1 12 - 1 32 mmol/L       Meds:  Scheduled Meds:  Current Facility-Administered Medications:  acetaminophen 975 mg Oral [facility] Joaquin Joy, DO   al mag oxide-diphenhydramine-lidocaine viscous 10 mL Swish & Spit Q4H PRN [facility]uf[facility], DO   albuterol 2 puff Inhalation Q4H PRN [facility]  Rufino, DO   docusate sodium 100 mg Oral BID [facility]  Kaseybo, DO   enoxaparin 40 mg Subcutaneous Daily Joaquin Joy DO   escitalopram 10 mg Oral Daily Joaquin Joy DO HYDROmorphone 0 5 mg Intravenous Q1H [facility]N [facility]   ibuprofen 600 mg Oral Q6H PRN [facility], DO   LORazepam 1 mg Oral Q8H PRN [facility], DO   ondansetron 4 mg Intravenous Q6H PRN [facility] Zabo, DO   oxyCODONE 10 mg Oral Q4H PRN [facility], DO   oxyCODONE 5 mg Oral Q4H PRN [facility] Zabo, DO   polyethylene glycol 17 g Oral Daily PRN [facility] Zabo, DO   senna 1 tablet Oral HS [place] F Zabo, DO     Continuous Infusions:   PRN Meds: al mag oxide-diphenhydramine-lidocaine viscous    albuterol    HYDROmorphone    ibuprofen    LORazepam    ondansetron    oxyCODONE    oxyCODONE    polyethylene glycol    Imaging Studies: I have personally reviewed pertinent reports  EKG, Pathology, Microbiology, and Other Studies: I have personally reviewed pertinent reports        __________________    Signature / Title: Nito Layne DO, Ob/Gyn, PGY-3  Date: 2/6/2020  Time: 6:30 AM

## 2020-02-06 NOTE — PLAN OF CARE
Problem: PAIN - ADULT  Goal: Verbalizes/displays adequate comfort level or baseline comfort level  Description  Interventions:  - Encourage patient to monitor pain and request assistance  - Assess pain using appropriate pain scale  - Administer analgesics based on type and severity of pain and evaluate response  - Implement non-pharmacological measures as appropriate and evaluate response  - Consider cultural and social influences on pain and pain management  - Notify physician/advanced practitioner if interventions unsuccessful or patient reports new pain  Outcome: Progressing     Problem: INFECTION - ADULT  Goal: Absence or prevention of progression during hospitalization  Description  INTERVENTIONS:  - Assess and monitor for signs and symptoms of infection  - Monitor lab/diagnostic results  - Monitor all insertion sites, i e  indwelling lines, tubes, and drains  - Monitor endotracheal if appropriate and nasal secretions for changes in amount and color  - [place] appropriate cooling/warming therapies per order  - Administer medications as ordered  - Instruct and encourage patient and family to use good hand hygiene technique  - Identify and instruct in appropriate isolation precautions for identified infection/condition  Outcome: Progressing  Goal: Absence of fever/infection during neutropenic period  Description  INTERVENTIONS:  - Monitor WBC    Outcome: Progressing     Problem: DISCHARGE PLANNING  Goal: Discharge to home or other facility with appropriate resources  Description  INTERVENTIONS:  - Identify barriers to discharge w/patient and caregiver  - Arrange for needed discharge resources and transportation as appropriate  - Identify discharge learning needs (meds, wound care, etc )  - Arrange for interpretive services to assist at discharge as needed  - Refer to Case Management Department for coordinating discharge planning if the patient needs post-hospital services based on physician/advanced practitioner order or complex needs related to functional status, cognitive ability, or social support system  Outcome: Progressing     Problem: Knowledge Deficit  Goal: Patient/family/caregiver demonstrates understanding of disease process, treatment plan, medications, and discharge instructions  Description  Complete learning assessment and assess knowledge base  Interventions:  - Provide teaching at level of understanding  - Provide teaching via preferred learning methods  Outcome: Progressing     Problem: Nutrition/Hydration-ADULT  Goal: Nutrient/Hydration intake appropriate for improving, restoring or maintaining nutritional needs  Description  Monitor and assess patient's nutrition/hydration status for malnutrition  Collaborate with interdisciplinary team and initiate plan and interventions as ordered  Monitor patient's weight and dietary intake as ordered or per policy  Utilize nutrition screening tool and intervene as necessary  Determine patient's food preferences and provide high-protein, high-caloric foods as appropriate       INTERVENTIONS:  - Monitor oral intake, urinary output, labs, and treatment plans  - Assess nutrition and hydration status and recommend course of action  - Evaluate amount of meals eaten  - Assist patient with eating if necessary   - Allow adequate time for meals  - Recommend/ encourage appropriate diets, oral nutritional supplements, and vitamin/mineral supplements  - Order, calculate, and assess calorie counts as needed  - Recommend, monitor, and adjust tube feedings and TPN/PPN based on assessed needs  - Assess need for intravenous fluids  - Provide specific nutrition/hydration education as appropriate  - Include patient/family/caregiver in decisions related to nutrition  Outcome: Progressing

## 2020-02-06 NOTE — UTILIZATION REVIEW
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 on chemotherapy initially admitted following an elective outpatient hysterectomy, bso, cysto on 2/4/20, had no patient class order on 2/4  Outpatient no charge bed order was entered on 2/5/20 @0950, then was converted to inpatient on 2/5/20 @1234  Prior to surgery, had 3 cycles of EMA-CO chemo complicated by chemo induced anemia and neutropenia with grade 2 stomatitis  Assessment/Plan:   2/5 [id number], GYN oncology serviced noted that patient required continued inpatient care based on leukopenia from ongoing chemo requiring cbc and hcg check in the am  Considering granix if low anc count persisted  2/6: c/o 2/10 low abd pain at site of mini lap  Pain control achieved with tylenol, oxy, and dilaudid for breakthrough pain  She has a port  will require continued chemo, for 2 treatments past a normal BD HCG result  Chemo will resume on 2/18/20  Wbc is 2 09 today, and is   83  Holding granix, reviewed infectious precautions with patient  hgb is 7 4, no transfusion required  Continue magic mouthwash for stomatitis       Pertinent Labs/Diagnostic Results:   Results from last 7 days   Lab Units 02/06/20  0525 02/05/20  1128 02/05/20  0808 02/05/20  0535   WBC Thousand/uL 2 09* 2 20* 1 90* 1 82*   HEMOGLOBIN g/dL 7 4* 7 9* 8 1* 7 9*   HEMATOCRIT % 22 7* 24 6* 24 5* 23 5*   PLATELETS Thousands/uL 393* 405* 401* 386   NEUTROS ABS Thousands/µL 0 83* 1 27* 1 29*  --      Results from last 7 days   Lab Units 02/06/20  0525 02/05/20  0710 02/05/20  0535   SODIUM mmol/L 144  --  140   POTASSIUM mmol/L 4 0  --  4 3   CHLORIDE mmol/L 115*  --  112*   CO2 mmol/L 24  --  24   ANION GAP mmol/L 5  --  4   BUN mg/dL 10  --  13   CREATININE mg/dL 0 89  --  0 97   EGFR ml/min/1 73sq m 76  --  69   CALCIUM mg/dL 8 0* --  8 1*   CALCIUM, IONIZED mmol/L 1 10* 1 10*  --    MAGNESIUM mg/dL 2 1  --   --      Results from last 7 days   Lab Units 02/04/20  1904   POC GLUCOSE mg/dl 134     Results from last 7 days   Lab Units 02/06/20  0525 02/05/20  0535   GLUCOSE RANDOM mg/dL 106 165*     Results from last 7 days   Lab Units 01/31/20  1412   HEMOGLOBIN A1C % 6 1   EAG mg/dl 128     Results from last 7 days   Lab Units 01/31/20  1412   PROTIME seconds 13 7   INR  1 09     Results from last 7 days   Lab Units 02/04/20  1912   HEP B S AG  Non-reactive   HEP C AB  Non-reactive     Vital Signs:   Date/Time  Temp  Pulse  Resp  BP   SpO2    02/05/20 2337  98 °F (36 7 °C)  75  18  116/48Abnormal    96 %    02/05/20 14:01:41  98 °F (36 7 °C)             02/05/20 0700  98 4 °F (36 9 °C)  73  16  120/72   96 %    02/04/20 2342  98 6 °F (37 °C)  78  18  165/85   97 %          Medications:   Scheduled Medications:  Medications:  acetaminophen 975 mg Oral Q8H [address]   docusate sodium 100 mg Oral BID   enoxaparin 40 mg Subcutaneous Daily   escitalopram 10 mg Oral Daily   senna 1 tablet Oral HS     Continuous IV Infusions:dextrose 5 % and sodium chloride 0 9 % with KCl 20 mEq/L infusion (premix)   Rate: 125 mL/hr Dose: 125 mL/hr  Freq: Continuous Route: IV  Last Dose: Stopped (02/06/20 0557)  Start: 02/04/20 1915 End: 02/06/20 0630     PRN Meds:  al [place] oxide-diphenhydramine-lidocaine viscous 10 mL Swish & Spit Q4H PRN   albuterol 2 puff Inhalation Q4H PRN   HYDROmorphone 0 5 mg Intravenous Q1H PRN   ibuprofen 600 mg Oral Q6H PRN   LORazepam 1 mg Oral Q8H PRN   ondansetron 4 mg Intravenous Q6H PRN   oxyCODONE 10 mg Oral Q4H PRN x 2 addtl doses on 2/5   oxyCODONE 5 mg Oral Q4H PRN x 1 on 2/6   polyethylene glycol 17 g Oral Daily PRN       Discharge Plan: TBD    Network Utilization Review Department  [email or web address]  ATTENTION: Please call with any questions or concerns to [phone] and carefully listen to the prompts so that you are directed to the right person  All voicemails are confidential   Marychuy Merida all requests for admission clinical reviews, approved or denied determinations and any other requests to dedicated fax number below belonging to the campus where the patient is receiving treatment   List of dedicated fax numbers for the Facilities:  [address] [place] [address] DENIALS (Administrative/Medical Necessity) [phone]   [address] (Maternity/NICU/Pediatrics) [phone]   Jie Orr [phone]   [facility] [phone]   Carol Aguayo [phone]   145 [facility]  [phone]   1205 [facility] [address] [place] [address] [phone]   [facility]  [phone]   [address] [facility]  2401 [facility] [address] [facility] [phone]

## 2020-02-06 NOTE — DISCHARGE SUMMARY
Discharge Summary   Roz Rosario MRN: [id number]  Unit/Bed#: -01 Encounter: [id number]      Admission Date: 2/4/2020     Discharge Date: 02/06/20    Attending: Nikos Skinner MD    Principal Diagnosis: GTD (gestational trophoblastic disease) [O01 9]    Procedures: Robotic Assisted Total Laparoscopic Hysterectomy, Bilateral Salpingoophorectomy, Mini laparotomy for specimen extraction, Cystoscopy    Hospital course: Patient with high risk gestational trophoblastic diease, with excellent response to chemotherapy, presents for surgery as listed above  She tolerated the procedure well  She remained inpatient until postoperative day #2 in the setting of leukopenia and neutropenia, with postoperative day #2 CBC indicated stable white blood cell count and absolute neutrophil count  On day of discharge, she was meeting all postoperative milestones, with pain controlled on oral medication, tolerating regular diet, and appropriate bowel and bladder function  She will follow-up in the outpatient setting for further laboratory monitoring, planning for additional chemotherapy    O    Lab Results:   Lab Results   Component Value Date    WBC 2 09 (L) 02/06/2020    HGB 7 4 (L) 02/06/2020    HCT 22 7 (L) 02/06/2020    MCV 90 02/06/2020     (H) 02/06/2020     Lab Results   Component Value Date    CALCIUM 8 0 (L) 02/06/2020    K 4 0 02/06/2020    CO2 24 02/06/2020     (H) 02/06/2020    BUN 10 02/06/2020    CREATININE 0 89 02/06/2020     Lab Results   Component Value Date/Time    POCGLU 134 02/04/2020 07:04 PM     Lab Results   Component Value Date    PTT 29 12/25/2019     Lab Results   Component Value Date    INR 1 09 01/31/2020    INR 0 97 12/25/2019    INR 0 99 12/20/2019    PROTIME 13 7 01/31/2020    PROTIME 12 6 12/25/2019    PROTIME 12 7 07/87/9300       Complications: none apparent    Condition at discharge: stable     Discharge instructions/Information to patient and family:   See After Visit Summary for information provided to patient and family  Provisions for Follow-Up Care:  See After Visit Summary for information related to follow-up care and any pertinent home health orders  Disposition: See After Visit Summary for discharge disposition information  Planned Readmission: yes at time of additional chemotherapy     Discharge Medications: For a complete list of the patient's medications, please refer to her med rec      Uday Zurita DO  OB/GYN, PGY3  2/6/2020, 6:38 AM

## 2020-02-07 ENCOUNTER — TELEPHONE (OUTPATIENT)
Dept: GYNECOLOGIC ONCOLOGY | Facility: CLINIC | Age: 50
End: 2020-02-07

## 2020-02-07 NOTE — UTILIZATION REVIEW
Notification of Discharge  This is a Notification of Discharge from our facility [address]  Please be advised that this patient has been discharge from our facility  Below you will find the admission and discharge date and time including the patients disposition  PRESENTATION DATE: 2/4/2020 12:52 PM  OBS ADMISSION DATE:   IP ADMISSION DATE: 2/5/20 1234   DISCHARGE DATE: 2/6/2020  9:10 AM  DISPOSITION: Home/Self Care Home/Self Care   Admission Orders listed below:  Admission Orders (From admission, onward)     Ordered        02/05/20 1234  Inpatient Admission  Once                   Please contact the UR Department if additional information is required to close this patient's authorization/case  [address] Utilization Review Department  Main: [phone] x carefully listen to the prompts  All voicemails are confidential   [email or web address]  Send all requests for admission clinical reviews, approved or denied determinations and any other requests to dedicated fax number below belonging to the campus where the patient is receiving treatment   List of dedicated fax numbers:  [address] DENIALS (Administrative/Medical Necessity) [phone]   [address] (Maternity/NICU/Pediatrics) [phone]   Lisandra Courser [phone]   Jamshid Sole [phone]   [facility] Felicia [phone]   [facility] 1525 [facility] [phone]   1101 [facility] [phone]   [address]6 [facility]  2401 [facility] 1000 [address] [facility] [phone]

## 2020-02-08 ENCOUNTER — TELEPHONE (OUTPATIENT)
Dept: OTHER | Facility: OTHER | Age: 50
End: 2020-02-08

## 2020-02-09 ENCOUNTER — APPOINTMENT (EMERGENCY)
Dept: RADIOLOGY | Facility: HOSPITAL | Age: 50
End: 2020-02-09
Payer: COMMERCIAL

## 2020-02-09 ENCOUNTER — HOSPITAL ENCOUNTER (OUTPATIENT)
Facility: HOSPITAL | Age: 50
Setting detail: OBSERVATION
Discharge: HOME | End: 2020-02-11
Attending: EMERGENCY MEDICINE | Admitting: OBSTETRICS & GYNECOLOGY
Payer: COMMERCIAL

## 2020-02-09 ENCOUNTER — APPOINTMENT (OUTPATIENT)
Dept: RADIOLOGY | Facility: HOSPITAL | Age: 50
End: 2020-02-09
Payer: COMMERCIAL

## 2020-02-09 ENCOUNTER — TELEPHONE (OUTPATIENT)
Dept: OTHER | Facility: OTHER | Age: 50
End: 2020-02-09

## 2020-02-09 DIAGNOSIS — O01.9 GTD (GESTATIONAL TROPHOBLASTIC DISEASE): ICD-10-CM

## 2020-02-09 DIAGNOSIS — R50.82 POSTOPERATIVE FEVER: ICD-10-CM

## 2020-02-09 DIAGNOSIS — J10.1 INFLUENZA B: Primary | ICD-10-CM

## 2020-02-09 LAB
ALBUMIN SERPL BCP-MCNC: 3.2 G/DL (ref 3.5–5)
ALP SERPL-CCNC: 78 U/L (ref 46–116)
ALT SERPL W P-5'-P-CCNC: 26 U/L (ref 12–78)
ANION GAP SERPL CALCULATED.3IONS-SCNC: 6 MMOL/L (ref 4–13)
APTT PPP: 29 SECONDS (ref 23–37)
AST SERPL W P-5'-P-CCNC: 14 U/L (ref 5–45)
BASOPHILS # BLD AUTO: 0.06 THOUSANDS/ΜL (ref 0–0.1)
BASOPHILS NFR BLD AUTO: 2 % (ref 0–1)
BILIRUB SERPL-MCNC: 0.54 MG/DL (ref 0.2–1)
BILIRUB UR QL STRIP: NEGATIVE
BUN SERPL-MCNC: 13 MG/DL (ref 5–25)
CALCIUM SERPL-MCNC: 9.1 MG/DL (ref 8.3–10.1)
CHLORIDE SERPL-SCNC: 106 MMOL/L (ref 100–108)
CLARITY UR: NORMAL
CO2 SERPL-SCNC: 26 MMOL/L (ref 21–32)
COLOR UR: YELLOW
CREAT SERPL-MCNC: 1.09 MG/DL (ref 0.6–1.3)
EOSINOPHIL # BLD AUTO: 0.11 THOUSAND/ΜL (ref 0–0.61)
EOSINOPHIL NFR BLD AUTO: 3 % (ref 0–6)
ERYTHROCYTE [DISTWIDTH] IN BLOOD BY AUTOMATED COUNT: 15.6 % (ref 11.6–15.1)
GFR SERPL CREATININE-BSD FRML MDRD: 60 ML/MIN/1.73SQ M
GLUCOSE SERPL-MCNC: 108 MG/DL (ref 65–140)
GLUCOSE UR STRIP-MCNC: NEGATIVE MG/DL
HCT VFR BLD AUTO: 29.3 % (ref 34.8–46.1)
HGB BLD-MCNC: 9.5 G/DL (ref 11.5–15.4)
HGB UR QL STRIP.AUTO: NEGATIVE
IMM GRANULOCYTES # BLD AUTO: 0.07 THOUSAND/UL (ref 0–0.2)
IMM GRANULOCYTES NFR BLD AUTO: 2 % (ref 0–2)
INR PPP: 1.09 (ref 0.84–1.19)
KETONES UR STRIP-MCNC: NEGATIVE MG/DL
LACTATE SERPL-SCNC: 1.1 MMOL/L (ref 0.5–2)
LEUKOCYTE ESTERASE UR QL STRIP: NEGATIVE
LYMPHOCYTES # BLD AUTO: 0.75 THOUSANDS/ΜL (ref 0.6–4.47)
LYMPHOCYTES NFR BLD AUTO: 18 % (ref 14–44)
MCH RBC QN AUTO: 28.3 PG (ref 26.8–34.3)
MCHC RBC AUTO-ENTMCNC: 32.4 G/DL (ref 31.4–37.4)
MCV RBC AUTO: 87 FL (ref 82–98)
MONOCYTES # BLD AUTO: 0.81 THOUSAND/ΜL (ref 0.17–1.22)
MONOCYTES NFR BLD AUTO: 20 % (ref 4–12)
NEUTROPHILS # BLD AUTO: 2.29 THOUSANDS/ΜL (ref 1.85–7.62)
NEUTS SEG NFR BLD AUTO: 55 % (ref 43–75)
NITRITE UR QL STRIP: NEGATIVE
NRBC BLD AUTO-RTO: 0 /100 WBCS
PH UR STRIP.AUTO: 7.5 [PH]
PLATELET # BLD AUTO: 541 THOUSANDS/UL (ref 149–390)
PMV BLD AUTO: 8.9 FL (ref 8.9–12.7)
POTASSIUM SERPL-SCNC: 4.1 MMOL/L (ref 3.5–5.3)
PROCALCITONIN SERPL-MCNC: 0.06 NG/ML
PROT SERPL-MCNC: 6.8 G/DL (ref 6.4–8.2)
PROT UR STRIP-MCNC: NEGATIVE MG/DL
PROTHROMBIN TIME: 13.7 SECONDS (ref 11.6–14.5)
RBC # BLD AUTO: 3.36 MILLION/UL (ref 3.81–5.12)
SODIUM SERPL-SCNC: 138 MMOL/L (ref 136–145)
SP GR UR STRIP.AUTO: 1.01 (ref 1–1.03)
UROBILINOGEN UR QL STRIP.AUTO: 1 E.U./DL
WBC # BLD AUTO: 4.09 THOUSAND/UL (ref 4.31–10.16)

## 2020-02-09 PROCEDURE — 85610 PROTHROMBIN TIME: CPT | Performed by: EMERGENCY MEDICINE

## 2020-02-09 PROCEDURE — 83605 ASSAY OF LACTIC ACID: CPT | Performed by: EMERGENCY MEDICINE

## 2020-02-09 PROCEDURE — 93005 ELECTROCARDIOGRAM TRACING: CPT

## 2020-02-09 PROCEDURE — 99285 EMERGENCY DEPT VISIT HI MDM: CPT

## 2020-02-09 PROCEDURE — 99285 EMERGENCY DEPT VISIT HI MDM: CPT | Performed by: EMERGENCY MEDICINE

## 2020-02-09 PROCEDURE — 74177 CT ABD & PELVIS W/CONTRAST: CPT

## 2020-02-09 PROCEDURE — 36415 COLL VENOUS BLD VENIPUNCTURE: CPT | Performed by: EMERGENCY MEDICINE

## 2020-02-09 PROCEDURE — 85025 COMPLETE CBC W/AUTO DIFF WBC: CPT | Performed by: EMERGENCY MEDICINE

## 2020-02-09 PROCEDURE — 96365 THER/PROPH/DIAG IV INF INIT: CPT

## 2020-02-09 PROCEDURE — 99219 PR INITIAL OBSERVATION CARE/DAY 50 MINUTES: CPT | Performed by: PHYSICIAN ASSISTANT

## 2020-02-09 PROCEDURE — 87040 BLOOD CULTURE FOR BACTERIA: CPT | Performed by: EMERGENCY MEDICINE

## 2020-02-09 PROCEDURE — 80053 COMPREHEN METABOLIC PANEL: CPT | Performed by: EMERGENCY MEDICINE

## 2020-02-09 PROCEDURE — 84145 PROCALCITONIN (PCT): CPT | Performed by: EMERGENCY MEDICINE

## 2020-02-09 PROCEDURE — 96375 TX/PRO/DX INJ NEW DRUG ADDON: CPT

## 2020-02-09 PROCEDURE — 81003 URINALYSIS AUTO W/O SCOPE: CPT | Performed by: EMERGENCY MEDICINE

## 2020-02-09 PROCEDURE — 85730 THROMBOPLASTIN TIME PARTIAL: CPT | Performed by: EMERGENCY MEDICINE

## 2020-02-09 RX ORDER — DEXTROSE, SODIUM CHLORIDE, AND POTASSIUM CHLORIDE 5; .45; .15 G/100ML; G/100ML; G/100ML
100 INJECTION INTRAVENOUS CONTINUOUS
Status: DISCONTINUED | OUTPATIENT
Start: 2020-02-09 | End: 2020-02-11 | Stop reason: HOSPADM

## 2020-02-09 RX ORDER — ACETAMINOPHEN 325 MG/1
650 TABLET ORAL EVERY 6 HOURS PRN
Status: DISCONTINUED | OUTPATIENT
Start: 2020-02-09 | End: 2020-02-11 | Stop reason: HOSPADM

## 2020-02-09 RX ORDER — ESCITALOPRAM OXALATE 10 MG/1
10 TABLET ORAL DAILY
Status: DISCONTINUED | OUTPATIENT
Start: 2020-02-10 | End: 2020-02-11 | Stop reason: HOSPADM

## 2020-02-09 RX ORDER — ONDANSETRON 2 MG/ML
4 INJECTION INTRAMUSCULAR; INTRAVENOUS EVERY 6 HOURS PRN
Status: DISCONTINUED | OUTPATIENT
Start: 2020-02-09 | End: 2020-02-11 | Stop reason: HOSPADM

## 2020-02-09 RX ORDER — LISINOPRIL 10 MG/1
10 TABLET ORAL DAILY
Status: DISCONTINUED | OUTPATIENT
Start: 2020-02-10 | End: 2020-02-11 | Stop reason: HOSPADM

## 2020-02-09 RX ORDER — IBUPROFEN 600 MG/1
600 TABLET ORAL EVERY 6 HOURS PRN
Status: DISCONTINUED | OUTPATIENT
Start: 2020-02-09 | End: 2020-02-09

## 2020-02-09 RX ORDER — DOCUSATE SODIUM 100 MG/1
100 CAPSULE, LIQUID FILLED ORAL 2 TIMES DAILY
Status: DISCONTINUED | OUTPATIENT
Start: 2020-02-09 | End: 2020-02-11 | Stop reason: HOSPADM

## 2020-02-09 RX ORDER — IBUPROFEN 600 MG/1
600 TABLET ORAL EVERY 6 HOURS PRN
Status: DISCONTINUED | OUTPATIENT
Start: 2020-02-09 | End: 2020-02-11 | Stop reason: HOSPADM

## 2020-02-09 RX ORDER — FLUCONAZOLE 150 MG/1
150 TABLET ORAL ONCE
Status: COMPLETED | OUTPATIENT
Start: 2020-02-09 | End: 2020-02-09

## 2020-02-09 RX ORDER — LORAZEPAM 0.5 MG/1
0.5 TABLET ORAL
Status: DISCONTINUED | OUTPATIENT
Start: 2020-02-09 | End: 2020-02-11 | Stop reason: HOSPADM

## 2020-02-09 RX ORDER — ACETAMINOPHEN 325 MG/1
975 TABLET ORAL ONCE
Status: COMPLETED | OUTPATIENT
Start: 2020-02-09 | End: 2020-02-09

## 2020-02-09 RX ORDER — SENNOSIDES 8.6 MG
1 TABLET ORAL DAILY
Status: DISCONTINUED | OUTPATIENT
Start: 2020-02-10 | End: 2020-02-11 | Stop reason: HOSPADM

## 2020-02-09 RX ADMIN — ACETAMINOPHEN 975 MG: 325 TABLET ORAL at 17:26

## 2020-02-09 RX ADMIN — NYSTATIN 500000 UNITS: 100000 SUSPENSION ORAL at 23:05

## 2020-02-09 RX ADMIN — LORAZEPAM 0.5 MG: 0.5 TABLET ORAL at 23:06

## 2020-02-09 RX ADMIN — METRONIDAZOLE 500 MG: 500 INJECTION, SOLUTION INTRAVENOUS at 17:54

## 2020-02-09 RX ADMIN — DEXTROSE, SODIUM CHLORIDE, AND POTASSIUM CHLORIDE 100 ML/HR: 5; .45; .15 INJECTION INTRAVENOUS at 20:42

## 2020-02-09 RX ADMIN — IOHEXOL 100 ML: 350 INJECTION, SOLUTION INTRAVENOUS at 21:42

## 2020-02-09 RX ADMIN — FLUCONAZOLE 150 MG: 150 TABLET ORAL at 21:59

## 2020-02-09 RX ADMIN — CEFEPIME HYDROCHLORIDE 2000 MG: 2 INJECTION, POWDER, FOR SOLUTION INTRAVENOUS at 17:20

## 2020-02-09 RX ADMIN — ACETAMINOPHEN 650 MG: 325 TABLET ORAL at 20:48

## 2020-02-09 RX ADMIN — SODIUM CHLORIDE 1000 ML: 0.9 INJECTION, SOLUTION INTRAVENOUS at 17:05

## 2020-02-09 RX ADMIN — NYSTATIN 500000 UNITS: 100000 SUSPENSION ORAL at 20:43

## 2020-02-09 RX ADMIN — VANCOMYCIN HYDROCHLORIDE 2000 MG: 10 INJECTION, POWDER, LYOPHILIZED, FOR SOLUTION INTRAVENOUS at 18:28

## 2020-02-09 RX ADMIN — DOCUSATE SODIUM 100 MG: 100 CAPSULE, LIQUID FILLED ORAL at 20:42

## 2020-02-09 RX ADMIN — IOHEXOL 50 ML: 240 INJECTION, SOLUTION INTRATHECAL; INTRAVASCULAR; INTRAVENOUS; ORAL at 20:05

## 2020-02-09 NOTE — ED ATTENDING ATTESTATION
2/9/2020  Abbie Watson MD, saw and evaluated the patient  I have discussed the patient with the resident/non-physician practitioner and agree with the resident's/non-physician practitioner's findings, Plan of Care, and MDM as documented in the resident's/non-physician practitioner's note, except where noted  All available labs and Radiology studies were reviewed  I was present for key portions of any procedure(s) performed by the resident/non-physician practitioner and I was immediately available to provide assistance  At this point I agree with the current assessment done in the Emergency Department  I have conducted an independent evaluation of this patient a history and physical is as follows:   Pt recently with hysterectomy and chemo Pt ahs ahd low blood counts and now developed a fever   PE: alert heart reg lungs clear abd soft tender diffusely MDM: will do labs ct and give antibiotics and admit  ED Course         Critical Care Time  Procedures

## 2020-02-09 NOTE — ED PROVIDER NOTES
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 with recent admission for total hysterectomy done on 02/04/2020 that presents with fever  Patient says that she has been feeling well up until yesterday when she began to have low-grade temperatures around 99 9  She was diagnosed with thrush and given nystatin for this  She took aspirin for her fever and she noted that she had night sweats overnight and awoke with a temperature of 97° this morning and felt well  However, temperature continued to rise today with T-max 101 2°  She communicated with her gynecologic oncologist who advised her to come in  Patient denies infectious symptoms including nausea, vomiting, cough, rhinorrhea, congestion, hematuria, dysuria, diarrhea  She says that she has had mild abdominal pain that has been improving over the past several days  She denies any chest pain or dyspnea  She has not taken any Tylenol for the fever  Prior to Admission Medications   Prescriptions Last Dose Informant Patient Reported? Taking?    LORazepam (ATIVAN) 1 mg tablet   No No   Sig: Take 1 tablet (1 mg total) by mouth every 8 (eight) hours as needed for anxiety (and nausea) for up to 10 days   acetaminophen (TYLENOL) 325 mg tablet   No No   Sig: Take 2 tablets (650 mg total) by mouth every 6 (six) hours as needed for mild pain   al mag oxide-diphenhydramine-lidocaine viscous (MAGIC MOUTHWASH) 1:1:1 suspension   No No   Sig: Swish and spit 10 mL every 4 (four) hours as needed for mouth pain or discomfort   albuterol (PROVENTIL HFA,VENTOLIN HFA) 90 mcg/act inhaler   No No   Sig: Inhale 2 puffs every 4 (four) hours as needed for wheezing   benzonatate (TESSALON PERLES) 100 mg capsule   No No   Sig: Take 1 capsule (100 mg total) by mouth 3 (three) times a day as needed for cough   dextromethorphan-guaiFENesin (ROBITUSSIN DM)  mg/5 mL syrup   No No   Sig: Take 10 mL by mouth every 3 (three) hours as needed for cough   docusate sodium (COLACE) 100 mg capsule   No No   Sig: Take 1 capsule (100 mg total) by mouth 2 (two) times a day   escitalopram (LEXAPRO) 10 mg tablet   No No   Sig: Take 1 tablet (10 mg total) by mouth daily   furosemide (LASIX) 20 mg tablet   No No   Sig: Take 1 tablet (20 mg total) by mouth daily   Patient taking differently: Take 20 mg by mouth daily as needed    ibuprofen (MOTRIN) 600 mg tablet   No No   Sig: Take 1 tablet (600 mg total) by mouth every 6 (six) hours as needed for mild pain   leucovorin (WELLCOVORIN) 15 MG tablet   No No   Sig: Take 1 tablet (15 mg total) by mouth every 12 (twelve) hours for 3 doses   lisinopril (ZESTRIL) 10 mg tablet   No No   Sig: Take 1 tablet (10 mg total) by mouth daily   nystatin (MYCOSTATIN) 500,000 units/5 mL suspension   No No   Sig: Apply 5 mL (500,000 Units total) to the mouth or throat 4 (four) times a day   ondansetron (ZOFRAN) 4 mg tablet   No No   Sig: Take 1 tablet (4 mg total) by mouth every 8 (eight) hours as needed for nausea or vomiting   oxyCODONE (OXY-IR) 5 MG capsule   Yes No   Sig: Take 5 mg by mouth every 4 (four) hours as needed for moderate pain   oxyCODONE (ROXICODONE) 5 mg immediate release tablet   No No   Sig: Take 1 tablet (5 mg total) by mouth every 6 (six) hours as needed for severe pain for up to 10 daysMax Daily Amount: 20 mg   polyethylene glycol (MIRALAX) 17 g packet   No No   Sig: Take 17 g by mouth daily   Patient taking differently: Take 17 g by mouth as needed    senna (SENOKOT) 8 6 MG tablet   Yes No   Sig: Take 1 tablet by mouth daily at bedtime   simethicone (MYLICON) 80 mg chewable tablet No No   Sig: Chew 1 tablet (80 mg total) every 6 (six) hours as needed for flatulence      Facility-Administered Medications: None       Past Medical History:   Diagnosis Date    Cancer ([address] )     GTD (gestational trophoblastic disease)     History of chemotherapy     Obesity     Shortness of breath     Wheezing        Past Surgical History:   Procedure Laterality Date     SECTION      CYSTOSCOPY N/A 2020    Procedure: CYSTOSCOPY;  Surgeon: Jesse Ferrara MD;  [facility]: [facility];  Service: Gynecology Oncology    DILATION AND CURETTAGE OF UTERUS      X2    ENDOMETRIAL ABLATION      IR PORT PLACEMENT  2019    r chest    LAPAROTOMY N/A 2020    Procedure: MINI-LAPAROTOMY (FOR SPECIMEN REMOVAL); Surgeon: Jesse Ferrara MD;  [facility];  Service: Gynecology Oncology    SD LAPAROSCOPY W TOT HYSTERECTUTERUS <=250 [address] TUBE/OVARY N/A 2020    Procedure: ROBOTIC ASSISTED TOTAL LAPAROSCOPIC HYSTERECTOMY, BILATERAL SALPINGO-OOPHORECTOMY;  Surgeon: Jesse Ferrara MD;  [facility];  Service: Gynecology Oncology    THYROIDECTOMY, PARTIAL         History reviewed  No pertinent family history  I have reviewed and agree with the history as documented  Social History     Tobacco Use    Smoking status: Former Smoker     Types: Cigarettes     Last attempt to quit:      Years since quittin     Smokeless tobacco: Former User   Substance Use Topics    Alcohol use: Not Currently     Frequency: Never     Binge frequency: Never    Drug use: Never        Review of Systems   Constitutional: Positive for fever  Negative for chills, diaphoresis and fatigue  HENT: Negative for facial swelling, sore throat and trouble swallowing  Respiratory: Negative for cough, chest tightness, shortness of breath and wheezing  Cardiovascular: Negative for chest pain  Gastrointestinal: Positive for abdominal pain   Negative for abdominal distention, diarrhea, nausea and vomiting  Genitourinary: Negative for dysuria  Musculoskeletal: Negative for back pain, neck pain and neck stiffness  Skin: Negative for color change, pallor, rash and wound  Neurological: Negative for weakness, light-headedness and numbness  Psychiatric/Behavioral: Negative for agitation  All other systems reviewed and are negative  Physical Exam  ED Triage Vitals [02/09/20 1617]   Temperature Pulse Respirations Blood Pressure SpO2   (!) 101 6 °F (38 7 °C) (!) 130 (!) 24 144/90 98 %      Temp Source Heart Rate Source Patient Position - Orthostatic VS BP Location FiO2 (%)   Oral Monitor Sitting Left arm --      Pain Score       5             Orthostatic Vital Signs  Vitals:    02/09/20 1617 02/09/20 1727 02/09/20 1921   BP: 144/90 144/70 144/93   Pulse: (!) 130 103 98   Patient Position - Orthostatic VS: Sitting Sitting        Physical Exam   Constitutional: She is oriented to person, place, and time  She appears well-developed and well-nourished  No distress  HENT:   Head: Normocephalic  Oropharynx clear   Eyes: Pupils are equal, round, and reactive to light  Neck: Normal range of motion  Neck supple  Cardiovascular: Normal rate, regular rhythm, normal heart sounds and intact distal pulses  Pulmonary/Chest: Effort normal and breath sounds normal    Lungs are clear bilaterally   Abdominal: Soft  Bowel sounds are normal  She exhibits no distension  There is tenderness  There is no guarding  Mild suprapubic abdominal tenderness  Incision sites look clean, non erythematous, non draining  No rebound tenderness or guarding  Non peritoneal abdomen   Musculoskeletal: Normal range of motion  She exhibits no edema, tenderness or deformity  Neurological: She is alert and oriented to person, place, and time  No cranial nerve deficit or sensory deficit  Skin: Skin is warm and dry  Capillary refill takes less than 2 seconds  Psychiatric: She has a normal mood and affect   Her behavior is normal  Judgment and thought content normal    Vitals reviewed        ED Medications  Medications   vancomycin (VANCOCIN) 2,000 mg in sodium chloride 0 9 % 500 mL IVPB (2,000 mg Intravenous New Bag 2/9/20 1828)   metroNIDAZOLE (FLAGYL) IVPB (premix) 500 mg (0 mg Intravenous Stopped 2/9/20 1827)   iohexol (OMNIPAQUE) 240 MG/ML solution 50 mL (has no administration in time range)   dextrose 5 % and sodium chloride 0 45 % with KCl 20 mEq/L infusion (has no administration in time range)   docusate sodium (COLACE) capsule 100 mg (has no administration in time range)   senna (SENOKOT) tablet 8 6 mg (has no administration in time range)   ondansetron (ZOFRAN) injection 4 mg (has no administration in time range)   enoxaparin (LOVENOX) subcutaneous injection 40 mg (has no administration in time range)   acetaminophen (TYLENOL) tablet 650 mg (has no administration in time range)   lisinopril (ZESTRIL) tablet 10 mg (has no administration in time range)   escitalopram (LEXAPRO) tablet 10 mg (has no administration in time range)   LORazepam (ATIVAN) tablet 0 5 mg (has no administration in time range)   fluconazole (DIFLUCAN) tablet 150 mg (has no administration in time range)   nystatin (MYCOSTATIN) oral suspension 500,000 Units (has no administration in time range)   ibuprofen (MOTRIN) tablet 600 mg (has no administration in time range)   sodium chloride 0 9 % bolus 1,000 mL (0 mL Intravenous Stopped 2/9/20 1828)   cefepime (MAXIPIME) 2 g/50 mL dextrose IVPB (0 mg Intravenous Stopped 2/9/20 1753)   acetaminophen (TYLENOL) tablet 975 mg (975 mg Oral Given 2/9/20 1726)       Diagnostic Studies  Results Reviewed     Procedure Component Value Units Date/Time    Platelet count [[id number]]     Lab Status:  No result Specimen:  Blood     Procalcitonin [[id number]]  (Normal) Collected:  02/09/20 1648    Lab Status:  Final result Specimen:  Blood from Central Venous Line Updated:  02/09/20 1800     Procalcitonin 0 06 ng/ml UA w Reflex to Microscopic w Reflex to Culture [[id number]] Collected:  02/09/20 1720    Lab Status:  Final result Specimen:  Urine, Clean Catch Updated:  02/09/20 1737     Color, UA Yellow     Clarity, UA Cloudy     Specific Gravity, UA 1 013     pH, UA 7 5     Leukocytes, UA Negative     Nitrite, UA Negative     Protein, UA Negative mg/dl      Glucose, UA Negative mg/dl      Ketones, UA Negative mg/dl      Urobilinogen, UA 1 0 E U /dl      Bilirubin, UA Negative     Blood, UA Negative    Lactic acid x2 [[id number]]  (Normal) Collected:  02/09/20 1648    Lab Status:  Final result Specimen:  Blood from Central Venous Line Updated:  02/09/20 1730     LACTIC ACID 1 1 mmol/L     Narrative:       Result may be elevated if tourniquet was used during collection      Comprehensive metabolic panel [[id number]]  (Abnormal) Collected:  02/09/20 1648    Lab Status:  Final result Specimen:  Blood from Central Venous Line Updated:  02/09/20 1728     Sodium 138 mmol/L      Potassium 4 1 mmol/L      Chloride 106 mmol/L      CO2 26 mmol/L      ANION GAP 6 mmol/L      BUN 13 mg/dL      Creatinine 1 09 mg/dL      Glucose 108 mg/dL      Calcium 9 1 mg/dL      AST 14 U/L      ALT 26 U/L      Alkaline Phosphatase 78 U/L      Total Protein 6 8 g/dL      Albumin 3 2 g/dL      Total Bilirubin 0 54 mg/dL      eGFR 60 ml/min/1 73sq m     Narrative:       [place] guidelines for Chronic Kidney Disease (CKD):     Stage 1 with normal or high GFR (GFR > 90 mL/min/1 73 square meters)    Stage 2 Mild CKD (GFR = 60-89 mL/min/1 73 square meters)    Stage 3A Moderate CKD (GFR = 45-59 mL/min/1 73 square meters)    Stage 3B Moderate CKD (GFR = 30-44 mL/min/1 73 square meters)    Stage 4 Severe CKD (GFR = 15-29 mL/min/1 73 square meters)    Stage 5 End Stage CKD (GFR <15 mL/min/1 73 square meters)  Note: GFR calculation is accurate only with a steady state creatinine    Protime-INR [[id number]]  (Normal) Collected:  02/09/20 1648    Lab Status:  Final result Specimen:  Blood from Central Venous Line Updated:  02/09/20 1722     Protime 13 7 seconds      INR 1 09    APTT [[id number]]  (Normal) Collected:  02/09/20 1648    Lab Status:  Final result Specimen:  Blood from Central Venous Line Updated:  02/09/20 1722     PTT 29 seconds     CBC and differential [[id number]]  (Abnormal) Collected:  02/09/20 1648    Lab Status:  Final result Specimen:  Blood from Central Venous Line Updated:  02/09/20 1709     WBC 4 09 Thousand/uL      RBC 3 36 Million/uL      Hemoglobin 9 5 g/dL      Hematocrit 29 3 %      MCV 87 fL      MCH 28 3 pg      MCHC 32 4 g/dL      RDW 15 6 %      MPV 8 9 fL      Platelets 830 Thousands/uL      nRBC 0 /100 WBCs      Neutrophils Relative 55 %      Immat GRANS % 2 %      Lymphocytes Relative 18 %      Monocytes Relative 20 %      Eosinophils Relative 3 %      Basophils Relative 2 %      Neutrophils Absolute 2 29 Thousands/µL      Immature Grans Absolute 0 07 Thousand/uL      Lymphocytes Absolute 0 75 Thousands/µL      Monocytes Absolute 0 81 Thousand/µL      Eosinophils Absolute 0 11 Thousand/µL      Basophils Absolute 0 06 Thousands/µL     Blood culture #1 [[id number]] Collected:  02/09/20 1639    Lab Status: In process Specimen:  Blood from Central Venous Line Updated:  02/09/20 1704    Blood culture #2 [[id number]] Collected:  02/09/20 1649    Lab Status: In process Specimen:  Blood from Arm, Right Updated:  02/09/20 1704                 CT abdomen pelvis with contrast    (Results Pending)         Procedures  Procedures      ED Course                               MDM  Number of Diagnoses or Management Options  Postoperative fever:   Diagnosis management comments: Patient is a 60-year-old female with history of [place] carcinoma on chemotherapy status post total hysterectomy that presents with postoperative fever without any other major infectious symptoms    Patient was initially febrile and tachycardic which improved with IV fluids and Tylenol  Physical exam was largely benign as the patient was alert oriented with mild abdominal tenderness  Lab work was also relatively unremarkable with improved neutrophil count from previous  Patient had pending p o /IV contrast CT abdomen pelvis to determine abscess with recent operation  Patient [facility]  Disposition  Final diagnoses:   Postoperative fever     Time reflects when diagnosis was documented in both MDM as applicable and the Disposition within this note     Time User Action Codes Description Comment    2/9/2020  7:37 PM Handy Sanz Add [R50 [id number]] Postoperative fever       ED Disposition     ED Disposition Condition Date/Time Comment    Admit Stable Sun Feb 9, 2020  7:37 PM Case was discussed with Gyn Onc and the patient's admission status was agreed to be Admission Status: inpatient status to the service of Dr Vilma Mendez   Follow-up Information    None         Current Discharge Medication List      CONTINUE these medications which have NOT CHANGED    Details   acetaminophen (TYLENOL) 325 mg tablet Take 2 tablets (650 mg total) by mouth every 6 (six) hours as needed for mild pain  Qty: 30 tablet, Refills: 0    Associated Diagnoses: GTD (gestational trophoblastic disease)      al mag oxide-diphenhydramine-lidocaine viscous (MAGIC MOUTHWASH) 1:1:1 suspension Swish and spit 10 mL every 4 (four) hours as needed for mouth pain or discomfort  Qty: 1 Bottle, Refills: 0    Associated Diagnoses: Mucositis      albuterol (PROVENTIL HFA,VENTOLIN HFA) 90 mcg/act inhaler Inhale 2 puffs every 4 (four) hours as needed for wheezing  Qty: 1 Inhaler, Refills: 1    Comments: Substitution to a formulary equivalent within the same pharmaceutical class is authorized    Associated Diagnoses: Pneumonia      benzonatate (TESSALON PERLES) 100 mg capsule Take 1 capsule (100 mg total) by mouth 3 (three) times a day as needed for cough  Qty: 20 capsule, Refills: 0    Associated Diagnoses: Pneumonia      dextromethorphan-guaiFENesin (ROBITUSSIN DM)  mg/5 mL syrup Take 10 mL by mouth every 3 (three) hours as needed for cough  Qty: 118 mL, Refills: 0    Associated Diagnoses: Pneumonia      docusate sodium (COLACE) 100 mg capsule Take 1 capsule (100 mg total) by mouth 2 (two) times a day  Qty: 30 capsule, Refills: 2    Associated Diagnoses: GTD (gestational trophoblastic disease)      escitalopram (LEXAPRO) 10 mg tablet Take 1 tablet (10 mg total) by mouth daily  Qty: 30 tablet, Refills: 3    Associated Diagnoses: Anxiety      furosemide (LASIX) 20 mg tablet Take 1 tablet (20 mg total) by mouth daily  Qty: 30 tablet, Refills: 1    Associated Diagnoses: Hypertension, unspecified type      ibuprofen (MOTRIN) 600 mg tablet Take 1 tablet (600 mg total) by mouth every 6 (six) hours as needed for mild pain  Qty: 30 tablet, Refills: 3    Associated Diagnoses: GTD (gestational trophoblastic disease)      leucovorin (WELLCOVORIN) 15 MG tablet Take 1 tablet (15 mg total) by mouth every 12 (twelve) hours for 3 doses  Qty: 16 tablet, Refills: 4    Associated Diagnoses: GTD (gestational trophoblastic disease)      lisinopril (ZESTRIL) 10 mg tablet Take 1 tablet (10 mg total) by mouth daily  Qty: 30 tablet, Refills: 1    Associated Diagnoses: Hypertension, unspecified type      LORazepam (ATIVAN) 1 mg tablet Take 1 tablet (1 mg total) by mouth every 8 (eight) hours as needed for anxiety (and nausea) for up to 10 days  Qty: 30 tablet, Refills: 0    Associated Diagnoses: GTD (gestational trophoblastic disease)      nystatin (MYCOSTATIN) 500,000 units/5 mL suspension Apply 5 mL (500,000 Units total) to the mouth or throat 4 (four) times a day  Qty: 60 mL, Refills: 0    Associated Diagnoses: Oral thrush      ondansetron (ZOFRAN) 4 mg tablet Take 1 tablet (4 mg total) by mouth every 8 (eight) hours as needed for nausea or vomiting  Qty: 20 tablet, Refills: 0    Associated Diagnoses: GTD (gestational trophoblastic disease)      oxyCODONE (OXY-IR) 5 MG capsule Take 5 mg by mouth every 4 (four) hours as needed for moderate pain      oxyCODONE (ROXICODONE) 5 mg immediate release tablet Take 1 tablet (5 mg total) by mouth every 6 (six) hours as needed for severe pain for up to 10 daysMax Daily Amount: 20 mg  Qty: 10 tablet, Refills: 0    Associated Diagnoses: GTD (gestational trophoblastic disease)      polyethylene glycol (MIRALAX) 17 g packet Take 17 g by mouth daily  Qty: 14 each, Refills: 0    Associated Diagnoses: GTD (gestational trophoblastic disease)      senna (SENOKOT) 8 6 MG tablet Take 1 tablet by mouth daily at bedtime      simethicone (MYLICON) 80 mg chewable tablet Chew 1 tablet (80 mg total) every 6 (six) hours as needed for flatulence  Qty: 30 tablet, Refills: 0    Associated Diagnoses: GTD (gestational trophoblastic disease)           No discharge procedures on file  ED Provider  Attending physically available and evaluated Blayne Felder I managed the patient along with the ED Attending      Electronically Signed by         Bindu Pemberton MD  02/09/20 9659

## 2020-02-09 NOTE — H&P
H&P Exam - GYN/ONC  David Hills 52 y o  female MRN: [id number]  Unit/Bed#: [facility] Encounter: [id number]        HPI:  David Hills is a 52 y o  female who presents with fevers and chills last evening into this afternoon  Patient has known choriocarcinoma  Patient underwent a OMAR 2/4/20  Last chemotherapy was 1/28/20  Maria Teresa-cath placed right upper chest Dec of 2019  Fevers of 101 2 this afternoon at 3 pm  Patient has no abdominal complaints, no loose stools, no dysuria, hematuria, or burning on urination  There is no chronic cough, no sputum production, no shortness of breath,  no headaches, no leg pain  Patient states today was the first day she was able to have solid food  The thrush ulcers in her mouth have healed  Previously was on Ensure, smoothies, etc   Patient was given cefepime Flagyl and vanc intravenously in the emergency room  Review of Systems   Constitutional: Positive for chills, diaphoresis, fatigue and fever  HENT: Positive for mouth sores, sore throat and trouble swallowing  Negative for ear pain  Eyes: Negative for pain and discharge  Respiratory: Positive for cough  Negative for chest tightness, shortness of breath and wheezing  Cardiovascular: Negative for chest pain and leg swelling  Gastrointestinal: Negative for abdominal distention, blood in stool, constipation, diarrhea, nausea and vomiting  [facility] 2/4/20   Genitourinary: Negative for difficulty urinating, dysuria and flank pain  Musculoskeletal: Positive for myalgias  Negative for arthralgias, neck pain and neck stiffness  Skin: Positive for rash  Negative for pallor  Flush type rash bilateral facial cheeks    Neurological: Negative for dizziness, tremors, syncope, facial asymmetry and speech difficulty  Psychiatric/Behavioral: Negative for agitation and confusion         Historical Information   Past Medical History:   Diagnosis Date    Cancer ([facility] [address] )     GTD (gestational trophoblastic disease)     History of chemotherapy     Obesity     Shortness of breath     Wheezing      Past Surgical History:   Procedure Laterality Date     SECTION      CYSTOSCOPY N/A 2020    Procedure: CYSTOSCOPY;  Surgeon: Kerrie Davis MD;  [facility];  Service: Gynecology Oncology    DILATION AND CURETTAGE OF UTERUS      X2    ENDOMETRIAL ABLATION  2009    IR PORT PLACEMENT  2019    r chest    LAPAROTOMY N/A 2020    Procedure: MINI-LAPAROTOMY (FOR SPECIMEN REMOVAL);   Surgeon: Kerrie Davis MD;  [facility];  Service: Gynecology Oncology    NE LAPAROSCOPY W TOT HYSTERECTUTERUS <=[address] TUBE/OVARY N/A 2020    Procedure: ROBOTIC ASSISTED TOTAL LAPAROSCOPIC HYSTERECTOMY, BILATERAL SALPINGO-OOPHORECTOMY;  Surgeon: Kerrie Davis MD;  [facility];  Service: Gynecology Oncology    THYROIDECTOMY, PARTIAL       Social History   Social History     Substance and Sexual Activity   Alcohol Use Not Currently    Frequency: Never    Binge frequency: Never     Social History     Substance and Sexual Activity   Drug Use Never     Social History     Tobacco Use   Smoking Status Former Smoker    Types: Cigarettes    Last attempt to quit: Letty Apley Years since quittin 1   Smokeless Tobacco Former User     Family History: non-contributory    Meds/Allergies   all medications and allergies reviewed  Allergies   Allergen Reactions    Penicillins Fever       Objective   First Vitals:   Blood Pressure: 144/90 (20)  Pulse: (!) 130 (20)  Temperature: (!) 101 6 °F (38 7 °C) (20)  Temp Source: Oral (20)  Respirations: (!) 24 (20)  Height: 5' 6" (167 6 cm) (20)  Weight - Scale: 134 kg (296 lb) (20)  SpO2: 98 % (20)    Current Vitals:   Blood Pressure: 144/90 (20)  Pulse: (!) 130 (20)  Temperature: (!) 101 6 °F (38 7 °C) (20)  Temp Source: Oral (20 1617)  Respirations: (!) 24 (02/09/20 1617)  Height: 5' 6" (167 6 cm) (02/09/20 1617)  Weight - Scale: 134 kg (296 lb) (02/09/20 1617)  SpO2: 98 % (02/09/20 1617)    No intake or output data in the 24 hours ending 02/09/20 1724    Invasive Devices     Central Venous Catheter Line            Port A Cath 12/30/19 Right Chest 41 days                Physical Exam   Constitutional: She is oriented to person, place, and time  She appears well-developed and well-nourished  No distress  HENT:   Head: Normocephalic and atraumatic  Nose: Nose normal    Mouth/Throat: Oropharynx is clear and moist    Hx thrush, no ulcerations seen   Eyes: Conjunctivae are normal  No scleral icterus  Neck: No tracheal deviation present  No thyromegaly present  Cardiovascular: Regular rhythm, normal heart sounds and intact distal pulses  Exam reveals no gallop  No murmur heard  Pulmonary/Chest: Effort normal and breath sounds normal  No stridor  No respiratory distress  She has no wheezes  She exhibits no tenderness  Port site right upper chest is accessed, area is clean without erythema   Abdominal: Soft  Bowel sounds are normal  She exhibits no distension and no mass  There is tenderness  There is guarding  There is no rebound  No hernia  Pfannenstiel incision clean and dry without erythema, no hematoma  Trocar sites are all clean and dry and mid abdominal region   Musculoskeletal: She exhibits no edema, tenderness or deformity  No calf tenderness   Neurological: She is alert and oriented to person, place, and time  Skin: Skin is warm and dry  She is not diaphoretic  No erythema  Psychiatric: She has a normal mood and affect   Her behavior is normal  Judgment normal        Lab Results:  WBC count 4 09 up from 2 09; hemoglobin 9 5 previously 7 4 on 2/6/20  Blood cultures as well as electrolytes are pending    Imaging:  CT AP with oral and IV contrast pending      Assessment:  Patient with history of choriocarcinoma on chemotherapy now with fevers and chills status post total abdominal hysterectomy on February 4th, 2020    Plan:  1  Admit for observation  2  House diet as tolerated  3  Diflucan 1 dose of 150 mg oral  4  Follow-up blood cultures and urine culture  5  Check a m  Labs  6  Monitor fevers  7  Tylenol 650 mg every 6 hours as needed for temps greater than 100 5  8  Incentive spirometry  9  Venadynes   10  Lovenox 40 mg subcu daily for DVT prophylaxis  11  Home medications ordered  12  F/U CTAP  13  Accurate I/O's  14  Bowel regiment    Code Status: Level 1  Advance Directive and Living Will:      Power of :    POLST:      Counseling / Coordination of Care  Total floor / unit time spent today 75 minutes  Greater than 50% of total time was spent with the patient and / or family counseling and / or coordination of care    A description of the counseling / coordination of care:

## 2020-02-10 ENCOUNTER — APPOINTMENT (OUTPATIENT)
Dept: NON INVASIVE DIAGNOSTICS | Facility: HOSPITAL | Age: 50
End: 2020-02-10
Payer: COMMERCIAL

## 2020-02-10 PROBLEM — R50.9 FEVER AND CHILLS: Status: ACTIVE | Noted: 2020-02-10

## 2020-02-10 LAB
ANION GAP SERPL CALCULATED.3IONS-SCNC: 3 MMOL/L (ref 4–13)
ANISOCYTOSIS BLD QL SMEAR: PRESENT
ATRIAL RATE: 0 BPM
ATRIAL RATE: 86 BPM
BASOPHILS # BLD MANUAL: 0.13 THOUSAND/UL (ref 0–0.1)
BASOPHILS NFR MAR MANUAL: 4 % (ref 0–1)
BUN SERPL-MCNC: 14 MG/DL (ref 5–25)
CALCIUM SERPL-MCNC: 8 MG/DL (ref 8.3–10.1)
CHLORIDE SERPL-SCNC: 109 MMOL/L (ref 100–108)
CO2 SERPL-SCNC: 25 MMOL/L (ref 21–32)
CREAT SERPL-MCNC: 1.02 MG/DL (ref 0.6–1.3)
EOSINOPHIL # BLD MANUAL: 0 THOUSAND/UL (ref 0–0.4)
EOSINOPHIL NFR BLD MANUAL: 0 % (ref 0–6)
ERYTHROCYTE [DISTWIDTH] IN BLOOD BY AUTOMATED COUNT: 15.9 % (ref 11.6–15.1)
FLUAV RNA NPH QL NAA+PROBE: ABNORMAL
FLUBV RNA NPH QL NAA+PROBE: DETECTED
GFR SERPL CREATININE-BSD FRML MDRD: 65 ML/MIN/1.73SQ M
GLUCOSE SERPL-MCNC: 114 MG/DL (ref 65–140)
HCT VFR BLD AUTO: 26 % (ref 34.8–46.1)
HGB BLD-MCNC: 8.4 G/DL (ref 11.5–15.4)
LYMPHOCYTES # BLD AUTO: 0.39 THOUSAND/UL (ref 0.6–4.47)
LYMPHOCYTES # BLD AUTO: 12 % (ref 14–44)
MCH RBC QN AUTO: 28.5 PG (ref 26.8–34.3)
MCHC RBC AUTO-ENTMCNC: 32.3 G/DL (ref 31.4–37.4)
MCV RBC AUTO: 88 FL (ref 82–98)
METAMYELOCYTES NFR BLD MANUAL: 1 % (ref 0–1)
MONOCYTES # BLD AUTO: 0.59 THOUSAND/UL (ref 0–1.22)
MONOCYTES NFR BLD: 18 % (ref 4–12)
MYCOBACTERIUM SPEC CULT: NORMAL
MYELOCYTES NFR BLD MANUAL: 2 % (ref 0–1)
NEUTROPHILS # BLD MANUAL: 2.06 THOUSAND/UL (ref 1.85–7.62)
NEUTS BAND NFR BLD MANUAL: 1 % (ref 0–8)
NEUTS SEG NFR BLD AUTO: 62 % (ref 43–75)
NRBC BLD AUTO-RTO: 0 /100 WBCS
P AXIS: 47 DEGREES
PLATELET # BLD AUTO: 490 THOUSANDS/UL (ref 149–390)
PLATELET BLD QL SMEAR: ABNORMAL
PMV BLD AUTO: 8.9 FL (ref 8.9–12.7)
POIKILOCYTOSIS BLD QL SMEAR: PRESENT
POLYCHROMASIA BLD QL SMEAR: PRESENT
POTASSIUM SERPL-SCNC: 4 MMOL/L (ref 3.5–5.3)
PR INTERVAL: 140 MS
QRS AXIS: -18 DEGREES
QRS AXIS: 0 DEGREES
QRSD INTERVAL: 0 MS
QRSD INTERVAL: 74 MS
QT INTERVAL: 0 MS
QT INTERVAL: 370 MS
QTC INTERVAL: 0 MS
QTC INTERVAL: 442 MS
RBC # BLD AUTO: 2.95 MILLION/UL (ref 3.81–5.12)
RBC MORPH BLD: PRESENT
RHODAMINE-AURAMINE STN SPEC: NORMAL
RSV RNA NPH QL NAA+PROBE: ABNORMAL
SODIUM SERPL-SCNC: 137 MMOL/L (ref 136–145)
T WAVE AXIS: 0 DEGREES
T WAVE AXIS: 29 DEGREES
VENTRICULAR RATE: 0 BPM
VENTRICULAR RATE: 86 BPM
WBC # BLD AUTO: 3.27 THOUSAND/UL (ref 4.31–10.16)

## 2020-02-10 PROCEDURE — 93970 EXTREMITY STUDY: CPT

## 2020-02-10 PROCEDURE — 80048 BASIC METABOLIC PNL TOTAL CA: CPT | Performed by: PHYSICIAN ASSISTANT

## 2020-02-10 PROCEDURE — 87631 RESP VIRUS 3-5 TARGETS: CPT | Performed by: STUDENT IN AN ORGANIZED HEALTH CARE EDUCATION/TRAINING PROGRAM

## 2020-02-10 PROCEDURE — 85007 BL SMEAR W/DIFF WBC COUNT: CPT | Performed by: PHYSICIAN ASSISTANT

## 2020-02-10 PROCEDURE — 99225 PR SBSQ OBSERVATION CARE/DAY 25 MINUTES: CPT | Performed by: OBSTETRICS & GYNECOLOGY

## 2020-02-10 PROCEDURE — 85027 COMPLETE CBC AUTOMATED: CPT | Performed by: PHYSICIAN ASSISTANT

## 2020-02-10 PROCEDURE — 93010 ELECTROCARDIOGRAM REPORT: CPT | Performed by: INTERNAL MEDICINE

## 2020-02-10 RX ADMIN — METRONIDAZOLE 500 MG: 500 INJECTION, SOLUTION INTRAVENOUS at 01:22

## 2020-02-10 RX ADMIN — LORAZEPAM 0.5 MG: 0.5 TABLET ORAL at 22:48

## 2020-02-10 RX ADMIN — ESCITALOPRAM OXALATE 10 MG: 10 TABLET ORAL at 08:36

## 2020-02-10 RX ADMIN — DEXTROSE, SODIUM CHLORIDE, AND POTASSIUM CHLORIDE 100 ML/HR: 5; .45; .15 INJECTION INTRAVENOUS at 15:13

## 2020-02-10 RX ADMIN — ENOXAPARIN SODIUM 40 MG: 40 INJECTION SUBCUTANEOUS at 08:37

## 2020-02-10 RX ADMIN — SENNOSIDES 8.6 MG: 8.6 TABLET, FILM COATED ORAL at 08:37

## 2020-02-10 RX ADMIN — NYSTATIN 500000 UNITS: 100000 SUSPENSION ORAL at 22:48

## 2020-02-10 RX ADMIN — LISINOPRIL 10 MG: 10 TABLET ORAL at 08:36

## 2020-02-10 RX ADMIN — ACETAMINOPHEN 650 MG: 325 TABLET ORAL at 15:11

## 2020-02-10 RX ADMIN — ACETAMINOPHEN 650 MG: 325 TABLET ORAL at 08:37

## 2020-02-10 RX ADMIN — IBUPROFEN 600 MG: 600 TABLET ORAL at 19:56

## 2020-02-10 RX ADMIN — METRONIDAZOLE 500 MG: 500 INJECTION, SOLUTION INTRAVENOUS at 17:24

## 2020-02-10 RX ADMIN — METRONIDAZOLE 500 MG: 500 INJECTION, SOLUTION INTRAVENOUS at 08:40

## 2020-02-10 RX ADMIN — NYSTATIN 500000 UNITS: 100000 SUSPENSION ORAL at 17:23

## 2020-02-10 RX ADMIN — DOCUSATE SODIUM 100 MG: 100 CAPSULE, LIQUID FILLED ORAL at 17:23

## 2020-02-10 RX ADMIN — NYSTATIN 500000 UNITS: 100000 SUSPENSION ORAL at 11:14

## 2020-02-10 RX ADMIN — DOCUSATE SODIUM 100 MG: 100 CAPSULE, LIQUID FILLED ORAL at 08:37

## 2020-02-10 RX ADMIN — DEXTROSE, SODIUM CHLORIDE, AND POTASSIUM CHLORIDE 100 ML/HR: 5; .45; .15 INJECTION INTRAVENOUS at 06:09

## 2020-02-10 RX ADMIN — NYSTATIN 500000 UNITS: 100000 SUSPENSION ORAL at 08:36

## 2020-02-10 NOTE — SOCIAL WORK
Pt re-admit for fever/chills  Met with pt and discussed role of CM  Pt lives with her son (currently being supervised by her brother) in a 2nd floor apt with 20 steps at entrance  Pt is independent in ADLs  No DME or prior HHC  No MH/ D&A tx hx  Preference for pharmacy is [facility] in [place], [place]  PCP- Kristin Duran MD ([phone])  No POA  Main contact: Manjit Horton ([phone])  CM reviewed d/c planning process including the following: identifying help at home, patient preference for d/c planning needs, Discharge Lounge, [facility] Meds to Bed program, availability of treatment team to discuss questions or concerns patient and/or family may have regarding understanding medications and recognizing signs and symptoms once discharged  CM also encouraged patient to follow up with all recommended appointments after discharge  Patient advised of importance for patient and family to participate in managing patients medical well being  Patient/caregiver received discharge checklist  Content reviewed  Patient/caregiver encouraged to participate in discharge plan of care prior to discharge home

## 2020-02-10 NOTE — UTILIZATION REVIEW
Initial Clinical Review    Admission: Date/Time/Statement: Admission Orders (From admission, onward)     Ordered        02/09/20 1801  Place in Observation  Once                   Orders Placed This Encounter   Procedures    Place in Observation     Standing Status:   Standing     Number of Occurrences:   1     Order Specific Question:   Admitting Physician     Answer:   Berenice Montesinos [[id number]]     Order Specific Question:   Level of Care     Answer:   Med Surg [[id number]]     ED Arrival Information     Expected Arrival Acuity Means of Arrival Escorted By Service Admission Type    - 2/9/2020 16:08 Urgent Walk-In Self GYN Oncology Urgent    Arrival Complaint    Post Surgery fever, chills        Chief Complaint   Patient presents with    Post-op Problem     Pt "I had a hysterectomy on Tue (02/04/2020/) and I was told to look out for infection  I developed thrush on Friday  I got medication and was told to watch for fevers  I felt simone crumy but it stayed below 100  Today I was sweaty and my fever was 101 2 at home"      Assessment/Plan:   Ms Benetta Babinski is a 53 yo female who presents to the ED from home with c/o fever, chills, diaphoresis, fatigue, mouth sores, sore throat, cough, myalgias, rash x 1 day  PMH:  Choriocarcinoma, OMAR 2/4/20  Last chemo 1/28/20, port placement 12/20  Thrush ulcers in mouth have headed  She is admitted to OBSERVATION status with fever and chills - iv antibiotics in ED, diflucan oral, blood and urine cultures, Lovenox sq  2/10 Afternoon Update - repeat low grade fever, lactic acid negative,  procalcitonin WNL, pending blood and urine cultures, IV antibiotics  Passing flatus and BM  Tolerating oral intake       ED Triage Vitals [02/09/20 1617]   Temperature Pulse Respirations Blood Pressure SpO2   (!) 101 6 °F (38 7 °C) (!) 130 (!) 24 144/90 98 %      Temp Source Heart Rate Source Patient Position - Orthostatic VS BP Location FiO2 (%)   Oral Monitor Sitting Left arm --      Pain Score 5        Wt Readings from Last 1 Encounters:   02/09/20 135 kg (296 lb 15 4 oz)     Additional Vital Signs:   02/10/20 06:48:07  100 7 °F (38 2 °C)Abnormal    88  20  132/92  105  96 %  --   02/09/20 22:09:27  99 3 °F (37 4 °C)  89  18  137/89  105  99 %  --   02/09/20 2000  --  --  --  --  --  --  None (Room air)   02/09/20 19:21:11  99 3 °F (37 4 °C)  98  18  144/93  110  96 %  --   02/09/20 1832  99 3 °F (37 4 °C)  --  --  --  --  --  --   02/09/20 1727  --  103  18  144/70  --  99 %  None (Room air)     Pertinent Labs/Diagnostic Test Results:     2/9 CT abd, pelvis - Multiple droplets of air in the anterior abdominal wall with surrounding inflammatory changes  Findings are likely due to postsurgical changes less likely infection  Thickening of the urinary bladder wall which may represent cystitis  Status post hysterectomy with mild inflammatory changes in the surgical bed  Findings are likely due to postsurgical changes        2/9 ECG - Normal sinus rhythm  Slow R wave progression   When compared with ECG of 09-FEB-2020 18:32, (unconfirmed)  Previous ECG has undetermined rhythm, needs review    Results from last 7 days   Lab Units 02/10/20  0613 02/09/20  1648 02/06/20  0525 02/05/20  1128 02/05/20  0808   WBC Thousand/uL 3 27* 4 09* 2 09* 2 20* 1 90*   HEMOGLOBIN g/dL 8 4* 9 5* 7 4* 7 9* 8 1*   HEMATOCRIT % 26 0* 29 3* 22 7* 24 6* 24 5*   PLATELETS Thousands/uL 490* 541* 393* 405* 401*   NEUTROS ABS Thousands/µL  --  2 29 0 83* 1 27* 1 29*     Results from last 7 days   Lab Units 02/10/20  0613 02/09/20  1648 02/06/20  0525 02/05/20  0710 02/05/20  0535   SODIUM mmol/L 137 138 144  --  140   POTASSIUM mmol/L 4 0 4 1 4 0  --  4 3   CHLORIDE mmol/L 109* 106 115*  --  112*   CO2 mmol/L 25 26 24  --  24   ANION GAP mmol/L 3* 6 5  --  4   BUN mg/dL 14 13 10  --  13   CREATININE mg/dL 1 02 1 09 0 89  --  0 97   EGFR ml/min/1 73sq m 65 60 76  --  69   CALCIUM mg/dL 8 0* 9 1 8 0*  --  8 1*   CALCIUM, IONIZED mmol/L  --   --  1 10* 1 10*  --    MAGNESIUM mg/dL  --   --  2 1  --   --      Results from last 7 days   Lab Units 02/09/20  1648   AST U/L 14   ALT U/L 26   ALK PHOS U/L 78   TOTAL PROTEIN g/dL 6 8   ALBUMIN g/dL 3 2*   TOTAL BILIRUBIN mg/dL 0 54     Results from last 7 days   Lab Units 02/04/20  1904   POC GLUCOSE mg/dl 134     Results from last 7 days   Lab Units 02/10/20  0613 02/09/20  1648 02/06/20  0525 02/05/20  0535   GLUCOSE RANDOM mg/dL 114 108 106 165*     Results from last 7 days   Lab Units 02/09/20  1648   PROTIME seconds 13 7   INR  1 09   PTT seconds 29     Results from last 7 days   Lab Units 02/09/20  1648   PROCALCITONIN ng/ml 0 06     Results from last 7 days   Lab Units 02/09/20  1648   LACTIC ACID mmol/L 1 1     Results from last 7 days   Lab Units 02/04/20  1912   HEP B S AG  Non-reactive   HEP C AB  Non-reactive     Results from last 7 days   Lab Units 02/09/20  1720   CLARITY UA  Cloudy   COLOR UA  Yellow   SPEC GRAV UA  1 013   PH UA  7 5   GLUCOSE UA mg/dl Negative   KETONES UA mg/dl Negative   BLOOD UA  Negative   PROTEIN UA mg/dl Negative   NITRITE UA  Negative   BILIRUBIN UA  Negative   UROBILINOGEN UA E U /dl 1 0   LEUKOCYTES UA  Negative     Results from last 7 days   Lab Units 02/09/20  1649 02/09/20  1639   BLOOD CULTURE  Received in Microbiology Lab  Culture in Progress  Received in Microbiology Lab  Culture in Progress       ED Treatment:   Medication Administration from 02/09/2020 1608 to 02/09/2020 1853    Date/Time Order Dose Route Action   02/09/2020 1705 sodium chloride 0 9 % bolus 1,000 mL 1,000 mL Intravenous New Bag   02/09/2020 1828 vancomycin (VANCOCIN) 2,000 mg in sodium chloride 0 9 % 500 mL IVPB 2,000 mg Intravenous New Bag   02/09/2020 1720 cefepime (MAXIPIME) 2 g/50 mL dextrose IVPB 2,000 mg Intravenous New Bag   02/09/2020 1754 metroNIDAZOLE (FLAGYL) IVPB (premix) 500 mg 500 mg Intravenous New Bag   02/09/2020 1726 acetaminophen (TYLENOL) tablet 975 mg 975 mg Oral Given        Past Medical History:   Diagnosis Date    Cancer ([facility] [address]     GTD (gestational trophoblastic disease)     History of chemotherapy     Obesity     Shortness of breath     Wheezing      Present on Admission:   Fever and chills    Admitting Diagnosis: Fever [R50 9]     Age/Sex: 52 y o  female     Admission Orders:  Scheduled Medications:    Medications:  docusate sodium 100 mg Oral BID   enoxaparin 40 mg Subcutaneous Daily   escitalopram 10 mg Oral Daily   lisinopril 10 mg Oral Daily   LORazepam 0 5 mg Oral HS   metroNIDAZOLE 500 mg Intravenous Q8H   nystatin 500,000 Units Swish & Swallow 4x Daily   senna 1 tablet Oral Daily     Continuous IV Infusions:    dextrose 5 % and sodium chloride 0 45 % with KCl 20 mEq/L 100 mL/hr Intravenous Continuous     PRN Meds:    acetaminophen 650 mg Oral Q6H PRN x1 2/9, 2/10   ibuprofen 600 mg Oral Q6H PRN    ondansetron 4 mg Intravenous Q6H PRN      SCDs  Ambulate 3 x daily   Regular diet       Network Utilization Review Department  [email or web address]  ATTENTION: Please call with any questions or concerns to [phone] and carefully listen to the prompts so that you are directed to the right person  All voicemails are confidential   Hanh Kirkpatrick all requests for admission clinical reviews, approved or denied determinations and any other requests to dedicated fax number below belonging to the campus where the patient is receiving treatment   List of dedicated fax numbers for the Facilities:  FACILITY NAME UR FAX NUMBER   ADMISSION DENIALS (Administrative/Medical Necessity) [phone]   PARENT CHILD HEALTH (Maternity/NICU/Pediatrics) [phone]   [facility] Notice [phone]   Car Deluca [phone]   Wili Guthrie [address] [address] [address] [phone]   [address] [place] [address] [phone]

## 2020-02-10 NOTE — PROGRESS NOTES
Gyn Oncology Progress note   Connor Davis 52 y o  female MRN: [id number]  Unit/Bed#: [facility] 931-01 Encounter: [id number]    Assessment: 52 y o  gestational trophoblastic disease on going EMA-CO  POD# 6 from robotic assisted total laparoscopic hysterectomy and bilateral salpingectomy    Plan:    1) gestational trophoblastic disease  - follow-up pathology results  - S/p  EMA-CO chemotherapy regimen    2) fever of unknown origin  - last  Tmax 100 7 02/10 at 0648  - lactic acid 1 1, procalcitonin 0 006  - urinalysis negative  - urine and blood culture results pending  - CT abdomen pelvis with contrast:  Postop inflammatory changes  - status post cefepime vancomycin 1 dose  - Flagyl 500 mg IV b i d  Day 1      3) Oral thrush  - status post Diflucan x1   - nystatin oral solution    4) Anxiety/ depression  - Lexapro    5) Hypertension  - Lisinopril    6) DVT PPx:  - SCDs  - Lovenox 40mg /daily        Subjective:    Connor Davis has pain at mouth due to trush she is using nystatin  Pain is well controlled  Patient is currently voiding  She is ambulating  Patient is currently passing flatus and has had bowel movement  She is tolerating PO, and denies nausea or vomitting  Patient denies fever, chills, chest pain, shortness of breath, or calf tenderness  /92   Pulse 88   Temp (!) 100 7 °F (38 2 °C) Comment: tylenol given  Resp 20   Ht 5' 6" (1 676 m)   Wt 135 kg (296 lb 15 4 oz)   SpO2 96%   BMI 47 93 kg/m²     I/O last 3 completed shifts: In: 3030 [P O :480; I V :900; IV Piggyback:1650]  Out: 550 [Urine:550]  No intake/output data recorded      Lab Results   Component Value Date    WBC 3 27 (L) 02/10/2020    HGB 8 4 (L) 02/10/2020    HCT 26 0 (L) 02/10/2020    MCV 88 02/10/2020     (H) 02/10/2020       Lab Results   Component Value Date    CALCIUM 8 0 (L) 02/10/2020    K 4 0 02/10/2020    CO2 25 02/10/2020     (H) 02/10/2020    BUN 14 02/10/2020    CREATININE 1 02 02/10/2020           Physical Exam   Constitutional: She is oriented to person, place, and time  She appears well-developed and well-nourished  No distress  Cardiovascular: Normal rate, regular rhythm and normal heart sounds  Pulmonary/Chest: Effort normal and breath sounds normal    Abdominal: Soft  Musculoskeletal: Normal range of motion  Neurological: She is alert and oriented to person, place, and time  Skin: Skin is warm  She is not diaphoretic  Psychiatric: She has a normal mood and affect   Her behavior is normal          Diego Torres MD  3/78/1697  17:45 AM

## 2020-02-10 NOTE — PLAN OF CARE
Problem: Potential for Falls  Goal: Patient will remain free of falls  Description  INTERVENTIONS:  - Assess patient frequently for physical needs  -  Identify cognitive and physical deficits and behaviors that affect risk of falls    -  [place] fall precautions as indicated by assessment   - Educate patient/family on patient safety including physical limitations  - Instruct patient to call for assistance with activity based on assessment  - Modify environment to reduce risk of injury  - Consider OT/PT consult to assist with strengthening/mobility  Outcome: Progressing     Problem: PAIN - ADULT  Goal: Verbalizes/displays adequate comfort level or baseline comfort level  Description  Interventions:  - Encourage patient to monitor pain and request assistance  - Assess pain using appropriate pain scale  - Administer analgesics based on type and severity of pain and evaluate response  - Implement non-pharmacological measures as appropriate and evaluate response  - Consider cultural and social influences on pain and pain management  - Notify physician/advanced practitioner if interventions unsuccessful or patient reports new pain  Outcome: Progressing     Problem: INFECTION - ADULT  Goal: Absence or prevention of progression during hospitalization  Description  INTERVENTIONS:  - Assess and monitor for signs and symptoms of infection  - Monitor lab/diagnostic results  - Monitor all insertion sites, i e  indwelling lines, tubes, and drains  - Monitor endotracheal if appropriate and nasal secretions for changes in amount and color  - [place] appropriate cooling/warming therapies per order  - Administer medications as ordered  - Instruct and encourage patient and family to use good hand hygiene technique  - Identify and instruct in appropriate isolation precautions for identified infection/condition  Outcome: Progressing  Goal: Absence of fever/infection during neutropenic period  Description  INTERVENTIONS:  - Monitor WBC    Outcome: Progressing     Problem: SAFETY ADULT  Goal: Maintain or return to baseline ADL function  Description  INTERVENTIONS:  -  Assess patient's ability to carry out ADLs; assess patient's baseline for ADL function and identify physical deficits which impact ability to perform ADLs (bathing, care of mouth/teeth, toileting, grooming, dressing, etc )  - Assess/evaluate cause of self-care deficits   - Assess range of motion  - Assess patient's mobility; develop plan if impaired  - Assess patient's need for assistive devices and provide as appropriate  - Encourage maximum independence but intervene and supervise when necessary  - Involve family in performance of ADLs  - Assess for home care needs following discharge   - Consider OT consult to assist with ADL evaluation and planning for discharge  - Provide patient education as appropriate  Outcome: Progressing  Goal: Maintain or return mobility status to optimal level  Description  INTERVENTIONS:  - Assess patient's baseline mobility status (ambulation, transfers, stairs, etc )    - Identify cognitive and physical deficits and behaviors that affect mobility  - Identify mobility aids required to assist with transfers and/or ambulation (gait belt, sit-to-stand, lift, walker, cane, etc )  - [place] fall precautions as indicated by assessment  - Record patient progress and toleration of activity level on Mobility SBAR; progress patient to next Phase/Stage  - Instruct patient to call for assistance with activity based on assessment  - Consider rehabilitation consult to assist with strengthening/weightbearing, etc   Outcome: Progressing     Problem: DISCHARGE PLANNING  Goal: Discharge to home or other facility with appropriate resources  Description  INTERVENTIONS:  - Identify barriers to discharge w/patient and caregiver  - Arrange for needed discharge resources and transportation as appropriate  - Identify discharge learning needs (meds, wound care, etc )  - Arrange for interpretive services to assist at discharge as needed  - Refer to Case Management Department for coordinating discharge planning if the patient needs post-hospital services based on physician/advanced practitioner order or complex needs related to functional status, cognitive ability, or social support system  Outcome: Progressing     Problem: Knowledge Deficit  Goal: Patient/family/caregiver demonstrates understanding of disease process, treatment plan, medications, and discharge instructions  Description  Complete learning assessment and assess knowledge base    Interventions:  - Provide teaching at level of understanding  - Provide teaching via preferred learning methods  Outcome: Progressing

## 2020-02-10 NOTE — PLAN OF CARE
Problem: Potential for Falls  Goal: Patient will remain free of falls  Description  INTERVENTIONS:  - Assess patient frequently for physical needs  -  Identify cognitive and physical deficits and behaviors that affect risk of falls    -  [place] fall precautions as indicated by assessment   - Educate patient/family on patient safety including physical limitations  - Instruct patient to call for assistance with activity based on assessment  - Modify environment to reduce risk of injury  - Consider OT/PT consult to assist with strengthening/mobility  Outcome: Progressing     Problem: PAIN - ADULT  Goal: Verbalizes/displays adequate comfort level or baseline comfort level  Description  Interventions:  - Encourage patient to monitor pain and request assistance  - Assess pain using appropriate pain scale  - Administer analgesics based on type and severity of pain and evaluate response  - Implement non-pharmacological measures as appropriate and evaluate response  - Consider cultural and social influences on pain and pain management  - Notify physician/advanced practitioner if interventions unsuccessful or patient reports new pain  Outcome: Progressing     Problem: INFECTION - ADULT  Goal: Absence or prevention of progression during hospitalization  Description  INTERVENTIONS:  - Assess and monitor for signs and symptoms of infection  - Monitor lab/diagnostic results  - Monitor all insertion sites, i e  indwelling lines, tubes, and drains  - Monitor endotracheal if appropriate and nasal secretions for changes in amount and color  - [place] appropriate cooling/warming therapies per order  - Administer medications as ordered  - Instruct and encourage patient and family to use good hand hygiene technique  - Identify and instruct in appropriate isolation precautions for identified infection/condition  Outcome: Progressing  Goal: Absence of fever/infection during neutropenic period  Description  INTERVENTIONS:  - Monitor WBC    Outcome: Progressing     Problem: SAFETY ADULT  Goal: Maintain or return to baseline ADL function  Description  INTERVENTIONS:  -  Assess patient's ability to carry out ADLs; assess patient's baseline for ADL function and identify physical deficits which impact ability to perform ADLs (bathing, care of mouth/teeth, toileting, grooming, dressing, etc )  - Assess/evaluate cause of self-care deficits   - Assess range of motion  - Assess patient's mobility; develop plan if impaired  - Assess patient's need for assistive devices and provide as appropriate  - Encourage maximum independence but intervene and supervise when necessary  - Involve family in performance of ADLs  - Assess for home care needs following discharge   - Consider OT consult to assist with ADL evaluation and planning for discharge  - Provide patient education as appropriate  Outcome: Progressing  Goal: Maintain or return mobility status to optimal level  Description  INTERVENTIONS:  - Assess patient's baseline mobility status (ambulation, transfers, stairs, etc )    - Identify cognitive and physical deficits and behaviors that affect mobility  - Identify mobility aids required to assist with transfers and/or ambulation (gait belt, sit-to-stand, lift, walker, cane, etc )  - [place] fall precautions as indicated by assessment  - Record patient progress and toleration of activity level on Mobility SBAR; progress patient to next Phase/Stage  - Instruct patient to call for assistance with activity based on assessment  - Consider rehabilitation consult to assist with strengthening/weightbearing, etc   Outcome: Progressing     Problem: DISCHARGE PLANNING  Goal: Discharge to home or other facility with appropriate resources  Description  INTERVENTIONS:  - Identify barriers to discharge w/patient and caregiver  - Arrange for needed discharge resources and transportation as appropriate  - Identify discharge learning needs (meds, wound care, etc )  - Arrange for interpretive services to assist at discharge as needed  - Refer to Case Management Department for coordinating discharge planning if the patient needs post-hospital services based on physician/advanced practitioner order or complex needs related to functional status, cognitive ability, or social support system  Outcome: Progressing     Problem: Knowledge Deficit  Goal: Patient/family/caregiver demonstrates understanding of disease process, treatment plan, medications, and discharge instructions  Description  Complete learning assessment and assess knowledge base    Interventions:  - Provide teaching at level of understanding  - Provide teaching via preferred learning methods  Outcome: Progressing

## 2020-02-11 ENCOUNTER — HOSPITAL ENCOUNTER (OUTPATIENT)
Dept: INFUSION CENTER | Facility: HOSPITAL | Age: 50
Discharge: HOME | End: 2020-02-11
Attending: OBSTETRICS & GYNECOLOGY

## 2020-02-11 VITALS
HEIGHT: 66 IN | OXYGEN SATURATION: 98 % | TEMPERATURE: 99.7 F | BODY MASS INDEX: 47.09 KG/M2 | RESPIRATION RATE: 17 BRPM | DIASTOLIC BLOOD PRESSURE: 89 MMHG | SYSTOLIC BLOOD PRESSURE: 160 MMHG | WEIGHT: 293 LBS | HEART RATE: 85 BPM

## 2020-02-11 PROBLEM — J10.1 INFLUENZA B: Status: ACTIVE | Noted: 2020-02-11

## 2020-02-11 LAB
ALBUMIN SERPL BCP-MCNC: 2.6 G/DL (ref 3.5–5)
ALP SERPL-CCNC: 70 U/L (ref 46–116)
ALT SERPL W P-5'-P-CCNC: 26 U/L (ref 12–78)
ANION GAP SERPL CALCULATED.3IONS-SCNC: 4 MMOL/L (ref 4–13)
ANISOCYTOSIS BLD QL SMEAR: PRESENT
AST SERPL W P-5'-P-CCNC: 18 U/L (ref 5–45)
BASOPHILS # BLD MANUAL: 0.09 THOUSAND/UL (ref 0–0.1)
BASOPHILS NFR MAR MANUAL: 3 % (ref 0–1)
BILIRUB SERPL-MCNC: 0.38 MG/DL (ref 0.2–1)
BUN SERPL-MCNC: 10 MG/DL (ref 5–25)
CALCIUM SERPL-MCNC: 8.1 MG/DL (ref 8.3–10.1)
CHLORIDE SERPL-SCNC: 112 MMOL/L (ref 100–108)
CO2 SERPL-SCNC: 24 MMOL/L (ref 21–32)
CREAT SERPL-MCNC: 0.93 MG/DL (ref 0.6–1.3)
EOSINOPHIL # BLD MANUAL: 0 THOUSAND/UL (ref 0–0.4)
EOSINOPHIL NFR BLD MANUAL: 0 % (ref 0–6)
ERYTHROCYTE [DISTWIDTH] IN BLOOD BY AUTOMATED COUNT: 15.9 % (ref 11.6–15.1)
GFR SERPL CREATININE-BSD FRML MDRD: 72 ML/MIN/1.73SQ M
GLUCOSE SERPL-MCNC: 104 MG/DL (ref 65–140)
HCT VFR BLD AUTO: 25.3 % (ref 34.8–46.1)
HGB BLD-MCNC: 8 G/DL (ref 11.5–15.4)
LYMPHOCYTES # BLD AUTO: 0.53 THOUSAND/UL (ref 0.6–4.47)
LYMPHOCYTES # BLD AUTO: 17 % (ref 14–44)
MCH RBC QN AUTO: 28.1 PG (ref 26.8–34.3)
MCHC RBC AUTO-ENTMCNC: 31.6 G/DL (ref 31.4–37.4)
MCV RBC AUTO: 89 FL (ref 82–98)
METAMYELOCYTES NFR BLD MANUAL: 3 % (ref 0–1)
MONOCYTES # BLD AUTO: 0.34 THOUSAND/UL (ref 0–1.22)
MONOCYTES NFR BLD: 11 % (ref 4–12)
MYELOCYTES NFR BLD MANUAL: 1 % (ref 0–1)
NEUTROPHILS # BLD MANUAL: 1.94 THOUSAND/UL (ref 1.85–7.62)
NEUTS BAND NFR BLD MANUAL: 1 % (ref 0–8)
NEUTS SEG NFR BLD AUTO: 61 % (ref 43–75)
NRBC BLD AUTO-RTO: 0 /100 WBCS
PLATELET # BLD AUTO: 418 THOUSANDS/UL (ref 149–390)
PLATELET BLD QL SMEAR: ABNORMAL
PMV BLD AUTO: 9.1 FL (ref 8.9–12.7)
POLYCHROMASIA BLD QL SMEAR: PRESENT
POTASSIUM SERPL-SCNC: 3.9 MMOL/L (ref 3.5–5.3)
PROT SERPL-MCNC: 5.8 G/DL (ref 6.4–8.2)
RBC # BLD AUTO: 2.85 MILLION/UL (ref 3.81–5.12)
RBC MORPH BLD: PRESENT
SODIUM SERPL-SCNC: 140 MMOL/L (ref 136–145)
VARIANT LYMPHS # BLD AUTO: 3 %
WBC # BLD AUTO: 3.13 THOUSAND/UL (ref 4.31–10.16)

## 2020-02-11 PROCEDURE — 80053 COMPREHEN METABOLIC PANEL: CPT | Performed by: STUDENT IN AN ORGANIZED HEALTH CARE EDUCATION/TRAINING PROGRAM

## 2020-02-11 PROCEDURE — 93970 EXTREMITY STUDY: CPT | Performed by: SURGERY

## 2020-02-11 PROCEDURE — 85007 BL SMEAR W/DIFF WBC COUNT: CPT | Performed by: STUDENT IN AN ORGANIZED HEALTH CARE EDUCATION/TRAINING PROGRAM

## 2020-02-11 PROCEDURE — 99217 PR OBSERVATION CARE DISCHARGE MANAGEMENT: CPT | Performed by: OBSTETRICS & GYNECOLOGY

## 2020-02-11 PROCEDURE — NC001 PR NO CHARGE: Performed by: OBSTETRICS & GYNECOLOGY

## 2020-02-11 PROCEDURE — 85027 COMPLETE CBC AUTOMATED: CPT | Performed by: STUDENT IN AN ORGANIZED HEALTH CARE EDUCATION/TRAINING PROGRAM

## 2020-02-11 RX ORDER — OSELTAMIVIR PHOSPHATE 75 MG/1
75 CAPSULE ORAL EVERY 12 HOURS SCHEDULED
Qty: 10 CAPSULE | Refills: 0 | Status: SHIPPED | OUTPATIENT
Start: 2020-02-11 | End: 2020-02-16

## 2020-02-11 RX ORDER — OSELTAMIVIR PHOSPHATE 75 MG/1
75 CAPSULE ORAL EVERY 12 HOURS SCHEDULED
Status: DISCONTINUED | OUTPATIENT
Start: 2020-02-11 | End: 2020-02-11 | Stop reason: HOSPADM

## 2020-02-11 RX ADMIN — ONDANSETRON 4 MG: 2 INJECTION INTRAMUSCULAR; INTRAVENOUS at 07:31

## 2020-02-11 RX ADMIN — ENOXAPARIN SODIUM 40 MG: 40 INJECTION SUBCUTANEOUS at 08:33

## 2020-02-11 RX ADMIN — SENNOSIDES 8.6 MG: 8.6 TABLET, FILM COATED ORAL at 08:33

## 2020-02-11 RX ADMIN — ESCITALOPRAM OXALATE 10 MG: 10 TABLET ORAL at 08:33

## 2020-02-11 RX ADMIN — METRONIDAZOLE 500 MG: 500 INJECTION, SOLUTION INTRAVENOUS at 01:31

## 2020-02-11 RX ADMIN — ACETAMINOPHEN 650 MG: 325 TABLET ORAL at 08:34

## 2020-02-11 RX ADMIN — OSELTAMIVIR PHOSPHATE 75 MG: 75 CAPSULE ORAL at 11:04

## 2020-02-11 RX ADMIN — NYSTATIN 500000 UNITS: 100000 SUSPENSION ORAL at 11:04

## 2020-02-11 RX ADMIN — LISINOPRIL 10 MG: 10 TABLET ORAL at 08:33

## 2020-02-11 RX ADMIN — DEXTROSE, SODIUM CHLORIDE, AND POTASSIUM CHLORIDE 100 ML/HR: 5; .45; .15 INJECTION INTRAVENOUS at 01:30

## 2020-02-11 RX ADMIN — IBUPROFEN 600 MG: 600 TABLET ORAL at 11:04

## 2020-02-11 RX ADMIN — DEXTROSE, SODIUM CHLORIDE, AND POTASSIUM CHLORIDE 100 ML/HR: 5; .45; .15 INJECTION INTRAVENOUS at 11:05

## 2020-02-11 RX ADMIN — NYSTATIN 500000 UNITS: 100000 SUSPENSION ORAL at 08:33

## 2020-02-11 RX ADMIN — DOCUSATE SODIUM 100 MG: 100 CAPSULE, LIQUID FILLED ORAL at 08:33

## 2020-02-11 NOTE — PROGRESS NOTES
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  POD# 7 from robotic assisted total laparoscopic hysterectomy and bilateral salpingectomy     Plan:    1) gestational trophoblastic disease  -status post surgery follow-up pathology results  -status post EMA-CO chemotherapy regimen, she will be getting next week    2) fever  - Last T-max 100 7 on 02/10 at 1900  - rapid flu test positive  - blood culture no growth at 12:00 p m   - urine culture pending, urinalysis negative  - Tamiflu will be starting  - leukopenia  WBC 2 09->4 09->3 27->3 13  - status post cefepime+ vancomycinx1  She is currently on Flagyl will be discontinued  - lower extremity Doppler negative  - CT scan shows postoperative changes  - procalcitonin negative,      3) oral thrush  - status post Diflucanx1  - nystatin oral solution    4) anxiety/depression  -continue Lexapro    5) hypertension  - continue lisinopril    6) anemia  - status post surgery, chronic disease anemia  - hemoglobin   Subjective:    Maryellen Reid has flu symptoms  Pain is well controlled  Patient is currently voiding  She is ambulating  Patient is currently passing flatus and has had bowel movement  She is tolerating PO, and denies nausea or vomitting  Patient denies fever, chills, chest pain, shortness of breath, or calf tenderness  /64   Pulse 75   Temp 99 1 °F (37 3 °C)   Resp 20   Ht 5' 6" (1 676 m)   Wt 135 kg (296 lb 15 4 oz)   SpO2 96%   BMI 47 93 kg/m²     I/O last 3 completed shifts: In: 5783 [P O :600; I V :1805; IV Piggyback:1650]  Out: 1900 [Urine:1900]  I/O this shift:  In: 1581 7 [P O :120;  I V :1461 7]  Out: 2800 [Urine:2800]    Lab Results   Component Value Date    WBC 3 13 (L) 02/11/2020    HGB 8 0 (L) 02/11/2020    HCT 25 3 (L) 02/11/2020    MCV 89 02/11/2020     (H) 02/11/2020       Lab Results   Component Value Date    CALCIUM 8 0 (L) 02/10/2020    K 4 0 02/10/2020    CO2 25 02/10/2020     (H) 02/10/2020    BUN 14 02/10/2020    CREATININE 1 02 02/10/2020           Physical Exam   Constitutional: She is oriented to person, place, and time  She appears well-developed and well-nourished  No distress  Cardiovascular: Normal rate and regular rhythm  Pulmonary/Chest: Effort normal    Abdominal: Soft  Musculoskeletal: Normal range of motion  Neurological: She is alert and oriented to person, place, and time  Skin: Skin is warm  She is not diaphoretic  Psychiatric: She has a normal mood and affect   Her behavior is normal          Mallory Sanchez MD  9/14/9975  8:17 AM

## 2020-02-11 NOTE — PLAN OF CARE
Problem: Potential for Falls  Goal: Patient will remain free of falls  Description  INTERVENTIONS:  - Assess patient frequently for physical needs  -  Identify cognitive and physical deficits and behaviors that affect risk of falls    -  [place] fall precautions as indicated by assessment   - Educate patient/family on patient safety including physical limitations  - Instruct patient to call for assistance with activity based on assessment  - Modify environment to reduce risk of injury  - Consider OT/PT consult to assist with strengthening/mobility  Outcome: Progressing     Problem: PAIN - ADULT  Goal: Verbalizes/displays adequate comfort level or baseline comfort level  Description  Interventions:  - Encourage patient to monitor pain and request assistance  - Assess pain using appropriate pain scale  - Administer analgesics based on type and severity of pain and evaluate response  - Implement non-pharmacological measures as appropriate and evaluate response  - Consider cultural and social influences on pain and pain management  - Notify physician/advanced practitioner if interventions unsuccessful or patient reports new pain  Outcome: Progressing     Problem: INFECTION - ADULT  Goal: Absence or prevention of progression during hospitalization  Description  INTERVENTIONS:  - Assess and monitor for signs and symptoms of infection  - Monitor lab/diagnostic results  - Monitor all insertion sites, i e  indwelling lines, tubes, and drains  - Monitor endotracheal if appropriate and nasal secretions for changes in amount and color  - [place] appropriate cooling/warming therapies per order  - Administer medications as ordered  - Instruct and encourage patient and family to use good hand hygiene technique  - Identify and instruct in appropriate isolation precautions for identified infection/condition  Outcome: Progressing  Goal: Absence of fever/infection during neutropenic period  Description  INTERVENTIONS:  - Monitor WBC    Outcome: Progressing     Problem: SAFETY ADULT  Goal: Maintain or return to baseline ADL function  Description  INTERVENTIONS:  -  Assess patient's ability to carry out ADLs; assess patient's baseline for ADL function and identify physical deficits which impact ability to perform ADLs (bathing, care of mouth/teeth, toileting, grooming, dressing, etc )  - Assess/evaluate cause of self-care deficits   - Assess range of motion  - Assess patient's mobility; develop plan if impaired  - Assess patient's need for assistive devices and provide as appropriate  - Encourage maximum independence but intervene and supervise when necessary  - Involve family in performance of ADLs  - Assess for home care needs following discharge   - Consider OT consult to assist with ADL evaluation and planning for discharge  - Provide patient education as appropriate  Outcome: Progressing  Goal: Maintain or return mobility status to optimal level  Description  INTERVENTIONS:  - Assess patient's baseline mobility status (ambulation, transfers, stairs, etc )    - Identify cognitive and physical deficits and behaviors that affect mobility  - Identify mobility aids required to assist with transfers and/or ambulation (gait belt, sit-to-stand, lift, walker, cane, etc )  - [place] fall precautions as indicated by assessment  - Record patient progress and toleration of activity level on Mobility SBAR; progress patient to next Phase/Stage  - Instruct patient to call for assistance with activity based on assessment  - Consider rehabilitation consult to assist with strengthening/weightbearing, etc   Outcome: Progressing     Problem: DISCHARGE PLANNING  Goal: Discharge to home or other facility with appropriate resources  Description  INTERVENTIONS:  - Identify barriers to discharge w/patient and caregiver  - Arrange for needed discharge resources and transportation as appropriate  - Identify discharge learning needs (meds, wound care, etc )  - Arrange for interpretive services to assist at discharge as needed  - Refer to Case Management Department for coordinating discharge planning if the patient needs post-hospital services based on physician/advanced practitioner order or complex needs related to functional status, cognitive ability, or social support system  Outcome: Progressing     Problem: Knowledge Deficit  Goal: Patient/family/caregiver demonstrates understanding of disease process, treatment plan, medications, and discharge instructions  Description  Complete learning assessment and assess knowledge base    Interventions:  - Provide teaching at level of understanding  - Provide teaching via preferred learning methods  Outcome: Progressing

## 2020-02-11 NOTE — UTILIZATION REVIEW
Continued Stay Review    Date: 2/11/20                          Current Patient Class: OBS    Current Level of Care: MS    HPI:49 y o  female initially admitted on 2/9 with fever, chills, diaphoresis, fatigue, mouth sores, sore throat, cough, myalgias, rash x 1 day s/p OMAR, Chemo and port placement  Assessment/Plan:   Pt was found to have + influenza B - will start on Tamiflu today  Stopping antibiotics  Will continue with chemo next week  Recovering well from surgical standpoint  Blood cultures pending and urine culture negative  BLE Venous duplex is negative  H/H is acceptable - no transfusion  Pertinent Labs/Diagnostic Results:     2/10 BLE venous duplex - no thrombus       Results from last 7 days   Lab Units 02/11/20  0548 02/10/20  0613 02/09/20  1648 02/06/20  0525 02/05/20  1128   WBC Thousand/uL 3 13* 3 27* 4 09* 2 09* 2 20*   HEMOGLOBIN g/dL 8 0* 8 4* 9 5* 7 4* 7 9*   HEMATOCRIT % 25 3* 26 0* 29 3* 22 7* 24 6*   PLATELETS Thousands/uL 418* 490* 541* 393* 405*   NEUTROS ABS Thousands/µL  --   --  2 29 0 83* 1 27*   BANDS PCT % 1 1  --   --   --      Results from last 7 days   Lab Units 02/11/20  0548 02/10/20  0613 02/09/20  1648 02/06/20  0525 02/05/20  0710 02/05/20  0535   SODIUM mmol/L 140 137 138 144  --  140   POTASSIUM mmol/L 3 9 4 0 4 1 4 0  --  4 3   CHLORIDE mmol/L 112* 109* 106 115*  --  112*   CO2 mmol/L 24 25 26 24  --  24   ANION GAP mmol/L 4 3* 6 5  --  4   BUN mg/dL 10 14 13 10  --  13   CREATININE mg/dL 0 93 1 02 1 09 0 89  --  0 97   EGFR ml/min/1 73sq m 72 65 60 76  --  69   CALCIUM mg/dL 8 1* 8 0* 9 1 8 0*  --  8 1*   CALCIUM, IONIZED mmol/L  --   --   --  1 10* 1 10*  --    MAGNESIUM mg/dL  --   --   --  2 1  --   --      Results from last 7 days   Lab Units 02/11/20  0548 02/09/20  1648   AST U/L 18 14   ALT U/L 26 26   ALK PHOS U/L 70 78   TOTAL PROTEIN g/dL 5 8* 6 8   ALBUMIN g/dL 2 6* 3 2*   TOTAL BILIRUBIN mg/dL 0 38 0 54     Results from last 7 days   Lab Units 02/04/20  1904   POC GLUCOSE mg/dl 134     Results from last 7 days   Lab Units 02/11/20  0548 02/10/20  0613 02/09/20  1648 02/06/20  0525 02/05/20  0535   GLUCOSE RANDOM mg/dL 104 114 108 106 165*     Results from last 7 days   Lab Units 02/09/20  1648   PROTIME seconds 13 7   INR  1 09   PTT seconds 29     Results from last 7 days   Lab Units 02/09/20  1648   PROCALCITONIN ng/ml 0 06     Results from last 7 days   Lab Units 02/09/20  1648   LACTIC ACID mmol/L 1 1     Results from last 7 days   Lab Units 02/04/20  1912   HEP B S AG  Non-reactive   HEP C AB  Non-reactive     Results from last 7 days   Lab Units 02/09/20  1720   CLARITY UA  Cloudy   COLOR UA  Yellow   SPEC GRAV UA  1 013   PH UA  7 5   GLUCOSE UA mg/dl Negative   KETONES UA mg/dl Negative   BLOOD UA  Negative   PROTEIN UA mg/dl Negative   NITRITE UA  Negative   BILIRUBIN UA  Negative   UROBILINOGEN UA E U /dl 1 0   LEUKOCYTES UA  Negative     Results from last 7 days   Lab Units 02/10/20  1727   INFLUENZA A PCR  None Detected   INFLUENZA B PCR  Detected*   RSV PCR  None Detected     Results from last 7 days   Lab Units 02/09/20  1649 02/09/20  1639   BLOOD CULTURE  No Growth at 24 hrs  No Growth at 24 hrs       Vital Signs:   02/11/20 07:16:19  99 4 °F (37 4 °C)  93  17  160/89  113  98 %   02/10/20 22:53:20  99 1 °F (37 3 °C)  75  --  --  --  96 %   02/10/20 22:09:57  100 °F (37 8 °C)  88  20  139/64  89  93 %   02/10/20 19:00:51  100 7 °F (38 2 °C)Abnormal   --  --  --  --  --   02/10/20 16:06:20  100 3 °F (37 9 °C)  --  --  --  --  --   02/10/20 14:51:22  101 3 °F (38 5 °C)Abnormal    89  20  148/75  99  100 %   02/10/20 06:48:07  100 7 °F (38 2 °C)Abnormal    88  20  132/92  105  96 %   02/09/20 22:09:27  99 3 °F (37 4 °C)  89  18  137/89  105  99 %     Medications:   Scheduled Medications:    Medications:  docusate sodium 100 mg Oral BID   enoxaparin 40 mg Subcutaneous Daily   escitalopram 10 mg Oral Daily   lisinopril 10 mg Oral Daily LORazepam 0 5 mg Oral HS   nystatin 500,000 Units Swish & Swallow 4x Daily   oseltamivir 75 mg Oral Q12H [address]   senna 1 tablet Oral Daily     Continuous IV Infusions:    dextrose 5 % and sodium chloride 0 45 % with KCl 20 mEq/L 100 mL/hr Intravenous Continuous     PRN Meds:    acetaminophen 650 mg Oral Q6H PRN x2 in last 24 hrs   ibuprofen 600 mg Oral Q6H PRN x1 2/10   ondansetron 4 mg Intravenous Q6H PRN x1 2/11     Discharge Plan: home     Network Utilization Review Department  [email or web address]  ATTENTION: Please call with any questions or concerns to [phone] and carefully listen to the prompts so that you are directed to the right person  All voicemails are confidential   Yamileth Spears all requests for admission clinical reviews, approved or denied determinations and any other requests to dedicated fax number below belonging to the campus where the patient is receiving treatment   List of dedicated fax numbers for the Facilities:  [address] DENIALS (Administrative/Medical Necessity) [phone]   [address] (Maternity/NICU/Pediatrics) [phone]   Bobbi Garcia [phone]   [facility] \[facility]"" [phone]   Carla Sodus [phone]   Merlinda Downs [phone]   12[address] [facility] [address] [place] [address] [phone]   [facility]  [phone]   [address] [facility]  [address] [facility] [address] [facility] [phone]

## 2020-02-11 NOTE — NURSING NOTE
Informed Devin Paris, the on call surgical PA, that the patient is positive for Influenza B  Droplet precautions have been maintained since the nasal swab was taken earlier today  Tamiflu was not started at this time

## 2020-02-12 NOTE — DISCHARGE SUMMARY
Discharge Summary   Drea Coreas MRN: [id number]  Unit/Bed#: [facility] 931-01 Encounter: [id number]      Admission Date: 2/9/2020     Discharge Date: 2/11/2020    Attending: No att  providers found    Principal Diagnosis: Fever [R50 9]    Procedures:   Antiviral therapy for flu, s/p RATLH+BSO for Gestational trophoblastic disease    Hospital course: Patient has been admitted to ED with fever of unknown origin on 2/10/20  She is s/p RATLH and BSO due to gestational thromboplastic disease and on Chemotherapy with EMA-CO  Urine analysis and blood cultures were negative  Patient WBC shows neutropenia last WBC 3 13 and anemia with hgb: 8 0  Influenza test was resulted positive  Under hospital course patient was complaining of intermittent fever, overall stable vitals  She had oral thrush and was treated with Nystatin oral solution and Diflucanx1  She is s/p EMA-CO first dose and have appointment on 2/18/20 second dose  Lab Results:   Lab Results   Component Value Date    WBC 3 13 (L) 02/11/2020    HGB 8 0 (L) 02/11/2020    HCT 25 3 (L) 02/11/2020    MCV 89 02/11/2020     (H) 02/11/2020     Lab Results   Component Value Date    CALCIUM 8 1 (L) 02/11/2020    K 3 9 02/11/2020    CO2 24 02/11/2020     (H) 02/11/2020    BUN 10 02/11/2020    CREATININE 0 93 02/11/2020     Lab Results   Component Value Date/Time    POCGLU 134 02/04/2020 07:04 PM     Lab Results   Component Value Date    PTT 29 02/09/2020     Lab Results   Component Value Date    INR 1 09 02/09/2020    INR 1 09 01/31/2020    INR 0 97 12/25/2019    PROTIME 13 7 02/09/2020    PROTIME 13 7 01/31/2020    PROTIME 12 6 70/14/3952       Complications: none apparent    Condition at discharge: stable     Discharge instructions/Information to patient and family:   See After Visit Summary for information provided to patient and family        Provisions for Follow-Up Care:  See After Visit Summary for information related to follow-up care and any pertinent home health orders  Disposition: See After Visit Summary for discharge disposition information  Planned Readmission: Yes, pending clinical status    Discharge Medications: For a complete list of the patient's medications, please refer to her med rec      Robert Watters MD  7/94/3316  37:35 AM

## 2020-02-13 DIAGNOSIS — O01.9 GTD (GESTATIONAL TROPHOBLASTIC DISEASE): Primary | ICD-10-CM

## 2020-02-14 LAB
BACTERIA BLD CULT: NORMAL
BACTERIA BLD CULT: NORMAL

## 2020-02-17 DIAGNOSIS — I10 HYPERTENSION, UNSPECIFIED TYPE: ICD-10-CM

## 2020-02-17 DIAGNOSIS — F41.9 ANXIETY: ICD-10-CM

## 2020-02-17 RX ORDER — METHOTREXATE 25 MG/ML
100 INJECTION INTRA-ARTERIAL; INTRAMUSCULAR; INTRATHECAL; INTRAVENOUS ONCE
Status: CANCELLED | OUTPATIENT
Start: 2020-02-18

## 2020-02-17 RX ORDER — PALONOSETRON 0.05 MG/ML
0.25 INJECTION, SOLUTION INTRAVENOUS ONCE
Status: CANCELLED | OUTPATIENT
Start: 2020-02-18

## 2020-02-17 RX ORDER — LISINOPRIL 10 MG/1
TABLET ORAL
Qty: 90 TABLET | Refills: 1 | OUTPATIENT
Start: 2020-02-17

## 2020-02-17 RX ORDER — SODIUM CHLORIDE 9 MG/ML
125 INJECTION, SOLUTION INTRAVENOUS CONTINUOUS
Status: CANCELLED | OUTPATIENT
Start: 2020-02-18 | End: 2020-02-20

## 2020-02-17 RX ORDER — LEUCOVORIN CALCIUM 5 MG/1
15 TABLET ORAL EVERY 12 HOURS
Status: CANCELLED | OUTPATIENT
Start: 2020-02-19 | End: 2020-02-20

## 2020-02-17 RX ORDER — SODIUM CHLORIDE 9 MG/ML
20 INJECTION, SOLUTION INTRAVENOUS ONCE AS NEEDED
Status: CANCELLED | OUTPATIENT
Start: 2020-02-18

## 2020-02-17 RX ORDER — PALONOSETRON 0.05 MG/ML
0.25 INJECTION, SOLUTION INTRAVENOUS ONCE
Status: CANCELLED | OUTPATIENT
Start: 2020-02-19

## 2020-02-17 RX ORDER — SODIUM CHLORIDE 9 MG/ML
20 INJECTION, SOLUTION INTRAVENOUS ONCE AS NEEDED
Status: CANCELLED | OUTPATIENT
Start: 2020-02-19

## 2020-02-18 ENCOUNTER — HOSPITAL ENCOUNTER (INPATIENT)
Facility: HOSPITAL | Age: 50
LOS: 1 days | Discharge: HOME | DRG: 833 | End: 2020-02-19
Attending: OBSTETRICS & GYNECOLOGY | Admitting: OBSTETRICS & GYNECOLOGY
Payer: COMMERCIAL

## 2020-02-18 DIAGNOSIS — O01.9 GTD (GESTATIONAL TROPHOBLASTIC DISEASE): Primary | ICD-10-CM

## 2020-02-18 LAB
ALBUMIN SERPL-MCNC: 4.2 G/DL (ref 3.6–5.1)
ALBUMIN/GLOB SERPL: 1.6 (CALC) (ref 1–2.5)
ALP SERPL-CCNC: 89 U/L (ref 31–125)
ALT SERPL-CCNC: 73 U/L (ref 6–29)
ANION GAP SERPL CALCULATED.3IONS-SCNC: 4 MMOL/L (ref 4–13)
ANISOCYTOSIS BLD QL SMEAR: PRESENT
AST SERPL-CCNC: 52 U/L (ref 10–35)
B-HCG SERPL-ACNC: 7 MIU/ML
BASOPHILS # BLD AUTO: 0 CELLS/UL (ref 0–200)
BASOPHILS # BLD MANUAL: 0.09 THOUSAND/UL (ref 0–0.1)
BASOPHILS NFR BLD AUTO: 0 %
BASOPHILS NFR MAR MANUAL: 1 % (ref 0–1)
BILIRUB SERPL-MCNC: 0.5 MG/DL (ref 0.2–1.2)
BUN SERPL-MCNC: 17 MG/DL (ref 7–25)
BUN SERPL-MCNC: 18 MG/DL (ref 5–25)
BUN/CREAT SERPL: ABNORMAL (CALC) (ref 6–22)
CALCIUM SERPL-MCNC: 9.2 MG/DL (ref 8.3–10.1)
CALCIUM SERPL-MCNC: 9.2 MG/DL (ref 8.6–10.2)
CHLORIDE SERPL-SCNC: 108 MMOL/L (ref 98–110)
CHLORIDE SERPL-SCNC: 111 MMOL/L (ref 100–108)
CO2 SERPL-SCNC: 23 MMOL/L (ref 20–32)
CO2 SERPL-SCNC: 24 MMOL/L (ref 21–32)
CREAT SERPL-MCNC: 0.91 MG/DL (ref 0.6–1.3)
CREAT SERPL-MCNC: 0.97 MG/DL (ref 0.5–1.1)
EOSINOPHIL # BLD AUTO: 0 CELLS/UL (ref 15–500)
EOSINOPHIL # BLD MANUAL: 0.09 THOUSAND/UL (ref 0–0.4)
EOSINOPHIL NFR BLD AUTO: 0 %
EOSINOPHIL NFR BLD MANUAL: 1 % (ref 0–6)
ERYTHROCYTE [DISTWIDTH] IN BLOOD BY AUTOMATED COUNT: 16.5 % (ref 11.6–15.1)
ERYTHROCYTE [DISTWIDTH] IN BLOOD BY AUTOMATED COUNT: 16.8 % (ref 11–15)
GFR SERPL CREATININE-BSD FRML MDRD: 74 ML/MIN/1.73SQ M
GLOBULIN SER CALC-MCNC: 2.7 G/DL (CALC) (ref 1.9–3.7)
GLUCOSE SERPL-MCNC: 103 MG/DL (ref 65–99)
GLUCOSE SERPL-MCNC: 109 MG/DL (ref 65–140)
HCT VFR BLD AUTO: 31 % (ref 34.8–46.1)
HCT VFR BLD AUTO: 34.2 % (ref 35–45)
HGB BLD-MCNC: 10 G/DL (ref 11.5–15.4)
HGB BLD-MCNC: 10.9 G/DL (ref 11.7–15.5)
LYMPHOCYTES # BLD AUTO: 0.87 THOUSAND/UL (ref 0.6–4.47)
LYMPHOCYTES # BLD AUTO: 10 % (ref 14–44)
LYMPHOCYTES # BLD MANUAL: 2151 CELLS/UL (ref 850–3900)
LYMPHOCYTES NFR BLD AUTO: 25.3 %
MAGNESIUM SERPL-MCNC: 2.1 MG/DL (ref 1.5–2.5)
MCH RBC QN AUTO: 27.9 PG (ref 27–33)
MCH RBC QN AUTO: 28 PG (ref 26.8–34.3)
MCHC RBC AUTO-ENTMCNC: 31.9 G/DL (ref 32–36)
MCHC RBC AUTO-ENTMCNC: 32.3 G/DL (ref 31.4–37.4)
MCV RBC AUTO: 87 FL (ref 82–98)
MCV RBC AUTO: 87.5 FL (ref 80–100)
METAMYELOCYTES # BLD: 255 CELLS/UL
METAMYELOCYTES NFR BLD MANUAL: 1 % (ref 0–1)
METAMYELOCYTES NFR BLD MANUAL: 3 %
MONOCYTES # BLD AUTO: 0.35 THOUSAND/UL (ref 0–1.22)
MONOCYTES # BLD AUTO: 170 CELLS/UL (ref 200–950)
MONOCYTES NFR BLD AUTO: 2 %
MONOCYTES NFR BLD: 4 % (ref 4–12)
MYELOCYTES # BLD: 255 CELLS/UL
MYELOCYTES NFR BLD MANUAL: 3 %
MYELOCYTES NFR BLD MANUAL: 4 % (ref 0–1)
NEUTROPHILS # BLD AUTO: 4463 CELLS/UL (ref 1500–7800)
NEUTROPHILS # BLD MANUAL: 6.56 THOUSAND/UL (ref 1.85–7.62)
NEUTROPHILS NFR BLD AUTO: 52.5 %
NEUTS BAND # BLD: 1207 CELLS/UL (ref 0–750)
NEUTS BAND NFR BLD MANUAL: 14.2 %
NEUTS BAND NFR BLD MANUAL: 3 % (ref 0–8)
NEUTS SEG NFR BLD AUTO: 72 % (ref 43–75)
NRBC BLD AUTO-RTO: 0 /100 WBCS
PLATELET # BLD AUTO: 276 THOUSANDS/UL (ref 149–390)
PLATELET # BLD AUTO: 370 THOUSAND/UL (ref 140–400)
PLATELET BLD QL SMEAR: ADEQUATE
PMV BLD AUTO: 9.5 FL (ref 8.9–12.7)
PMV BLD REES-ECKER: 9.6 FL (ref 7.5–12.5)
POLYCHROMASIA BLD QL SMEAR: PRESENT
POTASSIUM SERPL-SCNC: 4.1 MMOL/L (ref 3.5–5.3)
POTASSIUM SERPL-SCNC: 4.3 MMOL/L (ref 3.5–5.3)
PROT SERPL-MCNC: 6.9 G/DL (ref 6.1–8.1)
RBC # BLD AUTO: 3.57 MILLION/UL (ref 3.81–5.12)
RBC # BLD AUTO: 3.91 MILLION/UL (ref 3.8–5.1)
RBC MORPH BLD: PRESENT
SL AMB EGFR AFRICAN AMERICAN: 79 ML/MIN/1.73M2
SL AMB EGFR NON AFRICAN AMERICAN: 69 ML/MIN/1.73M2
SODIUM SERPL-SCNC: 139 MMOL/L (ref 136–145)
SODIUM SERPL-SCNC: 142 MMOL/L (ref 135–146)
VARIANT LYMPHS # BLD AUTO: 4 %
WBC # BLD AUTO: 8.5 THOUSAND/UL (ref 3.8–10.8)
WBC # BLD AUTO: 8.74 THOUSAND/UL (ref 4.31–10.16)

## 2020-02-18 PROCEDURE — 99223 1ST HOSP IP/OBS HIGH 75: CPT | Performed by: OBSTETRICS & GYNECOLOGY

## 2020-02-18 PROCEDURE — 80048 BASIC METABOLIC PNL TOTAL CA: CPT | Performed by: OBSTETRICS & GYNECOLOGY

## 2020-02-18 PROCEDURE — 85027 COMPLETE CBC AUTOMATED: CPT | Performed by: OBSTETRICS & GYNECOLOGY

## 2020-02-18 PROCEDURE — 85007 BL SMEAR W/DIFF WBC COUNT: CPT | Performed by: OBSTETRICS & GYNECOLOGY

## 2020-02-18 RX ORDER — DOCUSATE SODIUM 100 MG/1
100 CAPSULE, LIQUID FILLED ORAL 2 TIMES DAILY
Status: DISCONTINUED | OUTPATIENT
Start: 2020-02-18 | End: 2020-02-19 | Stop reason: HOSPADM

## 2020-02-18 RX ORDER — ESCITALOPRAM OXALATE 10 MG/1
10 TABLET ORAL DAILY
Status: DISCONTINUED | OUTPATIENT
Start: 2020-02-18 | End: 2020-02-19 | Stop reason: HOSPADM

## 2020-02-18 RX ORDER — ACETAMINOPHEN 325 MG/1
650 TABLET ORAL EVERY 6 HOURS PRN
Status: DISCONTINUED | OUTPATIENT
Start: 2020-02-18 | End: 2020-02-19 | Stop reason: HOSPADM

## 2020-02-18 RX ORDER — IBUPROFEN 600 MG/1
600 TABLET ORAL EVERY 6 HOURS PRN
Status: DISCONTINUED | OUTPATIENT
Start: 2020-02-18 | End: 2020-02-19 | Stop reason: HOSPADM

## 2020-02-18 RX ORDER — METHOTREXATE 25 MG/ML
100 INJECTION INTRA-ARTERIAL; INTRAMUSCULAR; INTRATHECAL; INTRAVENOUS ONCE
Status: COMPLETED | OUTPATIENT
Start: 2020-02-18 | End: 2020-02-18

## 2020-02-18 RX ORDER — SODIUM CHLORIDE 9 MG/ML
20 INJECTION, SOLUTION INTRAVENOUS ONCE AS NEEDED
Status: DISCONTINUED | OUTPATIENT
Start: 2020-02-18 | End: 2020-02-19 | Stop reason: HOSPADM

## 2020-02-18 RX ORDER — ONDANSETRON 2 MG/ML
4 INJECTION INTRAMUSCULAR; INTRAVENOUS EVERY 6 HOURS PRN
Status: DISCONTINUED | OUTPATIENT
Start: 2020-02-18 | End: 2020-02-19 | Stop reason: HOSPADM

## 2020-02-18 RX ORDER — OXYCODONE HYDROCHLORIDE 5 MG/1
5 TABLET ORAL EVERY 4 HOURS PRN
Status: DISCONTINUED | OUTPATIENT
Start: 2020-02-18 | End: 2020-02-19 | Stop reason: HOSPADM

## 2020-02-18 RX ORDER — ESCITALOPRAM OXALATE 10 MG/1
TABLET ORAL
Qty: 90 TABLET | Refills: 2 | Status: SHIPPED | OUTPATIENT
Start: 2020-02-18 | End: 2020-12-11 | Stop reason: SDUPTHER

## 2020-02-18 RX ORDER — FUROSEMIDE 20 MG/1
20 TABLET ORAL DAILY
Status: DISCONTINUED | OUTPATIENT
Start: 2020-02-18 | End: 2020-02-19 | Stop reason: HOSPADM

## 2020-02-18 RX ORDER — ALBUTEROL SULFATE 90 UG/1
2 AEROSOL, METERED RESPIRATORY (INHALATION) EVERY 4 HOURS PRN
Status: DISCONTINUED | OUTPATIENT
Start: 2020-02-18 | End: 2020-02-19 | Stop reason: HOSPADM

## 2020-02-18 RX ORDER — LISINOPRIL 10 MG/1
10 TABLET ORAL DAILY
Status: DISCONTINUED | OUTPATIENT
Start: 2020-02-18 | End: 2020-02-19 | Stop reason: HOSPADM

## 2020-02-18 RX ORDER — PALONOSETRON 0.05 MG/ML
0.25 INJECTION, SOLUTION INTRAVENOUS ONCE
Status: COMPLETED | OUTPATIENT
Start: 2020-02-18 | End: 2020-02-18

## 2020-02-18 RX ORDER — LORAZEPAM 1 MG/1
1 TABLET ORAL EVERY 8 HOURS PRN
Status: DISCONTINUED | OUTPATIENT
Start: 2020-02-18 | End: 2020-02-19 | Stop reason: HOSPADM

## 2020-02-18 RX ORDER — SODIUM CHLORIDE 9 MG/ML
125 INJECTION, SOLUTION INTRAVENOUS CONTINUOUS
Status: DISCONTINUED | OUTPATIENT
Start: 2020-02-18 | End: 2020-02-19 | Stop reason: HOSPADM

## 2020-02-18 RX ADMIN — PALONOSETRON HYDROCHLORIDE 0.25 MG: 0.25 INJECTION, SOLUTION INTRAVENOUS at 12:01

## 2020-02-18 RX ADMIN — DEXAMETHASONE SODIUM PHOSPHATE 10 MG: 10 INJECTION, SOLUTION INTRAMUSCULAR; INTRAVENOUS at 11:09

## 2020-02-18 RX ADMIN — ETOPOSIDE 237 MG: 20 INJECTION, SOLUTION, CONCENTRATE INTRAVENOUS at 12:07

## 2020-02-18 RX ADMIN — DACTINOMYCIN 500 MCG: 0.5 INJECTION, POWDER, LYOPHILIZED, FOR SOLUTION INTRAVENOUS at 11:35

## 2020-02-18 RX ADMIN — ENOXAPARIN SODIUM 40 MG: 40 INJECTION SUBCUTANEOUS at 18:27

## 2020-02-18 RX ADMIN — METHOTREXATE 475 MG: 25 INJECTION, SOLUTION INTRA-ARTERIAL; INTRAMUSCULAR; INTRATHECAL; INTRAVENOUS at 13:42

## 2020-02-18 RX ADMIN — LIDOCAINE HYDROCHLORIDE 10 ML: 20 SOLUTION ORAL; TOPICAL at 13:00

## 2020-02-18 RX ADMIN — SODIUM CHLORIDE 125 ML/HR: 0.9 INJECTION, SOLUTION INTRAVENOUS at 10:25

## 2020-02-18 RX ADMIN — SODIUM CHLORIDE 125 ML/HR: 0.9 INJECTION, SOLUTION INTRAVENOUS at 19:32

## 2020-02-18 RX ADMIN — METHOTREXATE 237 MG: 25 INJECTION, SOLUTION INTRA-ARTERIAL; INTRAMUSCULAR; INTRATHECAL; INTRAVENOUS at 13:30

## 2020-02-18 NOTE — SOCIAL WORK
Pt scheduled re-admit for chemo  Met with pt and discussed role of CM  Pt lives with her son (current being supervised by pt's brother) in a 2nd floor apt with 20 steps at entrance  Pt is independent in ADLs  No DME or prior C  Preference for pharmacy is [facility] in [place], [place]  No MH/D&A tx hx  PCP- Gavi Christian MD ([phone])  No POA  Main contact: Ankita Zevklaus ([phone])  CM reviewed d/c planning process including the following: identifying help at home, patient preference for d/c planning needs, Discharge Lounge, [facility] Meds to Bed program, availability of treatment team to discuss questions or concerns patient and/or family may have regarding understanding medications and recognizing signs and symptoms once discharged  CM also encouraged patient to follow up with all recommended appointments after discharge  Patient advised of importance for patient and family to participate in managing patients medical well being  Patient/caregiver received discharge checklist  Content reviewed  Patient/caregiver encouraged to participate in discharge plan of care prior to discharge home

## 2020-02-18 NOTE — PLAN OF CARE
Problem: Potential for Falls  Goal: Patient will remain free of falls  Description  INTERVENTIONS:  - Assess patient frequently for physical needs  -  Identify cognitive and physical deficits and behaviors that affect risk of falls    -  [place] fall precautions as indicated by assessment   - Educate patient/family on patient safety including physical limitations  - Instruct patient to call for assistance with activity based on assessment  - Modify environment to reduce risk of injury  - Consider OT/PT consult to assist with strengthening/mobility  Outcome: Progressing     Problem: PAIN - ADULT  Goal: Verbalizes/displays adequate comfort level or baseline comfort level  Description  Interventions:  - Encourage patient to monitor pain and request assistance  - Assess pain using appropriate pain scale  - Administer analgesics based on type and severity of pain and evaluate response  - Implement non-pharmacological measures as appropriate and evaluate response  - Consider cultural and social influences on pain and pain management  - Notify physician/advanced practitioner if interventions unsuccessful or patient reports new pain  Outcome: Progressing     Problem: INFECTION - ADULT  Goal: Absence or prevention of progression during hospitalization  Description  INTERVENTIONS:  - Assess and monitor for signs and symptoms of infection  - Monitor lab/diagnostic results  - Monitor all insertion sites, i e  indwelling lines, tubes, and drains  - Monitor endotracheal if appropriate and nasal secretions for changes in amount and color  - [place] appropriate cooling/warming therapies per order  - Administer medications as ordered  - Instruct and encourage patient and family to use good hand hygiene technique  - Identify and instruct in appropriate isolation precautions for identified infection/condition  Outcome: Progressing  Goal: Absence of fever/infection during neutropenic period  Description  INTERVENTIONS:  - Monitor WBC    Outcome: Progressing     Problem: DISCHARGE PLANNING  Goal: Discharge to home or other facility with appropriate resources  Description  INTERVENTIONS:  - Identify barriers to discharge w/patient and caregiver  - Arrange for needed discharge resources and transportation as appropriate  - Identify discharge learning needs (meds, wound care, etc )  - Arrange for interpretive services to assist at discharge as needed  - Refer to Case Management Department for coordinating discharge planning if the patient needs post-hospital services based on physician/advanced practitioner order or complex needs related to functional status, cognitive ability, or social support system  Outcome: Progressing     Problem: Knowledge Deficit  Goal: Patient/family/caregiver demonstrates understanding of disease process, treatment plan, medications, and discharge instructions  Description  Complete learning assessment and assess knowledge base    Interventions:  - Provide teaching at level of understanding  - Provide teaching via preferred learning methods  Outcome: Progressing

## 2020-02-18 NOTE — H&P
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 undergoing curative intent chemotherapy with EMA-CO, she will receive  chemotherapy on this admission  Last HCG was 7 on 2020  Previously grade 2 mucositis, which resolved now  LFT shows grade 1 transaminatis (CTCAE)   Review of Systems   Constitutional: Negative  HENT: Negative  Respiratory: Negative  Cardiovascular: Negative  Gastrointestinal: Negative  Genitourinary: Negative  Musculoskeletal: Negative  Neurological: Negative  Psychiatric/Behavioral: Negative  Historical Information   Past Medical History:   Diagnosis Date    Cancer ([facility] [address] )     GTD (gestational trophoblastic disease)     History of chemotherapy     Obesity     Shortness of breath     Wheezing      Past Surgical History:   Procedure Laterality Date     SECTION      CYSTOSCOPY N/A 2020    Procedure: CYSTOSCOPY;  Surgeon: Nico Morris MD;  [facility];  Service: Gynecology Oncology    DILATION AND CURETTAGE OF UTERUS      X2    ENDOMETRIAL ABLATION      IR PORT PLACEMENT  2019    r chest    LAPAROTOMY N/A 2020    Procedure: MINI-LAPAROTOMY (FOR SPECIMEN REMOVAL); Surgeon: Nico Morris MD;  [facility];  Service: Gynecology Oncology    KS LAPAROSCOPY W TOT HYSTERECTUTERUS <=[address] TUBE/OVARY N/A 2020    Procedure: ROBOTIC ASSISTED TOTAL LAPAROSCOPIC HYSTERECTOMY, BILATERAL SALPINGO-OOPHORECTOMY;  Surgeon: Nico Morris MD;  [facility];  Service: Gynecology Oncology    THYROIDECTOMY, PARTIAL       OB/GYN History: [id number]  No family history on file    Social History   Social History     Substance and Sexual Activity   Alcohol Use Not Currently    Frequency: Never    Binge frequency: Never     Social History     Substance and Sexual Activity   Drug Use Never     Social History     Tobacco Use   Smoking Status Former Smoker    Types: Cigarettes    Last attempt to quit:     Years since quittin 1   Smokeless Tobacco Former User       Meds/Allergies   Medications Prior to Admission   Medication    acetaminophen (TYLENOL) 325 mg tablet    al mag oxide-diphenhydramine-lidocaine viscous (MAGIC MOUTHWASH) 1:1:1 suspension    albuterol (PROVENTIL HFA,VENTOLIN HFA) 90 mcg/act inhaler    benzonatate (TESSALON PERLES) 100 mg capsule    dextromethorphan-guaiFENesin (ROBITUSSIN DM)  mg/5 mL syrup    docusate sodium (COLACE) 100 mg capsule    furosemide (LASIX) 20 mg tablet    ibuprofen (MOTRIN) 600 mg tablet    lisinopril (ZESTRIL) 10 mg tablet    LORazepam (ATIVAN) 1 mg tablet    nystatin (MYCOSTATIN) 500,000 units/5 mL suspension    ondansetron (ZOFRAN) 4 mg tablet    oxyCODONE (OXY-IR) 5 MG capsule    senna (SENOKOT) 8 6 MG tablet    simethicone (MYLICON) 80 mg chewable tablet    leucovorin (WELLCOVORIN) 15 MG tablet    polyethylene glycol (MIRALAX) 17 g packet     Allergies   Allergen Reactions    Penicillins Fever       Objective   /89   Pulse 73   Temp 98 2 °F (36 8 °C) (Oral)   Resp 20   Ht 5' 6" (1 676 m)   Wt 129 kg (283 lb 11 7 oz)   SpO2 98%   BMI 45 80 kg/m²       Intake/Output Summary (Last 24 hours) at 2020 1038  Last data filed at 2020 0951  Gross per 24 hour   Intake 120 ml   Output --   Net 120 ml         Physical Exam   Constitutional: She is oriented to person, place, and time  She appears well-developed and well-nourished  No distress  Cardiovascular: Normal rate and regular rhythm  Pulmonary/Chest: Effort normal    Abdominal: Soft  Musculoskeletal: Normal range of motion  Neurological: She is alert and oriented to person, place, and time  Skin: Skin is warm  She is not diaphoretic     Psychiatric: She has a normal mood and affect  Her behavior is normal        Lab Results:   Admission on 02/18/2020   Component Date Value    WBC 02/18/2020 8 74     RBC 02/18/2020 3 57*    Hemoglobin 02/18/2020 10 0*    Hematocrit 02/18/2020 31 0*    MCV 02/18/2020 87     MCH 02/18/2020 28 0     MCHC 02/18/2020 32 3     RDW 02/18/2020 16 5*    MPV 02/18/2020 9 5     Platelets 68/91/1533 276     nRBC 02/18/2020 0     Sodium 02/18/2020 139     Potassium 02/18/2020 4 1     Chloride 02/18/2020 111*    CO2 02/18/2020 24     ANION GAP 02/18/2020 4     BUN 02/18/2020 18     Creatinine 02/18/2020 0 91     Glucose 02/18/2020 109     Calcium 02/18/2020 9 2     eGFR 02/18/2020 74     Segmented % 02/18/2020 72     Bands % 02/18/2020 3     Lymphocytes % 02/18/2020 10*    Monocytes % 02/18/2020 4     Eosinophils, % 02/18/2020 1     Basophils % 02/18/2020 1     Metamyelocytes% 02/18/2020 1     Myelocytes % 02/18/2020 4*    Atypical Lymphocytes % 02/18/2020 4*    Absolute Neutrophils 02/18/2020 6 56     Lymphocytes Absolute 02/18/2020 0 87     Monocytes Absolute 02/18/2020 0 35     Eosinophils Absolute 02/18/2020 0 09     Basophils Absolute 02/18/2020 0 09     RBC Morphology 02/18/2020 Present     Anisocytosis 02/18/2020 Present     Polychromasia 02/18/2020 Present     Platelet Estimate 52/87/0031 Adequate       Imaging: I have personally reviewed pertinent reports  EKG, Pathology, and Other Studies: I have personally reviewed pertinent reports        Assessment/Plan     A/P:  Gestational trophoblastic neoplasia undergoing operative intense chemotherapy with EMA-CO 4th cycle  1) Gestational trophoblastic neoplasia  - EMA-CO chemotherapy th cycle  - Somatitis and mucositis on previous admission, no complaint now; Magic oral wash suspension ordered  - HCG 7 on 2/17    2) Elevated LFT  - AST/ALT  52/73 previously 18/26;   - Grade 1 LFT according to CTCAE guideline will continue chemotherapy as planned    3)Anemia  - status post surgery, chronic disease anemia  - hemoglobin 10 on admission previously 8 0      4) Hypertension   - Home Lisiniopril    5) Anxiety  - Home Lexapro    6)Bowel regimen  -  Colace, Simethicone    7 DVT PPX   - SCDs  - Lovenox 40 mg /daily          Code Status: Level 1 - Full Code  Advance Directive and Living Will:      Power of :    Brenda Pineda MD  0/76/5914  58:86 AM

## 2020-02-19 VITALS
RESPIRATION RATE: 20 BRPM | SYSTOLIC BLOOD PRESSURE: 161 MMHG | WEIGHT: 283.73 LBS | HEIGHT: 66 IN | HEART RATE: 69 BPM | TEMPERATURE: 98 F | OXYGEN SATURATION: 99 % | DIASTOLIC BLOOD PRESSURE: 87 MMHG | BODY MASS INDEX: 45.6 KG/M2

## 2020-02-19 LAB
ALBUMIN SERPL BCP-MCNC: 2.9 G/DL (ref 3.5–5)
ALP SERPL-CCNC: 101 U/L (ref 46–116)
ALT SERPL W P-5'-P-CCNC: 100 U/L (ref 12–78)
ANION GAP SERPL CALCULATED.3IONS-SCNC: 7 MMOL/L (ref 4–13)
AST SERPL W P-5'-P-CCNC: 54 U/L (ref 5–45)
BILIRUB SERPL-MCNC: 0.59 MG/DL (ref 0.2–1)
BUN SERPL-MCNC: 18 MG/DL (ref 5–25)
CALCIUM SERPL-MCNC: 8.8 MG/DL (ref 8.3–10.1)
CHLORIDE SERPL-SCNC: 118 MMOL/L (ref 100–108)
CO2 SERPL-SCNC: 20 MMOL/L (ref 21–32)
CREAT SERPL-MCNC: 1.08 MG/DL (ref 0.6–1.3)
ERYTHROCYTE [DISTWIDTH] IN BLOOD BY AUTOMATED COUNT: 16.2 % (ref 11.6–15.1)
GFR SERPL CREATININE-BSD FRML MDRD: 60 ML/MIN/1.73SQ M
GLUCOSE SERPL-MCNC: 129 MG/DL (ref 65–140)
HCT VFR BLD AUTO: 28.2 % (ref 34.8–46.1)
HGB BLD-MCNC: 8.9 G/DL (ref 11.5–15.4)
MCH RBC QN AUTO: 28 PG (ref 26.8–34.3)
MCHC RBC AUTO-ENTMCNC: 31.6 G/DL (ref 31.4–37.4)
MCV RBC AUTO: 89 FL (ref 82–98)
NRBC BLD AUTO-RTO: 0 /100 WBCS
PLATELET # BLD AUTO: 217 THOUSANDS/UL (ref 149–390)
PMV BLD AUTO: 9.6 FL (ref 8.9–12.7)
POTASSIUM SERPL-SCNC: 4.2 MMOL/L (ref 3.5–5.3)
PROT SERPL-MCNC: 6.3 G/DL (ref 6.4–8.2)
RBC # BLD AUTO: 3.18 MILLION/UL (ref 3.81–5.12)
SODIUM SERPL-SCNC: 145 MMOL/L (ref 136–145)
WBC # BLD AUTO: 13.06 THOUSAND/UL (ref 4.31–10.16)

## 2020-02-19 PROCEDURE — 99238 HOSP IP/OBS DSCHRG MGMT 30/<: CPT | Performed by: OBSTETRICS & GYNECOLOGY

## 2020-02-19 PROCEDURE — 85027 COMPLETE CBC AUTOMATED: CPT | Performed by: STUDENT IN AN ORGANIZED HEALTH CARE EDUCATION/TRAINING PROGRAM

## 2020-02-19 PROCEDURE — 80053 COMPREHEN METABOLIC PANEL: CPT | Performed by: STUDENT IN AN ORGANIZED HEALTH CARE EDUCATION/TRAINING PROGRAM

## 2020-02-19 RX ORDER — LEUCOVORIN CALCIUM 5 MG/1
15 TABLET ORAL EVERY 12 HOURS
Status: DISCONTINUED | OUTPATIENT
Start: 2020-02-19 | End: 2020-02-19 | Stop reason: HOSPADM

## 2020-02-19 RX ORDER — NORGESTIMATE AND ETHINYL ESTRADIOL 0.25-0.035
1 KIT ORAL DAILY
Qty: 7 TABLET | Refills: 0 | Status: SHIPPED | OUTPATIENT
Start: 2020-02-19 | End: 2020-02-19 | Stop reason: SDUPTHER

## 2020-02-19 RX ORDER — NORGESTIMATE AND ETHINYL ESTRADIOL 0.25-0.035
1 KIT ORAL DAILY
Qty: 7 TABLET | Refills: 1 | Status: SHIPPED | OUTPATIENT
Start: 2020-02-19 | End: 2020-05-06

## 2020-02-19 RX ORDER — SODIUM CHLORIDE 9 MG/ML
20 INJECTION, SOLUTION INTRAVENOUS ONCE AS NEEDED
Status: DISCONTINUED | OUTPATIENT
Start: 2020-02-19 | End: 2020-02-19 | Stop reason: HOSPADM

## 2020-02-19 RX ORDER — PALONOSETRON 0.05 MG/ML
0.25 INJECTION, SOLUTION INTRAVENOUS ONCE
Status: COMPLETED | OUTPATIENT
Start: 2020-02-19 | End: 2020-02-19

## 2020-02-19 RX ADMIN — SODIUM CHLORIDE 20 ML/HR: 0.9 INJECTION, SOLUTION INTRAVENOUS at 11:12

## 2020-02-19 RX ADMIN — SODIUM CHLORIDE 125 ML/HR: 0.9 INJECTION, SOLUTION INTRAVENOUS at 03:37

## 2020-02-19 RX ADMIN — LISINOPRIL 10 MG: 10 TABLET ORAL at 09:09

## 2020-02-19 RX ADMIN — PALONOSETRON HYDROCHLORIDE 0.25 MG: 0.25 INJECTION, SOLUTION INTRAVENOUS at 11:17

## 2020-02-19 RX ADMIN — ETOPOSIDE 237 MG: 20 INJECTION, SOLUTION, CONCENTRATE INTRAVENOUS at 13:35

## 2020-02-19 RX ADMIN — ESCITALOPRAM OXALATE 10 MG: 10 TABLET ORAL at 09:09

## 2020-02-19 RX ADMIN — DACTINOMYCIN 500 MCG: 0.5 INJECTION, POWDER, LYOPHILIZED, FOR SOLUTION INTRAVENOUS at 12:58

## 2020-02-19 RX ADMIN — LEUCOVORIN CALCIUM 15 MG: 5 TABLET ORAL at 13:37

## 2020-02-19 RX ADMIN — DOCUSATE SODIUM 100 MG: 100 CAPSULE, LIQUID FILLED ORAL at 09:09

## 2020-02-19 RX ADMIN — DEXAMETHASONE SODIUM PHOSPHATE 10 MG: 10 INJECTION, SOLUTION INTRAMUSCULAR; INTRAVENOUS at 12:16

## 2020-02-19 RX ADMIN — ENOXAPARIN SODIUM 40 MG: 40 INJECTION SUBCUTANEOUS at 09:09

## 2020-02-19 RX ADMIN — FUROSEMIDE 20 MG: 20 TABLET ORAL at 09:09

## 2020-02-19 NOTE — DISCHARGE INSTRUCTIONS
Please  your script for sprintec from the pharmacy on Thursday 2/20 and take it daily until your next hcg is drawn  You can stop taking this medication after your hcg is drawn  Intravenous Chemotherapy   WHAT YOU NEED TO KNOW:   IV chemo is medicine used to shrink a tumor or kill cancer cells  IV chemo is injected into your blood through an IV  Chemo can help cure cancer, prevent cancer from spreading, and relieve symptoms caused by cancer  You may be given 1 or more types of chemo  You may get chemo at home, in your healthcare provider's office, in a clinic, or in a hospital    DISCHARGE INSTRUCTIONS:   Call 911 for any of the following:   · You have chest pain, shortness of breath, or trouble breathing  · Your throat feels swollen and you have trouble swallowing or breathing  · You cough up blood  Seek care immediately if:   · You feel confused or have a severe headache that does not go away  · You have arm or leg weakness, trouble walking, or trouble seeing  · You have pain where your IV was or at your catheter site  · Your arm or leg feels warm, painful, or looks bigger than usual      · You have blood in your urine or bowel movement  · You vomit blood  · You urinate a lot less than usual or stop urinating  · You feel weak, dizzy, or faint  · Your heart is beating faster than usual   Contact your oncologist if:   · You have a fever of 100 5° F or higher or chills  · You have bleeding from your gums  · You have white spots or sores in your mouth  · You have bruises on your body that are not caused by an injury or fall  · You feel depressed  · You have a cough that lasts more than a few days  · You have diarrhea, nausea, or vomiting that lasts more than 2 days  · You have frequent, painful urination  · You have questions or concerns about your condition or care  Medicines  may be given to help manage side effects of chemotherapy   This may include medicines to decrease nausea and vomiting, or to manage pain  Take your medicines as directed  Call your healthcare provider if you think your medicine is not helping or if you have side effects  Tell him if you are allergic to any medicine  Keep a list of the medicines, vitamins, and herbs you take  Include the amounts, and when and why you take them  Bring the list or the pill bottles to follow-up visits  Carry your medicine list with you in case of an emergency  Self-care:   · Rest as needed  You may feel tired for a few days after getting chemo  Return to activities slowly, and do more as you feel stronger  · Eat healthy foods  Healthy foods include fruits, vegetables, whole-grain breads, low-fat dairy products, beans, lean meats, and fish  Several small meals a day may be easier to eat than a few large meals  · Drink plenty of fluids  This will help prevent dehydration from vomiting or diarrhea  Ask how much liquid to drink each day and which liquids are best for you  · Prevent infection  Stay away from people who are sick  Wash your hands frequently and ask visitors to wash their hands  Ask family and friends not to visit if they are sick  Do not spend time in crowded places such as movie theaters, malls, or elevators  Ask your healthcare provider if you need vaccines  · Manage hair loss  Use mild shampoos if your hair begins to thin or fall out  Use a soft bristled brush to comb your hair  If you lose your hair, wash your scalp with moisturizing shampoos or conditioners  Apply lotion and massage your scalp after a shower  Use sunscreen, a hat, a scarf, or a wig to protect your scalp from the sun  Ask your healthcare provider where you can purchase a wig or hair piece  · Work with your healthcare provider to manage side effects  Always tell your healthcare provider if you have side effects  Take medicines as directed   Ask your healthcare provider for more information on how to manage certain side effects  For more information and support: It may be difficult for you and your family to go through cancer and cancer treatments  Join a support group or talk with others who have gone through treatment  · [address]  [place] [address]  Phone: [phone] [phone]  Web Address: http://SAFCell/  boolino  · [address] [place] [address], [address] [place] [address]  Phone: 4- [phone] - [phone]  Web Address: [email or web address]/  [employer]  Follow up with your oncologist as directed: You will need to see your oncologist for ongoing tests and treatment  Write down your questions so you remember to ask them during your visits  © 2017 [address] [place] [address] Information is for End User's use only and may not be sold, redistributed or otherwise used for commercial purposes  All illustrations and images included in CareNotes® are the copyrighted property of [employer] , [employer]  or Javier Florentino  The above information is an  only  It is not intended as medical advice for individual conditions or treatments  Talk to your doctor, nurse or pharmacist before following any medical regimen to see if it is safe and effective for you

## 2020-02-19 NOTE — PROGRESS NOTES
Gyn Oncology Progress note   Anali Phillips 52 y o  female MRN: [id number]  Unit/Bed#: [facility] 926-01 Encounter: [id number]    A/P: Anali Phillips is a 52 y o  female admitted with high risk GTN for inpatient EMA-CO    1  GTN: last beta hcg 2/17: 7, continue EMA-CO  Will provide script for sprintec to be started outpatient  2  History of stomatitis and mucositis: magic oral wash suspension and nystatin wash prn  3  Elevated LFTs: 52/73 --> 54/100  Continue to trend  4  HTN: Bps have been 130s-140s/60s-80s  Continue lisinopril and lasxi  5  Anxiety: continue home lexapro  6  Asthma: albuterol prn  7  Pain: motrin/tylenol/marti 5 prn  8  FEN: regular diet, NS @ 125cc/hr  9  DVT ppx: SCDs, lovenox    Subjective    No acute events overnight  Pain is well controlled  Pt is tolerating PO  Pt is passing flatus  Pt is ambulating  Denies fevers/chills  Review of Systems   Constitutional: Negative for chills and fever  Eyes: Negative for visual disturbance  Respiratory: Negative for shortness of breath  Cardiovascular: Negative for chest pain  Gastrointestinal: Negative for constipation, diarrhea, nausea and vomiting  Genitourinary: Negative for pelvic pain, urgency, vaginal bleeding, vaginal discharge and vaginal pain  Neurological: Negative for headaches  /68   Pulse 70   Temp 98 7 °F (37 1 °C)   Resp 20   Ht 5' 6" (1 676 m)   Wt 129 kg (283 lb 11 7 oz)   SpO2 97%   BMI 45 80 kg/m²     I/O last 3 completed shifts: In: 872 5 [P O :180; IV Piggyback:692 5]  Out: 200 [Urine:200]  I/O this shift:  In: 1871 3 [P O :840;  I V :1031 3]  Out: 2500 [Urine:2500]    Lab Results   Component Value Date    WBC 13 06 (H) 02/19/2020    HGB 8 9 (L) 02/19/2020    HCT 28 2 (L) 02/19/2020    MCV 89 02/19/2020     02/19/2020       Lab Results   Component Value Date    CALCIUM 8 8 02/19/2020    K 4 2 02/19/2020    CO2 20 (L) 02/19/2020     (H) 02/19/2020    BUN 18 02/19/2020    CREATININE 1 08 02/19/2020       Lab Results   Component Value Date/Time    POCGLU 134 02/04/2020 07:04 PM       Physical Exam   Constitutional: She is oriented to person, place, and time  She appears well-developed and well-nourished  Cardiovascular: Normal rate and regular rhythm  Pulmonary/Chest: Effort normal and breath sounds normal    Abdominal: Bowel sounds are normal  There is no tenderness  There is no rebound and no guarding  Incisions are well healed   Musculoskeletal: Normal range of motion  She exhibits no tenderness  Neurological: She is alert and oriented to person, place, and time  Psychiatric: She has a normal mood and affect  Nursing note and vitals reviewed        Wilbern Schlatter, MD   PGY-3 GYN/ONC  2/19/2020 6:55 AM

## 2020-02-19 NOTE — UTILIZATION REVIEW
Initial Clinical Review    Admission: Date/Time/Statement: Admission Orders (From admission, onward)     Ordered        02/18/20 0843  Inpatient Admission  Once                   Orders Placed This Encounter   Procedures    Inpatient Admission     Standing Status:   Standing     Number of Occurrences:   1     Order Specific Question:   Admitting Physician     Answer:   Jaky Rust [[id number]]     Order Specific Question:   Level of Care     Answer:   Level 1 Stepdown [13]     Order Specific Question:   Estimated length of stay     Answer:   Inpatient Only Surgery     Assessment/Plan: 53 yo female w/hx htn, anemia, anxiety, and gestational trophoblastic dz on chemo directly admitted from home as inpatient to receive her 4th cycle of planned chemotherapy  Had hysterectomy on 2/4/20  IVF, analgesics, antiemetics, and other supportive measures are in place  Lab monitoring in progress  2/19: tolerating PO; chemo continues; has downtrending hcg, which is not yet normal  Will start oral contraceptives in the event of pituitary secretion  She has grade 2 transaminitis       Temperature Pulse Respirations Blood Pressure SpO2   02/18/20 0829 02/18/20 0829 02/18/20 0829 02/18/20 0829 02/18/20 0829   98 2 °F (36 8 °C) 73 20 147/89 98 %      Temp Source Heart Rate Source Patient Position - Orthostatic VS BP Location FiO2 (%)   02/18/20 0829 -- -- -- --   Oral          Pain Score       02/18/20 0928       No Pain        Wt Readings from Last 1 Encounters:   02/18/20 129 kg (283 lb 11 7 oz)     Additional Vital Signs:   Date/Time  Temp  Pulse  Resp  BP  MAP (mmHg)  SpO2    02/19/20 07:08:38  98 °F (36 7 °C)  69  20  161/87  112  99 %    02/19/20 0100  --  70  --  --  --  97 %    02/18/20 22:17:30  98 7 °F (37 1 °C)  83  --  135/68  90  97 %    02/18/20 14:50:47  98 1 °F (36 7 °C)  84  --  134/83  100  97 %        Pertinent Labs/Diagnostic Test Results:   No rads or EKG  Results from last 7 days   Lab Units 02/19/20  0541 20  0851 20  0700   WBC Thousand/uL 13 06* 8 74  --    WHITE BLOOD CELL COUNT  Thousand/uL  --   --  8 5   HEMOGLOBIN g/dL 8 9* 10 0*  --    HEMOGLOBIN  g/dL  --   --  10 9*   HEMATOCRIT % 28 2* 31 0*  --    HEMATOCRIT  %  --   --  34 2*   PLATELETS Thousands/uL 217 276  --    PLATELETS  Thousand/uL  --   --  370   NEUTROS ABS  cells/uL  --   --  4,463   BANDS PCT %  --  3  --          Results from last 7 days   Lab Units 20  0541 20  0851 20  0700   SODIUM mmol/L 145 139 142   POTASSIUM mmol/L 4 2 4 1 4 3   CHLORIDE mmol/L 118* 111* 108   CO2 mmol/L 20* 24 23   ANION GAP mmol/L 7 4  --    BUN mg/dL 18 18 17   CREATININE mg/dL 1 08 0 91 0 97   EGFR ml/min/1 73sq m 60 74  --    SL AMB CALCIUM mg/dL  --   --  9 2   CALCIUM mg/dL 8 8 9 2  --    MAGNESIUM mg/dL  --   --  2 1     Results from last 7 days   Lab Units 20  0541 20  0700   AST U/L 54* 52*   ALT U/L 100* 73*   ALK PHOS U/L 101 89   TOTAL PROTEIN g/dL 6 3* 6 9   ALBUMIN g/dL 2 9* 4 2   TOTAL BILIRUBIN mg/dL 0 59 0 5         Results from last 7 days   Lab Units 20  0541 20  0851 20  0700   GLUCOSE RANDOM mg/dL 129 109 103*       Past Medical History:   Diagnosis Date    Cancer ([facility] [address] )     GTD (gestational trophoblastic disease)     History of chemotherapy     Obesity     Shortness of breath     Wheezing      Present on Admission:   GTD (gestational trophoblastic disease)    Past Surgical History:   Procedure Laterality Date     SECTION       CYSTOSCOPY N/A 2020     Procedure: CYSTOSCOPY;  Surgeon: Chase Maria MD;  [facility];  Service: Gynecology Oncology    DILATION AND CURETTAGE OF UTERUS         X2    ENDOMETRIAL ABLATION       IR PORT PLACEMENT   2019     r chest    LAPAROTOMY N/A 2020     Procedure: MINI-LAPAROTOMY (FOR SPECIMEN REMOVAL);   Surgeon: Chase Maria MD;  [facility];  Service: Gynecology Oncology    WA LAPAROSCOPY W TOT HYSTERECTUTERUS <=250 GRAM  W TUBE/OVARY N/A 2/4/2020     Procedure: ROBOTIC ASSISTED TOTAL LAPAROSCOPIC HYSTERECTOMY, BILATERAL SALPINGO-OOPHORECTOMY;  Surgeon: Gloris Najjar, MD;  [facility];  Service: Gynecology Oncology    THYROIDECTOMY, PARTIAL             OB/GYN History: [id number]    Admitting Diagnosis: GTD (gestational trophoblastic disease) [O01 9]  Age/Sex: 52 y o  female  Admission Orders:  Scheduled Medications:  Medications:  docusate sodium 100 mg Oral BID   enoxaparin 40 mg Subcutaneous Q24H [address]   escitalopram 10 mg Oral Daily   etoposide (TOPOSAR) chemo infusion 100 mg/m2 (Treatment Plan Recorded) Intravenous Once   furosemide 20 mg Oral Daily   leucovorin 15 mg Oral Q12H   lisinopril 10 mg Oral Daily   DACTINomycin (COSMEGEN) 500 mcg in sodium chloride 0 9 % 50 mL IVPB   Dose: 500 mcg  Freq: Once Route: IV  Last Dose: Stopped (02/18/20 1155)  Start: 02/18/20 1145 End: 02/18/20 1155  DACTINomycin (COSMEGEN) 500 mcg in sodium chloride 0 9 % 50 mL IVPB   Dose: 500 mcg  Freq: Once Route: IV  Last Dose: 500 mcg (02/19/20 1258)  Start: 02/19/20 1000 End: 02/19/20 1318  dexamethasone (DECADRON) 10 mg in sodium chloride 0 9 % 51 mL IVPB   Dose: 10 mg  Freq: Once Route: IV  Last Dose: 10 mg (02/18/20 1109)  Start: 02/18/20 1015 End: 02/18/20 1129  dexamethasone (DECADRON) 10 mg in sodium chloride 0 9 % 51 mL IVPB   Dose: 10 mg  Freq: Once Route: IV  Last Dose: 10 mg (02/19/20 1216)  Start: 02/19/20 0830 End: 02/19/20 1236  methotrexate (PF) 475 mg in sodium chloride 0 9 % 1,000 mL continuous infusion   Dose: 200 mg/m2  Weight Dosing Info: 135 kg (Treatment Plan Recorded)  Freq: Once Route: IV  Last Dose: Stopped (02/19/20 0200)  Start: 02/18/20 1200 End: 02/19/20 0200  methotrexate (PF) injection 237 mg   Dose: 100 mg/m2  Weight Dosing Info: 135 kg (Treatment Plan Recorded)  Freq:  Once Route: IV  Start: 02/18/20 1200 End: 02/18/20 1330  palonosetron (ALOXI) injection 0 25 mg   Dose: 0 25 mg  Freq: Once Route: IV  Start: 02/18/20 1015 End: 02/18/20 1201  palonosetron (ALOXI) injection 0 25 mg   Dose: 0 25 mg  Freq: Once Route: IV  Start: 02/19/20 0830 End: 02/19/20 1117    Continuous IV Infusions:  sodium chloride 125 mL/hr Intravenous Continuous     PRN Meds:  acetaminophen 650 mg Oral Q6H PRN   al mag oxide-diphenhydramine-lidocaine viscous 10 mL Swish & Swallow Q6H PRN x 1 on 2/18   albuterol 2 puff Inhalation Q4H PRN   ibuprofen 600 mg Oral Q6H PRN   LORazepam 1 mg Oral Q8H PRN   nystatin 500,000 Units Swish & Swallow 4x Daily PRN   ondansetron 4 mg Intravenous Q6H PRN   oxyCODONE 5 mg Oral Q4H PRN   sodium chloride 20 mL/hr Intravenous Once PRN     SCD's  ambulatory  House diet    Network Utilization Review Department  [email or web address]  ATTENTION: Please call with any questions or concerns to [phone] and carefully listen to the prompts so that you are directed to the right person  All voicemails are confidential   Yvonne Quinones all requests for admission clinical reviews, approved or denied determinations and any other requests to dedicated fax number below belonging to the campus where the patient is receiving treatment   List of dedicated fax numbers for the Facilities:  [address] DENIALS (Administrative/Medical Necessity) [phone]   [address] [place] [address] (Maternity/NICU/Pediatrics) [phone]   Jacky Chauhan [phone]   Percy Escobar [phone]   Vanesa Mendoza [phone]   Rhonda Bolanos [phone]   1205 [facility] 1525 [facility] [phone]   [facility]  [phone]   2205 [facility] [address] [facility]  2401 [facility] 1000 [address] [facility] [phone]

## 2020-02-19 NOTE — SOCIAL WORK
MSW provided supportive visit to pt this day  Pt was able to verbalize her concerns and fears that she was experiencing and all that was causing her much distress  The pt states that after she spoke with Dr Quang Avelar the previous day, she felt a "sense of relief" and expressed hope that she will have the strength to complete her journey  The pt tends to underestimate her internal strength to endure during her struggles and MSW reminded her of this during this session  The pt has shown much resilience, strength and courage and was reminded of this today  The pt opened up about her cancer journey, her relationship with her son, her work and how her outlook on life is much different  MSW listened and provided significant emotional support

## 2020-02-20 ENCOUNTER — TELEPHONE (OUTPATIENT)
Dept: PULMONOLOGY | Facility: CLINIC | Age: 50
End: 2020-02-20

## 2020-02-20 NOTE — UTILIZATION REVIEW
Notification of Discharge  This is a Notification of Discharge from our facility [address]  Please be advised that this patient has been discharge from our facility  Below you will find the admission and discharge date and time including the patients disposition  PRESENTATION DATE: 2/18/2020  8:16 AM  OBS ADMISSION DATE:   IP ADMISSION DATE: 2/18/20 0816   DISCHARGE DATE: 2/19/2020  4:45 PM  DISPOSITION: Home/Self Care Home/Self Care   Admission Orders listed below:  Admission Orders (From admission, onward)     Ordered        02/18/20 0843  Inpatient Admission  Once                   Please contact the UR Department if additional information is required to close this patient's authorization/case  [address] Utilization Review Department  Main: [phone] x carefully listen to the prompts  All voicemails are confidential   [email or web address]  Send all requests for admission clinical reviews, approved or denied determinations and any other requests to dedicated fax number below belonging to the campus where the patient is receiving treatment   List of dedicated fax numbers:  [address] DENIALS (Administrative/Medical Necessity) [phone]   [address] (Maternity/NICU/Pediatrics) [phone]   Tadeo Lang [phone]   Cherelle Robley [facility] [phone]   Yohannes Mejia [phone]   145 [facility] 1525  [phone]   [place]k [place] [phone] 2205 [facility] [address]  2401 [facility] 1000 [address] [facility] [phone]

## 2020-02-20 NOTE — TELEPHONE ENCOUNTER
I called patient and left a vm informing her she was supposed to have a chest x-ray done   I informed her to please have chest x-ray completed and to please call and schedule an appt with either follow Dr Celso Phoenix as per Dr Zena Guy request

## 2020-02-24 DIAGNOSIS — K12.30 MUCOSITIS: ICD-10-CM

## 2020-02-24 DIAGNOSIS — B37.0 ORAL CANDIDA: Primary | ICD-10-CM

## 2020-02-24 RX ORDER — FLUCONAZOLE 200 MG/1
200 TABLET ORAL DAILY
Qty: 1 TABLET | Refills: 0 | Status: SHIPPED | OUTPATIENT
Start: 2020-02-24 | End: 2020-02-25

## 2020-02-24 RX ORDER — FLUCONAZOLE 200 MG/1
200 TABLET ORAL DAILY
Qty: 2 TABLET | Refills: 0 | Status: SHIPPED | OUTPATIENT
Start: 2020-02-24 | End: 2020-02-24 | Stop reason: SDUPTHER

## 2020-02-24 RX ORDER — PALONOSETRON 0.05 MG/ML
0.25 INJECTION, SOLUTION INTRAVENOUS ONCE
Status: CANCELLED | OUTPATIENT
Start: 2020-02-25

## 2020-02-24 RX ORDER — SODIUM CHLORIDE 9 MG/ML
20 INJECTION, SOLUTION INTRAVENOUS ONCE
Status: CANCELLED | OUTPATIENT
Start: 2020-02-25

## 2020-02-25 ENCOUNTER — HOSPITAL ENCOUNTER (OUTPATIENT)
Dept: INFUSION CENTER | Facility: HOSPITAL | Age: 50
Discharge: HOME | End: 2020-02-25
Attending: OBSTETRICS & GYNECOLOGY
Payer: COMMERCIAL

## 2020-02-25 ENCOUNTER — TELEPHONE (OUTPATIENT)
Dept: PALLIATIVE MEDICINE | Facility: CLINIC | Age: 50
End: 2020-02-25

## 2020-02-25 ENCOUNTER — OFFICE VISIT (OUTPATIENT)
Dept: GYNECOLOGIC ONCOLOGY | Facility: CLINIC | Age: 50
End: 2020-02-25

## 2020-02-25 VITALS
BODY MASS INDEX: 47.09 KG/M2 | DIASTOLIC BLOOD PRESSURE: 69 MMHG | WEIGHT: 282.63 LBS | TEMPERATURE: 98.3 F | RESPIRATION RATE: 18 BRPM | HEIGHT: 65 IN | SYSTOLIC BLOOD PRESSURE: 141 MMHG | HEART RATE: 107 BPM | OXYGEN SATURATION: 99 %

## 2020-02-25 VITALS
HEIGHT: 65 IN | WEIGHT: 289.2 LBS | SYSTOLIC BLOOD PRESSURE: 122 MMHG | TEMPERATURE: 98.3 F | HEART RATE: 117 BPM | DIASTOLIC BLOOD PRESSURE: 72 MMHG | BODY MASS INDEX: 48.18 KG/M2

## 2020-02-25 DIAGNOSIS — T45.1X5A CHEMOTHERAPY-INDUCED NAUSEA: Primary | ICD-10-CM

## 2020-02-25 DIAGNOSIS — R11.0 CHEMOTHERAPY-INDUCED NAUSEA: Primary | ICD-10-CM

## 2020-02-25 DIAGNOSIS — O01.9 GTD (GESTATIONAL TROPHOBLASTIC DISEASE): ICD-10-CM

## 2020-02-25 DIAGNOSIS — O01.9 GTD (GESTATIONAL TROPHOBLASTIC DISEASE): Primary | ICD-10-CM

## 2020-02-25 LAB
ALBUMIN SERPL-MCNC: 4.2 G/DL (ref 3.6–5.1)
ALBUMIN/GLOB SERPL: 1.8 (CALC) (ref 1–2.5)
ALP SERPL-CCNC: 101 U/L (ref 31–125)
ALT SERPL-CCNC: 83 U/L (ref 6–29)
AST SERPL-CCNC: 46 U/L (ref 10–35)
B-HCG SERPL-ACNC: 3 MIU/ML
BASOPHILS # BLD AUTO: 22 CELLS/UL (ref 0–200)
BASOPHILS NFR BLD AUTO: 0.4 %
BILIRUB SERPL-MCNC: 1 MG/DL (ref 0.2–1.2)
BUN SERPL-MCNC: 24 MG/DL (ref 7–25)
BUN/CREAT SERPL: 21 (CALC) (ref 6–22)
CALCIUM SERPL-MCNC: 9.8 MG/DL (ref 8.6–10.2)
CHLORIDE SERPL-SCNC: 103 MMOL/L (ref 98–110)
CO2 SERPL-SCNC: 24 MMOL/L (ref 20–32)
CREAT SERPL-MCNC: 1.17 MG/DL (ref 0.5–1.1)
EOSINOPHIL # BLD AUTO: 39 CELLS/UL (ref 15–500)
EOSINOPHIL NFR BLD AUTO: 0.7 %
ERYTHROCYTE [DISTWIDTH] IN BLOOD BY AUTOMATED COUNT: 16.7 % (ref 11–15)
GLOBULIN SER CALC-MCNC: 2.4 G/DL (CALC) (ref 1.9–3.7)
GLUCOSE SERPL-MCNC: 175 MG/DL (ref 65–99)
HCT VFR BLD AUTO: 30.4 % (ref 35–45)
HGB BLD-MCNC: 9.8 G/DL (ref 11.7–15.5)
LYMPHOCYTES # BLD AUTO: 292 CELLS/UL (ref 850–3900)
LYMPHOCYTES NFR BLD AUTO: 5.3 %
MAGNESIUM SERPL-MCNC: 1.8 MG/DL (ref 1.5–2.5)
MCH RBC QN AUTO: 28.3 PG (ref 27–33)
MCHC RBC AUTO-ENTMCNC: 32.2 G/DL (ref 32–36)
MCV RBC AUTO: 87.9 FL (ref 80–100)
MONOCYTES # BLD AUTO: 11 CELLS/UL (ref 200–950)
MONOCYTES NFR BLD AUTO: 0.2 %
NEUTROPHILS # BLD AUTO: 5137 CELLS/UL (ref 1500–7800)
NEUTROPHILS NFR BLD AUTO: 93.4 %
PLATELET # BLD AUTO: 191 THOUSAND/UL (ref 140–400)
PMV BLD REES-ECKER: 10.6 FL (ref 7.5–12.5)
POTASSIUM SERPL-SCNC: 4 MMOL/L (ref 3.5–5.3)
PROT SERPL-MCNC: 6.6 G/DL (ref 6.1–8.1)
RBC # BLD AUTO: 3.46 MILLION/UL (ref 3.8–5.1)
SL AMB EGFR AFRICAN AMERICAN: 63 ML/MIN/1.73M2
SL AMB EGFR NON AFRICAN AMERICAN: 55 ML/MIN/1.73M2
SODIUM SERPL-SCNC: 139 MMOL/L (ref 135–146)
WBC # BLD AUTO: 5.5 THOUSAND/UL (ref 3.8–10.8)

## 2020-02-25 PROCEDURE — 96361 HYDRATE IV INFUSION ADD-ON: CPT

## 2020-02-25 PROCEDURE — 96375 TX/PRO/DX INJ NEW DRUG ADDON: CPT

## 2020-02-25 PROCEDURE — 96413 CHEMO IV INFUSION 1 HR: CPT

## 2020-02-25 PROCEDURE — 96417 CHEMO IV INFUS EACH ADDL SEQ: CPT

## 2020-02-25 PROCEDURE — 99024 POSTOP FOLLOW-UP VISIT: CPT | Performed by: OBSTETRICS & GYNECOLOGY

## 2020-02-25 PROCEDURE — 96367 TX/PROPH/DG ADDL SEQ IV INF: CPT

## 2020-02-25 RX ORDER — CRANBERRY FRUIT EXTRACT 200 MG
2 CAPSULE ORAL DAILY
COMMUNITY

## 2020-02-25 RX ORDER — DEXAMETHASONE 4 MG/1
8 TABLET ORAL 2 TIMES DAILY PRN
Qty: 30 TABLET | Refills: 0 | Status: SHIPPED | OUTPATIENT
Start: 2020-02-25 | End: 2020-05-06

## 2020-02-25 RX ORDER — PALONOSETRON 0.05 MG/ML
0.25 INJECTION, SOLUTION INTRAVENOUS ONCE
Status: COMPLETED | OUTPATIENT
Start: 2020-02-25 | End: 2020-02-25

## 2020-02-25 RX ORDER — PROMETHAZINE HYDROCHLORIDE 25 MG/1
25 TABLET ORAL EVERY 6 HOURS PRN
Qty: 30 TABLET | Refills: 0 | Status: SHIPPED | OUTPATIENT
Start: 2020-02-25 | End: 2020-05-06

## 2020-02-25 RX ORDER — SODIUM CHLORIDE 9 MG/ML
20 INJECTION, SOLUTION INTRAVENOUS ONCE
Status: COMPLETED | OUTPATIENT
Start: 2020-02-25 | End: 2020-02-25

## 2020-02-25 RX ADMIN — SODIUM CHLORIDE 1000 ML: 0.9 INJECTION, SOLUTION INTRAVENOUS at 09:15

## 2020-02-25 RX ADMIN — SODIUM CHLORIDE 20 ML/HR: 0.9 INJECTION, SOLUTION INTRAVENOUS at 09:14

## 2020-02-25 RX ADMIN — CYCLOPHOSPHAMIDE 1422 MG: 1 INJECTION, POWDER, FOR SOLUTION INTRAVENOUS; ORAL at 11:12

## 2020-02-25 RX ADMIN — VINCRISTINE SULFATE 2 MG: 1 INJECTION, SOLUTION INTRAVENOUS at 10:31

## 2020-02-25 RX ADMIN — DEXAMETHASONE SODIUM PHOSPHATE 20 MG: 10 INJECTION, SOLUTION INTRAMUSCULAR; INTRAVENOUS at 09:47

## 2020-02-25 RX ADMIN — SODIUM CHLORIDE 1000 ML: 0.9 INJECTION, SOLUTION INTRAVENOUS at 11:55

## 2020-02-25 RX ADMIN — PALONOSETRON HYDROCHLORIDE 0.25 MG: 0.25 INJECTION, SOLUTION INTRAVENOUS at 09:42

## 2020-02-25 NOTE — PROGRESS NOTES
Pt renee chemo infusion w/o AR  Port deaccessed after post hydration  Dsd applied  Disch amb , will head to [place] for her Dr appt

## 2020-02-25 NOTE — TELEPHONE ENCOUNTER
Apryl Valdes - thanks for checking with me on this more unusual diagnosis  I have confirmed with Dr Echo Bull that it is indeed considered a cancer and she is on active chemotherapy which has resulted in nausea that so far has not been controlled with traditional medications  Like we do with everyone I would stress that MMJ is just one option we might consider for her symptoms at the visit, but other more traditional means may be recommended too

## 2020-02-25 NOTE — PROGRESS NOTES
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  She has received for complete cycles  Her most recent serum beta HCG is 3-immediately prior to her last CO  She is fatigued and has chemotherapy-induced nausea  Her performance status is 2   1  Plan to continue with EMA-CO for 1 additional cycle   2  CT of chest abdomen pelvis after completion of treatment  3  She understands she will require monthly beta HCGs for 1 year after completing her treatment  4  For chemotherapy-induced nausea, prescribed additional dexamethasone, 8 mg orally twice daily as needed  I also prescribed Phenergan  She was instructed on how to use these medications  5  Referral to palliative care for certification for medical marijuana due to her chemotherapy-induced nausea and vomiting             Chemotherapy-induced nausea - Primary    Relevant Medications    dexamethasone (DECADRON) 4 mg tablet    promethazine (PHENERGAN) 25 mg tablet    Other Relevant Orders    Ambulatory referral to Palliative Care            CHIEF COMPLAINT:  Pre chemotherapy evaluation, chemotherapy-induced nausea vomiting, fatigue        Previous therapy:     GTD (gestational trophoblastic disease)    12/20/2019 Initial Diagnosis     GTD (gestational trophoblastic disease)      12/21/2019 -  Chemotherapy     leucovorin (WELLCOVORIN) tablet 15 mg, 15 mg, Oral, Every 12 hours, 4 of 6 cycles  Administration: 15 mg (12/22/2019), 15 mg (12/23/2019), 15 mg (1/8/2020), 15 mg (1/22/2020), 15 mg (2/19/2020)  palonosetron (ALOXI) injection 0 25 mg, 0 25 mg, Intravenous, Once, 4 of 6 cycles  Administration: 0 25 mg (12/21/2019), 0 25 mg (12/22/2019), 0 25 mg (12/31/2019), 0 25 mg (1/7/2020), 0 25 mg (1/14/2020), 0 25 mg (1/8/2020), 0 25 mg (1/28/2020), 0 25 mg (1/21/2020), 0 25 mg (1/22/2020), 0 25 mg (2/18/2020), 0 25 mg (2/19/2020)  methotrexate injection 242 5 mg, 100 mg/m2 = 242 5 mg, Intravenous, Once, 4 of 6 cycles  Administration: 242 5 mg (12/21/2019), 242 5 mg (1/7/2020), 237 5 mg (1/21/2020), 237 mg (2/18/2020)  Filgrastim-sndz SOSY 480 mcg, 480 mcg (100 % of original dose 480 mcg), Injection, Once, 1 of 1 cycle  Dose modification: 480 mcg (original dose 480 mcg, Cycle 2, Reason: Other (See Comments), Comment: Alternative treatment)  Administration: 480 mcg (1/16/2020), 480 mcg (1/17/2020)  DACTINomycin (COSMEGEN) 500 mcg in sodium chloride 0 9 % 50 mL IVPB, 500 mcg, Intravenous, Once, 4 of 6 cycles  Administration: 500 mcg (12/21/2019), 500 mcg (12/22/2019), 500 mcg (1/7/2020), 500 mcg (1/8/2020), 500 mcg (1/21/2020), 500 mcg (1/22/2020), 500 mcg (2/18/2020), 500 mcg (2/19/2020)  vinCRIStine (ONCOVIN) 2 mg in sodium chloride 0 9 % 50 mL chemo infusion, 2 mg (original dose 1 mg/m2), Intravenous, Once, 4 of 6 cycles  Dose modification: 2 mg (original dose 1 mg/m2, Cycle 1, Reason: Max Dose Reached)  Administration: 2 mg (12/31/2019), 2 mg (1/14/2020), 2 mg (1/28/2020)  cyclophosphamide (CYTOXAN) 1,500 mg in sodium chloride 0 9 % 250 mL IVPB, 1,458 mg, Intravenous, Once, 4 of 6 cycles  Administration: 1,500 mg (12/31/2019), 1,500 mg (1/14/2020), 1,500 mg (1/28/2020)  methotrexate (PF) 485 mg in sodium chloride 0 9 % 1,000 mL continuous infusion, 200 mg/m2 = 485 mg, Intravenous, Once, 4 of 6 cycles  Administration: 485 mg (12/21/2019), 485 mg (1/7/2020), 475 mg (1/21/2020), 475 mg (2/18/2020)  etoposide (TOPOSAR) 243 mg in sodium chloride 0 9 % 600 mL chemo infusion, 100 mg/m2 = 243 mg, Intravenous, Once, 4 of 6 cycles  Administration: 243 mg (12/21/2019), 243 mg (12/22/2019), 243 mg (1/7/2020), 243 mg (1/8/2020), 237 mg (1/21/2020), 237 mg (1/22/2020), 237 mg (2/18/2020), 237 mg (2/19/2020)      2/4/2020 Surgery     Robotic assisted total laparoscopic hysterectomy, bilateral salpingo-oophorectomy, mini-laparotomy for specimen removal, cystoscopy  1  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  Tumor measured 8 5 cm in diameter  Patient ID: Genny Ward is a 52 y o  female  66-year-old presents prior to cycle 5  Of EMA-CO  She is very fatigued  She is having difficulty performing activities of daily living  She does not have stomatitis but has no appetite  She has not been drinking liquids well  She has had recent nausea and vomiting that was treated effectively with Ativan  After taking Ativan, she slept for 15 hours  Her recent laboratory studies are as below  The of note, her most recent serum beta HCG prior to receiving her last CO treatment was 3  She would like to explore options for medical marijuana certification for chemotherapy-induced nausea and vomiting  She has mild rectal pain with bowel movements  She has been afebrile  No further cough  No hemoptysis  No vaginal bleeding  The following portions of the patient's history were reviewed and updated as appropriate: allergies, current medications, past family history, past medical history, past social history, past surgical history and problem list     Review of Systems   Constitutional: Positive for activity change, appetite change, fatigue and unexpected weight change  HENT: Positive for mouth sores, sore throat and trouble swallowing  Respiratory: Negative for shortness of breath  Cardiovascular: Negative for chest pain and leg swelling  Gastrointestinal: Positive for nausea, rectal pain and vomiting  Negative for abdominal pain  Genitourinary: Negative for pelvic pain and vaginal bleeding  Neurological: Positive for weakness and light-headedness  Psychiatric/Behavioral: Positive for dysphoric mood  All other systems reviewed and are negative        Current Outpatient Medications   Medication Sig Dispense Refill    acetaminophen (TYLENOL) 325 mg tablet Take 2 tablets (650 mg total) by mouth every 6 (six) hours as needed for mild pain (Patient not taking: Reported on 2/25/2020) 30 tablet 0    al mag oxide-diphenhydramine-lidocaine viscous (MAGIC MOUTHWASH) 1:1:1 suspension Swish and spit 10 mL every 4 (four) hours as needed for mouth pain or discomfort 1 Bottle 0    albuterol (PROVENTIL HFA,VENTOLIN HFA) 90 mcg/act inhaler Inhale 2 puffs every 4 (four) hours as needed for wheezing (Patient not taking: Reported on 2/25/2020) 1 Inhaler 1    benzonatate (TESSALON PERLES) 100 mg capsule Take 1 capsule (100 mg total) by mouth 3 (three) times a day as needed for cough (Patient not taking: Reported on 2/25/2020) 20 capsule 0    dexamethasone (DECADRON) 4 mg tablet Take 2 tablets (8 mg total) by mouth 2 (two) times a day as needed (nausea) 30 tablet 0    dextromethorphan-guaiFENesin (ROBITUSSIN DM)  mg/5 mL syrup Take 10 mL by mouth every 3 (three) hours as needed for cough 118 mL 0    docusate sodium (COLACE) 100 mg capsule Take 1 capsule (100 mg total) by mouth 2 (two) times a day 30 capsule 2    escitalopram (LEXAPRO) 10 mg tablet TAKE 1 TABLET BY MOUTH EVERY DAY 90 tablet 2    fluconazole (DIFLUCAN) 200 mg tablet Take 1 tablet (200 mg total) by mouth daily for 1 dose 1 tablet 0    furosemide (LASIX) 20 mg tablet Take 1 tablet (20 mg total) by mouth daily (Patient taking differently: Take 20 mg by mouth daily as needed ) 30 tablet 1    ibuprofen (MOTRIN) 600 mg tablet Take 1 tablet (600 mg total) by mouth every 6 (six) hours as needed for mild pain (Patient not taking: Reported on 2/25/2020) 30 tablet 3    leucovorin (WELLCOVORIN) 15 MG tablet Take 1 tablet (15 mg total) by mouth every 12 (twelve) hours for 3 doses 16 tablet 4    lisinopril (ZESTRIL) 10 mg tablet Take 1 tablet (10 mg total) by mouth daily 30 tablet 1    LORazepam (ATIVAN) 1 mg tablet Take 1 tablet (1 mg total) by mouth every 8 (eight) hours as needed for anxiety (and nausea) for up to 10 days 30 tablet 0    norgestimate-ethinyl estradiol (ORTHO-CYCLEN) 0 25-35 MG-MCG per tablet Take 1 tablet by mouth daily for 7 days 7 tablet 1    nystatin (MYCOSTATIN) 500,000 units/5 mL suspension Apply 5 mL (500,000 Units total) to the mouth or throat 4 (four) times a day 60 mL 0    ondansetron (ZOFRAN) 4 mg tablet Take 1 tablet (4 mg total) by mouth every 8 (eight) hours as needed for nausea or vomiting 20 tablet 0    oxyCODONE (OXY-IR) 5 MG capsule Take 5 mg by mouth every 4 (four) hours as needed for moderate pain      polyethylene glycol (MIRALAX) 17 g packet Take 17 g by mouth daily (Patient not taking: Reported on 2/25/2020) 14 each 0    Probiotic Product (ACIDOPHILUS PROBIOTIC BLEND) CAPS Take 2 capsules by mouth daily      promethazine (PHENERGAN) 25 mg tablet Take 1 tablet (25 mg total) by mouth every 6 (six) hours as needed for nausea or vomiting 30 tablet 0    senna (SENOKOT) 8 6 MG tablet Take 1 tablet by mouth daily at bedtime      simethicone (MYLICON) 80 mg chewable tablet Chew 1 tablet (80 mg total) every 6 (six) hours as needed for flatulence (Patient not taking: Reported on 2/25/2020) 30 tablet 0     No current facility-administered medications for this visit  Objective:    Blood pressure 122/72, pulse (!) 117, temperature 98 3 °F (36 8 °C), temperature source Oral, height 5' 4 65" (1 642 m), weight 131 kg (289 lb 3 2 oz), not currently breastfeeding  Body mass index is 48 65 kg/m²  Body surface area is 2 3 meters squared  Physical Exam   Constitutional: She is oriented to person, place, and time  She appears well-developed and well-nourished  No distress  HENT:   Head: Normocephalic and atraumatic  Right Ear: External ear normal    Left Ear: External ear normal    Nose: Nose normal    Healing from prior oral ulcers   Eyes: Conjunctivae and EOM are normal  Right eye exhibits no discharge  Left eye exhibits no discharge  No scleral icterus  Pulmonary/Chest: Effort normal  No stridor   No respiratory distress  She has no wheezes  She has no rales  Musculoskeletal: She exhibits no edema, tenderness or deformity  Neurological: She is alert and oriented to person, place, and time  No cranial nerve deficit or sensory deficit  She exhibits normal muscle tone  Coordination normal    Skin: Skin is warm and dry  No rash noted  She is not diaphoretic  No erythema  No pallor  Psychiatric: She has a normal mood and affect   Her behavior is normal  Judgment and thought content normal        No results found for:   Lab Results   Component Value Date    K 4 0 02/24/2020     02/24/2020    CO2 24 02/24/2020    BUN 24 02/24/2020    CREATININE 1 17 (H) 02/24/2020    GLUF 97 12/26/2019    CALCIUM 9 8 02/24/2020    AST 46 (H) 02/24/2020    ALT 83 (H) 02/24/2020    ALKPHOS 101 02/24/2020    EGFR 60 02/19/2020     Lab Results   Component Value Date    WBC 5 5 02/24/2020    HGB 9 8 (L) 02/24/2020    HCT 30 4 (L) 02/24/2020    MCV 87 9 02/24/2020     02/24/2020     Lab Results   Component Value Date    NEUTROABS 5,137 02/24/2020

## 2020-02-25 NOTE — PROGRESS NOTES
Pt arrived amb for chemo, annabella  States she has had a really bad week, had the flu, had chemo, Tamaflu gave her diarrhea, states her Nonda Deep is back along with her mouth sores  She took a Diflucan yesterday, states the Nystatin mouth gargle doesn't help  Has had nausea every day, and vomited several times on Friday  Denies nausea today, but has no appetite  Drinking sips of fluids, and has crackers  VSS  Her hCG level is 3  Port accessed, brisk blood return  Hydration infused, premeds completed and starting chemo infusion  Comfortable at present  Luz Maria notified via email, Dr Ricco Strickland would like to see her in [facility] this afternoon  Pt agrees  WCTM

## 2020-02-25 NOTE — LETTER
February 25, 2020     Kalyanibriannafabiola Ayush  [address]  [employer]  [address]    Patient: Pastor Gerber   YOB: 1970   Date of Visit: 2/25/2020       Dear Dr Olamide Redd: Thank you for referring Pastor Gerber to me for evaluation  Below are my notes for this consultation  If you have questions, please do not hesitate to call me  I look forward to following your patient along with you  Sincerely,        Wendy Chakraborty MD        CC: No Recipients  Wendy Chakraborty MD  2/25/2020  3:59 PM  Sign at close encounter  Assessment/Plan:    Problem List Items Addressed This Visit        Other    GTD (gestational trophoblastic disease)     72-year-old undergoing EMA-CO treatment for 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  She has received for complete cycles  Her most recent serum beta HCG is 3-immediately prior to her last CO  She is fatigued and has chemotherapy-induced nausea  Her performance status is 2   1  Plan to continue with EMA-CO for 1 additional cycle   2  CT of chest abdomen pelvis after completion of treatment  3  She understands she will require monthly beta HCGs for 1 year after completing her treatment  4  For chemotherapy-induced nausea, prescribed additional dexamethasone, 8 mg orally twice daily as needed  I also prescribed Phenergan  She was instructed on how to use these medications  5  Referral to palliative care for certification for medical marijuana due to her chemotherapy-induced nausea and vomiting             Chemotherapy-induced nausea - Primary    Relevant Medications    dexamethasone (DECADRON) 4 mg tablet    promethazine (PHENERGAN) 25 mg tablet    Other Relevant Orders    Ambulatory referral to Palliative Care            CHIEF COMPLAINT:  Pre chemotherapy evaluation, chemotherapy-induced nausea vomiting, fatigue        Previous therapy:     GTD (gestational trophoblastic disease)    12/20/2019 Initial Diagnosis     GTD (gestational trophoblastic disease)      12/21/2019 -  Chemotherapy     leucovorin (WELLCOVORIN) tablet 15 mg, 15 mg, Oral, Every 12 hours, 4 of 6 cycles  Administration: 15 mg (12/22/2019), 15 mg (12/23/2019), 15 mg (1/8/2020), 15 mg (1/22/2020), 15 mg (2/19/2020)  palonosetron (ALOXI) injection 0 25 mg, 0 25 mg, Intravenous, Once, 4 of 6 cycles  Administration: 0 25 mg (12/21/2019), 0 25 mg (12/22/2019), 0 25 mg (12/31/2019), 0 25 mg (1/7/2020), 0 25 mg (1/14/2020), 0 25 mg (1/8/2020), 0 25 mg (1/28/2020), 0 25 mg (1/21/2020), 0 25 mg (1/22/2020), 0 25 mg (2/18/2020), 0 25 mg (2/19/2020)  methotrexate injection 242 5 mg, 100 mg/m2 = 242 5 mg, Intravenous, Once, 4 of 6 cycles  Administration: 242 5 mg (12/21/2019), 242 5 mg (1/7/2020), 237 5 mg (1/21/2020), 237 mg (2/18/2020)  Filgrastim-sndz SOSY 480 mcg, 480 mcg (100 % of original dose 480 mcg), Injection, Once, 1 of 1 cycle  Dose modification: 480 mcg (original dose 480 mcg, Cycle 2, Reason: Other (See Comments), Comment: Alternative treatment)  Administration: 480 mcg (1/16/2020), 480 mcg (1/17/2020)  DACTINomycin (COSMEGEN) 500 mcg in sodium chloride 0 9 % 50 mL IVPB, 500 mcg, Intravenous, Once, 4 of 6 cycles  Administration: 500 mcg (12/21/2019), 500 mcg (12/22/2019), 500 mcg (1/7/2020), 500 mcg (1/8/2020), 500 mcg (1/21/2020), 500 mcg (1/22/2020), 500 mcg (2/18/2020), 500 mcg (2/19/2020)  vinCRIStine (ONCOVIN) 2 mg in sodium chloride 0 9 % 50 mL chemo infusion, 2 mg (original dose 1 mg/m2), Intravenous, Once, 4 of 6 cycles  Dose modification: 2 mg (original dose 1 mg/m2, Cycle 1, Reason: Max Dose Reached)  Administration: 2 mg (12/31/2019), 2 mg (1/14/2020), 2 mg (1/28/2020)  cyclophosphamide (CYTOXAN) 1,500 mg in sodium chloride 0 9 % 250 mL IVPB, 1,458 mg, Intravenous, Once, 4 of 6 cycles  Administration: 1,500 mg (12/31/2019), 1,500 mg (1/14/2020), 1,500 mg (1/28/2020)  methotrexate (PF) 485 mg in sodium chloride 0 9 % 1,000 mL continuous infusion, 200 mg/m2 = 485 mg, Intravenous, Once, 4 of 6 cycles  Administration: 485 mg (12/21/2019), 485 mg (1/7/2020), 475 mg (1/21/2020), 475 mg (2/18/2020)  etoposide (TOPOSAR) 243 mg in sodium chloride 0 9 % 600 mL chemo infusion, 100 mg/m2 = 243 mg, Intravenous, Once, 4 of 6 cycles  Administration: 243 mg (12/21/2019), 243 mg (12/22/2019), 243 mg (1/7/2020), 243 mg (1/8/2020), 237 mg (1/21/2020), 237 mg (1/22/2020), 237 mg (2/18/2020), 237 mg (2/19/2020)      2/4/2020 Surgery     Robotic assisted total laparoscopic hysterectomy, bilateral salpingo-oophorectomy, mini-laparotomy for specimen removal, cystoscopy  1  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  Tumor measured 8 5 cm in diameter  Patient ID: New Betancourt is a 52 y o  female  49-year-old presents prior to cycle 5  Of EMA-CO  She is very fatigued  She is having difficulty performing activities of daily living  She does not have stomatitis but has no appetite  She has not been drinking liquids well  She has had recent nausea and vomiting that was treated effectively with Ativan  After taking Ativan, she slept for 15 hours  Her recent laboratory studies are as below  The of note, her most recent serum beta HCG prior to receiving her last CO treatment was 3  She would like to explore options for medical marijuana certification for chemotherapy-induced nausea and vomiting  She has mild rectal pain with bowel movements  She has been afebrile  No further cough  No hemoptysis  No vaginal bleeding  The following portions of the patient's history were reviewed and updated as appropriate: allergies, current medications, past family history, past medical history, past social history, past surgical history and problem list     Review of Systems   Constitutional: Positive for activity change, appetite change, fatigue and unexpected weight change  HENT: Positive for mouth sores, sore throat and trouble swallowing      Respiratory: Negative for shortness of breath  Cardiovascular: Negative for chest pain and leg swelling  Gastrointestinal: Positive for nausea, rectal pain and vomiting  Negative for abdominal pain  Genitourinary: Negative for pelvic pain and vaginal bleeding  Neurological: Positive for weakness and light-headedness  Psychiatric/Behavioral: Positive for dysphoric mood  All other systems reviewed and are negative        Current Outpatient Medications   Medication Sig Dispense Refill    acetaminophen (TYLENOL) 325 mg tablet Take 2 tablets (650 mg total) by mouth every 6 (six) hours as needed for mild pain (Patient not taking: Reported on 2/25/2020) 30 tablet 0    al mag oxide-diphenhydramine-lidocaine viscous (MAGIC MOUTHWASH) 1:1:1 suspension Swish and spit 10 mL every 4 (four) hours as needed for mouth pain or discomfort 1 Bottle 0    albuterol (PROVENTIL HFA,VENTOLIN HFA) 90 mcg/act inhaler Inhale 2 puffs every 4 (four) hours as needed for wheezing (Patient not taking: Reported on 2/25/2020) 1 Inhaler 1    benzonatate (TESSALON PERLES) 100 mg capsule Take 1 capsule (100 mg total) by mouth 3 (three) times a day as needed for cough (Patient not taking: Reported on 2/25/2020) 20 capsule 0    dexamethasone (DECADRON) 4 mg tablet Take 2 tablets (8 mg total) by mouth 2 (two) times a day as needed (nausea) 30 tablet 0    dextromethorphan-guaiFENesin (ROBITUSSIN DM)  mg/5 mL syrup Take 10 mL by mouth every 3 (three) hours as needed for cough 118 mL 0    docusate sodium (COLACE) 100 mg capsule Take 1 capsule (100 mg total) by mouth 2 (two) times a day 30 capsule 2    escitalopram (LEXAPRO) 10 mg tablet TAKE 1 TABLET BY MOUTH EVERY DAY 90 tablet 2    fluconazole (DIFLUCAN) 200 mg tablet Take 1 tablet (200 mg total) by mouth daily for 1 dose 1 tablet 0    furosemide (LASIX) 20 mg tablet Take 1 tablet (20 mg total) by mouth daily (Patient taking differently: Take 20 mg by mouth daily as needed ) 30 tablet 1    ibuprofen (MOTRIN) 600 mg tablet Take 1 tablet (600 mg total) by mouth every 6 (six) hours as needed for mild pain (Patient not taking: Reported on 2/25/2020) 30 tablet 3    leucovorin (WELLCOVORIN) 15 MG tablet Take 1 tablet (15 mg total) by mouth every 12 (twelve) hours for 3 doses 16 tablet 4    lisinopril (ZESTRIL) 10 mg tablet Take 1 tablet (10 mg total) by mouth daily 30 tablet 1    LORazepam (ATIVAN) 1 mg tablet Take 1 tablet (1 mg total) by mouth every 8 (eight) hours as needed for anxiety (and nausea) for up to 10 days 30 tablet 0    norgestimate-ethinyl estradiol (ORTHO-CYCLEN) 0 25-35 MG-MCG per tablet Take 1 tablet by mouth daily for 7 days 7 tablet 1    nystatin (MYCOSTATIN) 500,000 units/5 mL suspension Apply 5 mL (500,000 Units total) to the mouth or throat 4 (four) times a day 60 mL 0    ondansetron (ZOFRAN) 4 mg tablet Take 1 tablet (4 mg total) by mouth every 8 (eight) hours as needed for nausea or vomiting 20 tablet 0    oxyCODONE (OXY-IR) 5 MG capsule Take 5 mg by mouth every 4 (four) hours as needed for moderate pain      polyethylene glycol (MIRALAX) 17 g packet Take 17 g by mouth daily (Patient not taking: Reported on 2/25/2020) 14 each 0    Probiotic Product (ACIDOPHILUS PROBIOTIC BLEND) CAPS Take 2 capsules by mouth daily      promethazine (PHENERGAN) 25 mg tablet Take 1 tablet (25 mg total) by mouth every 6 (six) hours as needed for nausea or vomiting 30 tablet 0    senna (SENOKOT) 8 6 MG tablet Take 1 tablet by mouth daily at bedtime      simethicone (MYLICON) 80 mg chewable tablet Chew 1 tablet (80 mg total) every 6 (six) hours as needed for flatulence (Patient not taking: Reported on 2/25/2020) 30 tablet 0     No current facility-administered medications for this visit              Objective:    Blood pressure 122/72, pulse (!) 117, temperature 98 3 °F (36 8 °C), temperature source Oral, height 5' 4 65" (1 642 m), weight 131 kg (289 lb 3 2 oz), not currently breastfeeding  Body mass index is 48 65 kg/m²  Body surface area is 2 3 meters squared  Physical Exam   Constitutional: She is oriented to person, place, and time  She appears well-developed and well-nourished  No distress  HENT:   Head: Normocephalic and atraumatic  Right Ear: External ear normal    Left Ear: External ear normal    Nose: Nose normal    Healing from prior oral ulcers   Eyes: Conjunctivae and EOM are normal  Right eye exhibits no discharge  Left eye exhibits no discharge  No scleral icterus  Pulmonary/Chest: Effort normal  No stridor  No respiratory distress  She has no wheezes  She has no rales  Musculoskeletal: She exhibits no edema, tenderness or deformity  Neurological: She is alert and oriented to person, place, and time  No cranial nerve deficit or sensory deficit  She exhibits normal muscle tone  Coordination normal    Skin: Skin is warm and dry  No rash noted  She is not diaphoretic  No erythema  No pallor  Psychiatric: She has a normal mood and affect   Her behavior is normal  Judgment and thought content normal        No results found for:   Lab Results   Component Value Date    K 4 0 02/24/2020     02/24/2020    CO2 24 02/24/2020    BUN 24 02/24/2020    CREATININE 1 17 (H) 02/24/2020    GLUF 97 12/26/2019    CALCIUM 9 8 02/24/2020    AST 46 (H) 02/24/2020    ALT 83 (H) 02/24/2020    ALKPHOS 101 02/24/2020    EGFR 60 02/19/2020     Lab Results   Component Value Date    WBC 5 5 02/24/2020    HGB 9 8 (L) 02/24/2020    HCT 30 4 (L) 02/24/2020    MCV 87 9 02/24/2020     02/24/2020     Lab Results   Component Value Date    NEUTROABS 5,137 02/24/2020

## 2020-02-25 NOTE — ASSESSMENT & PLAN NOTE
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  She has received for complete cycles  Her most recent serum beta HCG is 3-immediately prior to her last CO  She is fatigued and has chemotherapy-induced nausea  Her performance status is 2   1  Plan to continue with EMA-CO for 1 additional cycle   2  CT of chest abdomen pelvis after completion of treatment  3  She understands she will require monthly beta HCGs for 1 year after completing her treatment  4  For chemotherapy-induced nausea, prescribed additional dexamethasone, 8 mg orally twice daily as needed  I also prescribed Phenergan  She was instructed on how to use these medications  5  Referral to palliative care for certification for medical marijuana due to her chemotherapy-induced nausea and vomiting

## 2020-02-25 NOTE — TELEPHONE ENCOUNTER
Hi Dr Megan Linares, Just double checking this with you before I call to screen  She has chemo-therapy induced nausea and is referred to us for [facility]  It looks as if she would be appropriate but unsure if this is definitive cancer diagnosis   Wisam Bains

## 2020-02-25 NOTE — PLAN OF CARE
Problem: Potential for Falls  Goal: Patient will remain free of falls  Description  INTERVENTIONS:  - Assess patient frequently for physical needs  -  Identify cognitive and physical deficits and behaviors that affect risk of falls  -  Winger fall precautions as indicated by assessment   - Educate patient/family on patient safety including physical limitations  - Instruct patient to call for assistance with activity based on assessment  - Modify environment to reduce risk of injury  - Consider OT/PT consult to assist with strengthening/mobility  Outcome: Progressing     Problem: Knowledge Deficit  Goal: Patient/family/caregiver demonstrates understanding of disease process, treatment plan, medications, and discharge instructions  Description  Complete learning assessment and assess knowledge base    Interventions:  - Provide teaching at level of understanding  - Provide teaching via preferred learning methods  Outcome: Progressing

## 2020-02-25 NOTE — LETTER
February 25, 2020     María Elena Rahman [address] [facility]    Patient: Jose M Dougherty   YOB: 1970   Date of Visit: 2/25/2020       Dear Dr Nataly Terry:    Thank you for referring Jose M Dougherty to me for evaluation  Below are my notes for this consultation  If you have questions, please do not hesitate to call me  I look forward to following your patient along with you  Sincerely,        Jannette Hardin MD        CC: No Recipients  Jannette Hardin MD  2/25/2020  3:59 PM  Sign at close encounter  Assessment/Plan:    Problem List Items Addressed This Visit        Other    GTD (gestational trophoblastic disease)     26-year-old undergoing EMA-CO treatment for 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  She has received for complete cycles  Her most recent serum beta HCG is 3-immediately prior to her last CO  She is fatigued and has chemotherapy-induced nausea  Her performance status is 2   1  Plan to continue with EMA-CO for 1 additional cycle   2  CT of chest abdomen pelvis after completion of treatment  3  She understands she will require monthly beta HCGs for 1 year after completing her treatment  4  For chemotherapy-induced nausea, prescribed additional dexamethasone, 8 mg orally twice daily as needed  I also prescribed Phenergan  She was instructed on how to use these medications  5  Referral to palliative care for certification for medical marijuana due to her chemotherapy-induced nausea and vomiting             Chemotherapy-induced nausea - Primary    Relevant Medications    dexamethasone (DECADRON) 4 mg tablet    promethazine (PHENERGAN) 25 mg tablet    Other Relevant Orders    Ambulatory referral to Palliative Care            CHIEF COMPLAINT:  Pre chemotherapy evaluation, chemotherapy-induced nausea vomiting, fatigue        Previous therapy:     GTD (gestational trophoblastic disease)    12/20/2019 Initial Diagnosis     GTD (gestational trophoblastic disease)      12/21/2019 -  Chemotherapy     leucovorin (WELLCOVORIN) tablet 15 mg, 15 mg, Oral, Every 12 hours, 4 of 6 cycles  Administration: 15 mg (12/22/2019), 15 mg (12/23/2019), 15 mg (1/8/2020), 15 mg (1/22/2020), 15 mg (2/19/2020)  palonosetron (ALOXI) injection 0 25 mg, 0 25 mg, Intravenous, Once, 4 of 6 cycles  Administration: 0 25 mg (12/21/2019), 0 25 mg (12/22/2019), 0 25 mg (12/31/2019), 0 25 mg (1/7/2020), 0 25 mg (1/14/2020), 0 25 mg (1/8/2020), 0 25 mg (1/28/2020), 0 25 mg (1/21/2020), 0 25 mg (1/22/2020), 0 25 mg (2/18/2020), 0 25 mg (2/19/2020)  methotrexate injection 242 5 mg, 100 mg/m2 = 242 5 mg, Intravenous, Once, 4 of 6 cycles  Administration: 242 5 mg (12/21/2019), 242 5 mg (1/7/2020), 237 5 mg (1/21/2020), 237 mg (2/18/2020)  Filgrastim-sndz SOSY 480 mcg, 480 mcg (100 % of original dose 480 mcg), Injection, Once, 1 of 1 cycle  Dose modification: 480 mcg (original dose 480 mcg, Cycle 2, Reason: Other (See Comments), Comment: Alternative treatment)  Administration: 480 mcg (1/16/2020), 480 mcg (1/17/2020)  DACTINomycin (COSMEGEN) 500 mcg in sodium chloride 0 9 % 50 mL IVPB, 500 mcg, Intravenous, Once, 4 of 6 cycles  Administration: 500 mcg (12/21/2019), 500 mcg (12/22/2019), 500 mcg (1/7/2020), 500 mcg (1/8/2020), 500 mcg (1/21/2020), 500 mcg (1/22/2020), 500 mcg (2/18/2020), 500 mcg (2/19/2020)  vinCRIStine (ONCOVIN) 2 mg in sodium chloride 0 9 % 50 mL chemo infusion, 2 mg (original dose 1 mg/m2), Intravenous, Once, 4 of 6 cycles  Dose modification: 2 mg (original dose 1 mg/m2, Cycle 1, Reason: Max Dose Reached)  Administration: 2 mg (12/31/2019), 2 mg (1/14/2020), 2 mg (1/28/2020)  cyclophosphamide (CYTOXAN) 1,500 mg in sodium chloride 0 9 % 250 mL IVPB, 1,458 mg, Intravenous, Once, 4 of 6 cycles  Administration: 1,500 mg (12/31/2019), 1,500 mg (1/14/2020), 1,500 mg (1/28/2020)  methotrexate (PF) 485 mg in sodium chloride 0 9 % 1,000 mL continuous infusion, 200 mg/m2 = 485 mg, Intravenous, Once, 4 of 6 cycles  Administration: 485 mg (12/21/2019), 485 mg (1/7/2020), 475 mg (1/21/2020), 475 mg (2/18/2020)  etoposide (TOPOSAR) 243 mg in sodium chloride 0 9 % 600 mL chemo infusion, 100 mg/m2 = 243 mg, Intravenous, Once, 4 of 6 cycles  Administration: 243 mg (12/21/2019), 243 mg (12/22/2019), 243 mg (1/7/2020), 243 mg (1/8/2020), 237 mg (1/21/2020), 237 mg (1/22/2020), 237 mg (2/18/2020), 237 mg (2/19/2020)      2/4/2020 Surgery     Robotic assisted total laparoscopic hysterectomy, bilateral salpingo-oophorectomy, mini-laparotomy for specimen removal, cystoscopy  1  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  Tumor measured 8 5 cm in diameter  Patient ID: Anali Phillips is a 52 y o  female  41-year-old presents prior to cycle 5  Of EMA-CO  She is very fatigued  She is having difficulty performing activities of daily living  She does not have stomatitis but has no appetite  She has not been drinking liquids well  She has had recent nausea and vomiting that was treated effectively with Ativan  After taking Ativan, she slept for 15 hours  Her recent laboratory studies are as below  The of note, her most recent serum beta HCG prior to receiving her last CO treatment was 3  She would like to explore options for medical marijuana certification for chemotherapy-induced nausea and vomiting  She has mild rectal pain with bowel movements  She has been afebrile  No further cough  No hemoptysis  No vaginal bleeding  The following portions of the patient's history were reviewed and updated as appropriate: allergies, current medications, past family history, past medical history, past social history, past surgical history and problem list     Review of Systems   Constitutional: Positive for activity change, appetite change, fatigue and unexpected weight change  HENT: Positive for mouth sores, sore throat and trouble swallowing      Respiratory: Negative for shortness of breath  Cardiovascular: Negative for chest pain and leg swelling  Gastrointestinal: Positive for nausea, rectal pain and vomiting  Negative for abdominal pain  Genitourinary: Negative for pelvic pain and vaginal bleeding  Neurological: Positive for weakness and light-headedness  Psychiatric/Behavioral: Positive for dysphoric mood  All other systems reviewed and are negative        Current Outpatient Medications   Medication Sig Dispense Refill    acetaminophen (TYLENOL) 325 mg tablet Take 2 tablets (650 mg total) by mouth every 6 (six) hours as needed for mild pain (Patient not taking: Reported on 2/25/2020) 30 tablet 0    al mag oxide-diphenhydramine-lidocaine viscous (MAGIC MOUTHWASH) 1:1:1 suspension Swish and spit 10 mL every 4 (four) hours as needed for mouth pain or discomfort 1 Bottle 0    albuterol (PROVENTIL HFA,VENTOLIN HFA) 90 mcg/act inhaler Inhale 2 puffs every 4 (four) hours as needed for wheezing (Patient not taking: Reported on 2/25/2020) 1 Inhaler 1    benzonatate (TESSALON PERLES) 100 mg capsule Take 1 capsule (100 mg total) by mouth 3 (three) times a day as needed for cough (Patient not taking: Reported on 2/25/2020) 20 capsule 0    dexamethasone (DECADRON) 4 mg tablet Take 2 tablets (8 mg total) by mouth 2 (two) times a day as needed (nausea) 30 tablet 0    dextromethorphan-guaiFENesin (ROBITUSSIN DM)  mg/5 mL syrup Take 10 mL by mouth every 3 (three) hours as needed for cough 118 mL 0    docusate sodium (COLACE) 100 mg capsule Take 1 capsule (100 mg total) by mouth 2 (two) times a day 30 capsule 2    escitalopram (LEXAPRO) 10 mg tablet TAKE 1 TABLET BY MOUTH EVERY DAY 90 tablet 2    fluconazole (DIFLUCAN) 200 mg tablet Take 1 tablet (200 mg total) by mouth daily for 1 dose 1 tablet 0    furosemide (LASIX) 20 mg tablet Take 1 tablet (20 mg total) by mouth daily (Patient taking differently: Take 20 mg by mouth daily as needed ) 30 tablet 1    ibuprofen (MOTRIN) 600 mg tablet Take 1 tablet (600 mg total) by mouth every 6 (six) hours as needed for mild pain (Patient not taking: Reported on 2/25/2020) 30 tablet 3    leucovorin (WELLCOVORIN) 15 MG tablet Take 1 tablet (15 mg total) by mouth every 12 (twelve) hours for 3 doses 16 tablet 4    lisinopril (ZESTRIL) 10 mg tablet Take 1 tablet (10 mg total) by mouth daily 30 tablet 1    LORazepam (ATIVAN) 1 mg tablet Take 1 tablet (1 mg total) by mouth every 8 (eight) hours as needed for anxiety (and nausea) for up to 10 days 30 tablet 0    norgestimate-ethinyl estradiol (ORTHO-CYCLEN) 0 25-35 MG-MCG per tablet Take 1 tablet by mouth daily for 7 days 7 tablet 1    nystatin (MYCOSTATIN) 500,000 units/5 mL suspension Apply 5 mL (500,000 Units total) to the mouth or throat 4 (four) times a day 60 mL 0    ondansetron (ZOFRAN) 4 mg tablet Take 1 tablet (4 mg total) by mouth every 8 (eight) hours as needed for nausea or vomiting 20 tablet 0    oxyCODONE (OXY-IR) 5 MG capsule Take 5 mg by mouth every 4 (four) hours as needed for moderate pain      polyethylene glycol (MIRALAX) 17 g packet Take 17 g by mouth daily (Patient not taking: Reported on 2/25/2020) 14 each 0    Probiotic Product (ACIDOPHILUS PROBIOTIC BLEND) CAPS Take 2 capsules by mouth daily      promethazine (PHENERGAN) 25 mg tablet Take 1 tablet (25 mg total) by mouth every 6 (six) hours as needed for nausea or vomiting 30 tablet 0    senna (SENOKOT) 8 6 MG tablet Take 1 tablet by mouth daily at bedtime      simethicone (MYLICON) 80 mg chewable tablet Chew 1 tablet (80 mg total) every 6 (six) hours as needed for flatulence (Patient not taking: Reported on 2/25/2020) 30 tablet 0     No current facility-administered medications for this visit  Objective:    Blood pressure 122/72, pulse (!) 117, temperature 98 3 °F (36 8 °C), temperature source Oral, height 5' 4 65" (1 642 m), weight 131 kg (289 lb 3 2 oz), not currently breastfeeding    Body mass index is 48 65 kg/m²  Body surface area is 2 3 meters squared  Physical Exam   Constitutional: She is oriented to person, place, and time  She appears well-developed and well-nourished  No distress  HENT:   Head: Normocephalic and atraumatic  Right Ear: External ear normal    Left Ear: External ear normal    Nose: Nose normal    Healing from prior oral ulcers   Eyes: Conjunctivae and EOM are normal  Right eye exhibits no discharge  Left eye exhibits no discharge  No scleral icterus  Pulmonary/Chest: Effort normal  No stridor  No respiratory distress  She has no wheezes  She has no rales  Musculoskeletal: She exhibits no edema, tenderness or deformity  Neurological: She is alert and oriented to person, place, and time  No cranial nerve deficit or sensory deficit  She exhibits normal muscle tone  Coordination normal    Skin: Skin is warm and dry  No rash noted  She is not diaphoretic  No erythema  No pallor  Psychiatric: She has a normal mood and affect   Her behavior is normal  Judgment and thought content normal        No results found for:   Lab Results   Component Value Date    K 4 0 02/24/2020     02/24/2020    CO2 24 02/24/2020    BUN 24 02/24/2020    CREATININE 1 17 (H) 02/24/2020    GLUF 97 12/26/2019    CALCIUM 9 8 02/24/2020    AST 46 (H) 02/24/2020    ALT 83 (H) 02/24/2020    ALKPHOS 101 02/24/2020    EGFR 60 02/19/2020     Lab Results   Component Value Date    WBC 5 5 02/24/2020    HGB 9 8 (L) 02/24/2020    HCT 30 4 (L) 02/24/2020    MCV 87 9 02/24/2020     02/24/2020     Lab Results   Component Value Date    NEUTROABS 5,137 02/24/2020

## 2020-02-27 NOTE — TELEPHONE ENCOUNTER
This nurse called patient and left detailed message with our caql back information to have her return call for previsit screening for [facility]

## 2020-02-28 ENCOUNTER — DOCUMENTATION (OUTPATIENT)
Dept: GYNECOLOGIC ONCOLOGY | Facility: CLINIC | Age: 50
End: 2020-02-28

## 2020-02-28 DIAGNOSIS — O01.9 GTD (GESTATIONAL TROPHOBLASTIC DISEASE): Primary | ICD-10-CM

## 2020-02-28 NOTE — PROGRESS NOTES
Patient's case was presented in multidisciplinary cancer conference on 02/28/2020  She has a history of gestational trophoblastic disease with a HCG elevated above 400,000  And an  Endometrial mass  And metastatic disease within the lungs  She has undergone neoadjuvant treatment with [facility] therapy and was tolerating this with the degree of difficulty  She subsequently underwent robotically assisted total laparoscopic hysterectomy bilateral salpingo-oophorectomy  Final pathology report is consistent with gestational trophoblastic disease  Given the brisk response to her chemotherapy it is unclear whether this is choriocarcinoma or a molar pregnancy  The patient's hCG level has returned to normal at 3  It is recommended that she undergo 2 subsequent treatments of EMACO  After normalization of beta HCG level  However if the patient is intolerant of the treatment consideration of decreasing further cycles is possible given that the patient has undergone an interval hysterectomy,  However her likelihood of recurrence may increase

## 2020-03-02 ENCOUNTER — TELEPHONE (OUTPATIENT)
Dept: INFUSION CENTER | Facility: HOSPITAL | Age: 50
End: 2020-03-02

## 2020-03-02 NOTE — TELEPHONE ENCOUNTER
MSW contacted the pt this day at the request of the pt's need for rental assistance with the use of the cancer funds  MSW explained to the pt the process for these funds and requested a W-9 for her landlord to complete  MSW emailed the pt this form  The pt proceeded to share the level of anxiety she has been experiencing with her upcoming in pt infusion, which is scheduled for tomorrow  This will be the pt's final in pt treatment and time was spent processing that with her  MSW will plan to meet the pt in her room to provide support and help decrease her anxiety

## 2020-03-03 ENCOUNTER — HOSPITAL ENCOUNTER (OUTPATIENT)
Facility: HOSPITAL | Age: 50
Discharge: HOME | End: 2020-03-03
Attending: OBSTETRICS & GYNECOLOGY | Admitting: OBSTETRICS & GYNECOLOGY
Payer: COMMERCIAL

## 2020-03-03 VITALS
DIASTOLIC BLOOD PRESSURE: 92 MMHG | TEMPERATURE: 98.7 F | BODY MASS INDEX: 46.41 KG/M2 | RESPIRATION RATE: 18 BRPM | HEIGHT: 66 IN | WEIGHT: 288.8 LBS | HEART RATE: 65 BPM | OXYGEN SATURATION: 100 % | SYSTOLIC BLOOD PRESSURE: 123 MMHG

## 2020-03-03 LAB
ABO GROUP BLD: NORMAL
ALBUMIN SERPL-MCNC: 3.9 G/DL (ref 3.6–5.1)
ALBUMIN/GLOB SERPL: 1.7 (CALC) (ref 1–2.5)
ALP SERPL-CCNC: 69 U/L (ref 31–125)
ALT SERPL-CCNC: 53 U/L (ref 6–29)
AST SERPL-CCNC: 17 U/L (ref 10–35)
B-HCG SERPL-ACNC: 3 MIU/ML
BASOPHILS # BLD AUTO: 0 CELLS/UL (ref 0–200)
BASOPHILS # BLD AUTO: 0.01 THOUSANDS/ΜL (ref 0–0.1)
BASOPHILS NFR BLD AUTO: 0 %
BASOPHILS NFR BLD AUTO: 1 % (ref 0–1)
BILIRUB SERPL-MCNC: 0.6 MG/DL (ref 0.2–1.2)
BLD GP AB SCN SERPL QL: NEGATIVE
BUN SERPL-MCNC: 12 MG/DL (ref 7–25)
BUN/CREAT SERPL: ABNORMAL (CALC) (ref 6–22)
CALCIUM SERPL-MCNC: 9 MG/DL (ref 8.6–10.2)
CHLORIDE SERPL-SCNC: 107 MMOL/L (ref 98–110)
CO2 SERPL-SCNC: 27 MMOL/L (ref 20–32)
CREAT SERPL-MCNC: 0.98 MG/DL (ref 0.5–1.1)
EOSINOPHIL # BLD AUTO: 0.13 THOUSAND/ΜL (ref 0–0.61)
EOSINOPHIL # BLD AUTO: 120 CELLS/UL (ref 15–500)
EOSINOPHIL NFR BLD AUTO: 8 %
EOSINOPHIL NFR BLD AUTO: 9 % (ref 0–6)
ERYTHROCYTE [DISTWIDTH] IN BLOOD BY AUTOMATED COUNT: 16.4 % (ref 11.6–15.1)
ERYTHROCYTE [DISTWIDTH] IN BLOOD BY AUTOMATED COUNT: 16.6 % (ref 11–15)
GLOBULIN SER CALC-MCNC: 2.3 G/DL (CALC) (ref 1.9–3.7)
GLUCOSE SERPL-MCNC: 118 MG/DL (ref 65–99)
HCT VFR BLD AUTO: 23.6 % (ref 34.8–46.1)
HCT VFR BLD AUTO: 25.6 % (ref 35–45)
HGB BLD-MCNC: 7.7 G/DL (ref 11.5–15.4)
HGB BLD-MCNC: 8.4 G/DL (ref 11.7–15.5)
IMM GRANULOCYTES # BLD AUTO: 0.01 THOUSAND/UL (ref 0–0.2)
IMM GRANULOCYTES NFR BLD AUTO: 1 % (ref 0–2)
LYMPHOCYTES # BLD AUTO: 0.73 THOUSANDS/ΜL (ref 0.6–4.47)
LYMPHOCYTES # BLD MANUAL: 540 CELLS/UL (ref 850–3900)
LYMPHOCYTES NFR BLD AUTO: 36 %
LYMPHOCYTES NFR BLD AUTO: 52 % (ref 14–44)
MAGNESIUM SERPL-MCNC: 2 MG/DL (ref 1.5–2.5)
MCH RBC QN AUTO: 28.1 PG (ref 27–33)
MCH RBC QN AUTO: 28.5 PG (ref 26.8–34.3)
MCHC RBC AUTO-ENTMCNC: 32.6 G/DL (ref 31.4–37.4)
MCHC RBC AUTO-ENTMCNC: 32.8 G/DL (ref 32–36)
MCV RBC AUTO: 85.6 FL (ref 80–100)
MCV RBC AUTO: 87 FL (ref 82–98)
MONOCYTES # BLD AUTO: 0.18 THOUSAND/ΜL (ref 0.17–1.22)
MONOCYTES # BLD AUTO: 75 CELLS/UL (ref 200–950)
MONOCYTES NFR BLD AUTO: 13 % (ref 4–12)
MONOCYTES NFR BLD AUTO: 5 %
NEUTROPHILS # BLD AUTO: 0.34 THOUSANDS/ΜL (ref 1.85–7.62)
NEUTROPHILS # BLD AUTO: 765 CELLS/UL (ref 1500–7800)
NEUTROPHILS NFR BLD AUTO: 51 %
NEUTS SEG NFR BLD AUTO: 24 % (ref 43–75)
NRBC # BLD: 15 CELLS/UL
NRBC BLD AUTO-RTO: 1 /100 WBCS
NRBC BLD-RTO: 1 /100 WBC
PLATELET # BLD AUTO: 217 THOUSAND/UL (ref 140–400)
PLATELET # BLD AUTO: 287 THOUSANDS/UL (ref 149–390)
PMV BLD AUTO: 9.7 FL (ref 8.9–12.7)
PMV BLD REES-ECKER: 10.5 FL (ref 7.5–12.5)
POTASSIUM SERPL-SCNC: 3.7 MMOL/L (ref 3.5–5.3)
PROT SERPL-MCNC: 6.2 G/DL (ref 6.1–8.1)
RBC # BLD AUTO: 2.7 MILLION/UL (ref 3.81–5.12)
RBC # BLD AUTO: 2.99 MILLION/UL (ref 3.8–5.1)
RH BLD: POSITIVE
SL AMB EGFR AFRICAN AMERICAN: 79 ML/MIN/1.73M2
SL AMB EGFR NON AFRICAN AMERICAN: 68 ML/MIN/1.73M2
SODIUM SERPL-SCNC: 141 MMOL/L (ref 135–146)
SPECIMEN EXPIRATION DATE: NORMAL
WBC # BLD AUTO: 1.4 THOUSAND/UL (ref 4.31–10.16)
WBC # BLD AUTO: 1.5 THOUSAND/UL (ref 3.8–10.8)

## 2020-03-03 PROCEDURE — 85025 COMPLETE CBC W/AUTO DIFF WBC: CPT | Performed by: OBSTETRICS & GYNECOLOGY

## 2020-03-03 PROCEDURE — 86923 COMPATIBILITY TEST ELECTRIC: CPT

## 2020-03-03 PROCEDURE — 86850 RBC ANTIBODY SCREEN: CPT | Performed by: STUDENT IN AN ORGANIZED HEALTH CARE EDUCATION/TRAINING PROGRAM

## 2020-03-03 PROCEDURE — 86901 BLOOD TYPING SEROLOGIC RH(D): CPT | Performed by: STUDENT IN AN ORGANIZED HEALTH CARE EDUCATION/TRAINING PROGRAM

## 2020-03-03 PROCEDURE — P9016 RBC LEUKOCYTES REDUCED: HCPCS

## 2020-03-03 PROCEDURE — 86900 BLOOD TYPING SEROLOGIC ABO: CPT | Performed by: STUDENT IN AN ORGANIZED HEALTH CARE EDUCATION/TRAINING PROGRAM

## 2020-03-03 RX ORDER — LEUCOVORIN CALCIUM 5 MG/1
15 TABLET ORAL EVERY 12 HOURS
Status: CANCELLED | OUTPATIENT
Start: 2020-03-12 | End: 2020-03-13

## 2020-03-03 RX ORDER — SODIUM CHLORIDE 9 MG/ML
125 INJECTION, SOLUTION INTRAVENOUS CONTINUOUS
Status: CANCELLED | OUTPATIENT
Start: 2020-03-11 | End: 2020-03-05

## 2020-03-03 RX ORDER — SODIUM CHLORIDE 9 MG/ML
20 INJECTION, SOLUTION INTRAVENOUS ONCE
Status: CANCELLED | OUTPATIENT
Start: 2020-03-18

## 2020-03-03 RX ORDER — PALONOSETRON 0.05 MG/ML
0.25 INJECTION, SOLUTION INTRAVENOUS ONCE
Status: CANCELLED | OUTPATIENT
Start: 2020-03-18

## 2020-03-03 RX ORDER — SODIUM CHLORIDE 9 MG/ML
20 INJECTION, SOLUTION INTRAVENOUS ONCE AS NEEDED
Status: CANCELLED | OUTPATIENT
Start: 2020-03-11

## 2020-03-03 RX ORDER — PALONOSETRON 0.05 MG/ML
0.25 INJECTION, SOLUTION INTRAVENOUS ONCE
Status: CANCELLED | OUTPATIENT
Start: 2020-03-11

## 2020-03-03 RX ORDER — PALONOSETRON 0.05 MG/ML
0.25 INJECTION, SOLUTION INTRAVENOUS ONCE
Status: CANCELLED | OUTPATIENT
Start: 2020-03-12

## 2020-03-03 RX ORDER — SODIUM CHLORIDE 9 MG/ML
20 INJECTION, SOLUTION INTRAVENOUS ONCE AS NEEDED
Status: CANCELLED | OUTPATIENT
Start: 2020-03-12

## 2020-03-03 RX ORDER — METHOTREXATE 25 MG/ML
100 INJECTION, SOLUTION INTRA-ARTERIAL; INTRAMUSCULAR; INTRAVENOUS ONCE
Status: CANCELLED | OUTPATIENT
Start: 2020-03-11

## 2020-03-03 RX ADMIN — TBO-FILGRASTIM 480 MCG: 480 INJECTION, SOLUTION SUBCUTANEOUS at 13:40

## 2020-03-03 NOTE — SOCIAL WORK
Readmission -chemo     Met with pt and explained Cm program/CM Role  Resides with son in an apartment, 1 slight stairs to reach, main floor set up    Son is with father  Independent prior to admission for ADL's/ambulation  She drives  PCP TAMARA Timmons  Has prescription plan, uses [facility]- interested in meds to bed  Denies utilization DME/HHC/IP Rehab  Denies mental health illness, IP or OP psyche care, drug and/or alcohol abuse  No POA or living will  Primary contact is brother Yulia Elba, [phone]  Family will provide transport home    Notified by nurse admission may be cancelled diabetes education

## 2020-03-03 NOTE — PROGRESS NOTES
Patient is a 51 yo with gestational trophoblastic disease who was scheduled to be admitted for EMA-CO  However, her admission labs show that she had WBC of 1 4 and ANC of 0 34  Decision was made to cancel chemotherapy admission and to received 1 u pRBC and granix subcutaneous today  This was explained to the patient by Dr Betsy Correa  Otherwise patient will have chemotherapy rescheduled after anemia and neutropenia resolved      /92   Pulse 65   Temp 98 7 °F (37 1 °C)   Resp 18   Ht 5' 6" (1 676 m)   Wt 131 kg (288 lb 12 8 oz)   SpO2 100%   BMI 46 61 kg/m²     Recent Results (from the past 12 hour(s))   CBC and differential    Collection Time: 03/03/20  8:44 AM   Result Value Ref Range    WBC 1 40 (LL) 4 31 - 10 16 Thousand/uL    RBC 2 70 (L) 3 81 - 5 12 Million/uL    Hemoglobin 7 7 (L) 11 5 - 15 4 g/dL    Hematocrit 23 6 (L) 34 8 - 46 1 %    MCV 87 82 - 98 fL    MCH 28 5 26 8 - 34 3 pg    MCHC 32 6 31 4 - 37 4 g/dL    RDW 16 4 (H) 11 6 - 15 1 %    MPV 9 7 8 9 - 12 7 fL    Platelets 919 154 - 991 Thousands/uL    nRBC 1 /100 WBCs    Neutrophils Relative 24 (L) 43 - 75 %    Immat GRANS % 1 0 - 2 %    Lymphocytes Relative 52 (H) 14 - 44 %    Monocytes Relative 13 (H) 4 - 12 %    Eosinophils Relative 9 (H) 0 - 6 %    Basophils Relative 1 0 - 1 %    Neutrophils Absolute 0 34 (L) 1 85 - 7 62 Thousands/µL    Immature Grans Absolute 0 01 0 00 - 0 20 Thousand/uL    Lymphocytes Absolute 0 73 0 60 - 4 47 Thousands/µL    Monocytes Absolute 0 18 0 17 - 1 22 Thousand/µL    Eosinophils Absolute 0 13 0 00 - 0 61 Thousand/µL    Basophils Absolute 0 01 0 00 - 0 10 Thousands/µL   Type and screen    Collection Time: 03/03/20 11:50 AM   Result Value Ref Range    ABO Grouping B     Rh Factor Positive     Antibody Screen Negative     Specimen Expiration Date [id number]    Prepare Leukoreduced RBC: 1 Units    Collection Time: 03/03/20  2:14 PM   Result Value Ref Range    Unit Product Code [id number]     Unit Number [id number] Unit ABO B     Unit RH POS     Crossmatch Compatible     Unit Dispense Status Issued      Patient was then discharged home      Amalia Dash  3/3/2020  4:00 PM

## 2020-03-04 ENCOUNTER — HOSPITAL ENCOUNTER (OUTPATIENT)
Dept: INFUSION CENTER | Facility: HOSPITAL | Age: 50
Discharge: HOME | End: 2020-03-04
Attending: OBSTETRICS & GYNECOLOGY
Payer: COMMERCIAL

## 2020-03-04 VITALS — TEMPERATURE: 99.8 F

## 2020-03-04 DIAGNOSIS — D70.1 CHEMOTHERAPY INDUCED NEUTROPENIA (HCC): Primary | ICD-10-CM

## 2020-03-04 DIAGNOSIS — T45.1X5A CHEMOTHERAPY INDUCED NEUTROPENIA (HCC): Primary | ICD-10-CM

## 2020-03-04 LAB
ABO GROUP BLD BPU: NORMAL
BPU ID: NORMAL
CROSSMATCH: NORMAL
UNIT DISPENSE STATUS: NORMAL
UNIT PRODUCT CODE: NORMAL
UNIT RH: NORMAL

## 2020-03-04 PROCEDURE — 96372 THER/PROPH/DIAG INJ SC/IM: CPT

## 2020-03-04 RX ADMIN — TBO-FILGRASTIM 480 MCG: 480 INJECTION, SOLUTION SUBCUTANEOUS at 14:19

## 2020-03-04 NOTE — PLAN OF CARE
Problem: Potential for Falls  Goal: Patient will remain free of falls  Description  INTERVENTIONS:  - Assess patient frequently for physical needs  -  Identify cognitive and physical deficits and behaviors that affect risk of falls  -  [place] fall precautions as indicated by assessment   - Educate patient/family on patient safety including physical limitations  - Instruct patient to call for assistance with activity based on assessment  - Modify environment to reduce risk of injury  - Consider OT/PT consult to assist with strengthening/mobility  Outcome: Progressing     Problem: Knowledge Deficit  Goal: Patient/family/caregiver demonstrates understanding of disease process, treatment plan, medications, and discharge instructions  Description  Complete learning assessment and assess knowledge base    Interventions:  - Provide teaching at level of understanding  - Provide teaching via preferred learning methods  Outcome: Progressing

## 2020-03-06 LAB
ALBUMIN SERPL-MCNC: 4 G/DL (ref 3.6–5.1)
ALBUMIN/GLOB SERPL: 1.9 (CALC) (ref 1–2.5)
ALP SERPL-CCNC: 68 U/L (ref 31–125)
ALT SERPL-CCNC: 31 U/L (ref 6–29)
AST SERPL-CCNC: 14 U/L (ref 10–35)
B-HCG SERPL-ACNC: 2 MIU/ML
BASOPHILS # BLD AUTO: 46 CELLS/UL (ref 0–200)
BASOPHILS NFR BLD AUTO: 2 %
BILIRUB SERPL-MCNC: 0.8 MG/DL (ref 0.2–1.2)
BUN SERPL-MCNC: 13 MG/DL (ref 7–25)
BUN/CREAT SERPL: ABNORMAL (CALC) (ref 6–22)
CALCIUM SERPL-MCNC: 8.9 MG/DL (ref 8.6–10.2)
CHLORIDE SERPL-SCNC: 110 MMOL/L (ref 98–110)
CO2 SERPL-SCNC: 25 MMOL/L (ref 20–32)
CREAT SERPL-MCNC: 0.99 MG/DL (ref 0.5–1.1)
EOSINOPHIL # BLD AUTO: 138 CELLS/UL (ref 15–500)
EOSINOPHIL NFR BLD AUTO: 6 %
ERYTHROCYTE [DISTWIDTH] IN BLOOD BY AUTOMATED COUNT: 18.5 % (ref 11–15)
GLOBULIN SER CALC-MCNC: 2.1 G/DL (CALC) (ref 1.9–3.7)
GLUCOSE SERPL-MCNC: 108 MG/DL (ref 65–99)
HCT VFR BLD AUTO: 31.2 % (ref 35–45)
HGB BLD-MCNC: 9.9 G/DL (ref 11.7–15.5)
LYMPHOCYTES # BLD MANUAL: 966 CELLS/UL (ref 850–3900)
LYMPHOCYTES NFR BLD AUTO: 42 %
MAGNESIUM SERPL-MCNC: 2.1 MG/DL (ref 1.5–2.5)
MCH RBC QN AUTO: 28 PG (ref 27–33)
MCHC RBC AUTO-ENTMCNC: 31.7 G/DL (ref 32–36)
MCV RBC AUTO: 88.1 FL (ref 80–100)
METAMYELOCYTES # BLD: 92 CELLS/UL
METAMYELOCYTES NFR BLD MANUAL: 4 %
MONOCYTES # BLD AUTO: 322 CELLS/UL (ref 200–950)
MONOCYTES NFR BLD AUTO: 14 %
NEUTROPHILS # BLD AUTO: 552 CELLS/UL (ref 1500–7800)
NEUTROPHILS NFR BLD AUTO: 24 %
NEUTS BAND # BLD: 184 CELLS/UL (ref 0–750)
NEUTS BAND NFR BLD MANUAL: 8 %
NRBC # BLD: 23 CELLS/UL
NRBC BLD-RTO: 1 /100 WBC
PLATELET # BLD AUTO: 460 THOUSAND/UL (ref 140–400)
PMV BLD REES-ECKER: 9.6 FL (ref 7.5–12.5)
POTASSIUM SERPL-SCNC: 4.2 MMOL/L (ref 3.5–5.3)
PROT SERPL-MCNC: 6.1 G/DL (ref 6.1–8.1)
RBC # BLD AUTO: 3.54 MILLION/UL (ref 3.8–5.1)
SL AMB EGFR AFRICAN AMERICAN: 78 ML/MIN/1.73M2
SL AMB EGFR NON AFRICAN AMERICAN: 67 ML/MIN/1.73M2
SODIUM SERPL-SCNC: 142 MMOL/L (ref 135–146)
WBC # BLD AUTO: 2.3 THOUSAND/UL (ref 3.8–10.8)

## 2020-03-08 ENCOUNTER — DOCUMENTATION (OUTPATIENT)
Dept: INFUSION CENTER | Facility: HOSPITAL | Age: 50
End: 2020-03-08

## 2020-03-08 DIAGNOSIS — I10 HYPERTENSION, UNSPECIFIED TYPE: ICD-10-CM

## 2020-03-08 RX ORDER — LISINOPRIL 10 MG/1
TABLET ORAL
Qty: 30 TABLET | Refills: 1 | OUTPATIENT
Start: 2020-03-08

## 2020-03-09 DIAGNOSIS — I10 HYPERTENSION, UNSPECIFIED TYPE: ICD-10-CM

## 2020-03-09 RX ORDER — LISINOPRIL 10 MG/1
10 TABLET ORAL DAILY
Qty: 30 TABLET | Refills: 1 | Status: SHIPPED | OUTPATIENT
Start: 2020-03-09 | End: 2020-04-02 | Stop reason: SDUPTHER

## 2020-03-09 NOTE — SOCIAL WORK
MSW spoke with pt on 3/6 with regards to the cancer care funds  Pt has requested the funds be used for rental assistance  MSW sent a W-9, which the pts landlord completed  Pt sent her lease along as well for the check to be submitted to [employer]  Check submitted on 3/6

## 2020-03-10 ENCOUNTER — HOSPITAL ENCOUNTER (OUTPATIENT)
Dept: INFUSION CENTER | Facility: HOSPITAL | Age: 50
End: 2020-03-10
Attending: OBSTETRICS & GYNECOLOGY

## 2020-03-10 DIAGNOSIS — O01.9 GTD (GESTATIONAL TROPHOBLASTIC DISEASE): Primary | ICD-10-CM

## 2020-03-10 LAB
ALBUMIN SERPL-MCNC: 4 G/DL (ref 3.6–5.1)
ALBUMIN/GLOB SERPL: 1.8 (CALC) (ref 1–2.5)
ALP SERPL-CCNC: 69 U/L (ref 31–125)
ALT SERPL-CCNC: 20 U/L (ref 6–29)
AST SERPL-CCNC: 13 U/L (ref 10–35)
B-HCG SERPL-ACNC: <2 MIU/ML
BASOPHILS # BLD AUTO: 84 CELLS/UL (ref 0–200)
BASOPHILS NFR BLD AUTO: 1 %
BILIRUB SERPL-MCNC: 0.5 MG/DL (ref 0.2–1.2)
BLASTS # BLD: 84 CELLS/UL
BLASTS NFR BLD MANUAL: 1 %
BUN SERPL-MCNC: 14 MG/DL (ref 7–25)
BUN/CREAT SERPL: NORMAL (CALC) (ref 6–22)
CALCIUM SERPL-MCNC: 8.9 MG/DL (ref 8.6–10.2)
CHLORIDE SERPL-SCNC: 108 MMOL/L (ref 98–110)
CO2 SERPL-SCNC: 25 MMOL/L (ref 20–32)
CREAT SERPL-MCNC: 0.99 MG/DL (ref 0.5–1.1)
EOSINOPHIL # BLD AUTO: 84 CELLS/UL (ref 15–500)
EOSINOPHIL NFR BLD AUTO: 1 %
ERYTHROCYTE [DISTWIDTH] IN BLOOD BY AUTOMATED COUNT: 18.2 % (ref 11–15)
GLOBULIN SER CALC-MCNC: 2.2 G/DL (CALC) (ref 1.9–3.7)
GLUCOSE SERPL-MCNC: 107 MG/DL (ref 65–139)
HCT VFR BLD AUTO: 32.4 % (ref 35–45)
HGB BLD-MCNC: 10 G/DL (ref 11.7–15.5)
LYMPHOCYTES # BLD AUTO: 840 CELLS/UL (ref 850–3900)
LYMPHOCYTES NFR BLD AUTO: 10 %
MAGNESIUM SERPL-MCNC: 2.2 MG/DL (ref 1.5–2.5)
MCH RBC QN AUTO: 27.2 PG (ref 27–33)
MCHC RBC AUTO-ENTMCNC: 30.9 G/DL (ref 32–36)
MCV RBC AUTO: 88.3 FL (ref 80–100)
METAMYELOCYTES # BLD: 336 CELLS/UL
METAMYELOCYTES NFR BLD MANUAL: 4 %
MONOCYTES # BLD AUTO: 420 CELLS/UL (ref 200–950)
MONOCYTES NFR BLD AUTO: 5 %
MYELOCYTES # BLD: 168 CELLS/UL
MYELOCYTES NFR BLD MANUAL: 2 %
NEUTROPHILS # BLD AUTO: 5292 CELLS/UL (ref 1500–7800)
NEUTROPHILS NFR BLD AUTO: 63 %
NEUTS BAND # BLD: 1008 CELLS/UL (ref 0–750)
NEUTS BAND NFR BLD MANUAL: 12 %
PLATELET # BLD AUTO: 387 THOUSAND/UL (ref 140–400)
PMV BLD REES-ECKER: 9.5 FL (ref 7.5–12.5)
POTASSIUM SERPL-SCNC: 4.3 MMOL/L (ref 3.5–5.3)
PROMYELOCYTES # BLD: 84 CELLS/UL
PROMYELOCYTES NFR BLD MANUAL: 1 %
PROT SERPL-MCNC: 6.2 G/DL (ref 6.1–8.1)
RBC # BLD AUTO: 3.67 MILLION/UL (ref 3.8–5.1)
SL AMB EGFR AFRICAN AMERICAN: 78 ML/MIN/1.73M2
SL AMB EGFR NON AFRICAN AMERICAN: 67 ML/MIN/1.73M2
SODIUM SERPL-SCNC: 141 MMOL/L (ref 135–146)
WBC # BLD AUTO: 8.4 THOUSAND/UL (ref 3.8–10.8)

## 2020-03-10 RX ORDER — METHOTREXATE 25 MG/ML
100 INJECTION, SOLUTION INTRA-ARTERIAL; INTRAMUSCULAR; INTRAVENOUS ONCE
Status: CANCELLED | OUTPATIENT
Start: 2020-03-11

## 2020-03-11 ENCOUNTER — TELEPHONE (OUTPATIENT)
Dept: NUTRITION | Facility: HOSPITAL | Age: 50
End: 2020-03-11

## 2020-03-11 ENCOUNTER — HOSPITAL ENCOUNTER (INPATIENT)
Facility: HOSPITAL | Age: 50
LOS: 1 days | Discharge: HOME | DRG: 848 | End: 2020-03-12
Attending: PEDIATRICS | Admitting: OBSTETRICS & GYNECOLOGY
Payer: COMMERCIAL

## 2020-03-11 DIAGNOSIS — O01.9 GTD (GESTATIONAL TROPHOBLASTIC DISEASE): Primary | ICD-10-CM

## 2020-03-11 DIAGNOSIS — C58 MOLAR PREGNANCY WITH CHORIOCARCINOMA (HCC): ICD-10-CM

## 2020-03-11 DIAGNOSIS — O02.89 MOLAR PREGNANCY WITH CHORIOCARCINOMA (HCC): ICD-10-CM

## 2020-03-11 DIAGNOSIS — B37.0 ORAL THRUSH: ICD-10-CM

## 2020-03-11 PROCEDURE — 3E03305 INTRODUCTION OF OTHER ANTINEOPLASTIC INTO PERIPHERAL VEIN, PERCUTANEOUS APPROACH: ICD-10-PCS | Performed by: OBSTETRICS & GYNECOLOGY

## 2020-03-11 PROCEDURE — 99221 1ST HOSP IP/OBS SF/LOW 40: CPT | Performed by: OBSTETRICS & GYNECOLOGY

## 2020-03-11 PROCEDURE — NC001 PR NO CHARGE: Performed by: OBSTETRICS & GYNECOLOGY

## 2020-03-11 RX ORDER — ESCITALOPRAM OXALATE 10 MG/1
10 TABLET ORAL DAILY
Status: DISCONTINUED | OUTPATIENT
Start: 2020-03-11 | End: 2020-03-12 | Stop reason: HOSPADM

## 2020-03-11 RX ORDER — SODIUM CHLORIDE 9 MG/ML
125 INJECTION, SOLUTION INTRAVENOUS CONTINUOUS
Status: DISCONTINUED | OUTPATIENT
Start: 2020-03-11 | End: 2020-03-12 | Stop reason: HOSPADM

## 2020-03-11 RX ORDER — POLYETHYLENE GLYCOL 3350 17 G/17G
17 POWDER, FOR SOLUTION ORAL DAILY
Status: DISCONTINUED | OUTPATIENT
Start: 2020-03-11 | End: 2020-03-12 | Stop reason: HOSPADM

## 2020-03-11 RX ORDER — BENZONATATE 100 MG/1
100 CAPSULE ORAL 3 TIMES DAILY PRN
Status: DISCONTINUED | OUTPATIENT
Start: 2020-03-11 | End: 2020-03-12 | Stop reason: HOSPADM

## 2020-03-11 RX ORDER — METHOTREXATE 25 MG/ML
100 INJECTION, SOLUTION INTRA-ARTERIAL; INTRAMUSCULAR; INTRAVENOUS ONCE
Status: COMPLETED | OUTPATIENT
Start: 2020-03-11 | End: 2020-03-11

## 2020-03-11 RX ORDER — DOCUSATE SODIUM 100 MG/1
100 CAPSULE, LIQUID FILLED ORAL 2 TIMES DAILY
Status: DISCONTINUED | OUTPATIENT
Start: 2020-03-11 | End: 2020-03-12 | Stop reason: HOSPADM

## 2020-03-11 RX ORDER — PALONOSETRON 0.05 MG/ML
0.25 INJECTION, SOLUTION INTRAVENOUS ONCE
Status: COMPLETED | OUTPATIENT
Start: 2020-03-11 | End: 2020-03-11

## 2020-03-11 RX ORDER — SODIUM CHLORIDE 9 MG/ML
20 INJECTION, SOLUTION INTRAVENOUS ONCE AS NEEDED
Status: DISCONTINUED | OUTPATIENT
Start: 2020-03-11 | End: 2020-03-12 | Stop reason: HOSPADM

## 2020-03-11 RX ORDER — PROMETHAZINE HYDROCHLORIDE 25 MG/1
25 TABLET ORAL EVERY 6 HOURS PRN
Status: DISCONTINUED | OUTPATIENT
Start: 2020-03-11 | End: 2020-03-12 | Stop reason: HOSPADM

## 2020-03-11 RX ORDER — LORAZEPAM 1 MG/1
1 TABLET ORAL EVERY 8 HOURS PRN
Status: DISCONTINUED | OUTPATIENT
Start: 2020-03-11 | End: 2020-03-12 | Stop reason: HOSPADM

## 2020-03-11 RX ORDER — LISINOPRIL 10 MG/1
10 TABLET ORAL DAILY
Status: DISCONTINUED | OUTPATIENT
Start: 2020-03-11 | End: 2020-03-12 | Stop reason: HOSPADM

## 2020-03-11 RX ORDER — ACETAMINOPHEN 325 MG/1
650 TABLET ORAL EVERY 6 HOURS PRN
Status: DISCONTINUED | OUTPATIENT
Start: 2020-03-11 | End: 2020-03-12 | Stop reason: HOSPADM

## 2020-03-11 RX ORDER — ALBUTEROL SULFATE 90 UG/1
2 AEROSOL, METERED RESPIRATORY (INHALATION) EVERY 4 HOURS PRN
Status: DISCONTINUED | OUTPATIENT
Start: 2020-03-11 | End: 2020-03-12 | Stop reason: HOSPADM

## 2020-03-11 RX ORDER — ONDANSETRON 4 MG/1
4 TABLET, ORALLY DISINTEGRATING ORAL EVERY 6 HOURS PRN
Status: DISCONTINUED | OUTPATIENT
Start: 2020-03-11 | End: 2020-03-12 | Stop reason: HOSPADM

## 2020-03-11 RX ADMIN — METHOTREXATE 235 MG: 25 INJECTION, SOLUTION INTRA-ARTERIAL; INTRAMUSCULAR; INTRATHECAL; INTRAVENOUS at 14:00

## 2020-03-11 RX ADMIN — ETOPOSIDE 234 MG: 20 INJECTION, SOLUTION, CONCENTRATE INTRAVENOUS at 12:14

## 2020-03-11 RX ADMIN — METHOTREXATE 467.5 MG: 25 INJECTION, SOLUTION INTRA-ARTERIAL; INTRAMUSCULAR; INTRATHECAL; INTRAVENOUS at 14:08

## 2020-03-11 RX ADMIN — LORAZEPAM 1 MG: 1 TABLET ORAL at 22:01

## 2020-03-11 RX ADMIN — ONDANSETRON 4 MG: 4 TABLET, ORALLY DISINTEGRATING ORAL at 19:18

## 2020-03-11 RX ADMIN — SODIUM CHLORIDE 125 ML/HR: 0.9 INJECTION, SOLUTION INTRAVENOUS at 17:07

## 2020-03-11 RX ADMIN — ENOXAPARIN SODIUM 40 MG: 40 INJECTION SUBCUTANEOUS at 11:50

## 2020-03-11 RX ADMIN — DOCUSATE SODIUM 100 MG: 100 CAPSULE, LIQUID FILLED ORAL at 17:06

## 2020-03-11 RX ADMIN — DACTINOMYCIN 500 MCG: 0.5 INJECTION, POWDER, LYOPHILIZED, FOR SOLUTION INTRAVENOUS at 13:34

## 2020-03-11 RX ADMIN — PALONOSETRON HYDROCHLORIDE 0.25 MG: 0.25 INJECTION, SOLUTION INTRAVENOUS at 12:12

## 2020-03-11 RX ADMIN — SODIUM CHLORIDE 125 ML/HR: 0.9 INJECTION, SOLUTION INTRAVENOUS at 09:25

## 2020-03-11 RX ADMIN — DEXAMETHASONE SODIUM PHOSPHATE 10 MG: 10 INJECTION, SOLUTION INTRAMUSCULAR; INTRAVENOUS at 11:48

## 2020-03-11 NOTE — TELEPHONE ENCOUNTER
Left message with Kaykay Holloway re: only company that I know that delivers pureed foods  I am unaware of any that are covered by insurance  Left number for future questions      ----- Message from Dorian Granger RD sent at 3/10/2020  2:16 PM EDT -----  Regarding: RE: Question about pureed foods  Ok, thank you, appreciate!    ----- Message -----  From: Patsy Traore RD  Sent: 3/6/2020  10:54 AM EDT  To: Dorian Granger RD, Lv Stone RD, #  Subject: RE: Question about pureed foods                  Not that I know of  Since Whitley is following this pt, I will send this to her too so she is aware of what is going on and the pts questions      ----- Message -----  From: Droian Granger RD  Sent: 3/6/2020  10:15 AM EST  To: Patsy Traore RD, Lv Stone RD  Subject: FW: Question about pureed foods                      ----- Message -----  From: SHERMAN Castillo  Sent: 1/30/2020   9:50 AM EST  To: Priya Singh, [facility] [place]  Subject: Question about pureed foods                      Good morning,    This patient was asking if you had any knowledge of a pureed foods delivery service (?) as she has mouth sores related to chemotherapy  She states that her mother had this service when she was homebound, but can't recall the name, just that it was covered by insurance  The patient has met with nutrition when at the [facility] as she has had weight loss since starting treatment  I'm not sure who would have this info but figured I would reach out to your team     Thanks!     Sondra Jimenez

## 2020-03-11 NOTE — DISCHARGE SUMMARY
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 who presents for cycle three of curative-intent chemotherapy with etoposide, methotrexate and actinomycin D, EMA-CO  The patient tolerated her regimen well without complications  She had minimal nausea  Lab Results:   Lab Results   Component Value Date    WBC 8 4 03/09/2020    HGB 10 0 (L) 03/09/2020    HCT 32 4 (L) 03/09/2020    MCV 88 3 03/09/2020     03/09/2020     Lab Results   Component Value Date    CALCIUM 8 9 03/09/2020    K 4 3 03/09/2020    CO2 25 03/09/2020     03/09/2020    BUN 14 03/09/2020    CREATININE 0 99 03/09/2020     Lab Results   Component Value Date/Time    POCGLU 134 02/04/2020 07:04 PM     Lab Results   Component Value Date    PTT 29 02/09/2020     Lab Results   Component Value Date    INR 1 09 02/09/2020    INR 1 09 01/31/2020    INR 0 97 12/25/2019    PROTIME 13 7 02/09/2020    PROTIME 13 7 01/31/2020    PROTIME 12 6 [id number]/[id number]       Complications: none apparent    Condition at discharge: stable     Discharge instructions/Information to patient and family:   See After Visit Summary for information provided to patient and family  Provisions for Follow-Up Care:  See After Visit Summary for information related to follow-up care and any pertinent home health orders  Disposition: See After Visit Summary for discharge disposition information  Planned Readmission: Yes, pending clinical status    Discharge Medications: For a complete list of the patient's medications, please refer to her med rec      Jose Whitehead MD  3/11/2020  9:48 AM

## 2020-03-11 NOTE — H&P
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 here for cycle three of chemotherapy  She is undergoing curative-intent therapy with EMA-CO  Robotic-assisted TLH, BSO with minilaparotomy was performed 20  Last HCG was <2 on 3/9/20  Re-admission labs were acceptable to proceed with chemotherapy  Since her last admission, she had some initial nausea and vomiting over the first few days, but otherwise has been well  Review of Systems   Constitutional: Positive for activity change and appetite change  Negative for chills, diaphoresis, fatigue, fever and unexpected weight change  HENT: Positive for mouth sores  Negative for congestion, dental problem, drooling, ear discharge, ear pain, facial swelling, hearing loss, nosebleeds, postnasal drip, rhinorrhea, sinus pressure, sinus pain, sneezing, sore throat, tinnitus, trouble swallowing and voice change  Eyes: Negative  Respiratory: Negative  Cardiovascular: Negative  Gastrointestinal: Positive for constipation and nausea  Negative for abdominal distention, abdominal pain, anal bleeding, blood in stool, diarrhea, rectal pain and vomiting  Endocrine: Negative  Genitourinary: Negative  Musculoskeletal: Negative  Allergic/Immunologic: Negative  Neurological: Negative  Hematological: Negative  Psychiatric/Behavioral: Negative          Historical Information   Past Medical History:   Diagnosis Date    Cancer ([address] )     GTD (gestational trophoblastic disease)     History of chemotherapy     Obesity     Shortness of breath     Wheezing      Past Surgical History:   Procedure Laterality Date     SECTION      CYSTOSCOPY N/A 2020    Procedure: CYSTOSCOPY;  Surgeon: Wendy Chakraborty MD;  [facility];  Service: Gynecology Oncology    DILATION AND CURETTAGE OF UTERUS      X2    ENDOMETRIAL ABLATION  2009    IR PORT PLACEMENT  2019    r chest    LAPAROTOMY N/A 2020    Procedure: MINI-LAPAROTOMY (FOR SPECIMEN REMOVAL); Surgeon: Mary Bashir MD;  [facility];  Service: Gynecology Oncology    TX LAPAROSCOPY W TOT HYSTERECTUTERUS <=250 [address]mer TUBE/OVARY N/A 2020    Procedure: ROBOTIC ASSISTED TOTAL LAPAROSCOPIC HYSTERECTOMY, BILATERAL SALPINGO-OOPHORECTOMY;  Surgeon: Mary Bashir MD;  [facility];  Service: Gynecology Oncology    THYROIDECTOMY, PARTIAL       OB/GYN History: [id number]  No family history on file    Social History   Social History     Substance and Sexual Activity   Alcohol Use Not Currently    Frequency: Never    Binge frequency: Never     Social History     Substance and Sexual Activity   Drug Use Never     Social History     Tobacco Use   Smoking Status Former Smoker    Types: Cigarettes    Last attempt to quit:     Years since quittin 2   Smokeless Tobacco Former User       Meds/Allergies   Medications Prior to Admission   Medication    acetaminophen (TYLENOL) 325 mg tablet    al mag oxide-diphenhydramine-lidocaine viscous (MAGIC MOUTHWASH) 1:1:1 suspension    albuterol (PROVENTIL HFA,VENTOLIN HFA) 90 mcg/act inhaler    benzonatate (TESSALON PERLES) 100 mg capsule    dexamethasone (DECADRON) 4 mg tablet    dextromethorphan-guaiFENesin (ROBITUSSIN DM)  mg/5 mL syrup    docusate sodium (COLACE) 100 mg capsule    escitalopram (LEXAPRO) 10 mg tablet    furosemide (LASIX) 20 mg tablet    ibuprofen (MOTRIN) 600 mg tablet    leucovorin (WELLCOVORIN) 15 MG tablet    lisinopril (ZESTRIL) 10 mg tablet    LORazepam (ATIVAN) 1 mg tablet    norgestimate-ethinyl estradiol (ORTHO-CYCLEN) 0 25-35 MG-MCG per tablet    nystatin (MYCOSTATIN) 500,000 units/5 mL suspension    ondansetron (ZOFRAN) 4 mg tablet    oxyCODONE (OXY-IR) 5 MG capsule    polyethylene glycol (MIRALAX) 17 g packet    Probiotic Product (ACIDOPHILUS PROBIOTIC BLEND) CAPS    promethazine (PHENERGAN) 25 mg tablet    senna (SENOKOT) 8 6 MG tablet    simethicone (MYLICON) 80 mg chewable tablet     Allergies   Allergen Reactions    Penicillins Fever       Objective   /77 (BP Location: Right arm)   Pulse 67   Temp 98 1 °F (36 7 °C) (Oral)   Resp 18   Ht 5' 6" (1 676 m)   Wt 130 kg (287 lb 6 4 oz)   SpO2 100%   BMI 46 39 kg/m²     No intake or output data in the 24 hours ending 03/11/20 0853      Physical Exam   Constitutional: She is oriented to person, place, and time  She appears well-developed and well-nourished  No distress  obese   HENT:   Head: Normocephalic and atraumatic    hairless   Neck: Normal range of motion  Neck supple  Cardiovascular: Normal rate, regular rhythm, normal heart sounds and intact distal pulses  Exam reveals no gallop and no friction rub  No murmur heard  Pulmonary/Chest: Effort normal and breath sounds normal  No stridor  No respiratory distress  She has no wheezes  She has no rales  She exhibits no tenderness  Abdominal: Soft  She exhibits no distension and no mass  There is no tenderness  There is no guarding  5 Robot incision sites across the abdomen    Musculoskeletal: Normal range of motion  She exhibits no edema, tenderness or deformity  Neurological: She is alert and oriented to person, place, and time  Skin: Skin is warm and dry  No rash noted  She is not diaphoretic  No erythema  There is pallor  Psychiatric: She has a normal mood and affect  Her behavior is normal  Judgment and thought content normal      Lab Results:   No visits with results within 1 Day(s) from this visit     Latest known visit with results is:   Orders Only on 03/09/2020   Component Date Value    Magnesium, Serum 03/09/2020 2 2     Glucose, Random 03/09/2020 107     BUN 03/09/2020 14     Creatinine 03/09/2020 0 99     eGFR Non African American 03/09/2020 67     eGFR  03/09/2020 78     SL AMB BUN/CREATININE RA* 32/09/3776 NOT APPLICABLE     Sodium 93/13/0091 141     Potassium 03/09/2020 4 3     Chloride 03/09/2020 108     CO2 03/09/2020 25     Calcium 03/09/2020 8 9     Protein, Total 03/09/2020 6 2     Albumin 03/09/2020 4 0     Globulin 03/09/2020 2 2     Albumin/Globulin Ratio 03/09/2020 1 8     TOTAL BILIRUBIN 03/09/2020 0 5     Alkaline Phosphatase 03/09/2020 69     AST 03/09/2020 13     ALT 03/09/2020 20     White Blood Cell Count 03/09/2020 8 4     Red Blood Cell Count 03/09/2020 3 67*    Hemoglobin 03/09/2020 10 0*    HCT 03/09/2020 32 4*    MCV 03/09/2020 88 3     MCH 03/09/2020 27 2     MCHC 03/09/2020 30 9*    RDW 03/09/2020 18 2*    Platelet Count 68/85/7737 387     SL AMB MPV 03/09/2020 9 5     Neutrophils (Absolute) 03/09/2020 5,292     Absolute Bands 03/09/2020 1,008*    Absolute Metamyelocytes 03/09/2020 336*    SL AMB ABSOLUTE MYELOCYT* 03/09/2020 168*    Absolute Promyelocytes 03/09/2020 84*    Lymphocytes (Absolute) 03/09/2020 840*    Monocytes (Absolute) 03/09/2020 420     Eosinophils (Absolute) 03/09/2020 84     Basophils ABS 03/09/2020 84     Absolute Blasts 03/09/2020 84*    Neutrophils 03/09/2020 63     Bands PCT 03/09/2020 12     Metamyelocytes 03/09/2020 4*    SL AMB MYELOCYTES 03/09/2020 2*    Promyelocytes 03/09/2020 1*    Lymphocytes 03/09/2020 10     Monocytes 03/09/2020 5     Eosinophils 03/09/2020 1     Basophils PCT 03/09/2020 1     Blasts 03/09/2020 1*    Comment(s) 03/09/2020      HCG, Quantitative 03/09/2020 <2         Assessment/Plan     A/P:  Chan Becker is a 52 y o  female with 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 here for cycle three of chemotherapy  She is undergoing curative-intent therapy with EMA-CO        1) Gestational trophoblastic neoplasia   - EMA-CO cycle #3, here for Etoposide, Methotrexate and Actinomycin D    - Last Hcg<2 on 3/9   - Monitor for stomatitis and mucositis     2) Chronic anemia   - Pre-admission Hb is 10 0, will continue to monitor     3) Hypertension: Continue home Lisinopril     4) Anxiety: Continue home Lexapro    5) Bowel regimen: Colace bid, Miralax     6) DVT ppx   - Lovenox 40mg daily   - SCDs     7) FEN: Regular diet     Code Status: Level 1 - Full Code    Satish Marmolejo MD  3/11/2020  8:53 AM

## 2020-03-11 NOTE — PLAN OF CARE
Problem: PAIN - ADULT  Goal: Verbalizes/displays adequate comfort level or baseline comfort level  Description  Interventions:  - Encourage patient to monitor pain and request assistance  - Assess pain using appropriate pain scale  - Administer analgesics based on type and severity of pain and evaluate response  - Implement non-pharmacological measures as appropriate and evaluate response  - Consider cultural and social influences on pain and pain management  - Notify physician/advanced practitioner if interventions unsuccessful or patient reports new pain  Outcome: Progressing     Problem: INFECTION - ADULT  Goal: Absence or prevention of progression during hospitalization  Description  INTERVENTIONS:  - Assess and monitor for signs and symptoms of infection  - Monitor lab/diagnostic results  - Monitor all insertion sites, i e  indwelling lines, tubes, and drains  - Monitor endotracheal if appropriate and nasal secretions for changes in amount and color  - [place] appropriate cooling/warming therapies per order  - Administer medications as ordered  - Instruct and encourage patient and family to use good hand hygiene technique  - Identify and instruct in appropriate isolation precautions for identified infection/condition  Outcome: Progressing  Goal: Absence of fever/infection during neutropenic period  Description  INTERVENTIONS:  - Monitor WBC    Outcome: Progressing     Problem: SAFETY ADULT  Goal: Patient will remain free of falls  Description  INTERVENTIONS:  - Assess patient frequently for physical needs  -  Identify cognitive and physical deficits and behaviors that affect risk of falls    -  [place] fall precautions as indicated by assessment   - Educate patient/family on patient safety including physical limitations  - Instruct patient to call for assistance with activity based on assessment  - Modify environment to reduce risk of injury  - Consider OT/PT consult to assist with strengthening/mobility  Outcome: Progressing  Goal: Maintain or return to baseline ADL function  Description  INTERVENTIONS:  -  Assess patient's ability to carry out ADLs; assess patient's baseline for ADL function and identify physical deficits which impact ability to perform ADLs (bathing, care of mouth/teeth, toileting, grooming, dressing, etc )  - Assess/evaluate cause of self-care deficits   - Assess range of motion  - Assess patient's mobility; develop plan if impaired  - Assess patient's need for assistive devices and provide as appropriate  - Encourage maximum independence but intervene and supervise when necessary  - Involve family in performance of ADLs  - Assess for home care needs following discharge   - Consider OT consult to assist with ADL evaluation and planning for discharge  - Provide patient education as appropriate  Outcome: Progressing  Goal: Maintain or return mobility status to optimal level  Description  INTERVENTIONS:  - Assess patient's baseline mobility status (ambulation, transfers, stairs, etc )    - Identify cognitive and physical deficits and behaviors that affect mobility  - Identify mobility aids required to assist with transfers and/or ambulation (gait belt, sit-to-stand, lift, walker, cane, etc )  - [place] fall precautions as indicated by assessment  - Record patient progress and toleration of activity level on Mobility SBAR; progress patient to next Phase/Stage  - Instruct patient to call for assistance with activity based on assessment  - Consider rehabilitation consult to assist with strengthening/weightbearing, etc   Outcome: Progressing     Problem: DISCHARGE PLANNING  Goal: Discharge to home or other facility with appropriate resources  Description  INTERVENTIONS:  - Identify barriers to discharge w/patient and caregiver  - Arrange for needed discharge resources and transportation as appropriate  - Identify discharge learning needs (meds, wound care, etc )  - Arrange for interpretive services to assist at discharge as needed  - Refer to Case Management Department for coordinating discharge planning if the patient needs post-hospital services based on physician/advanced practitioner order or complex needs related to functional status, cognitive ability, or social support system  Outcome: Progressing     Problem: Knowledge Deficit  Goal: Patient/family/caregiver demonstrates understanding of disease process, treatment plan, medications, and discharge instructions  Description  Complete learning assessment and assess knowledge base    Interventions:  - Provide teaching at level of understanding  - Provide teaching via preferred learning methods  Outcome: Progressing

## 2020-03-11 NOTE — SOCIAL WORK
Readmission -chemo     Met with pt and explained CM program/CM Role  Resides with son in an apartment, 1 flight stairs to reach, main floor set up  Son is with father  Jameel Galeana prior to admission for ADL's/ambulation  She drives  PCP [facility]- notified will receive call from PCP office within 3 business days of disch to [facility] appt  Has prescription plan, uses [facility]- interested in meds to bed  Denies utilization DME/HHC/IP Rehab  Denies mental health illness, IP or OP psyche care, drug and/or alcohol abuse  No POA or living will  Primary contact is brother Karla [place], [phone]  Family will provide transport home    CM reviewed d/c planning process including the following: identifying help at home, patient preference for d/c planning needs, Discharge Lounge, [facility] Meds to Bed program, availability of treatment team to discuss questions or concerns patient and/or family may have regarding understanding medications and recognizing signs and symptoms once discharged  CM also encouraged patient to follow up with all recommended appointments after discharge  Patient advised of importance for patient and family to participate in managing patients medical well being  Patient/caregiver received discharge checklist  Content reviewed  Patient/caregiver encouraged to participate in discharge plan of care prior to discharge home

## 2020-03-12 VITALS
RESPIRATION RATE: 18 BRPM | OXYGEN SATURATION: 99 % | BODY MASS INDEX: 46.06 KG/M2 | HEIGHT: 66 IN | TEMPERATURE: 98.8 F | HEART RATE: 69 BPM | WEIGHT: 286.6 LBS | SYSTOLIC BLOOD PRESSURE: 122 MMHG | DIASTOLIC BLOOD PRESSURE: 64 MMHG

## 2020-03-12 LAB
ALBUMIN SERPL BCP-MCNC: 3 G/DL (ref 3.5–5)
ALP SERPL-CCNC: 65 U/L (ref 46–116)
ALT SERPL W P-5'-P-CCNC: 20 U/L (ref 12–78)
ANION GAP SERPL CALCULATED.3IONS-SCNC: 5 MMOL/L (ref 4–13)
ANISOCYTOSIS BLD QL SMEAR: PRESENT
AST SERPL W P-5'-P-CCNC: 9 U/L (ref 5–45)
BASOPHILS # BLD MANUAL: 0 THOUSAND/UL (ref 0–0.1)
BASOPHILS NFR MAR MANUAL: 0 % (ref 0–1)
BILIRUB SERPL-MCNC: 0.65 MG/DL (ref 0.2–1)
BUN SERPL-MCNC: 15 MG/DL (ref 5–25)
CALCIUM SERPL-MCNC: 7.8 MG/DL (ref 8.3–10.1)
CHLORIDE SERPL-SCNC: 119 MMOL/L (ref 100–108)
CO2 SERPL-SCNC: 22 MMOL/L (ref 21–32)
CREAT SERPL-MCNC: 0.84 MG/DL (ref 0.6–1.3)
EOSINOPHIL # BLD MANUAL: 0 THOUSAND/UL (ref 0–0.4)
EOSINOPHIL NFR BLD MANUAL: 0 % (ref 0–6)
ERYTHROCYTE [DISTWIDTH] IN BLOOD BY AUTOMATED COUNT: 17.9 % (ref 11.6–15.1)
GFR SERPL CREATININE-BSD FRML MDRD: 82 ML/MIN/1.73SQ M
GLUCOSE SERPL-MCNC: 145 MG/DL (ref 65–140)
HCT VFR BLD AUTO: 27.1 % (ref 34.8–46.1)
HGB BLD-MCNC: 8.4 G/DL (ref 11.5–15.4)
LYMPHOCYTES # BLD AUTO: 0.2 THOUSAND/UL (ref 0.6–4.47)
LYMPHOCYTES # BLD AUTO: 2 % (ref 14–44)
MCH RBC QN AUTO: 27.6 PG (ref 26.8–34.3)
MCHC RBC AUTO-ENTMCNC: 31 G/DL (ref 31.4–37.4)
MCV RBC AUTO: 89 FL (ref 82–98)
METAMYELOCYTES NFR BLD MANUAL: 1 % (ref 0–1)
MONOCYTES # BLD AUTO: 0 THOUSAND/UL (ref 0–1.22)
MONOCYTES NFR BLD: 0 % (ref 4–12)
NEUTROPHILS # BLD MANUAL: 9.81 THOUSAND/UL (ref 1.85–7.62)
NEUTS SEG NFR BLD AUTO: 97 % (ref 43–75)
NRBC BLD AUTO-RTO: 0 /100 WBCS
OVALOCYTES BLD QL SMEAR: PRESENT
PLATELET # BLD AUTO: 228 THOUSANDS/UL (ref 149–390)
PLATELET BLD QL SMEAR: ADEQUATE
PMV BLD AUTO: 9.9 FL (ref 8.9–12.7)
POIKILOCYTOSIS BLD QL SMEAR: PRESENT
POLYCHROMASIA BLD QL SMEAR: PRESENT
POTASSIUM SERPL-SCNC: 4.1 MMOL/L (ref 3.5–5.3)
PROT SERPL-MCNC: 5.7 G/DL (ref 6.4–8.2)
RBC # BLD AUTO: 3.04 MILLION/UL (ref 3.81–5.12)
RBC MORPH BLD: PRESENT
SODIUM SERPL-SCNC: 146 MMOL/L (ref 136–145)
WBC # BLD AUTO: 10.11 THOUSAND/UL (ref 4.31–10.16)

## 2020-03-12 PROCEDURE — 80053 COMPREHEN METABOLIC PANEL: CPT | Performed by: OBSTETRICS & GYNECOLOGY

## 2020-03-12 PROCEDURE — 85027 COMPLETE CBC AUTOMATED: CPT | Performed by: OBSTETRICS & GYNECOLOGY

## 2020-03-12 PROCEDURE — 99238 HOSP IP/OBS DSCHRG MGMT 30/<: CPT | Performed by: OBSTETRICS & GYNECOLOGY

## 2020-03-12 PROCEDURE — 85007 BL SMEAR W/DIFF WBC COUNT: CPT | Performed by: OBSTETRICS & GYNECOLOGY

## 2020-03-12 RX ORDER — PALONOSETRON 0.05 MG/ML
0.25 INJECTION, SOLUTION INTRAVENOUS ONCE
Status: COMPLETED | OUTPATIENT
Start: 2020-03-12 | End: 2020-03-12

## 2020-03-12 RX ORDER — LEUCOVORIN CALCIUM 5 MG/1
15 TABLET ORAL EVERY 12 HOURS
Status: DISCONTINUED | OUTPATIENT
Start: 2020-03-12 | End: 2020-03-12 | Stop reason: HOSPADM

## 2020-03-12 RX ORDER — FLUCONAZOLE 200 MG/1
200 TABLET ORAL DAILY
Qty: 14 TABLET | Refills: 0 | Status: SHIPPED | OUTPATIENT
Start: 2020-03-12 | End: 2020-03-26

## 2020-03-12 RX ORDER — SODIUM CHLORIDE 9 MG/ML
20 INJECTION, SOLUTION INTRAVENOUS ONCE AS NEEDED
Status: DISCONTINUED | OUTPATIENT
Start: 2020-03-12 | End: 2020-03-12 | Stop reason: HOSPADM

## 2020-03-12 RX ADMIN — DEXAMETHASONE SODIUM PHOSPHATE 10 MG: 10 INJECTION, SOLUTION INTRAMUSCULAR; INTRAVENOUS at 11:09

## 2020-03-12 RX ADMIN — DOCUSATE SODIUM 100 MG: 100 CAPSULE, LIQUID FILLED ORAL at 09:09

## 2020-03-12 RX ADMIN — SODIUM CHLORIDE 125 ML/HR: 0.9 INJECTION, SOLUTION INTRAVENOUS at 00:56

## 2020-03-12 RX ADMIN — PALONOSETRON HYDROCHLORIDE 0.25 MG: 0.25 INJECTION, SOLUTION INTRAVENOUS at 11:09

## 2020-03-12 RX ADMIN — DACTINOMYCIN 500 MCG: 0.5 INJECTION, POWDER, LYOPHILIZED, FOR SOLUTION INTRAVENOUS at 13:14

## 2020-03-12 RX ADMIN — ESCITALOPRAM OXALATE 10 MG: 10 TABLET ORAL at 09:09

## 2020-03-12 RX ADMIN — ETOPOSIDE 234 MG: 20 INJECTION, SOLUTION, CONCENTRATE INTRAVENOUS at 11:48

## 2020-03-12 RX ADMIN — ENOXAPARIN SODIUM 40 MG: 40 INJECTION SUBCUTANEOUS at 09:09

## 2020-03-12 RX ADMIN — LORAZEPAM 1 MG: 1 TABLET ORAL at 11:09

## 2020-03-12 RX ADMIN — LISINOPRIL 10 MG: 10 TABLET ORAL at 09:09

## 2020-03-12 RX ADMIN — LEUCOVORIN CALCIUM 15 MG: 5 TABLET ORAL at 14:00

## 2020-03-12 NOTE — MALNUTRITION/BMI
This medical record reflects one or more clinical indicators suggestive of morbid obesity  BMI Findings:  BMI Classifications: Morbid Obesity 45-49 9     Body mass index is 46 26 kg/m²  See Nutrition note dated 3/12/20 for additional details  Completed nutrition assessment is viewable in the nutrition documentation

## 2020-03-12 NOTE — PROGRESS NOTES
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 here for cycle three of chemotherapy  She is undergoing curative-intent therapy with EMA-CO  HD#2    Plan:    1) Gestational trophoblastic neoplasia  - EMA-CO cycle 3, here for a toe beside, methotrexate and actinomycin D, Day #2  - last hCG less than 2 on 03/09  - Monitor for stomatitis and mucositis    2) Chronic anemia  - Hb 10 0 --> 8 4 today  - Continue to monitor     3) Hypertension  - Continue home Lisinopril     4) Anxiety: Continue home Lexapro     5) Bowel regimen: Colace bid, Miralax     6) DVT ppx  - Lovenox 40mg daily   - SCDs    7) FEN: Regular diet       Subjective:    Andres Andre  Was sleeping soundly when I visited her this morning  Nursing reports no issues overnight  /65   Pulse 64   Temp 98 8 °F (37 1 °C)   Resp 20   Ht 5' 6" (1 676 m)   Wt 130 kg (286 lb 9 6 oz)   SpO2 97%   BMI 46 26 kg/m²     I/O last 3 completed shifts: In: 1862 1 [P O :240; I V :960 4; IV Piggyback:661 7]  Out: 1200 [Urine:1200]  I/O this shift:  In: 360 [P O :360]  Out: 900 [Urine:900]  0 28cc/kg/hour     Lab Results   Component Value Date    WBC 10 11 03/12/2020    HGB 8 4 (L) 03/12/2020    HCT 27 1 (L) 03/12/2020    MCV 89 03/12/2020     03/12/2020       Lab Results   Component Value Date    CALCIUM 7 8 (L) 03/12/2020    K 4 1 03/12/2020    CO2 22 03/12/2020     (H) 03/12/2020    BUN 15 03/12/2020    CREATININE 0 84 03/12/2020       Physical Exam   Constitutional: She appears well-developed and well-nourished  No distress  HENT:   Head: Normocephalic and atraumatic  Cardiovascular:   Majority of the exam was deferred as patient was sound asleep    Pulmonary/Chest: No respiratory distress  Skin: Skin is warm and dry  No rash noted  She is not diaphoretic  No erythema  There is moises Martinez MD  3/12/2020  5:55 AM

## 2020-03-12 NOTE — DISCHARGE INSTRUCTIONS
Oral Mucositis   WHAT YOU NEED TO KNOW:   Oral mucositis is inflammation in and around your mouth  DISCHARGE INSTRUCTIONS:   Follow up with your healthcare provider as directed: You may need to return for tests if your symptoms do not improve within 2 weeks  Write down your questions so you remember to ask them during your visits  Risks: You may lose weight because you are not able to eat  Oral mucositis may come back after treatment  Recurrent oral mucositis could cause scars that make it hard for you to chew or swallow   You could develop a life-threatening infection  Manage your symptoms:  Oral mucositis usually gets better within 2 to 6 weeks  You can do the following to make your mouth feel better:  · Do not smoke  Smoking can make mouth sores worse  If you smoke, it is never too late to quit  Ask your healthcare provider for information if you need help quitting  · Eat soft, blended, moist foods  Puddings, milkshakes, broths, soups, and cooked cereals are less likely to bother your mouth  Eat food that is lukewarm or cool  · Do not eat anything that could burn, sting, or scratch your mouth  Examples are oranges, pineapples, hot peppers, potato chips, toast, and alcohol  · Take small bites, chew slowly, and sip water  while you eat  Rinse your mouth with water after meals  · Rinse with a baking soda or salt solution  to remove bacteria and food, and to prevent or treat mouth pain and sores  ¨ Mix ½ teaspoon baking soda or salt with 8 ounces of warm water  ¨ Rinse your mouth at least 4 to 6 times a day with this solution  You may rinse with plain water instead if it feels better to you  ¨ Do not use store-bought mouthwash  It contains alcohol and other chemicals that can irritate your mouth  Do not smoke:  Smoking can increase your risk for mouth sores or make them worse  If you smoke, it is never too late to quit   Ask your healthcare provider for information if you need help quitting  Medicines:   · Medicines  can help decrease pain or swelling in your mouth  You may also need medicine to prevent or treat an infection  Medicine may be given as a pill, an ointment, or a mouthwash  · Take your medicine as directed  Contact your healthcare provider if you think your medicine is not helping or if you have side effects  Tell him if you are allergic to any medicine  Keep a list of the medicines, vitamins, and herbs you take  Include the amounts, and when and why you take them  Bring the list or the pill bottles to follow-up visits  Carry your medicine list with you in case of an emergency  Prevent oral mucositis in the future:   · Gently brush your teeth, gums, and tongue  after every meal and before bed  Use a soft toothbrush and plain fluoride toothpaste  Let your toothbrush air dry after each use to prevent bacteria growth  Replace your toothbrush often  Ask if it is safe to floss your teeth  · If you wear dentures, make sure they fit well  Ask your dentist if your dentures can be refitted or if you need new dentures  Be extra careful when you put in or remove dentures  Try to prevent any injuries to your gums that could lead to sores or infection  Wear your dentures during the day only  Soak them in denture solution at night  Ask how to safely clean your gums  · Eat a variety of healthy foods  Examples are fruits, vegetables, whole-grain breads, low-fat dairy products, beans, lean meats, and fish  Heathy foods give your body the vitamins and minerals it needs, and may prevent mucositis  · Avoid any food or drug that triggers mucositis  Some fruit, nuts, shellfish, cinnamon, chewing gum, and toothpaste may trigger mucositis or other symptoms of an allergic reaction  Contact your healthcare provider if:   · You have a fever or chills  · Your symptoms do not get better within 2 weeks  · Your symptoms get worse, even after treatment      · You have questions or concerns about your condition or care  Seek care immediately or call 911:   · Your heart is beating fast     · You have trouble breathing  · You cannot eat or drink  2017 [address] Information is for End User's use only and may not be sold, redistributed or otherwise used for commercial purposes  All illustrations and images included in CareNotes® are the copyrighted property of [employer]  or Javier Florentino  The above information is an  only  It is not intended as medical advice for individual conditions or treatments  Talk to your doctor, nurse or pharmacist before following any medical regimen to see if it is safe and effective for you  Oral Candidiasis   WHAT YOU NEED TO KNOW:   What is oral candidiasis? Oral candidiasis, or thrush, is a fungal infection that affects the inside of your mouth  What causes oral candidiasis? Oral candidiasis is caused by a type of fungus called Candida  Fungi are normally found in your mouth  When there are too many fungi, it can cause an infection  Babies and the elderly are at higher risk because their immune systems are not as strong  Carroll babies may get thrush if the mother had vaginal candidiasis during delivery  The following may increase your risk of oral candidiasis:  · Medical conditions that suppress your immune system, such as diabetes, cancer, or HIV and AIDS    · Medicines, such as antibiotics, steroids, or chemotherapy    · Radiation therapy to the head and neck    · Dry mouth    · Smoking    · Dentures  What are the signs and symptoms of oral candidiasis? · White or whitish-yellow patches in the mouth that look like milk curds    · Redness or bleeding under the patches    · Sore and painful mouth, with cracking or tearing on the corners    · Bright red tongue that may feel like it is burning    · Trouble swallowing and tasting    · Swelling under dentures  How is oral candidiasis diagnosed?   Your healthcare provider will ask about your medical history  He will ask when your signs and symptoms started  He will examine the inside of your mouth and the area around your mouth  Your healthcare provider will also rub a cotton swab on one of the patches and check it under a microscope  How is oral candidiasis treated? Antifungal medicine helps kill the fungus that caused your oral candidiasis  This medicine may be a pill or a solution that you gargle  Remove dentures before you gargle  How can I help to prevent oral candidiasis? Brush your teeth, gums, and tongue after you eat and before you go to sleep  Use a toothbrush with soft bristles  See your dentist for regular exams  Remove your dentures when you sleep, or at least 6 hours each day  Clean your dentures and soak them in denture   Let them air dry after soaking  When should I seek immediate care? · You have trouble swallowing and your jaw and neck are stiff  · You are dizzy, thirsty, or have a dry mouth  · You are urinating little or not at all  · You cannot eat or drink because of the pain  When should I contact my healthcare provider? · You have a fever  · You have nausea, vomiting, or diarrhea  · Your signs and symptoms get worse, even after treatment  · You have questions or concerns about your condition or care  CARE AGREEMENT:   You have the right to help plan your care  Learn about your health condition and how it may be treated  Discuss treatment options with your caregivers to decide what care you want to receive  You always have the right to refuse treatment  The above information is an  only  It is not intended as medical advice for individual conditions or treatments  Talk to your doctor, nurse or pharmacist before following any medical regimen to see if it is safe and effective for you    © 2017 [employer] Information is for End User's use only and may not be sold, redistributed or otherwise used for commercial purposes  All illustrations and images included in CareNotes® are the copyrighted property of A D A M , Inc  or Javier Florentino

## 2020-03-12 NOTE — UTILIZATION REVIEW
Initial Clinical Review    Admission: Date/Time/Statement: Admission Orders (From admission, onward)     Ordered        03/11/20 0843  Inpatient Admission  Once                   Orders Placed This Encounter   Procedures    Inpatient Admission     Standing Status:   Standing     Number of Occurrences:   1     Order Specific Question:   Admitting Physician     Answer:   Malachi Halsted [[id number]]     Order Specific Question:   Level of Care     Answer:   Med Surg [[id number]]     Order Specific Question:   Estimated length of stay     Answer:   More than 2 Midnights     Order Specific Question:   Certification     Answer:   I certify that inpatient services are medically necessary for this patient for a duration of greater than two midnights  See H&P and MD Progress Notes for additional information about the patient's course of treatment  Assessment/Plan: 52year old female, presented to the hospital as a Direct Admission from home via car  Admitted as Inpatient due to Chemo  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 with EMA-CO, she will receive  chemotherapy on this admission  Last HCG was 7 on 7/17/2020  Previously grade 2 mucositis, which resolved now  LFT shows grade 1 transaminatis (CTCAE)         Triage Vitals   Temperature Pulse Respirations Blood Pressure SpO2   03/11/20 0832 03/11/20 0832 03/11/20 0832 03/11/20 0832 03/11/20 0832   98 1 °F (36 7 °C) 64 18 169/77 100 %      Temp Source Heart Rate Source Patient Position - Orthostatic VS BP Location FiO2 (%)   03/11/20 0836 -- 03/11/20 0836 03/11/20 0836 --   Oral  Sitting Right arm       Pain Score       03/11/20 1552       No Pain        Wt Readings from Last 1 Encounters:   03/11/20 130 kg (286 lb 9 6 oz)     Additional Vital Signs:   Date/Time  Temp  Pulse  Resp  BP  MAP (mmHg)  SpO2  O2 Device  Patient Position - Orthostatic VS   03/12/20 07:14:25  --  69  18  122/64  83  99 %  --  --   03/11/20 22:35:50  98 8 °F (37 1 °C)  64  20  119/65  83  97 %  --  --   03/11/20 19:11:45  98 5 °F (36 9 °C)  61  18  140/73  95  97 %  --  --   03/11/20 15:29:32  99 °F (37 2 °C)  60  18  150/77  101  98 %  --  --   03/11/20 1000  --  --  --  --  --  --  None (Room air)  --   03/11/20 08:36:05  98 1 °F (36 7 °C)  67  18  169/77  108  100 %  None (Room air)  Sitting         Pertinent Labs/Diagnostic Test Results:   Results from last 7 days   Lab Units 03/12/20  0452 03/09/20  0821   WBC Thousand/uL 10 11  --    WHITE BLOOD CELL COUNT  Thousand/uL  --  8 4   HEMOGLOBIN g/dL 8 4*  --    HEMOGLOBIN  g/dL  --  10 0*   HEMATOCRIT % 27 1*  --    HEMATOCRIT  %  --  32 4*   PLATELETS Thousands/uL 228  --    PLATELETS  Thousand/uL  --  387   NEUTROS ABS   cells/uL  --  5,292     Results from last 7 days   Lab Units 03/12/20  0452 03/09/20  0821   SODIUM mmol/L 146* 141   POTASSIUM mmol/L 4 1 4 3   CHLORIDE mmol/L 119* 108   CO2 mmol/L 22 25   ANION GAP mmol/L 5  --    BUN mg/dL 15 14   CREATININE mg/dL 0 84 0 99   EGFR ml/min/1 73sq m 82  --    CALCIUM mg/dL 7 8* 8 9   MAGNESIUM mg/dL  --  2 2     Results from last 7 days   Lab Units 03/12/20  0452 03/09/20  0821   AST U/L 9 13   ALT U/L 20 20   ALK PHOS U/L 65 69   TOTAL PROTEIN g/dL 5 7* 6 2   ALBUMIN g/dL 3 0* 4 0   TOTAL BILIRUBIN mg/dL 0 65 0 5     Results from last 7 days   Lab Units 03/12/20  0452 03/09/20  0821   GLUCOSE RANDOM mg/dL 145* 107     Past Medical History:   Diagnosis Date    Cancer ([facility] [address] )     GTD (gestational trophoblastic disease)     History of chemotherapy     Obesity     Shortness of breath     Wheezing      Admitting Diagnosis: GTD (gestational trophoblastic disease) [O01 9]  Age/Sex: 52 y o  female  Admission Orders:  Scheduled Medications:  Medications:  DACTINomycin (COSMEGEN) IVPB 500 mcg Intravenous Once   docusate sodium 100 mg Oral BID   enoxaparin 40 mg Subcutaneous Daily   escitalopram 10 mg Oral Daily   leucovorin 15 mg Oral Q12H   lisinopril 10 mg Oral Daily   polyethylene glycol 17 g Oral Daily   Continuous IV Infusions:  sodium chloride 125 mL/hr Intravenous Continuous   PRN Meds:  acetaminophen 650 mg Oral Q6H PRN   albuterol 2 puff Inhalation Q4H PRN   benzonatate 100 mg Oral TID PRN   LORazepam 1 mg Oral Q8H PRN   ondansetron 4 mg Oral Q6H PRN   promethazine 25 mg Oral Q6H PRN   sodium chloride 20 mL/hr Intravenous Once PRN   sodium chloride 20 mL/hr Intravenous Once PRN     Manpreet SCDs    Network Utilization Review Department  [email or web address]  ATTENTION: Please call with any questions or concerns to [phone] and carefully listen to the prompts so that you are directed to the right person  All voicemails are confidential   Katrin Gaurav all requests for admission clinical reviews, approved or denied determinations and any other requests to dedicated fax number below belonging to the campus where the patient is receiving treatment   List of dedicated fax numbers for the Facilities:  [address] [place] [address] DENIALS (Administrative/Medical Necessity) [phone]   [address] [place] [address] (Maternity/NICU/Pediatrics) [phone]   Heidi Starr [phone]   Patito Guzman [phone]   Gudelia Gilmore [phone]   Mark Hernadez [phone]   [address] [place] [address] [phone]   [facility]  [phone]   [address] [facility] [address]  [address] [facility] [address] [facility] [phone]

## 2020-03-13 NOTE — UTILIZATION REVIEW
Notification of Discharge  This is a Notification of Discharge from our facility [address]  Please be advised that this patient has been discharge from our facility  Below you will find the admission and discharge date and time including the patients disposition  PRESENTATION DATE: 3/11/2020  8:31 AM  OBS ADMISSION DATE:   IP ADMISSION DATE: 3/11/20 0831   DISCHARGE DATE: 3/12/2020  2:30 PM  DISPOSITION: Home/Self Care Home/Self Care   Admission Orders listed below:  Admission Orders (From admission, onward)     Ordered        03/11/20 0843  Inpatient Admission  Once                   Please contact the UR Department if additional information is required to close this patient's authorization/case  [address] Utilization Review Department  Main: [phone] x carefully listen to the prompts  All voicemails are confidential   [email or web address]  Send all requests for admission clinical reviews, approved or denied determinations and any other requests to dedicated fax number below belonging to the campus where the patient is receiving treatment   List of dedicated fax numbers:  [address] DENIALS (Administrative/Medical Necessity) [phone]   [address] (Maternity/NICU/Pediatrics) [phone]   [place] [phone]   [facility] [phone]   Carol Aguayo [phone]   Isaiah Hernandez [facility] [address] [facility] [phone]   [facility]  [phone]   [address] [facility] [address]  [address] [facility] [address] [facility] [phone] no

## 2020-03-17 LAB
ALBUMIN SERPL-MCNC: 4.3 G/DL (ref 3.6–5.1)
ALBUMIN/GLOB SERPL: 1.7 (CALC) (ref 1–2.5)
ALP SERPL-CCNC: 63 U/L (ref 31–125)
ALT SERPL-CCNC: 16 U/L (ref 6–29)
AST SERPL-CCNC: 12 U/L (ref 10–35)
B-HCG SERPL-ACNC: 2 MIU/ML
BASOPHILS # BLD AUTO: 56 CELLS/UL (ref 0–200)
BASOPHILS NFR BLD AUTO: 1 %
BILIRUB SERPL-MCNC: 1 MG/DL (ref 0.2–1.2)
BUN SERPL-MCNC: 25 MG/DL (ref 7–25)
BUN/CREAT SERPL: ABNORMAL (CALC) (ref 6–22)
CALCIUM SERPL-MCNC: 9.3 MG/DL (ref 8.6–10.2)
CHLORIDE SERPL-SCNC: 103 MMOL/L (ref 98–110)
CO2 SERPL-SCNC: 26 MMOL/L (ref 20–32)
CREAT SERPL-MCNC: 1.07 MG/DL (ref 0.5–1.1)
EOSINOPHIL # BLD AUTO: 56 CELLS/UL (ref 15–500)
EOSINOPHIL NFR BLD AUTO: 1 %
ERYTHROCYTE [DISTWIDTH] IN BLOOD BY AUTOMATED COUNT: 18.3 % (ref 11–15)
GLOBULIN SER CALC-MCNC: 2.5 G/DL (CALC) (ref 1.9–3.7)
GLUCOSE SERPL-MCNC: 127 MG/DL (ref 65–99)
HCT VFR BLD AUTO: 32.7 % (ref 35–45)
HGB BLD-MCNC: 10.6 G/DL (ref 11.7–15.5)
LYMPHOCYTES # BLD MANUAL: 784 CELLS/UL (ref 850–3900)
LYMPHOCYTES NFR BLD AUTO: 14 %
MAGNESIUM SERPL-MCNC: 2.2 MG/DL (ref 1.5–2.5)
MCH RBC QN AUTO: 27.9 PG (ref 27–33)
MCHC RBC AUTO-ENTMCNC: 32.4 G/DL (ref 32–36)
MCV RBC AUTO: 86.1 FL (ref 80–100)
MONOCYTES # BLD AUTO: 0 CELLS/UL (ref 200–950)
MONOCYTES NFR BLD AUTO: 0 %
NEUTROPHILS # BLD AUTO: 4704 CELLS/UL (ref 1500–7800)
NEUTROPHILS NFR BLD AUTO: 84 %
NRBC # BLD: 56 CELLS/UL
NRBC BLD-RTO: 1 /100 WBC
PLATELET # BLD AUTO: 231 THOUSAND/UL (ref 140–400)
PMV BLD REES-ECKER: 10.4 FL (ref 7.5–12.5)
POTASSIUM SERPL-SCNC: 4.1 MMOL/L (ref 3.5–5.3)
PROT SERPL-MCNC: 6.8 G/DL (ref 6.1–8.1)
RBC # BLD AUTO: 3.8 MILLION/UL (ref 3.8–5.1)
SL AMB EGFR AFRICAN AMERICAN: 71 ML/MIN/1.73M2
SL AMB EGFR NON AFRICAN AMERICAN: 61 ML/MIN/1.73M2
SODIUM SERPL-SCNC: 139 MMOL/L (ref 135–146)
WBC # BLD AUTO: 5.6 THOUSAND/UL (ref 3.8–10.8)

## 2020-03-18 ENCOUNTER — HOSPITAL ENCOUNTER (OUTPATIENT)
Dept: INFUSION CENTER | Facility: HOSPITAL | Age: 50
Discharge: HOME | End: 2020-03-18
Attending: OBSTETRICS & GYNECOLOGY
Payer: COMMERCIAL

## 2020-03-18 VITALS
HEART RATE: 109 BPM | HEIGHT: 65 IN | OXYGEN SATURATION: 98 % | SYSTOLIC BLOOD PRESSURE: 121 MMHG | WEIGHT: 284.61 LBS | BODY MASS INDEX: 47.42 KG/M2 | RESPIRATION RATE: 16 BRPM | DIASTOLIC BLOOD PRESSURE: 72 MMHG | TEMPERATURE: 97.4 F

## 2020-03-18 DIAGNOSIS — O02.89 MOLAR PREGNANCY WITH CHORIOCARCINOMA (HCC): Primary | ICD-10-CM

## 2020-03-18 DIAGNOSIS — C58 MOLAR PREGNANCY WITH CHORIOCARCINOMA (HCC): Primary | ICD-10-CM

## 2020-03-18 PROCEDURE — 96375 TX/PRO/DX INJ NEW DRUG ADDON: CPT

## 2020-03-18 PROCEDURE — 96361 HYDRATE IV INFUSION ADD-ON: CPT

## 2020-03-18 PROCEDURE — 96413 CHEMO IV INFUSION 1 HR: CPT

## 2020-03-18 PROCEDURE — 96367 TX/PROPH/DG ADDL SEQ IV INF: CPT

## 2020-03-18 PROCEDURE — 96417 CHEMO IV INFUS EACH ADDL SEQ: CPT

## 2020-03-18 RX ORDER — PALONOSETRON 0.05 MG/ML
0.25 INJECTION, SOLUTION INTRAVENOUS ONCE
Status: COMPLETED | OUTPATIENT
Start: 2020-03-18 | End: 2020-03-18

## 2020-03-18 RX ORDER — SODIUM CHLORIDE 9 MG/ML
20 INJECTION, SOLUTION INTRAVENOUS ONCE
Status: COMPLETED | OUTPATIENT
Start: 2020-03-18 | End: 2020-03-18

## 2020-03-18 RX ADMIN — VINCRISTINE SULFATE 2 MG: 1 INJECTION, SOLUTION INTRAVENOUS at 11:49

## 2020-03-18 RX ADMIN — DEXAMETHASONE SODIUM PHOSPHATE 20 MG: 10 INJECTION, SOLUTION INTRAMUSCULAR; INTRAVENOUS at 11:02

## 2020-03-18 RX ADMIN — CYCLOPHOSPHAMIDE 1404 MG: 1 INJECTION, POWDER, FOR SOLUTION INTRAVENOUS; ORAL at 12:27

## 2020-03-18 RX ADMIN — SODIUM CHLORIDE 20 ML/HR: 9 INJECTION, SOLUTION INTRAVENOUS at 10:56

## 2020-03-18 RX ADMIN — SODIUM CHLORIDE 1000 ML: 0.9 INJECTION, SOLUTION INTRAVENOUS at 13:05

## 2020-03-18 RX ADMIN — SODIUM CHLORIDE 1000 ML: 0.9 INJECTION, SOLUTION INTRAVENOUS at 10:38

## 2020-03-18 RX ADMIN — PALONOSETRON HYDROCHLORIDE 0.25 MG: 0.25 INJECTION, SOLUTION INTRAVENOUS at 10:58

## 2020-03-23 DIAGNOSIS — O01.9 GTD (GESTATIONAL TROPHOBLASTIC DISEASE): Primary | ICD-10-CM

## 2020-03-30 ENCOUNTER — TELEPHONE (OUTPATIENT)
Dept: INFUSION CENTER | Facility: HOSPITAL | Age: 50
End: 2020-03-30

## 2020-03-30 NOTE — TELEPHONE ENCOUNTER
MSW received a TC from pt stating she was feeling very anxious and was needing to talk  Pt shared that while she has returned to work, she is now feeling "petrified" to go into work for fear of tisha the corona virus  She states that she finds herself  "all consumed" with thinking about this, to the point where she is not sleeping and not able to eat  She shared how she "was literally physically sick over the weekend with fear of going to work today "  The pt called out sick today as she realized she was not able to get any sleep  MSW listened to all of the pt's concerns, acknowledged what she was verbalizing and validated her feelings  MSW proceeded to review with the pt all of the potential scenarios that could happen  Working from home, taking tome off without pay and requesting a lay off were all of the options that were discussed  The pt rents through a private landlord and MSW suggested she have a conversation with her landlord about a possible lower rental rate for this short time period  MSW also discussed the concerns with the pt taking a leave  from work and not receiving any income  While the pt shared this "worried her" she felt it was "almost better than going into work "  Pt states that she has not had the opportunity to work from home in the past and shared how nervous she was about talking to her boss  MSW offered to role play conversations with her and she was open and receptive to this idea  After several scenarios, the pt stated she felt more comfortable approaching her employer the following day  Pt will plan to contact MSW after she speaks with her employer

## 2020-03-31 ENCOUNTER — DOCUMENTATION (OUTPATIENT)
Dept: INFUSION CENTER | Facility: HOSPITAL | Age: 50
End: 2020-03-31

## 2020-03-31 NOTE — SOCIAL WORK
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

## 2020-04-02 DIAGNOSIS — I10 HYPERTENSION, UNSPECIFIED TYPE: ICD-10-CM

## 2020-04-02 DIAGNOSIS — O01.9 GTD (GESTATIONAL TROPHOBLASTIC DISEASE): ICD-10-CM

## 2020-04-02 RX ORDER — LISINOPRIL 10 MG/1
10 TABLET ORAL DAILY
Qty: 90 TABLET | Refills: 0 | Status: SHIPPED | OUTPATIENT
Start: 2020-04-02 | End: 2020-06-10

## 2020-04-02 RX ORDER — DOCUSATE SODIUM 100 MG/1
100 CAPSULE, LIQUID FILLED ORAL 2 TIMES DAILY
Qty: 180 CAPSULE | Refills: 0 | Status: SHIPPED | OUTPATIENT
Start: 2020-04-02 | End: 2020-05-06

## 2020-04-02 RX ORDER — LORAZEPAM 1 MG/1
TABLET ORAL
Qty: 60 TABLET | Refills: 0 | Status: SHIPPED | OUTPATIENT
Start: 2020-04-02 | End: 2020-05-06

## 2020-04-07 LAB
ALBUMIN SERPL-MCNC: 4.3 G/DL (ref 3.6–5.1)
ALBUMIN/GLOB SERPL: 1.8 (CALC) (ref 1–2.5)
ALP SERPL-CCNC: 72 U/L (ref 31–125)
ALT SERPL-CCNC: 19 U/L (ref 6–29)
AST SERPL-CCNC: 14 U/L (ref 10–35)
B-HCG SERPL-ACNC: <2 MIU/ML
BASOPHILS # BLD AUTO: 72 CELLS/UL (ref 0–200)
BASOPHILS NFR BLD AUTO: 1.1 %
BILIRUB SERPL-MCNC: 0.5 MG/DL (ref 0.2–1.2)
BUN SERPL-MCNC: 16 MG/DL (ref 7–25)
BUN/CREAT SERPL: NORMAL (CALC) (ref 6–22)
CALCIUM SERPL-MCNC: 9.5 MG/DL (ref 8.6–10.2)
CHLORIDE SERPL-SCNC: 107 MMOL/L (ref 98–110)
CO2 SERPL-SCNC: 25 MMOL/L (ref 20–32)
CREAT SERPL-MCNC: 0.88 MG/DL (ref 0.5–1.1)
EOSINOPHIL # BLD AUTO: 78 CELLS/UL (ref 15–500)
EOSINOPHIL NFR BLD AUTO: 1.2 %
ERYTHROCYTE [DISTWIDTH] IN BLOOD BY AUTOMATED COUNT: 18.1 % (ref 11–15)
GLOBULIN SER CALC-MCNC: 2.4 G/DL (CALC) (ref 1.9–3.7)
GLUCOSE SERPL-MCNC: 127 MG/DL (ref 65–139)
HCT VFR BLD AUTO: 35 % (ref 35–45)
HGB BLD-MCNC: 11.1 G/DL (ref 11.7–15.5)
LYMPHOCYTES # BLD AUTO: 1079 CELLS/UL (ref 850–3900)
LYMPHOCYTES NFR BLD AUTO: 16.6 %
MAGNESIUM SERPL-MCNC: 2 MG/DL (ref 1.5–2.5)
MCH RBC QN AUTO: 28.1 PG (ref 27–33)
MCHC RBC AUTO-ENTMCNC: 31.7 G/DL (ref 32–36)
MCV RBC AUTO: 88.6 FL (ref 80–100)
MONOCYTES # BLD AUTO: 540 CELLS/UL (ref 200–950)
MONOCYTES NFR BLD AUTO: 8.3 %
NEUTROPHILS # BLD AUTO: 4732 CELLS/UL (ref 1500–7800)
NEUTROPHILS NFR BLD AUTO: 72.8 %
PLATELET # BLD AUTO: 376 THOUSAND/UL (ref 140–400)
PMV BLD REES-ECKER: 9.6 FL (ref 7.5–12.5)
POTASSIUM SERPL-SCNC: 4.3 MMOL/L (ref 3.5–5.3)
PROT SERPL-MCNC: 6.7 G/DL (ref 6.1–8.1)
RBC # BLD AUTO: 3.95 MILLION/UL (ref 3.8–5.1)
SL AMB EGFR AFRICAN AMERICAN: 89 ML/MIN/1.73M2
SL AMB EGFR NON AFRICAN AMERICAN: 77 ML/MIN/1.73M2
SODIUM SERPL-SCNC: 141 MMOL/L (ref 135–146)
WBC # BLD AUTO: 6.5 THOUSAND/UL (ref 3.8–10.8)

## 2020-04-14 ENCOUNTER — TELEPHONE (OUTPATIENT)
Dept: INFUSION CENTER | Facility: HOSPITAL | Age: 50
End: 2020-04-14

## 2020-04-21 ENCOUNTER — TELEPHONE (OUTPATIENT)
Dept: GYNECOLOGIC ONCOLOGY | Facility: CLINIC | Age: 50
End: 2020-04-21

## 2020-04-21 DIAGNOSIS — O01.9 GTD (GESTATIONAL TROPHOBLASTIC DISEASE): Primary | ICD-10-CM

## 2020-04-22 ENCOUNTER — TELEPHONE (OUTPATIENT)
Dept: INFUSION CENTER | Facility: HOSPITAL | Age: 50
End: 2020-04-22

## 2020-04-22 ENCOUNTER — TELEPHONE (OUTPATIENT)
Dept: GYNECOLOGIC ONCOLOGY | Facility: CLINIC | Age: 50
End: 2020-04-22

## 2020-04-24 ENCOUNTER — TELEPHONE (OUTPATIENT)
Dept: GYNECOLOGIC ONCOLOGY | Facility: CLINIC | Age: 50
End: 2020-04-24

## 2020-04-25 ENCOUNTER — HOSPITAL ENCOUNTER (OUTPATIENT)
Dept: CT IMAGING | Facility: HOSPITAL | Age: 50
Discharge: HOME | End: 2020-04-25
Payer: COMMERCIAL

## 2020-04-25 DIAGNOSIS — O01.9 GTD (GESTATIONAL TROPHOBLASTIC DISEASE): ICD-10-CM

## 2020-04-25 PROCEDURE — 74177 CT ABD & PELVIS W/CONTRAST: CPT

## 2020-04-25 PROCEDURE — 71260 CT THORAX DX C+: CPT

## 2020-04-25 RX ADMIN — IOHEXOL 100 ML: 350 INJECTION, SOLUTION INTRAVENOUS at 10:15

## 2020-04-25 RX ADMIN — IOHEXOL 50 ML: 240 INJECTION, SOLUTION INTRATHECAL; INTRAVASCULAR; INTRAVENOUS; ORAL at 08:30

## 2020-04-29 ENCOUNTER — OFFICE VISIT (OUTPATIENT)
Dept: GYNECOLOGIC ONCOLOGY | Facility: HOSPITAL | Age: 50
End: 2020-04-29

## 2020-04-29 VITALS
HEART RATE: 87 BPM | DIASTOLIC BLOOD PRESSURE: 80 MMHG | HEIGHT: 65 IN | TEMPERATURE: 99 F | SYSTOLIC BLOOD PRESSURE: 158 MMHG | WEIGHT: 286.6 LBS | BODY MASS INDEX: 47.75 KG/M2

## 2020-04-29 DIAGNOSIS — O02.89 MOLAR PREGNANCY WITH CHORIOCARCINOMA (HCC): Primary | ICD-10-CM

## 2020-04-29 DIAGNOSIS — C58 MOLAR PREGNANCY WITH CHORIOCARCINOMA (HCC): Primary | ICD-10-CM

## 2020-04-29 PROCEDURE — 99024 POSTOP FOLLOW-UP VISIT: CPT | Performed by: OBSTETRICS & GYNECOLOGY

## 2020-04-30 ENCOUNTER — TELEPHONE (OUTPATIENT)
Dept: RADIOLOGY | Facility: HOSPITAL | Age: 50
End: 2020-04-30

## 2020-04-30 RX ORDER — SODIUM CHLORIDE 9 MG/ML
75 INJECTION, SOLUTION INTRAVENOUS CONTINUOUS
Status: CANCELLED | OUTPATIENT
Start: 2020-04-30

## 2020-05-05 ENCOUNTER — TELEPHONE (OUTPATIENT)
Dept: SURGERY | Facility: HOSPITAL | Age: 50
End: 2020-05-05

## 2020-05-06 ENCOUNTER — HOSPITAL ENCOUNTER (OUTPATIENT)
Dept: RADIOLOGY | Facility: HOSPITAL | Age: 50
Discharge: HOME | End: 2020-05-06
Admitting: RADIOLOGY
Payer: COMMERCIAL

## 2020-05-06 VITALS
WEIGHT: 286 LBS | OXYGEN SATURATION: 96 % | RESPIRATION RATE: 20 BRPM | TEMPERATURE: 98.5 F | HEART RATE: 69 BPM | HEIGHT: 65 IN | DIASTOLIC BLOOD PRESSURE: 53 MMHG | BODY MASS INDEX: 47.65 KG/M2 | SYSTOLIC BLOOD PRESSURE: 103 MMHG

## 2020-05-06 DIAGNOSIS — O01.9 GTD (GESTATIONAL TROPHOBLASTIC DISEASE): ICD-10-CM

## 2020-05-06 PROCEDURE — 99152 MOD SED SAME PHYS/QHP 5/>YRS: CPT

## 2020-05-06 PROCEDURE — 36590 REMOVAL TUNNELED CV CATH: CPT

## 2020-05-06 PROCEDURE — 99153 MOD SED SAME PHYS/QHP EA: CPT

## 2020-05-06 RX ORDER — SODIUM CHLORIDE 9 MG/ML
75 INJECTION, SOLUTION INTRAVENOUS CONTINUOUS
Status: DISCONTINUED | OUTPATIENT
Start: 2020-05-06 | End: 2020-05-07 | Stop reason: HOSPADM

## 2020-05-06 RX ORDER — MIDAZOLAM HYDROCHLORIDE 2 MG/2ML
INJECTION, SOLUTION INTRAMUSCULAR; INTRAVENOUS CODE/TRAUMA/SEDATION MEDICATION
Status: COMPLETED | OUTPATIENT
Start: 2020-05-06 | End: 2020-05-06

## 2020-05-06 RX ORDER — LIDOCAINE HYDROCHLORIDE 10 MG/ML
0.5 INJECTION, SOLUTION EPIDURAL; INFILTRATION; INTRACAUDAL; PERINEURAL ONCE AS NEEDED
Status: DISCONTINUED | OUTPATIENT
Start: 2020-05-06 | End: 2020-05-07 | Stop reason: HOSPADM

## 2020-05-06 RX ORDER — FENTANYL CITRATE 50 UG/ML
INJECTION, SOLUTION INTRAMUSCULAR; INTRAVENOUS CODE/TRAUMA/SEDATION MEDICATION
Status: COMPLETED | OUTPATIENT
Start: 2020-05-06 | End: 2020-05-06

## 2020-05-06 RX ADMIN — FENTANYL CITRATE 25 MCG: 50 INJECTION, SOLUTION INTRAMUSCULAR; INTRAVENOUS at 09:04

## 2020-05-06 RX ADMIN — FENTANYL CITRATE 50 MCG: 50 INJECTION, SOLUTION INTRAMUSCULAR; INTRAVENOUS at 08:57

## 2020-05-06 RX ADMIN — MIDAZOLAM 1 MG: 1 INJECTION INTRAMUSCULAR; INTRAVENOUS at 08:57

## 2020-05-06 RX ADMIN — SODIUM CHLORIDE 75 ML/HR: 0.9 INJECTION, SOLUTION INTRAVENOUS at 07:08

## 2020-05-06 RX ADMIN — FENTANYL CITRATE 25 MCG: 50 INJECTION, SOLUTION INTRAMUSCULAR; INTRAVENOUS at 09:06

## 2020-06-05 DIAGNOSIS — I10 HYPERTENSION, UNSPECIFIED TYPE: ICD-10-CM

## 2020-06-10 RX ORDER — LISINOPRIL 10 MG/1
TABLET ORAL
Qty: 90 TABLET | Refills: 0 | Status: SHIPPED | OUTPATIENT
Start: 2020-06-10 | End: 2020-09-11

## 2020-06-12 ENCOUNTER — TELEPHONE (OUTPATIENT)
Dept: INFUSION CENTER | Facility: HOSPITAL | Age: 50
End: 2020-06-12

## 2020-06-24 LAB — B-HCG SERPL-ACNC: <3 MIU/ML

## 2020-07-07 ENCOUNTER — TELEPHONE (OUTPATIENT)
Dept: INFUSION CENTER | Facility: HOSPITAL | Age: 50
End: 2020-07-07

## 2020-07-07 NOTE — TELEPHONE ENCOUNTER
MSW received a call from pt this day as she was pleased to share that she had been approved for 100% of the financial hardship through the hospital   The pt was pleased and expressed how much this relief this brought to her  She had been struggling financially and not having the additional burden of her hospital bills will help bring her much relief  MSW listened to the pt and offered support as the pt shared many other wonderful things that have been going well for her in the past few weeks  She is feeling encouraged with the growth of her hair, her improved physical health which has affected her over emotional well being and she has met up with a few friends  The pt appears to be doing well at this time

## 2020-07-09 ENCOUNTER — TELEPHONE (OUTPATIENT)
Dept: INFUSION CENTER | Facility: HOSPITAL | Age: 50
End: 2020-07-09

## 2020-07-09 NOTE — TELEPHONE ENCOUNTER
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 and was focused on her treatment, getting well for her son and maintaining her job that she allowed no time for processing her on feelings  When this was brought to her attention, she stated she was able to recognize this and felt validated  MSW discussed the fight or flight theory with her and she verbalized understanding  MSW offered resources of the ept to receive counseling in her area but she did not feel that was necessary at this time  She asked for continued contact and MSW assured her that was fine  If she needed more than what this MSW could offer, she is aware of the additional resources

## 2020-07-28 LAB — B-HCG SERPL-ACNC: <3 MIU/ML

## 2020-07-29 ENCOUNTER — OFFICE VISIT (OUTPATIENT)
Dept: GYNECOLOGIC ONCOLOGY | Facility: HOSPITAL | Age: 50
End: 2020-07-29
Payer: COMMERCIAL

## 2020-07-29 VITALS
OXYGEN SATURATION: 99 % | SYSTOLIC BLOOD PRESSURE: 128 MMHG | HEART RATE: 68 BPM | TEMPERATURE: 98.6 F | DIASTOLIC BLOOD PRESSURE: 74 MMHG | HEIGHT: 65 IN | BODY MASS INDEX: 47.48 KG/M2 | WEIGHT: 285 LBS

## 2020-07-29 DIAGNOSIS — N95.1 MENOPAUSAL SYMPTOMS: ICD-10-CM

## 2020-07-29 DIAGNOSIS — C58 MOLAR PREGNANCY WITH CHORIOCARCINOMA (HCC): Primary | ICD-10-CM

## 2020-07-29 DIAGNOSIS — O02.89 MOLAR PREGNANCY WITH CHORIOCARCINOMA (HCC): Primary | ICD-10-CM

## 2020-07-29 PROCEDURE — 99214 OFFICE O/P EST MOD 30 MIN: CPT | Performed by: OBSTETRICS & GYNECOLOGY

## 2020-07-29 RX ORDER — ESTRADIOL 0.04 MG/D
1 PATCH TRANSDERMAL WEEKLY
Qty: 4 PATCH | Refills: 3 | Status: SHIPPED | OUTPATIENT
Start: 2020-07-29 | End: 2020-08-10 | Stop reason: ALTCHOICE

## 2020-07-29 NOTE — ASSESSMENT & PLAN NOTE
I discussed the risks and benefits of starting estrogen replacement therapy  She understands the risks and benefits and agrees to proceed  Estradiol patch 0 0375 mg per week was prescribed

## 2020-07-29 NOTE — PROGRESS NOTES
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 last treated with chemotherapy in March of 2020  Beta HCG is less than 3  She is clinically without evidence of disease recurrence  Performance status is 0   1  Continue monthly beta HCG levels until March of 2021   2  Return in 6 months for surveillance  Relevant Orders    hCG, quantitative    Menopausal symptoms     I discussed the risks and benefits of starting estrogen replacement therapy  She understands the risks and benefits and agrees to proceed  Estradiol patch 0 0375 mg per week was prescribed           Relevant Medications    estradiol (CLIMARA) 0 0375 MG/24HR            CHIEF COMPLAINT:  Hot flashes        Previous therapy:     Molar pregnancy with choriocarcinoma ([facility] [address] )    12/20/2019 Initial Diagnosis     GTD (gestational trophoblastic disease)      12/21/2019 -  Chemotherapy     leucovorin (WELLCOVORIN) tablet 15 mg, 15 mg, Oral, Every 12 hours, 5 of 6 cycles  Administration: 15 mg (12/22/2019), 15 mg (12/23/2019), 15 mg (1/8/2020), 15 mg (1/22/2020), 15 mg (2/19/2020), 15 mg (3/12/2020)  palonosetron (ALOXI) injection 0 25 mg, 0 25 mg, Intravenous, Once, 5 of 6 cycles  Administration: 0 25 mg (12/21/2019), 0 25 mg (12/22/2019), 0 25 mg (12/31/2019), 0 25 mg (1/7/2020), 0 25 mg (1/14/2020), 0 25 mg (1/8/2020), 0 25 mg (1/28/2020), 0 25 mg (2/25/2020), 0 25 mg (3/18/2020), 0 25 mg (1/21/2020), 0 25 mg (1/22/2020), 0 25 mg (2/18/2020), 0 25 mg (2/19/2020), 0 25 mg (3/11/2020), 0 25 mg (3/12/2020)  methotrexate injection 242 5 mg, 100 mg/m2 = 242 5 mg, Intravenous, Once, 5 of 6 cycles  Administration: 242 5 mg (12/21/2019), 242 5 mg (1/7/2020), 237 5 mg (1/21/2020), 237 mg (2/18/2020), 235 mg (3/11/2020)  Filgrastim-sndz SOSY 480 mcg, 480 mcg (100 % of original dose 480 mcg), Injection, Once, 1 of 1 cycle  Dose modification: 480 mcg (original dose 480 mcg, Cycle 2, Reason: Other (See Comments), Comment: Alternative treatment)  Administration: 480 mcg (1/16/2020), 480 mcg (1/17/2020)  DACTINomycin (COSMEGEN) 500 mcg in sodium chloride 0 9 % 50 mL IVPB, 500 mcg, Intravenous, Once, 5 of 6 cycles  Administration: 500 mcg (12/21/2019), 500 mcg (12/22/2019), 500 mcg (1/7/2020), 500 mcg (1/8/2020), 500 mcg (1/21/2020), 500 mcg (1/22/2020), 500 mcg (2/18/2020), 500 mcg (2/19/2020), 500 mcg (3/11/2020), 500 mcg (3/12/2020)  vinCRIStine (ONCOVIN) 2 mg in sodium chloride 0 9 % 50 mL chemo infusion, 2 mg (original dose 1 mg/m2), Intravenous, Once, 5 of 6 cycles  Dose modification: 2 mg (original dose 1 mg/m2, Cycle 1, Reason: Max Dose Reached)  Administration: 2 mg (12/31/2019), 2 mg (1/14/2020), 2 mg (1/28/2020), 2 mg (2/25/2020), 2 mg (3/18/2020)  cyclophosphamide (CYTOXAN) 1,500 mg in sodium chloride 0 9 % 250 mL IVPB, 1,458 mg, Intravenous, Once, 5 of 6 cycles  Administration: 1,500 mg (12/31/2019), 1,500 mg (1/14/2020), 1,500 mg (1/28/2020), 1,422 mg (2/25/2020), 1,404 mg (3/18/2020)  methotrexate (PF) 485 mg in sodium chloride 0 9 % 1,000 mL continuous infusion, 200 mg/m2 = 485 mg, Intravenous, Once, 5 of 6 cycles  Administration: 485 mg (12/21/2019), 485 mg (1/7/2020), 475 mg (1/21/2020), 475 mg (2/18/2020), 467 5 mg (3/11/2020)  etoposide (TOPOSAR) 243 mg in sodium chloride 0 9 % 600 mL chemo infusion, 100 mg/m2 = 243 mg, Intravenous, Once, 5 of 6 cycles  Administration: 243 mg (12/21/2019), 243 mg (12/22/2019), 243 mg (1/7/2020), 243 mg (1/8/2020), 237 mg (1/21/2020), 237 mg (1/22/2020), 237 mg (2/18/2020), 237 mg (2/19/2020), 234 mg (3/11/2020), 234 mg (3/12/2020)      2/4/2020 Surgery     Robotic assisted total laparoscopic hysterectomy, bilateral salpingo-oophorectomy, mini-laparotomy for specimen removal, cystoscopy  1   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  Tumor measured 8 5 cm in diameter  Patient ID: Skyler Swenson is a 52 y o  female  49-year-old returns for follow-up of high risk gestational trophoblastic disease  Serum beta HCG on 7/27/2020 was less than 3  She has noticed hot flashes after losing approximately 35 lb  She has lost weight intentionally  The hot flashes have been keeping her up at night  She has no abdominal pain or vaginal bleeding  She notes an area of numbness on her right lateral thigh after exercise  No other interval change in her medications or medical history since her last visit  Her quality of life is good  The following portions of the patient's history were reviewed and updated as appropriate: allergies, current medications, past family history, past medical history, past social history, past surgical history and problem list     Review of Systems   Constitutional: Negative for activity change and unexpected weight change  HENT: Negative  Eyes: Negative  Respiratory: Negative  Cardiovascular: Negative  Gastrointestinal: Negative for abdominal distention and abdominal pain  Endocrine: Negative  Hot flashes   Genitourinary: Negative for pelvic pain and vaginal bleeding  Musculoskeletal: Negative  Skin: Negative  Allergic/Immunologic: Negative  Neurological: Positive for numbness  Hematological: Negative  Psychiatric/Behavioral: Negative  Current Outpatient Medications   Medication Sig Dispense Refill    escitalopram (LEXAPRO) 10 mg tablet TAKE 1 TABLET BY MOUTH EVERY DAY 90 tablet 2    estradiol (CLIMARA) 0 0375 MG/24HR Place 1 patch on the skin once a week 4 patch 3    lisinopril (ZESTRIL) 10 mg tablet TAKE 1 TABLET BY MOUTH EVERY DAY 90 tablet 0    Probiotic Product (ACIDOPHILUS PROBIOTIC BLEND) CAPS Take 2 capsules by mouth daily       No current facility-administered medications for this visit              Objective:    Blood pressure 128/74, pulse 68, temperature 98 6 °F (37 °C), temperature source Oral, height 5' 5" (1 651 m), weight 129 kg (285 lb), SpO2 99 %, not currently breastfeeding  Body mass index is 47 43 kg/m²  Body surface area is 2 3 meters squared  Physical Exam   Constitutional: She is oriented to person, place, and time  She appears well-developed and well-nourished  No distress  HENT:   Head: Normocephalic and atraumatic  Right Ear: External ear normal    Left Ear: External ear normal    Eyes: Conjunctivae and EOM are normal  Right eye exhibits no discharge  Left eye exhibits no discharge  No scleral icterus  Neck: Normal range of motion  Neck supple  No JVD present  No tracheal deviation present  No thyromegaly present  Pulmonary/Chest: Effort normal  No stridor  No respiratory distress  She has no wheezes  Abdominal: Soft  She exhibits no distension and no mass  There is no tenderness  There is no rebound and no guarding  No hernia  Genitourinary:   Genitourinary Comments: The external female genitalia is normal  The bartholin's, uretheral and skenes glands are normal  The urethral meatus is normal (midline with no lesions)  Anus without fissure or lesion  Speculum exam reveals a grossly normal vagina  No masses, lesions,discharge or bleeding  No significant cystocele or rectocele noted  Bimanual exam notes a surgical absent cervix, uterus and adnexal structures  No masses or fullness  Bladder is without fullness, mass or tenderness  Musculoskeletal: She exhibits no edema, tenderness or deformity  Lymphadenopathy:     She has no cervical adenopathy  Neurological: She is alert and oriented to person, place, and time  No cranial nerve deficit  Coordination normal    Skin: Skin is warm and dry  No rash noted  She is not diaphoretic  No erythema  No pallor  Psychiatric: She has a normal mood and affect   Her behavior is normal  Judgment and thought content normal

## 2020-07-29 NOTE — LETTER
July 29, 2020     Gary Zelaya  [address]  [address]    Patient: Jaime Ortiz   YOB: 1970   Date of Visit: 7/29/2020       Dear Dr  WELLSTAR [facility]: Thank you for referring Jaime Ortiz to me for evaluation  Below are my notes for this consultation  If you have questions, please do not hesitate to call me  I look forward to following your patient along with you  Sincerely,        Edgar Claudio MD        CC: No Recipients  Edgar Claudio MD  7/29/2020  5:33 PM  Sign at close encounter  Assessment/Plan:    Problem List Items Addressed This Visit        Other    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     40-year-old with history of 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 last treated with chemotherapy in March of 2020  Beta HCG is less than 3  She is clinically without evidence of disease recurrence  Performance status is 0   1  Continue monthly beta HCG levels until March of 2021   2  Return in 6 months for surveillance  Relevant Orders    hCG, quantitative    Menopausal symptoms     I discussed the risks and benefits of starting estrogen replacement therapy  She understands the risks and benefits and agrees to proceed  Estradiol patch 0 0375 mg per week was prescribed           Relevant Medications    estradiol (CLIMARA) 0 0375 MG/24HR            CHIEF COMPLAINT:  Hot flashes        Previous therapy:     Molar pregnancy with choriocarcinoma ([facility] [address] )    12/20/2019 Initial Diagnosis     GTD (gestational trophoblastic disease)      12/21/2019 -  Chemotherapy     leucovorin (WELLCOVORIN) tablet 15 mg, 15 mg, Oral, Every 12 hours, 5 of 6 cycles  Administration: 15 mg (12/22/2019), 15 mg (12/23/2019), 15 mg (1/8/2020), 15 mg (1/22/2020), 15 mg (2/19/2020), 15 mg (3/12/2020)  palonosetron (ALOXI) injection 0 25 mg, 0 25 mg, Intravenous, Once, 5 of 6 cycles  Administration: 0 25 mg (12/21/2019), 0 25 mg (12/22/2019), 0 25 mg (12/31/2019), 0 25 mg (1/7/2020), 0 25 mg (1/14/2020), 0 25 mg (1/8/2020), 0 25 mg (1/28/2020), 0 25 mg (2/25/2020), 0 25 mg (3/18/2020), 0 25 mg (1/21/2020), 0 25 mg (1/22/2020), 0 25 mg (2/18/2020), 0 25 mg (2/19/2020), 0 25 mg (3/11/2020), 0 25 mg (3/12/2020)  methotrexate injection 242 5 mg, 100 mg/m2 = 242 5 mg, Intravenous, Once, 5 of 6 cycles  Administration: 242 5 mg (12/21/2019), 242 5 mg (1/7/2020), 237 5 mg (1/21/2020), 237 mg (2/18/2020), 235 mg (3/11/2020)  Filgrastim-sndz SOSY 480 mcg, 480 mcg (100 % of original dose 480 mcg), Injection, Once, 1 of 1 cycle  Dose modification: 480 mcg (original dose 480 mcg, Cycle 2, Reason: Other (See Comments), Comment: Alternative treatment)  Administration: 480 mcg (1/16/2020), 480 mcg (1/17/2020)  DACTINomycin (COSMEGEN) 500 mcg in sodium chloride 0 9 % 50 mL IVPB, 500 mcg, Intravenous, Once, 5 of 6 cycles  Administration: 500 mcg (12/21/2019), 500 mcg (12/22/2019), 500 mcg (1/7/2020), 500 mcg (1/8/2020), 500 mcg (1/21/2020), 500 mcg (1/22/2020), 500 mcg (2/18/2020), 500 mcg (2/19/2020), 500 mcg (3/11/2020), 500 mcg (3/12/2020)  vinCRIStine (ONCOVIN) 2 mg in sodium chloride 0 9 % 50 mL chemo infusion, 2 mg (original dose 1 mg/m2), Intravenous, Once, 5 of 6 cycles  Dose modification: 2 mg (original dose 1 mg/m2, Cycle 1, Reason: Max Dose Reached)  Administration: 2 mg (12/31/2019), 2 mg (1/14/2020), 2 mg (1/28/2020), 2 mg (2/25/2020), 2 mg (3/18/2020)  cyclophosphamide (CYTOXAN) 1,500 mg in sodium chloride 0 9 % 250 mL IVPB, 1,458 mg, Intravenous, Once, 5 of 6 cycles  Administration: 1,500 mg (12/31/2019), 1,500 mg (1/14/2020), 1,500 mg (1/28/2020), 1,422 mg (2/25/2020), 1,404 mg (3/18/2020)  methotrexate (PF) 485 mg in sodium chloride 0 9 % 1,000 mL continuous infusion, 200 mg/m2 = 485 mg, Intravenous, Once, 5 of 6 cycles  Administration: 485 mg (12/21/2019), 485 mg (1/7/2020), 475 mg (1/21/2020), 475 mg (2/18/2020), 467 5 mg (3/11/2020)  etoposide (TOPOSAR) 243 mg in sodium chloride 0 9 % 600 mL chemo infusion, 100 mg/m2 = 243 mg, Intravenous, Once, 5 of 6 cycles  Administration: 243 mg (12/21/2019), 243 mg (12/22/2019), 243 mg (1/7/2020), 243 mg (1/8/2020), 237 mg (1/21/2020), 237 mg (1/22/2020), 237 mg (2/18/2020), 237 mg (2/19/2020), 234 mg (3/11/2020), 234 mg (3/12/2020)      2/4/2020 Surgery     Robotic assisted total laparoscopic hysterectomy, bilateral salpingo-oophorectomy, mini-laparotomy for specimen removal, cystoscopy  1  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  Tumor measured 8 5 cm in diameter  Patient ID: Juan Vaughn is a 52 y o  female  31-year-old returns for follow-up of high risk gestational trophoblastic disease  Serum beta HCG on 7/27/2020 was less than 3  She has noticed hot flashes after losing approximately 35 lb  She has lost weight intentionally  The hot flashes have been keeping her up at night  She has no abdominal pain or vaginal bleeding  She notes an area of numbness on her right lateral thigh after exercise  No other interval change in her medications or medical history since her last visit  Her quality of life is good  The following portions of the patient's history were reviewed and updated as appropriate: allergies, current medications, past family history, past medical history, past social history, past surgical history and problem list     Review of Systems   Constitutional: Negative for activity change and unexpected weight change  HENT: Negative  Eyes: Negative  Respiratory: Negative  Cardiovascular: Negative  Gastrointestinal: Negative for abdominal distention and abdominal pain  Endocrine: Negative  Hot flashes   Genitourinary: Negative for pelvic pain and vaginal bleeding  Musculoskeletal: Negative  Skin: Negative  Allergic/Immunologic: Negative  Neurological: Positive for numbness  Hematological: Negative      Psychiatric/Behavioral: Negative  Current Outpatient Medications   Medication Sig Dispense Refill    escitalopram (LEXAPRO) 10 mg tablet TAKE 1 TABLET BY MOUTH EVERY DAY 90 tablet 2    estradiol (CLIMARA) 0 0375 MG/24HR Place 1 patch on the skin once a week 4 patch 3    lisinopril (ZESTRIL) 10 mg tablet TAKE 1 TABLET BY MOUTH EVERY DAY 90 tablet 0    Probiotic Product (ACIDOPHILUS PROBIOTIC BLEND) CAPS Take 2 capsules by mouth daily       No current facility-administered medications for this visit  Objective:    Blood pressure 128/74, pulse 68, temperature 98 6 °F (37 °C), temperature source Oral, height 5' 5" (1 651 m), weight 129 kg (285 lb), SpO2 99 %, not currently breastfeeding  Body mass index is 47 43 kg/m²  Body surface area is 2 3 meters squared  Physical Exam   Constitutional: She is oriented to person, place, and time  She appears well-developed and well-nourished  No distress  HENT:   Head: Normocephalic and atraumatic  Right Ear: External ear normal    Left Ear: External ear normal    Eyes: Conjunctivae and EOM are normal  Right eye exhibits no discharge  Left eye exhibits no discharge  No scleral icterus  Neck: Normal range of motion  Neck supple  No JVD present  No tracheal deviation present  No thyromegaly present  Pulmonary/Chest: Effort normal  No stridor  No respiratory distress  She has no wheezes  Abdominal: Soft  She exhibits no distension and no mass  There is no tenderness  There is no rebound and no guarding  No hernia  Genitourinary:   Genitourinary Comments: The external female genitalia is normal  The bartholin's, uretheral and skenes glands are normal  The urethral meatus is normal (midline with no lesions)  Anus without fissure or lesion  Speculum exam reveals a grossly normal vagina  No masses, lesions,discharge or bleeding  No significant cystocele or rectocele noted  Bimanual exam notes a surgical absent cervix, uterus and adnexal structures  No masses or fullness  Bladder is without fullness, mass or tenderness  Musculoskeletal: She exhibits no edema, tenderness or deformity  Lymphadenopathy:     She has no cervical adenopathy  Neurological: She is alert and oriented to person, place, and time  No cranial nerve deficit  Coordination normal    Skin: Skin is warm and dry  No rash noted  She is not diaphoretic  No erythema  No pallor  Psychiatric: She has a normal mood and affect   Her behavior is normal  Judgment and thought content normal

## 2020-07-29 NOTE — ASSESSMENT & PLAN NOTE
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

## 2020-08-10 ENCOUNTER — TELEPHONE (OUTPATIENT)
Dept: GYNECOLOGIC ONCOLOGY | Facility: CLINIC | Age: 50
End: 2020-08-10

## 2020-08-10 DIAGNOSIS — N95.1 MENOPAUSAL SYMPTOMS: Primary | ICD-10-CM

## 2020-08-10 RX ORDER — ESTRADIOL 0.05 MG/D
1 PATCH TRANSDERMAL WEEKLY
Qty: 2 PATCH | Refills: 0 | Status: SHIPPED | OUTPATIENT
Start: 2020-08-10 | End: 2020-09-02 | Stop reason: SDUPTHER

## 2020-08-10 NOTE — TELEPHONE ENCOUNTER
Patient called to report that estradiol patch 0 0375 dose is not effective  Rx sent for 0 05 dose  If no improvement in control of menopausal symptoms, will attempt authorization of oral HRT

## 2020-09-02 ENCOUNTER — TELEPHONE (OUTPATIENT)
Dept: GYNECOLOGIC ONCOLOGY | Facility: CLINIC | Age: 50
End: 2020-09-02

## 2020-09-02 DIAGNOSIS — N95.1 MENOPAUSAL SYMPTOMS: ICD-10-CM

## 2020-09-02 RX ORDER — ESTRADIOL 0.5 MG/1
0.5 TABLET ORAL DAILY
Qty: 30 TABLET | Refills: 1 | Status: SHIPPED | OUTPATIENT
Start: 2020-09-02 | End: 2020-10-06 | Stop reason: SDUPTHER

## 2020-09-02 RX ORDER — ESTRADIOL 0.05 MG/D
1 PATCH TRANSDERMAL WEEKLY
Qty: 4 PATCH | Refills: 0 | Status: SHIPPED | OUTPATIENT
Start: 2020-09-02 | End: 2020-09-02

## 2020-09-02 NOTE — TELEPHONE ENCOUNTER
Patient called to report that the 0 5mg/week patch has been effective, but is only controlling her menopausal symptoms in the front half of the week  After 3-4 days, the hot flashes return for the remainder of the week until a new patch is applied  We again discussed the use of oral estradiol, which patient would like to try  Will d/c patch and start low dose pill  Patient aware this appears to not be covered under her insurance plan and may be denied or require prior authorization

## 2020-09-11 DIAGNOSIS — I10 HYPERTENSION, UNSPECIFIED TYPE: ICD-10-CM

## 2020-09-11 RX ORDER — LISINOPRIL 10 MG/1
TABLET ORAL
Qty: 90 TABLET | Refills: 0 | Status: SHIPPED | OUTPATIENT
Start: 2020-09-11 | End: 2020-12-18

## 2020-09-29 LAB — B-HCG SERPL-ACNC: <3 MIU/ML

## 2020-10-06 DIAGNOSIS — N95.1 MENOPAUSAL SYMPTOMS: ICD-10-CM

## 2020-10-06 RX ORDER — ESTRADIOL 0.5 MG/1
0.5 TABLET ORAL DAILY
Qty: 90 TABLET | Refills: 1 | Status: SHIPPED | OUTPATIENT
Start: 2020-10-06 | End: 2021-03-24

## 2020-10-31 LAB — B-HCG SERPL-ACNC: <3 MIU/ML

## 2020-12-11 DIAGNOSIS — F41.9 ANXIETY: ICD-10-CM

## 2020-12-11 RX ORDER — ESCITALOPRAM OXALATE 10 MG/1
10 TABLET ORAL DAILY
Qty: 90 TABLET | Refills: 2 | Status: SHIPPED | OUTPATIENT
Start: 2020-12-11 | End: 2021-09-29

## 2020-12-18 DIAGNOSIS — I10 HYPERTENSION, UNSPECIFIED TYPE: ICD-10-CM

## 2020-12-18 RX ORDER — LISINOPRIL 10 MG/1
TABLET ORAL
Qty: 90 TABLET | Refills: 0 | Status: SHIPPED | OUTPATIENT
Start: 2020-12-18 | End: 2021-03-25

## 2021-01-01 LAB — B-HCG SERPL-ACNC: <3 MIU/ML

## 2021-01-29 ENCOUNTER — TELEPHONE (OUTPATIENT)
Dept: GYNECOLOGIC ONCOLOGY | Facility: CLINIC | Age: 51
End: 2021-01-29

## 2021-01-29 NOTE — TELEPHONE ENCOUNTER
Tried to reach out to patient to get new insurance information prior to her visit with Dr Lissett Easton on Wednesday 2/3/21  Phone kept ringing and ringing   Will try again at another time

## 2021-01-30 LAB — B-HCG SERPL-ACNC: <3 MIU/ML

## 2021-02-03 ENCOUNTER — PATIENT OUTREACH (OUTPATIENT)
Dept: CASE MANAGEMENT | Facility: HOSPITAL | Age: 51
End: 2021-02-03

## 2021-02-03 ENCOUNTER — OFFICE VISIT (OUTPATIENT)
Dept: GYNECOLOGIC ONCOLOGY | Facility: HOSPITAL | Age: 51
End: 2021-02-03
Payer: COMMERCIAL

## 2021-02-03 VITALS
DIASTOLIC BLOOD PRESSURE: 78 MMHG | SYSTOLIC BLOOD PRESSURE: 132 MMHG | RESPIRATION RATE: 18 BRPM | TEMPERATURE: 97.8 F | HEIGHT: 65 IN | WEIGHT: 282 LBS | HEART RATE: 54 BPM | BODY MASS INDEX: 46.98 KG/M2

## 2021-02-03 DIAGNOSIS — C58 MOLAR PREGNANCY WITH CHORIOCARCINOMA (HCC): Primary | ICD-10-CM

## 2021-02-03 DIAGNOSIS — O02.89 MOLAR PREGNANCY WITH CHORIOCARCINOMA (HCC): Primary | ICD-10-CM

## 2021-02-03 DIAGNOSIS — Z12.31 VISIT FOR SCREENING MAMMOGRAM: ICD-10-CM

## 2021-02-03 DIAGNOSIS — N95.1 MENOPAUSAL SYMPTOMS: ICD-10-CM

## 2021-02-03 PROCEDURE — 99213 OFFICE O/P EST LOW 20 MIN: CPT | Performed by: OBSTETRICS & GYNECOLOGY

## 2021-02-03 NOTE — ASSESSMENT & PLAN NOTE
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

## 2021-02-03 NOTE — ASSESSMENT & PLAN NOTE
Symptoms relatively well controlled with estradiol at 0 5 mg daily  We discussed the risks and benefits of continuing hormone replacement therapy at the current dose, increasing the dose to 0 9 mg per day, and stopping hormone replacement therapy  She understands that she is at the approximate median age of menopause  She will start to reduce the hormone replacement therapy and assess symptoms  If her symptoms are significant and quality of life altering, I recommended continuing Estrace with consideration of increasing the dose  If she is able to tolerate reduction in HRT, she will stop treatment

## 2021-02-03 NOTE — PROGRESS NOTES
Assessment/Plan:    Problem List Items Addressed This Visit        Other    Molar pregnancy with choriocarcinoma (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 last treated with chemotherapy in March of 2020  Beta HCG is less than 3  She is clinically without evidence of disease recurrence  Performance status is 0   1  Continue monthly beta HCG levels until March  She will then go for HCG levels every 3 months for the next year  She understands that her risk of recurrence is low  2  Return in 1 year for evaluation   3  Screening mammography         Menopausal symptoms     Symptoms relatively well controlled with estradiol at 0 5 mg daily  We discussed the risks and benefits of continuing hormone replacement therapy at the current dose, increasing the dose to 0 9 mg per day, and stopping hormone replacement therapy  She understands that she is at the approximate median age of menopause  She will start to reduce the hormone replacement therapy and assess symptoms  If her symptoms are significant and quality of life altering, I recommended continuing Estrace with consideration of increasing the dose  If she is able to tolerate reduction in HRT, she will stop treatment  Visit for screening mammogram    Relevant Orders    Mammo screening bilateral w 3d & cad            CHIEF COMPLAINT:  Surveillance for gestational trophoblastic neoplasia, menopausal symptoms      Problem:  Cancer Staging  No matching staging information was found for the patient        Previous therapy:  Oncology History   Molar pregnancy with choriocarcinoma ([facility] [address] )   12/20/2019 Initial Diagnosis    GTD (gestational trophoblastic disease)     12/21/2019 - 3/24/2020 Chemotherapy    leucovorin (WELLCOVORIN) tablet 15 mg, 15 mg, Oral, Every 12 hours, 5 of 6 cycles  Administration: 15 mg (12/22/2019), 15 mg (12/23/2019), 15 mg (1/8/2020), 15 mg (1/22/2020), 15 mg (2/19/2020), 15 mg (3/12/2020)  palonosetron (ALOXI) injection 0 25 mg, 0 25 mg, Intravenous, Once, 5 of 6 cycles  Administration: 0 25 mg (12/21/2019), 0 25 mg (12/22/2019), 0 25 mg (12/31/2019), 0 25 mg (1/7/2020), 0 25 mg (1/14/2020), 0 25 mg (1/8/2020), 0 25 mg (1/28/2020), 0 25 mg (2/25/2020), 0 25 mg (3/18/2020), 0 25 mg (1/21/2020), 0 25 mg (1/22/2020), 0 25 mg (2/18/2020), 0 25 mg (2/19/2020), 0 25 mg (3/11/2020), 0 25 mg (3/12/2020)  methotrexate injection 242 5 mg, 100 mg/m2 = 242 5 mg, Intravenous, Once, 5 of 6 cycles  Administration: 242 5 mg (12/21/2019), 242 5 mg (1/7/2020), 237 5 mg (1/21/2020), 237 mg (2/18/2020), 235 mg (3/11/2020)  Filgrastim-sndz SOSY 480 mcg, 480 mcg (100 % of original dose 480 mcg), Injection, Once, 1 of 1 cycle  Dose modification: 480 mcg (original dose 480 mcg, Cycle 2, Reason: Other (See Comments), Comment: Alternative treatment)  Administration: 480 mcg (1/16/2020), 480 mcg (1/17/2020)  DACTINomycin (COSMEGEN) 500 mcg in sodium chloride 0 9 % 50 mL IVPB, 500 mcg, Intravenous, Once, 5 of 6 cycles  Administration: 500 mcg (12/21/2019), 500 mcg (12/22/2019), 500 mcg (1/7/2020), 500 mcg (1/8/2020), 500 mcg (1/21/2020), 500 mcg (1/22/2020), 500 mcg (2/18/2020), 500 mcg (2/19/2020), 500 mcg (3/11/2020), 500 mcg (3/12/2020)  vinCRIStine (ONCOVIN) 2 mg in sodium chloride 0 9 % 50 mL chemo infusion, 2 mg (original dose 1 mg/m2), Intravenous, Once, 5 of 6 cycles  Dose modification: 2 mg (original dose 1 mg/m2, Cycle 1, Reason: Max Dose Reached)  Administration: 2 mg (12/31/2019), 2 mg (1/14/2020), 2 mg (1/28/2020), 2 mg (2/25/2020), 2 mg (3/18/2020)  cyclophosphamide (CYTOXAN) 1,500 mg in sodium chloride 0 9 % 250 mL IVPB, 1,458 mg, Intravenous, Once, 5 of 6 cycles  Administration: 1,500 mg (12/31/2019), 1,500 mg (1/14/2020), 1,500 mg (1/28/2020), 1,422 mg (2/25/2020), 1,404 mg (3/18/2020)  methotrexate (PF) 485 mg in sodium chloride 0 9 % 1,000 mL continuous infusion, 200 mg/m2 = 485 mg, Intravenous, Once, 5 of 6 cycles  Administration: 485 mg (12/21/2019), 485 mg (1/7/2020), 475 mg (1/21/2020), 475 mg (2/18/2020), 467 5 mg (3/11/2020)  etoposide (TOPOSAR) 243 mg in sodium chloride 0 9 % 600 mL chemo infusion, 100 mg/m2 = 243 mg, Intravenous, Once, 5 of 6 cycles  Administration: 243 mg (12/21/2019), 243 mg (12/22/2019), 243 mg (1/7/2020), 243 mg (1/8/2020), 237 mg (1/21/2020), 237 mg (1/22/2020), 237 mg (2/18/2020), 237 mg (2/19/2020), 234 mg (3/11/2020), 234 mg (3/12/2020)     2/4/2020 Surgery    Robotic assisted total laparoscopic hysterectomy, bilateral salpingo-oophorectomy, mini-laparotomy for specimen removal, cystoscopy  1  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  Tumor measured 8 5 cm in diameter  Patient ID: Brittany Bolaños is a 48 y o  female  51-year-old returns for evaluation  Her most recent beta HCG on 1/29/2021 is less than 3  She has been taking estradiol at 0 5 mg daily which has improved her symptoms  She still awakens 1-2 times per week at night and has difficulty falling asleep  She has approximately 1 or 2 hot flashes per day  No vaginal bleeding or abdominal pain  She has been losing weight intentionally  No other interval change in her medications or medical history since her last visit  Overall, her quality of life is good  The following portions of the patient's history were reviewed and updated as appropriate: allergies, current medications, past family history, past medical history, past social history, past surgical history and problem list     Review of Systems   Constitutional: Negative for activity change and unexpected weight change  HENT: Negative  Eyes: Negative  Respiratory: Negative  Cardiovascular: Negative  Gastrointestinal: Negative for abdominal distention and abdominal pain  Endocrine: Negative  Genitourinary: Negative for pelvic pain and vaginal bleeding  Musculoskeletal: Negative      Skin: Negative  Allergic/Immunologic: Negative  Neurological: Negative  Hematological: Negative  Psychiatric/Behavioral: Negative  Current Outpatient Medications   Medication Sig Dispense Refill    escitalopram (LEXAPRO) 10 mg tablet Take 1 tablet (10 mg total) by mouth daily 90 tablet 2    estradiol (ESTRACE) 0 5 MG tablet Take 1 tablet (0 5 mg total) by mouth daily 90 tablet 1    lisinopril (ZESTRIL) 10 mg tablet TAKE 1 TABLET BY MOUTH EVERY DAY 90 tablet 0    Probiotic Product (ACIDOPHILUS PROBIOTIC BLEND) CAPS Take 2 capsules by mouth daily       No current facility-administered medications for this visit  Objective:    Blood pressure 132/78, pulse (!) 54, temperature 97 8 °F (36 6 °C), temperature source Temporal, resp  rate 18, height 5' 5" (1 651 m), weight 128 kg (282 lb), not currently breastfeeding  Body mass index is 46 93 kg/m²  Body surface area is 2 29 meters squared  Physical Exam  Vitals signs reviewed  Exam conducted with a chaperone present  Constitutional:       General: She is not in acute distress  Appearance: She is well-developed  She is not diaphoretic  HENT:      Head: Normocephalic and atraumatic  Eyes:      General: No scleral icterus  Right eye: No discharge  Left eye: No discharge  Extraocular Movements: Extraocular movements intact  Neck:      Musculoskeletal: Normal range of motion and neck supple  No muscular tenderness  Thyroid: No thyromegaly  Pulmonary:      Effort: Pulmonary effort is normal  No respiratory distress  Breath sounds: No stridor  No wheezing  Abdominal:      General: There is no distension  Palpations: Abdomen is soft  There is no mass  Tenderness: There is no abdominal tenderness  There is no guarding or rebound  Hernia: No hernia is present  Genitourinary:     Comments:  The external female genitalia is normal  The bartholin's, uretheral and skenes glands are normal  The urethral meatus is normal (midline with no lesions)  Anus without fissure or lesion  Speculum exam reveals a grossly normal vagina  No masses, lesions,discharge or bleeding  No significant cystocele or rectocele noted  Bimanual exam notes a surgical absent cervix, uterus and adnexal structures  No masses or fullness  Bladder is without fullness, mass or tenderness  Musculoskeletal:         General: No swelling, tenderness, deformity or signs of injury  Right lower leg: No edema  Left lower leg: No edema  Lymphadenopathy:      Cervical: No cervical adenopathy  Skin:     General: Skin is warm and dry  Coloration: Skin is not jaundiced or pale  Findings: Erythema present  No bruising, lesion or rash  Neurological:      General: No focal deficit present  Mental Status: She is alert and oriented to person, place, and time  Cranial Nerves: No cranial nerve deficit  Motor: No weakness  Gait: Gait normal    Psychiatric:         Mood and Affect: Mood normal          Behavior: Behavior normal          Thought Content:  Thought content normal          Judgment: Judgment normal

## 2021-02-04 NOTE — PROGRESS NOTES
MSW met with pt in the [address] [place] [address] office after her visit this day  Pt was pleased with her appointment reports and presents as well  Pt appears to be doing well both physically and emotionally  Time was spent providing emotional support as pt shared events that have bene occurring in her life  MSW will continue to be available to pt

## 2021-03-24 DIAGNOSIS — N95.1 MENOPAUSAL SYMPTOMS: ICD-10-CM

## 2021-03-24 RX ORDER — ESTRADIOL 0.5 MG/1
TABLET ORAL
Qty: 90 TABLET | Refills: 1 | Status: SHIPPED | OUTPATIENT
Start: 2021-03-24 | End: 2021-09-27

## 2021-03-25 DIAGNOSIS — I10 HYPERTENSION, UNSPECIFIED TYPE: ICD-10-CM

## 2021-03-25 RX ORDER — LISINOPRIL 10 MG/1
TABLET ORAL
Qty: 90 TABLET | Refills: 0 | Status: SHIPPED | OUTPATIENT
Start: 2021-03-25 | End: 2021-06-22

## 2021-05-04 ENCOUNTER — HOSPITAL ENCOUNTER (OUTPATIENT)
Dept: MAMMOGRAPHY | Facility: IMAGING CENTER | Age: 51
Discharge: HOME | End: 2021-05-04
Payer: COMMERCIAL

## 2021-05-04 VITALS — HEIGHT: 64 IN | BODY MASS INDEX: 48.14 KG/M2 | WEIGHT: 282 LBS

## 2021-05-04 DIAGNOSIS — Z12.31 VISIT FOR SCREENING MAMMOGRAM: ICD-10-CM

## 2021-05-04 PROCEDURE — 77063 BREAST TOMOSYNTHESIS BI: CPT

## 2021-05-04 PROCEDURE — 77067 SCR MAMMO BI INCL CAD: CPT

## 2021-06-22 DIAGNOSIS — I10 HYPERTENSION, UNSPECIFIED TYPE: ICD-10-CM

## 2021-06-22 RX ORDER — LISINOPRIL 10 MG/1
TABLET ORAL
Qty: 90 TABLET | Refills: 0 | Status: SHIPPED | OUTPATIENT
Start: 2021-06-22 | End: 2021-09-27

## 2021-08-09 LAB — B-HCG SERPL-ACNC: 3 MIU/ML

## 2021-08-12 ENCOUNTER — TELEPHONE (OUTPATIENT)
Dept: GYNECOLOGIC ONCOLOGY | Facility: CLINIC | Age: 51
End: 2021-08-12

## 2021-08-12 NOTE — TELEPHONE ENCOUNTER
Multiple attempts made to contact patient  Left another message on phone today  Will attempt to call back next week      ----- Message from Jerri Maxwell MD sent at 8/10/2021  1:07 PM EDT -----  Please give her a call with the result  Thanks  Would do a short interval f/u in one month  If that's OK, can go back to routine f/u   Thanks

## 2021-08-13 ENCOUNTER — TELEPHONE (OUTPATIENT)
Dept: HEMATOLOGY ONCOLOGY | Facility: CLINIC | Age: 51
End: 2021-08-13

## 2021-08-13 NOTE — TELEPHONE ENCOUNTER
Returning Lady Quiroga call regarding test results, best call back [phone]  CONSTITUTIONAL: Well-developed; well-nourished; in no acute distress.   SKIN: warm, dry  HEAD: Normocephalic; atraumatic.  EYES: PERRL, EOMI, no conjunctival erythema  ENT: No nasal discharge; airway clear.  NECK: Supple; non tender.  CARD: S1, S2 normal; no murmurs, gallops, or rubs. Regular rate and rhythm.   RESP: No wheezes, rales or rhonchi.  ABD: soft ntnd  EXT: Normal ROM.  No clubbing, cyanosis or edema.   LYMPH: No acute cervical adenopathy.  NEURO: Alert, oriented, grossly unremarkable  PSYCH: Cooperative, appropriate.

## 2021-08-13 NOTE — TELEPHONE ENCOUNTER
Patient returned call re: obtaining hcg level in 1 month vs 6 months  She is in agreement  Please fax lab order to the [facility] in [place]

## 2021-09-26 DIAGNOSIS — N95.1 MENOPAUSAL SYMPTOMS: ICD-10-CM

## 2021-09-27 DIAGNOSIS — I10 HYPERTENSION, UNSPECIFIED TYPE: ICD-10-CM

## 2021-09-27 RX ORDER — ESTRADIOL 0.5 MG/1
TABLET ORAL
Qty: 90 TABLET | Refills: 1 | Status: SHIPPED | OUTPATIENT
Start: 2021-09-27 | End: 2022-04-04 | Stop reason: SDUPTHER

## 2021-09-27 RX ORDER — LISINOPRIL 10 MG/1
TABLET ORAL
Qty: 90 TABLET | Refills: 0 | Status: SHIPPED | OUTPATIENT
Start: 2021-09-27 | End: 2021-12-24 | Stop reason: SDUPTHER

## 2021-09-29 DIAGNOSIS — F41.9 ANXIETY: ICD-10-CM

## 2021-09-29 RX ORDER — ESCITALOPRAM OXALATE 10 MG/1
TABLET ORAL
Qty: 90 TABLET | Refills: 2 | Status: SHIPPED | OUTPATIENT
Start: 2021-09-29 | End: 2022-04-25 | Stop reason: SDUPTHER

## 2021-12-24 DIAGNOSIS — I10 HYPERTENSION, UNSPECIFIED TYPE: ICD-10-CM

## 2021-12-24 RX ORDER — LISINOPRIL 10 MG/1
TABLET ORAL
Qty: 90 TABLET | Refills: 0 | Status: SHIPPED | OUTPATIENT
Start: 2021-12-24 | End: 2022-04-25 | Stop reason: SDUPTHER

## 2021-12-29 ENCOUNTER — TELEPHONE (OUTPATIENT)
Dept: GYNECOLOGIC ONCOLOGY | Facility: CLINIC | Age: 51
End: 2021-12-29

## 2022-02-01 ENCOUNTER — TELEPHONE (OUTPATIENT)
Dept: SURGICAL ONCOLOGY | Facility: CLINIC | Age: 52
End: 2022-02-01

## 2022-02-01 DIAGNOSIS — C58 MOLAR PREGNANCY WITH CHORIOCARCINOMA (HCC): Primary | ICD-10-CM

## 2022-02-01 DIAGNOSIS — O02.89 MOLAR PREGNANCY WITH CHORIOCARCINOMA (HCC): Primary | ICD-10-CM

## 2022-02-01 NOTE — TELEPHONE ENCOUNTER
Patient requesting a CMP,CBC-dif and Magnesium along with an HCG to [facility] [place]    Also requesting a 90 day refill on her Lisinopril

## 2022-02-01 NOTE — TELEPHONE ENCOUNTER
Call to patient left a VM message:  that her HCG prescription has been faxed to [facility] in [place] (fax #[phone])  Informed  there is no indication for us to add additional labs at this time (patient requesting "routine" lab work)  Patient also requesting a refill on Lisinopril (last refilled in Dec x 90day supply per chart)  Instructed patient that she need to obtain Lisinopril refill from her PCP  I did reach out to Dr Johanna Sargent (noted as her PCP in the chart) they stated she has not been seen there since 2010 or 2011, however they reported her records were forwarded to [address] [place] [address] (# [phone]) so I also called them and they report not having seen her since 2016  I requested that if she would call us back with name of current PCP and I will be happy to call and request additional labs and a refill on her Lisinopril on her behalf

## 2022-02-22 LAB — B-HCG SERPL-ACNC: 4 MIU/ML

## 2022-02-24 ENCOUNTER — OFFICE VISIT (OUTPATIENT)
Dept: GYNECOLOGIC ONCOLOGY | Facility: HOSPITAL | Age: 52
End: 2022-02-24
Payer: COMMERCIAL

## 2022-02-24 VITALS
DIASTOLIC BLOOD PRESSURE: 86 MMHG | BODY MASS INDEX: 50.02 KG/M2 | SYSTOLIC BLOOD PRESSURE: 132 MMHG | RESPIRATION RATE: 17 BRPM | OXYGEN SATURATION: 99 % | WEIGHT: 293 LBS | HEIGHT: 64 IN | HEART RATE: 83 BPM | TEMPERATURE: 98.5 F

## 2022-02-24 DIAGNOSIS — O02.89 MOLAR PREGNANCY WITH CHORIOCARCINOMA (HCC): Primary | ICD-10-CM

## 2022-02-24 DIAGNOSIS — C58 MOLAR PREGNANCY WITH CHORIOCARCINOMA (HCC): Primary | ICD-10-CM

## 2022-02-24 DIAGNOSIS — Z12.11 COLON CANCER SCREENING: ICD-10-CM

## 2022-02-24 PROCEDURE — 99214 OFFICE O/P EST MOD 30 MIN: CPT | Performed by: OBSTETRICS & GYNECOLOGY

## 2022-02-24 NOTE — PROGRESS NOTES
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 last treated with chemotherapy in March of 2020  Her most recent serum beta HCG as of 2/22/2022 is 4 from prior value of 3  She is clinically without evidence of disease recurrence  Performance status is 0   1  Repeat beta HCG in 1 month  Low suspicion for recurrent GTN based on time from last treatment  The low level is likely reflective of pituitary HCG  2  Continue estrogen replacement therapy for menopausal symptoms   3  Return in 1 year for gynecologic evaluation  4  Colorectal cancer screening with Cologuard           Relevant Orders    hCG, quantitative    Colon cancer screening    Relevant Orders    Cologuard            CHIEF COMPLAINT:  Follow-up gestational trophoblastic neoplasia          Previous therapy:  Oncology History   Molar pregnancy with choriocarcinoma ([facility] [address] )   12/20/2019 Initial Diagnosis    GTD (gestational trophoblastic disease)     12/21/2019 - 3/24/2020 Chemotherapy    leucovorin (WELLCOVORIN) tablet 15 mg, 15 mg, Oral, Every 12 hours, 5 of 6 cycles  Administration: 15 mg (12/22/2019), 15 mg (12/23/2019), 15 mg (1/8/2020), 15 mg (1/22/2020), 15 mg (2/19/2020), 15 mg (3/12/2020)  palonosetron (ALOXI) injection 0 25 mg, 0 25 mg, Intravenous, Once, 5 of 6 cycles  Administration: 0 25 mg (12/21/2019), 0 25 mg (12/22/2019), 0 25 mg (12/31/2019), 0 25 mg (1/7/2020), 0 25 mg (1/14/2020), 0 25 mg (1/8/2020), 0 25 mg (1/28/2020), 0 25 mg (2/25/2020), 0 25 mg (3/18/2020), 0 25 mg (1/21/2020), 0 25 mg (1/22/2020), 0 25 mg (2/18/2020), 0 25 mg (2/19/2020), 0 25 mg (3/11/2020), 0 25 mg (3/12/2020)  methotrexate injection 242 5 mg, 100 mg/m2 = 242 5 mg, Intravenous, Once, 5 of 6 cycles  Administration: 242 5 mg (12/21/2019), 242 5 mg (1/7/2020), 237 5 mg (1/21/2020), 237 mg (2/18/2020), 235 mg (3/11/2020)  Filgrastim-sndz SOSY 480 mcg, 480 mcg (100 % of original dose 480 mcg), Injection, Once, 1 of 1 cycle  Dose modification: 480 mcg (original dose 480 mcg, Cycle 2, Reason: Other (See Comments), Comment: Alternative treatment)  Administration: 480 mcg (1/16/2020), 480 mcg (1/17/2020)  DACTINomycin (COSMEGEN) 500 mcg in sodium chloride 0 9 % 50 mL IVPB, 500 mcg, Intravenous, Once, 5 of 6 cycles  Administration: 500 mcg (12/21/2019), 500 mcg (12/22/2019), 500 mcg (1/7/2020), 500 mcg (1/8/2020), 500 mcg (1/21/2020), 500 mcg (1/22/2020), 500 mcg (2/18/2020), 500 mcg (2/19/2020), 500 mcg (3/11/2020), 500 mcg (3/12/2020)  vinCRIStine (ONCOVIN) 2 mg in sodium chloride 0 9 % 50 mL chemo infusion, 2 mg (original dose 1 mg/m2), Intravenous, Once, 5 of 6 cycles  Dose modification: 2 mg (original dose 1 mg/m2, Cycle 1, Reason: Max Dose Reached)  Administration: 2 mg (12/31/2019), 2 mg (1/14/2020), 2 mg (1/28/2020), 2 mg (2/25/2020), 2 mg (3/18/2020)  cyclophosphamide (CYTOXAN) 1,500 mg in sodium chloride 0 9 % 250 mL IVPB, 1,458 mg, Intravenous, Once, 5 of 6 cycles  Administration: 1,500 mg (12/31/2019), 1,500 mg (1/14/2020), 1,500 mg (1/28/2020), 1,422 mg (2/25/2020), 1,404 mg (3/18/2020)  methotrexate (PF) 485 mg in sodium chloride 0 9 % 1,000 mL continuous infusion, 200 mg/m2 = 485 mg, Intravenous, Once, 5 of 6 cycles  Administration: 485 mg (12/21/2019), 485 mg (1/7/2020), 475 mg (1/21/2020), 475 mg (2/18/2020), 467 5 mg (3/11/2020)  etoposide (TOPOSAR) 243 mg in sodium chloride 0 9 % 600 mL chemo infusion, 100 mg/m2 = 243 mg, Intravenous, Once, 5 of 6 cycles  Administration: 243 mg (12/21/2019), 243 mg (12/22/2019), 243 mg (1/7/2020), 243 mg (1/8/2020), 237 mg (1/21/2020), 237 mg (1/22/2020), 237 mg (2/18/2020), 237 mg (2/19/2020), 234 mg (3/11/2020), 234 mg (3/12/2020)     2/4/2020 Surgery    Robotic assisted total laparoscopic hysterectomy, bilateral salpingo-oophorectomy, mini-laparotomy for specimen removal, cystoscopy  1  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  Tumor measured 8 5 cm in diameter  Patient ID: Oliverio Cotto is a 46 y o  female  Returns for follow-up of gestational trophoblastic neoplasia  Her most recent quantitative hCG is 4 from prior value of 3  Her menopausal symptoms are controlled with estrogen replacement therapy  Screening mammogram in May of 2021 was BI-RADS category 1  She is due for screening colonoscopy  She is asymptomatic and has good quality of life  No interval change in medications or medical history since her last visit  The following portions of the patient's history were reviewed and updated as appropriate: allergies, current medications, past family history, past medical history, past social history, past surgical history and problem list     Review of Systems   Constitutional: Negative for activity change and unexpected weight change  HENT: Negative  Eyes: Negative  Respiratory: Negative  Cardiovascular: Negative  Gastrointestinal: Negative for abdominal distention and abdominal pain  Endocrine: Negative  Genitourinary: Negative for pelvic pain and vaginal bleeding  Musculoskeletal: Negative  Skin: Negative  Allergic/Immunologic: Negative  Neurological: Negative  Hematological: Negative  Psychiatric/Behavioral: Negative  Current Outpatient Medications   Medication Sig Dispense Refill    escitalopram (LEXAPRO) 10 mg tablet TAKE ONE TABLET BY MOUTH EVERY DAY 90 tablet 2    estradiol (ESTRACE) 0 5 MG tablet TAKE 1 TABLET BY MOUTH EVERY DAY 90 tablet 1    lisinopril (ZESTRIL) 10 mg tablet TAKE 1 TABLET BY MOUTH EVERY DAY 90 tablet 0    Probiotic Product (ACIDOPHILUS PROBIOTIC BLEND) CAPS Take 2 capsules by mouth daily       No current facility-administered medications for this visit             Objective:    Blood pressure 132/86, pulse 83, temperature 98 5 °F (36 9 °C), temperature source Probe, resp  rate 17, height 5' 4" (1 626 m), weight (!) 140 kg (307 lb 12 8 oz), SpO2 99 %, not currently breastfeeding  Body mass index is 52 83 kg/m²  Body surface area is 2 35 meters squared  Physical Exam  Vitals reviewed  Exam conducted with a chaperone present  Constitutional:       General: She is not in acute distress  Appearance: Normal appearance  She is well-developed  She is obese  She is not ill-appearing, toxic-appearing or diaphoretic  HENT:      Head: Normocephalic and atraumatic  Eyes:      General: No scleral icterus  Right eye: No discharge  Left eye: No discharge  Extraocular Movements: Extraocular movements intact  Conjunctiva/sclera: Conjunctivae normal    Neck:      Thyroid: No thyromegaly  Pulmonary:      Effort: Pulmonary effort is normal  No respiratory distress  Breath sounds: No stridor  No wheezing  Abdominal:      General: There is no distension  Palpations: Abdomen is soft  There is no mass  Tenderness: There is no abdominal tenderness  There is no guarding or rebound  Hernia: No hernia is present  Genitourinary:     Comments: The external female genitalia is normal  The bartholin's, uretheral and skenes glands are normal  The urethral meatus is normal (midline with no lesions)  Anus without fissure or lesion  Speculum exam reveals a grossly normal vagina  No masses, lesions,discharge or bleeding  No significant cystocele or rectocele noted  Bimanual exam notes a surgical absent cervix, uterus and adnexal structures  No masses or fullness  Bladder is without fullness, mass or tenderness  Musculoskeletal:      Cervical back: Normal range of motion and neck supple  No rigidity  Right lower leg: No edema  Left lower leg: No edema  Lymphadenopathy:      Cervical: No cervical adenopathy  Skin:     General: Skin is warm and dry  Coloration: Skin is not jaundiced or pale        Findings: No bruising or erythema  Neurological:      General: No focal deficit present  Mental Status: She is alert and oriented to person, place, and time  Mental status is at baseline  Cranial Nerves: No cranial nerve deficit  Motor: No weakness  Gait: Gait normal    Psychiatric:         Mood and Affect: Mood normal          Behavior: Behavior normal          Thought Content:  Thought content normal          Judgment: Judgment normal                   Lab Results   Component Value Date    HCGQUANT 4 02/22/2022    HCGQUANT 3 08/09/2021    HCGQUANT <3 01/29/2021    HCGQUANT <3 12/31/2020    HCGQUANT <3 10/30/2020    HCGQUANT <3 09/28/2020    HCGQUANT <3 07/27/2020    HCGQUANT <3 06/23/2020    HCGQUANT <2 04/06/2020    HCGQUANT 2 03/16/2020    HCGQUANT <2 03/09/2020    HCGQUANT 2 03/05/2020    HCGQUANT 3 03/02/2020    HCGQUANT 3 02/24/2020    HCGQUANT 7 02/17/2020    HCGQUANT 45 (H) 02/06/2020    HCGQUANT 249 01/27/2020    HCGQUANT 823 01/20/2020    HCGQUANT 7,932 01/13/2020    HCGQUANT 17,673 (H) 01/07/2020

## 2022-02-24 NOTE — ASSESSMENT & PLAN NOTE
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

## 2022-04-04 ENCOUNTER — TELEPHONE (OUTPATIENT)
Dept: GYNECOLOGIC ONCOLOGY | Facility: CLINIC | Age: 52
End: 2022-04-04

## 2022-04-04 DIAGNOSIS — N95.1 MENOPAUSAL SYMPTOMS: ICD-10-CM

## 2022-04-04 RX ORDER — ESTRADIOL 0.5 MG/1
0.5 TABLET ORAL DAILY
Qty: 90 TABLET | Refills: 1 | Status: SHIPPED | OUTPATIENT
Start: 2022-04-04 | End: 2022-04-25 | Stop reason: SDUPTHER

## 2022-04-04 NOTE — TELEPHONE ENCOUNTER
Medication Refill     Who is Calling Patient   Medication  Estradiol   How many pills left 2   Preferred Pharmacy / Address [facility] in [place]   Who is your Physician?     Call back number  [phone]   Relevant Information  90 day supply please

## 2022-04-06 DIAGNOSIS — O02.89 MOLAR PREGNANCY WITH CHORIOCARCINOMA (HCC): Primary | ICD-10-CM

## 2022-04-06 DIAGNOSIS — C58 MOLAR PREGNANCY WITH CHORIOCARCINOMA (HCC): Primary | ICD-10-CM

## 2022-04-06 LAB — B-HCG SERPL-ACNC: 5 MIU/ML

## 2022-04-07 ENCOUNTER — TELEPHONE (OUTPATIENT)
Dept: HEMATOLOGY ONCOLOGY | Facility: CLINIC | Age: 52
End: 2022-04-07

## 2022-04-07 ENCOUNTER — TELEPHONE (OUTPATIENT)
Dept: GYNECOLOGIC ONCOLOGY | Facility: CLINIC | Age: 52
End: 2022-04-07

## 2022-04-15 LAB — B-HCG SERPL-ACNC: 5 MIU/ML

## 2022-04-22 ENCOUNTER — TELEPHONE (OUTPATIENT)
Dept: GYNECOLOGIC ONCOLOGY | Facility: CLINIC | Age: 52
End: 2022-04-22

## 2022-04-22 NOTE — TELEPHONE ENCOUNTER
Patient called in as she is in need of a mail in order prescription for the following    estradiol (ESTRACE) 0 5 MG tablet [[id number]]  lisinopril (ZESTRIL) 10 mg tablet [[id number]  escitalopram (LEXAPRO) 10 mg tablet [[id number]    Written for 90 days, [facility]  Dr Guerrier wanted a monthly 8 cv quantative   Fax into [facility]: [phone]     Best call back #[phone]

## 2022-04-25 DIAGNOSIS — F41.9 ANXIETY: ICD-10-CM

## 2022-04-25 DIAGNOSIS — I10 HYPERTENSION, UNSPECIFIED TYPE: ICD-10-CM

## 2022-04-25 DIAGNOSIS — C58 MOLAR PREGNANCY WITH CHORIOCARCINOMA (HCC): Primary | ICD-10-CM

## 2022-04-25 DIAGNOSIS — O02.89 MOLAR PREGNANCY WITH CHORIOCARCINOMA (HCC): Primary | ICD-10-CM

## 2022-04-25 DIAGNOSIS — N95.1 MENOPAUSAL SYMPTOMS: ICD-10-CM

## 2022-04-25 RX ORDER — ESTRADIOL 0.5 MG/1
0.5 TABLET ORAL DAILY
Qty: 90 TABLET | Refills: 2 | Status: SHIPPED | OUTPATIENT
Start: 2022-04-25 | End: 2022-08-08 | Stop reason: SDUPTHER

## 2022-04-25 RX ORDER — LISINOPRIL 10 MG/1
10 TABLET ORAL DAILY
Qty: 90 TABLET | Refills: 0 | Status: SHIPPED | OUTPATIENT
Start: 2022-04-25

## 2022-04-25 RX ORDER — ESCITALOPRAM OXALATE 10 MG/1
10 TABLET ORAL DAILY
Qty: 90 TABLET | Refills: 0 | Status: SHIPPED | OUTPATIENT
Start: 2022-04-25

## 2022-04-25 NOTE — TELEPHONE ENCOUNTER
Rx submitted for all 3 prescriptions  Lexapro/lisinopril will be filled for the last time  All future refills need to come from PCP

## 2022-04-25 NOTE — TELEPHONE ENCOUNTER
Call to patient left a VM that the 3 requested prescriptions will be refilled with a 90 day supply, however this is the final refill we will provide for her Lexapro and Lisinopril and she will have to have those 2 refilled next by her PCP  Also I let her know the HCG quantitative prescription faxed to [facility]    (FYI I did receive a positive confirmation of receipt of that script)

## 2022-08-08 ENCOUNTER — TELEPHONE (OUTPATIENT)
Dept: SURGICAL ONCOLOGY | Facility: CLINIC | Age: 52
End: 2022-08-08

## 2022-08-08 DIAGNOSIS — C58 MOLAR PREGNANCY WITH CHORIOCARCINOMA (HCC): Primary | ICD-10-CM

## 2022-08-08 DIAGNOSIS — O02.89 MOLAR PREGNANCY WITH CHORIOCARCINOMA (HCC): Primary | ICD-10-CM

## 2022-08-08 DIAGNOSIS — N95.1 MENOPAUSAL SYMPTOMS: ICD-10-CM

## 2022-08-08 DIAGNOSIS — Z12.31 SCREENING MAMMOGRAM FOR BREAST CANCER: ICD-10-CM

## 2022-08-08 RX ORDER — ESTRADIOL 0.5 MG/1
0.5 TABLET ORAL DAILY
Qty: 90 TABLET | Refills: 2 | Status: SHIPPED | OUTPATIENT
Start: 2022-08-08 | End: 2022-09-26 | Stop reason: SDUPTHER

## 2022-08-08 NOTE — TELEPHONE ENCOUNTER
Estracdil Refill and blood work order and a Mammogram order  Please give nilas a phone call back at [phone]

## 2022-08-09 ENCOUNTER — TELEPHONE (OUTPATIENT)
Dept: SURGICAL ONCOLOGY | Facility: CLINIC | Age: 52
End: 2022-08-09

## 2022-08-09 NOTE — TELEPHONE ENCOUNTER
James kit, The patient had some questions about it  Does she need to do the kit or not  Does the kit need to be file under the new insurance because the patient said it was filed under the old insurance  Pl,ease give her a call back at [phone]

## 2022-08-12 DIAGNOSIS — Z12.11 ENCOUNTER FOR SCREENING COLONOSCOPY: Primary | ICD-10-CM

## 2022-08-12 NOTE — TELEPHONE ENCOUNTER
Return call placed to patient  Submitted new order for cologuard  She will contact [employer] to inquire about needing new kit vs  using old kit

## 2022-08-19 ENCOUNTER — TELEPHONE (OUTPATIENT)
Dept: GYNECOLOGIC ONCOLOGY | Facility: CLINIC | Age: 52
End: 2022-08-19

## 2022-08-19 ENCOUNTER — HOSPITAL ENCOUNTER (OUTPATIENT)
Dept: MAMMOGRAPHY | Facility: CLINIC | Age: 52
Discharge: HOME | End: 2022-08-19
Payer: COMMERCIAL

## 2022-08-19 VITALS — WEIGHT: 293 LBS | BODY MASS INDEX: 50.02 KG/M2 | HEIGHT: 64 IN

## 2022-08-19 DIAGNOSIS — Z12.31 SCREENING MAMMOGRAM FOR BREAST CANCER: ICD-10-CM

## 2022-08-19 PROCEDURE — 77067 SCR MAMMO BI INCL CAD: CPT

## 2022-08-19 PROCEDURE — 77063 BREAST TOMOSYNTHESIS BI: CPT

## 2022-08-19 NOTE — TELEPHONE ENCOUNTER
Alejandra from [employer] called in regarding the order for the cologuard  Liam Gillespie states that the same order was placed back in March by Dr Goyo Gonzales and the kit was never returned  Liam Gillespie states that they are going to cancel the new order as a result of the same active order that was placed back in march   Any questions, the exact science lab can be called back @[phone]

## 2022-08-31 LAB — B-HCG SERPL-ACNC: 7 MIU/ML

## 2022-09-01 DIAGNOSIS — N95.1 INSOMNIA ASSOCIATED WITH MENOPAUSE: Primary | ICD-10-CM

## 2022-09-01 DIAGNOSIS — D70.1 CHEMOTHERAPY INDUCED NEUTROPENIA (HCC): Primary | ICD-10-CM

## 2022-09-01 DIAGNOSIS — O02.89 MOLAR PREGNANCY WITH CHORIOCARCINOMA (HCC): ICD-10-CM

## 2022-09-01 DIAGNOSIS — T45.1X5A CHEMOTHERAPY INDUCED NEUTROPENIA (HCC): Primary | ICD-10-CM

## 2022-09-01 DIAGNOSIS — C58 MOLAR PREGNANCY WITH CHORIOCARCINOMA (HCC): ICD-10-CM

## 2022-09-01 RX ORDER — LORAZEPAM 0.5 MG/1
TABLET ORAL
Qty: 20 TABLET | Refills: 0 | Status: SHIPPED | OUTPATIENT
Start: 2022-09-01 | End: 2022-09-26 | Stop reason: SDUPTHER

## 2022-09-23 LAB — B-HCG SERPL-ACNC: 6 MIU/ML

## 2022-09-26 DIAGNOSIS — N95.1 MENOPAUSAL SYMPTOMS: ICD-10-CM

## 2022-09-26 DIAGNOSIS — O02.89 MOLAR PREGNANCY WITH CHORIOCARCINOMA (HCC): Primary | ICD-10-CM

## 2022-09-26 DIAGNOSIS — F41.9 ANXIETY: ICD-10-CM

## 2022-09-26 DIAGNOSIS — N95.1 INSOMNIA ASSOCIATED WITH MENOPAUSE: ICD-10-CM

## 2022-09-26 DIAGNOSIS — C58 MOLAR PREGNANCY WITH CHORIOCARCINOMA (HCC): Primary | ICD-10-CM

## 2022-09-26 RX ORDER — LORAZEPAM 0.5 MG/1
TABLET ORAL
Qty: 20 TABLET | Refills: 0 | Status: SHIPPED | OUTPATIENT
Start: 2022-09-26

## 2022-09-26 RX ORDER — ESTRADIOL 0.5 MG/1
0.5 TABLET ORAL DAILY
Qty: 90 TABLET | Refills: 2 | Status: SHIPPED | OUTPATIENT
Start: 2022-09-26

## 2022-10-11 PROBLEM — Z12.11 COLON CANCER SCREENING: Status: RESOLVED | Noted: 2022-02-24 | Resolved: 2022-10-11

## 2023-02-20 ENCOUNTER — TELEPHONE (OUTPATIENT)
Dept: GYNECOLOGIC ONCOLOGY | Facility: CLINIC | Age: 53
End: 2023-02-20

## 2023-02-20 NOTE — TELEPHONE ENCOUNTER
Patient called into the office returning Giorgio's call  Patient was to reschedule her appointment with Sarah Michel but questioned if the 1 year f/u had to do with the hormonal replacement therapy   Patient is requesting a phone call back to discuss this @! [phone]

## 2023-02-20 NOTE — TELEPHONE ENCOUNTER
Patient has elected to see her primary gyn for annual visits, citing insurance reasons (high co-pays/deductible for specialty visits)  She is also no longer taking HRT and managing her hot flashes and insomnia with other products  She will have her hcg levels collected every 6-12 months, and reach out to us if there are any new symptoms or concerns

## 2024-02-05 NOTE — PROGRESS NOTES
3.5 Bivona trach still in, doing well.  Follow-up in 6 months.   Progress Note - Pulmonary   Philly Gay 52 y o  female MRN: [id number]  Unit/Bed#: [facility] 909-01 Encounter: [id number]    1  Abnormal CT chest with concern for healthcare associated pneumonia  - CT chest with areas of consolidation, most notable in the left upper lobe and lingula  Also noted are multiple irregular pulmonary nodules, likely hemorrhagic metastases  - bronchoscopy done yesterday with left lingular lavage, cultures and cytology are pending  - multiple aliquots taken from lavage, all similar in color, inconsistent with diffuse alveolar hemorrhage  - strep pneumo, Legionella, influenza, RSV all negative, along with initial procalcitonin  - given abnormal CT chest in the setting of active chemotherapy, agree with continuation of antibiotics to treat possible healthcare associated pneumonia  - continue with cefepime, azithromycin per Infectious Disease recommendations  - follow cultures  - blood cultures without growth to date thus far    2  Bilateral pleural effusions  - likely related to metastatic disease versus less likely infectious etiology  - continue to monitor, patient is currently saturating comfortably on room air and pleural effusions appear too small for drainage    3  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  - on active chemotherapy with EMA/CO, methotrexate  - planned for next round of chemotherapy in 1 week  - further management per Gyne onc    4  Super morbid obesity  - proper diet/exercise, weight loss counseling    5  Hypertension  - management per primary team, currently has IV labetalol p r n   - can consider starting daily medication such as amlodipine if persistently hypertensive    Subjective:   Patient seen examined at bedside  States that she had a difficult night due to persistent coughing, productive of clear and blood-tinged sputum  Denies further fever, chills, nausea, vomiting, chest pain  Resting comfortably on room air    Underwent bronchoscopy yesterday with left lingular lavage, cultures are pending  Review of Systems   Constitutional: Negative for chills, fever and unexpected weight change  HENT: Negative for congestion, rhinorrhea, sneezing and sore throat  Respiratory: Positive for cough  Negative for shortness of breath and wheezing  Cardiovascular: Negative for chest pain, palpitations and leg swelling  Gastrointestinal: Negative for abdominal pain, constipation, diarrhea, nausea and vomiting  Genitourinary: Negative for dysuria  Musculoskeletal: Negative for arthralgias  Neurological: Negative for dizziness and numbness  Objective:     Vitals:    12/26/19 1524 12/26/19 1911 12/26/19 2212 12/27/19 0308   BP: 139/87 145/93 152/83 (!) 171/93   BP Location:       Pulse: 105 97 94 83   Resp: 18  20 18   Temp: 99 2 °F (37 3 °C) 99 1 °F (37 3 °C) 98 4 °F (36 9 °C) 98 2 °F (36 8 °C)   TempSrc:       SpO2: 99% 99% 98% 96%   Weight:       Height:               Intake/Output Summary (Last 24 hours) at 12/27/2019 0738  Last data filed at 12/27/2019 0501  Gross per 24 hour   Intake 590 ml   Output 1750 ml   Net -1160 ml         Physical Exam   Constitutional: She is oriented to person, place, and time  She appears well-developed and well-nourished  No distress  Obese   HENT:   Head: Normocephalic and atraumatic  Mouth/Throat: Oropharynx is clear and moist  No oropharyngeal exudate  Eyes: EOM are normal  No scleral icterus  Cardiovascular: Normal rate, regular rhythm and normal heart sounds  No murmur heard  Pulmonary/Chest: Effort normal and breath sounds normal  No respiratory distress  She has no wheezes  Abdominal: Soft  Bowel sounds are normal  She exhibits no distension  There is no tenderness  Musculoskeletal: She exhibits no edema  Neurological: She is alert and oriented to person, place, and time  Skin: She is not diaphoretic  Labs: I have personally reviewed pertinent lab results    Results from last 7 days   Lab Units 12/27/19  0512 12/26/19  0637 12/25/19  0930  12/20/19  1527   WBC Thousand/uL 4 86 8 35 13 28*   < > 8 98   HEMOGLOBIN g/dL 10 3* 11 3* 12 5   < > 13 0   HEMATOCRIT % 31 2* 34 0* 38 0   < > 39 0   PLATELETS Thousands/uL 191 206 250   < > 263   NEUTROS PCT % 79*  --  94*  --  76*   MONOS PCT % 1*  --  0*  --  5   MONO PCT %  --  0*  --   --   --     < > = values in this interval not displayed  Results from last 7 days   Lab Units 12/27/19  0513 12/26/19  0637 12/25/19  0930  12/20/19  1527   POTASSIUM mmol/L 3 9 3 9 3 9   < > 4 1   CHLORIDE mmol/L 112* 110* 105   < > 108   CO2 mmol/L 21 22 23   < > 24   BUN mg/dL 20 15 17   < > 15   CREATININE mg/dL 0 92 0 94 0 99   < > 0 89   CALCIUM mg/dL 8 4 8 6 8 8   < > 9 4   ALK PHOS U/L  --   --  69  --  70   ALT U/L  --   --  59  --  39   AST U/L  --   --  42  --  22    < > = values in this interval not displayed  Results from last 7 days   Lab Units 12/20/19  1527   MAGNESIUM mg/dL 2 1     Results from last 7 days   Lab Units 12/20/19  1527   PHOSPHORUS mg/dL 3 8      Results from last 7 days   Lab Units 12/25/19  0930 12/20/19  1527   INR  0 97 0 99   PTT seconds 29 27     Results from last 7 days   Lab Units 12/25/19  1018   LACTIC ACID mmol/L 1 3     0   Lab Value Date/Time    TROPONINI 0 03 12/25/2019 0930       Microbiology:  Blood cultures no growth to date  Flu/RSV negative  Strep pneumo/Legionella negative  Bronchial cultures pending    Imaging and other studies: I have personally reviewed pertinent reports     and I have personally reviewed pertinent films in PACS  CTA chest 12/25  Negative PE  Dilated central pulmonary arteries  Bilateral consolidation, most notably in left upper lobe  Multiple irregular pulmonary nodules consistent with hemorrhagic metastases  Moderate bilateral pleural effusions      Jennifer Jim MD  Pulmonary & Critical Care Fellow, [facility]

## (undated) DEVICE — UTERINE MANIPULATOR RUMI DISP TIP 6.7X12CM ORANGE

## (undated) DEVICE — SUT PDS II 1 XLH 96 IN LOOPED Z881G

## (undated) DEVICE — LARGE NEEDLE DRIVER: Brand: ENDOWRIST

## (undated) DEVICE — INTENDED FOR TISSUE SEPARATION, AND OTHER PROCEDURES THAT REQUIRE A SHARP SURGICAL BLADE TO PUNCTURE OR CUT.: Brand: BARD-PARKER SAFETY BLADES SIZE 15, STERILE

## (undated) DEVICE — GLOVE PI ULTRA TOUCH SZ.7.5

## (undated) DEVICE — VISUALIZATION SYSTEM: Brand: CLEARIFY

## (undated) DEVICE — COLUMN DRAPE

## (undated) DEVICE — SYRINGE 50ML LL

## (undated) DEVICE — MEDI-VAC YANK SUCT HNDL W/TPRD BULBOUS TIP: Brand: CARDINAL HEALTH

## (undated) DEVICE — LUBRICANT INST ELECTROLUBE ANTISTK WO PAD

## (undated) DEVICE — STRL COTTON TIP APPLCTR 6IN PK: Brand: CARDINAL HEALTH

## (undated) DEVICE — TIP COVER ACCESSORY

## (undated) DEVICE — GLOVE INDICATOR PI UNDERGLOVE SZ 7 BLUE

## (undated) DEVICE — CHLORAPREP HI-LITE 26ML ORANGE

## (undated) DEVICE — INTENDED FOR TISSUE SEPARATION, AND OTHER PROCEDURES THAT REQUIRE A SHARP SURGICAL BLADE TO PUNCTURE OR CUT.: Brand: BARD-PARKER SAFETY BLADES SIZE 11, STERILE

## (undated) DEVICE — ARM DRAPE

## (undated) DEVICE — STERILE CYSTO PACK: Brand: CARDINAL HEALTH

## (undated) DEVICE — FENESTRATED BIPOLAR FORCEPS: Brand: ENDOWRIST

## (undated) DEVICE — SUT VICRYL 0 REEL 54 IN J287G

## (undated) DEVICE — SUT MONOCRYL 4-0 PS-2 27 IN Y426H

## (undated) DEVICE — LAPAROSCOPIC TROCAR SLEEVE/SINGLE USE: Brand: KII® OPTICAL ACCESS SYSTEM

## (undated) DEVICE — Device: Brand: TISSUE RETRIEVAL SYSTEM

## (undated) DEVICE — MONOPOLAR CURVED SCISSORS: Brand: ENDOWRIST

## (undated) DEVICE — ADHESIVE SKIN HIGH VISCOSITY EXOFIN 1ML

## (undated) DEVICE — PROGRASP FORCEPS: Brand: ENDOWRIST

## (undated) DEVICE — SUT PLAIN 2-0 CTX 27 IN 872H

## (undated) DEVICE — GLOVE INDICATOR PI UNDERGLOVE SZ 8 BLUE

## (undated) DEVICE — ANTIBACTERIAL VIOLET BRAIDED (POLYGLACTIN 910), SYNTHETIC ABSORBABLE SUTURE: Brand: COATED VICRYL

## (undated) DEVICE — 1820 FOAM BLOCK NEEDLE COUNTER: Brand: DEVON

## (undated) DEVICE — TRAY FOLEY 16FR URIMETER SILICONE SURESTEP

## (undated) DEVICE — CHLORHEXIDINE 4PCT 4 OZ

## (undated) DEVICE — KIT, BETHLEHEM ROBOTIC PROST: Brand: CARDINAL HEALTH

## (undated) DEVICE — PREMIUM DRY TRAY LF: Brand: MEDLINE INDUSTRIES, INC.

## (undated) DEVICE — 3000CC GUARDIAN II: Brand: GUARDIAN

## (undated) DEVICE — MAYO STAND COVER: Brand: CONVERTORS

## (undated) DEVICE — TROCAR PORT ACCESS 12 X120MM W/BLDLS OPTICAL TIP AIRSEAL

## (undated) DEVICE — SUT STRATAFIX SPIRAL 2-0 CT-1 30 CM SXPP1B410

## (undated) DEVICE — AIRSEAL TUBE SMOKE EVAC LUMENX3 FILTERED

## (undated) DEVICE — CANNULA SEAL